# Patient Record
Sex: MALE | Race: WHITE | NOT HISPANIC OR LATINO | Employment: OTHER | URBAN - METROPOLITAN AREA
[De-identification: names, ages, dates, MRNs, and addresses within clinical notes are randomized per-mention and may not be internally consistent; named-entity substitution may affect disease eponyms.]

---

## 2018-01-22 ENCOUNTER — TRANSCRIBE ORDERS (OUTPATIENT)
Dept: ADMINISTRATIVE | Facility: HOSPITAL | Age: 63
End: 2018-01-22

## 2018-01-22 DIAGNOSIS — R10.84 ABDOMINAL PAIN, GENERALIZED: Primary | ICD-10-CM

## 2018-01-23 ENCOUNTER — HOSPITAL ENCOUNTER (OUTPATIENT)
Dept: CT IMAGING | Facility: HOSPITAL | Age: 63
Discharge: HOME | End: 2018-01-23
Attending: FAMILY MEDICINE | Admitting: FAMILY MEDICINE

## 2018-01-23 DIAGNOSIS — R10.84 ABDOMINAL PAIN, GENERALIZED: ICD-10-CM

## 2018-01-23 PROCEDURE — 74176 CT ABD & PELVIS W/O CONTRAST: CPT

## 2018-01-25 ENCOUNTER — APPOINTMENT (OUTPATIENT)
Dept: CT IMAGING | Facility: HOSPITAL | Age: 63
End: 2018-01-25
Attending: FAMILY MEDICINE

## 2018-03-26 ENCOUNTER — HOSPITAL ENCOUNTER (OUTPATIENT)
Dept: GENERAL RADIOLOGY | Facility: HOSPITAL | Age: 63
Discharge: HOME | End: 2018-03-26
Attending: FAMILY MEDICINE | Admitting: FAMILY MEDICINE

## 2018-03-26 ENCOUNTER — TRANSCRIBE ORDERS (OUTPATIENT)
Dept: ADMINISTRATIVE | Facility: HOSPITAL | Age: 63
End: 2018-03-26

## 2018-03-26 DIAGNOSIS — R06.00 DYSPNEA, UNSPECIFIED TYPE: Primary | ICD-10-CM

## 2018-03-26 PROCEDURE — 71046 X-RAY EXAM CHEST 2 VIEWS: CPT

## 2018-10-29 ENCOUNTER — LAB (OUTPATIENT)
Dept: LAB | Facility: HOSPITAL | Age: 63
End: 2018-10-29

## 2018-10-29 ENCOUNTER — TRANSCRIBE ORDERS (OUTPATIENT)
Dept: LAB | Facility: HOSPITAL | Age: 63
End: 2018-10-29

## 2018-10-29 DIAGNOSIS — N18.9 CHRONIC KIDNEY DISEASE, UNSPECIFIED CKD STAGE: ICD-10-CM

## 2018-10-29 DIAGNOSIS — N18.9 CHRONIC KIDNEY DISEASE, UNSPECIFIED CKD STAGE: Primary | ICD-10-CM

## 2018-10-29 LAB
ALBUMIN SERPL-MCNC: 3.87 G/DL (ref 3.2–4.8)
ANION GAP SERPL CALCULATED.3IONS-SCNC: 7 MMOL/L (ref 3–11)
BACTERIA UR QL AUTO: ABNORMAL /HPF
BASOPHILS # BLD AUTO: 0.03 10*3/MM3 (ref 0–0.2)
BASOPHILS NFR BLD AUTO: 0.4 % (ref 0–1)
BILIRUB UR QL STRIP: NEGATIVE
BUN BLD-MCNC: 26 MG/DL (ref 9–23)
BUN/CREAT SERPL: 13.8 (ref 7–25)
CALCIUM SPEC-SCNC: 9.2 MG/DL (ref 8.7–10.4)
CHLORIDE SERPL-SCNC: 105 MMOL/L (ref 99–109)
CLARITY UR: CLEAR
CO2 SERPL-SCNC: 27 MMOL/L (ref 20–31)
COLOR UR: YELLOW
CREAT BLD-MCNC: 1.88 MG/DL (ref 0.6–1.3)
CREAT UR-MCNC: 127.2 MG/DL
DEPRECATED RDW RBC AUTO: 45.3 FL (ref 37–54)
EOSINOPHIL # BLD AUTO: 0.07 10*3/MM3 (ref 0–0.3)
EOSINOPHIL NFR BLD AUTO: 0.9 % (ref 0–3)
ERYTHROCYTE [DISTWIDTH] IN BLOOD BY AUTOMATED COUNT: 14 % (ref 11.3–14.5)
GFR SERPL CREATININE-BSD FRML MDRD: 36 ML/MIN/1.73
GLUCOSE BLD-MCNC: 188 MG/DL (ref 70–100)
GLUCOSE UR STRIP-MCNC: ABNORMAL MG/DL
HCT VFR BLD AUTO: 41.6 % (ref 38.9–50.9)
HGB BLD-MCNC: 13.6 G/DL (ref 13.1–17.5)
HGB UR QL STRIP.AUTO: ABNORMAL
HYALINE CASTS UR QL AUTO: ABNORMAL /LPF
IMM GRANULOCYTES # BLD: 0.02 10*3/MM3 (ref 0–0.03)
IMM GRANULOCYTES NFR BLD: 0.3 % (ref 0–0.6)
KETONES UR QL STRIP: NEGATIVE
LEUKOCYTE ESTERASE UR QL STRIP.AUTO: ABNORMAL
LYMPHOCYTES # BLD AUTO: 1.26 10*3/MM3 (ref 0.6–4.8)
LYMPHOCYTES NFR BLD AUTO: 15.9 % (ref 24–44)
MCH RBC QN AUTO: 29.5 PG (ref 27–31)
MCHC RBC AUTO-ENTMCNC: 32.7 G/DL (ref 32–36)
MCV RBC AUTO: 90.2 FL (ref 80–99)
MONOCYTES # BLD AUTO: 0.54 10*3/MM3 (ref 0–1)
MONOCYTES NFR BLD AUTO: 6.8 % (ref 0–12)
NEUTROPHILS # BLD AUTO: 6.01 10*3/MM3 (ref 1.5–8.3)
NEUTROPHILS NFR BLD AUTO: 76 % (ref 41–71)
NITRITE UR QL STRIP: NEGATIVE
PH UR STRIP.AUTO: <=5 [PH] (ref 5–8)
PHOSPHATE SERPL-MCNC: 2.8 MG/DL (ref 2.4–5.1)
PLAT MORPH BLD: NORMAL
PLATELET # BLD AUTO: 128 10*3/MM3 (ref 150–450)
PMV BLD AUTO: 10 FL (ref 6–12)
POTASSIUM BLD-SCNC: 4.4 MMOL/L (ref 3.5–5.5)
PROT UR QL STRIP: NEGATIVE
PROT UR-MCNC: 12 MG/DL (ref 1–14)
RBC # BLD AUTO: 4.61 10*6/MM3 (ref 4.2–5.76)
RBC # UR: ABNORMAL /HPF
RBC MORPH BLD: NORMAL
REF LAB TEST METHOD: ABNORMAL
SODIUM BLD-SCNC: 139 MMOL/L (ref 132–146)
SP GR UR STRIP: 1.02 (ref 1–1.03)
SQUAMOUS #/AREA URNS HPF: ABNORMAL /HPF
UROBILINOGEN UR QL STRIP: ABNORMAL
WBC MORPH BLD: NORMAL
WBC NRBC COR # BLD: 7.91 10*3/MM3 (ref 3.5–10.8)
WBC UR QL AUTO: ABNORMAL /HPF

## 2018-10-29 PROCEDURE — 81001 URINALYSIS AUTO W/SCOPE: CPT

## 2018-10-29 PROCEDURE — 84156 ASSAY OF PROTEIN URINE: CPT

## 2018-10-29 PROCEDURE — 80069 RENAL FUNCTION PANEL: CPT

## 2018-10-29 PROCEDURE — 85007 BL SMEAR W/DIFF WBC COUNT: CPT

## 2018-10-29 PROCEDURE — 36415 COLL VENOUS BLD VENIPUNCTURE: CPT

## 2018-10-29 PROCEDURE — 85025 COMPLETE CBC W/AUTO DIFF WBC: CPT

## 2018-10-29 PROCEDURE — 82570 ASSAY OF URINE CREATININE: CPT

## 2019-01-25 ENCOUNTER — TRANSCRIBE ORDERS (OUTPATIENT)
Dept: LAB | Facility: HOSPITAL | Age: 64
End: 2019-01-25

## 2019-01-25 ENCOUNTER — LAB (OUTPATIENT)
Dept: LAB | Facility: HOSPITAL | Age: 64
End: 2019-01-25

## 2019-01-25 DIAGNOSIS — N18.30 CHRONIC KIDNEY DISEASE, STAGE III (MODERATE) (HCC): ICD-10-CM

## 2019-01-25 DIAGNOSIS — N18.30 CHRONIC KIDNEY DISEASE, STAGE III (MODERATE) (HCC): Primary | ICD-10-CM

## 2019-01-25 LAB
25(OH)D3 SERPL-MCNC: 18.3 NG/ML
ALBUMIN SERPL-MCNC: 4.04 G/DL (ref 3.2–4.8)
ANION GAP SERPL CALCULATED.3IONS-SCNC: 6 MMOL/L (ref 3–11)
BUN BLD-MCNC: 29 MG/DL (ref 9–23)
BUN/CREAT SERPL: 15.7 (ref 7–25)
CALCIUM SPEC-SCNC: 9 MG/DL (ref 8.7–10.4)
CHLORIDE SERPL-SCNC: 105 MMOL/L (ref 99–109)
CO2 SERPL-SCNC: 27 MMOL/L (ref 20–31)
CREAT BLD-MCNC: 1.85 MG/DL (ref 0.6–1.3)
CREAT UR-MCNC: 140.5 MG/DL
EOSINOPHIL SPEC QL MICRO: 1 % EOS/100 CELLS (ref 0–0)
GFR SERPL CREATININE-BSD FRML MDRD: 37 ML/MIN/1.73
GLUCOSE BLD-MCNC: 292 MG/DL (ref 70–100)
PHOSPHATE SERPL-MCNC: 2.9 MG/DL (ref 2.4–5.1)
POTASSIUM BLD-SCNC: 4.2 MMOL/L (ref 3.5–5.5)
PTH-INTACT SERPL-MCNC: 38 PG/ML (ref 11–80)
SODIUM BLD-SCNC: 138 MMOL/L (ref 132–146)

## 2019-01-25 PROCEDURE — 80069 RENAL FUNCTION PANEL: CPT | Performed by: INTERNAL MEDICINE

## 2019-01-25 PROCEDURE — 87205 SMEAR GRAM STAIN: CPT

## 2019-01-25 PROCEDURE — 82043 UR ALBUMIN QUANTITATIVE: CPT

## 2019-01-25 PROCEDURE — 82570 ASSAY OF URINE CREATININE: CPT | Performed by: INTERNAL MEDICINE

## 2019-01-25 PROCEDURE — 83970 ASSAY OF PARATHORMONE: CPT

## 2019-01-25 PROCEDURE — 36415 COLL VENOUS BLD VENIPUNCTURE: CPT | Performed by: INTERNAL MEDICINE

## 2019-01-25 PROCEDURE — 82306 VITAMIN D 25 HYDROXY: CPT | Performed by: INTERNAL MEDICINE

## 2019-02-08 LAB — MICROALBUMIN UR-MCNC: 15.5 UG/ML

## 2019-02-26 ENCOUNTER — TRANSCRIBE ORDERS (OUTPATIENT)
Dept: ADMINISTRATIVE | Facility: HOSPITAL | Age: 64
End: 2019-02-26

## 2019-02-26 ENCOUNTER — HOSPITAL ENCOUNTER (OUTPATIENT)
Dept: CARDIOLOGY | Facility: HOSPITAL | Age: 64
Discharge: HOME | End: 2019-02-26
Admitting: INTERNAL MEDICINE

## 2019-02-26 ENCOUNTER — HOSPITAL ENCOUNTER (OUTPATIENT)
Dept: CARDIOLOGY | Facility: HOSPITAL | Age: 64
Discharge: HOME | End: 2019-02-26

## 2019-02-26 VITALS
SYSTOLIC BLOOD PRESSURE: 144 MMHG | DIASTOLIC BLOOD PRESSURE: 82 MMHG | WEIGHT: 205 LBS | HEIGHT: 70 IN | BODY MASS INDEX: 29.35 KG/M2

## 2019-02-26 VITALS — BODY MASS INDEX: 28.7 KG/M2 | WEIGHT: 205 LBS | HEIGHT: 71 IN

## 2019-02-26 DIAGNOSIS — R90.89 ABNORMAL BRAIN MRI: Primary | ICD-10-CM

## 2019-02-26 DIAGNOSIS — G45.8 OTHER SPECIFIED TRANSIENT CEREBRAL ISCHEMIAS: Primary | ICD-10-CM

## 2019-02-26 DIAGNOSIS — G45.8 OTHER SPECIFIED TRANSIENT CEREBRAL ISCHEMIAS: ICD-10-CM

## 2019-02-26 LAB
BH CV ECHO MEAS - AO MAX PG (FULL): 3 MMHG
BH CV ECHO MEAS - AO MAX PG: 6.7 MMHG
BH CV ECHO MEAS - AO ROOT AREA (BSA CORRECTED): 1.9
BH CV ECHO MEAS - AO ROOT AREA: 12.1 CM^2
BH CV ECHO MEAS - AO ROOT DIAM: 3.9 CM
BH CV ECHO MEAS - AO V2 MAX: 129.5 CM/SEC
BH CV ECHO MEAS - AVA(V,A): 3.2 CM^2
BH CV ECHO MEAS - AVA(V,D): 3.2 CM^2
BH CV ECHO MEAS - BSA(HAYCOCK): 2.2 M^2
BH CV ECHO MEAS - BSA: 2.1 M^2
BH CV ECHO MEAS - BZI_BMI: 29.4 KILOGRAMS/M^2
BH CV ECHO MEAS - BZI_METRIC_HEIGHT: 177.8 CM
BH CV ECHO MEAS - BZI_METRIC_WEIGHT: 93 KG
BH CV ECHO MEAS - EDV(CUBED): 213.9 ML
BH CV ECHO MEAS - EDV(TEICH): 178.7 ML
BH CV ECHO MEAS - EF(CUBED): 55.7 %
BH CV ECHO MEAS - EF(TEICH): 46.6 %
BH CV ECHO MEAS - ESV(CUBED): 94.8 ML
BH CV ECHO MEAS - ESV(TEICH): 95.3 ML
BH CV ECHO MEAS - FS: 23.8 %
BH CV ECHO MEAS - IVS/LVPW: 0.89
BH CV ECHO MEAS - IVSD: 1.3 CM
BH CV ECHO MEAS - LA DIMENSION: 4.7 CM
BH CV ECHO MEAS - LA/AO: 1.2
BH CV ECHO MEAS - LAD MAJOR: 6.6 CM
BH CV ECHO MEAS - LAT PEAK E' VEL: 6 CM/SEC
BH CV ECHO MEAS - LATERAL E/E' RATIO: 7.9
BH CV ECHO MEAS - LV MASS(C)D: 387.4 GRAMS
BH CV ECHO MEAS - LV MASS(C)DI: 183.6 GRAMS/M^2
BH CV ECHO MEAS - LV MAX PG: 3.7 MMHG
BH CV ECHO MEAS - LV MEAN PG: 2 MMHG
BH CV ECHO MEAS - LV V1 MAX: 96.4 CM/SEC
BH CV ECHO MEAS - LV V1 MEAN: 68 CM/SEC
BH CV ECHO MEAS - LV V1 VTI: 23.2 CM
BH CV ECHO MEAS - LVIDD: 6 CM
BH CV ECHO MEAS - LVIDS: 4.6 CM
BH CV ECHO MEAS - LVOT AREA (M): 4.2 CM^2
BH CV ECHO MEAS - LVOT AREA: 4.2 CM^2
BH CV ECHO MEAS - LVOT DIAM: 2.3 CM
BH CV ECHO MEAS - LVPWD: 1.5 CM
BH CV ECHO MEAS - MED PEAK E' VEL: 4.6 CM/SEC
BH CV ECHO MEAS - MEDIAL E/E' RATIO: 10.2
BH CV ECHO MEAS - MV A MAX VEL: 77.9 CM/SEC
BH CV ECHO MEAS - MV DEC TIME: 0.28 SEC
BH CV ECHO MEAS - MV E MAX VEL: 48.6 CM/SEC
BH CV ECHO MEAS - MV E/A: 0.62
BH CV ECHO MEAS - PA MAX PG: 2.7 MMHG
BH CV ECHO MEAS - PA V2 MAX: 82.1 CM/SEC
BH CV ECHO MEAS - PI END-D VEL: 107.5 CM/SEC
BH CV ECHO MEAS - RAP SYSTOLE: 3 MMHG
BH CV ECHO MEAS - RVSP: 37 MMHG
BH CV ECHO MEAS - SI(CUBED): 56.5 ML/M^2
BH CV ECHO MEAS - SI(LVOT): 46.6 ML/M^2
BH CV ECHO MEAS - SI(TEICH): 39.5 ML/M^2
BH CV ECHO MEAS - SV(CUBED): 119.1 ML
BH CV ECHO MEAS - SV(LVOT): 98.4 ML
BH CV ECHO MEAS - SV(TEICH): 83.3 ML
BH CV ECHO MEAS - TAPSE (>1.6): 1.5 CM2
BH CV ECHO MEAS - TR MAX PG: 34 MMHG
BH CV ECHO MEAS - TR MAX VEL: 289.4 CM/SEC
BH CV ECHO MEASUREMENTS AVERAGE E/E' RATIO: 9.17
BH CV VAS BP LEFT ARM: NORMAL MMHG
BH CV XLRA - RV BASE: 3 CM
BH CV XLRA - RV LENGTH: 7.8 CM
BH CV XLRA - RV MID: 2.4 CM
BH CV XLRA - TDI S': 12 CM/SEC
LEFT ATRIUM VOLUME INDEX: 34.1 ML/M^2
LEFT ATRIUM VOLUME: 72 ML
LV EF 2D ECHO EST: 55 %
MAXIMAL PREDICTED HEART RATE: 155 BPM
STRESS TARGET HR: 132 BPM

## 2019-02-26 PROCEDURE — 93306 TTE W/DOPPLER COMPLETE: CPT | Performed by: INTERNAL MEDICINE

## 2019-02-26 PROCEDURE — 93306 TTE W/DOPPLER COMPLETE: CPT

## 2019-02-26 PROCEDURE — 93880 EXTRACRANIAL BILAT STUDY: CPT | Performed by: INTERNAL MEDICINE

## 2019-02-26 PROCEDURE — 93880 EXTRACRANIAL BILAT STUDY: CPT

## 2019-02-26 PROCEDURE — 25010000002 SULFUR HEXAFLUORIDE MICROSPH 60.7-25 MG RECONSTITUTED SUSPENSION: Performed by: INTERNAL MEDICINE

## 2019-02-26 RX ADMIN — SULFUR HEXAFLUORIDE 5 ML: KIT at 16:15

## 2019-02-27 ENCOUNTER — APPOINTMENT (OUTPATIENT)
Dept: CARDIOLOGY | Facility: HOSPITAL | Age: 64
End: 2019-02-27

## 2019-02-27 LAB
BH CV ECHO MEAS - BSA(HAYCOCK): 2.2 M^2
BH CV ECHO MEAS - BSA: 2.1 M^2
BH CV ECHO MEAS - BZI_BMI: 29.4 KILOGRAMS/M^2
BH CV ECHO MEAS - BZI_METRIC_HEIGHT: 177.8 CM
BH CV ECHO MEAS - BZI_METRIC_WEIGHT: 93 KG
BH CV XLRA MEAS LEFT CCA RATIO VEL: 64.9 CM/SEC
BH CV XLRA MEAS LEFT DIST CCA EDV: 20.3 CM/SEC
BH CV XLRA MEAS LEFT DIST CCA PSV: 65.6 CM/SEC
BH CV XLRA MEAS LEFT DIST ICA EDV: 25 CM/SEC
BH CV XLRA MEAS LEFT DIST ICA PSV: 63.3 CM/SEC
BH CV XLRA MEAS LEFT ICA RATIO VEL: 83.5 CM/SEC
BH CV XLRA MEAS LEFT ICA/CCA RATIO: 1.3
BH CV XLRA MEAS LEFT MID CCA EDV: 23.7 CM/SEC
BH CV XLRA MEAS LEFT MID CCA PSV: 85.9 CM/SEC
BH CV XLRA MEAS LEFT MID ICA EDV: 30 CM/SEC
BH CV XLRA MEAS LEFT MID ICA PSV: 84 CM/SEC
BH CV XLRA MEAS LEFT PROX CCA EDV: 22.3 CM/SEC
BH CV XLRA MEAS LEFT PROX CCA PSV: 88.4 CM/SEC
BH CV XLRA MEAS LEFT PROX ECA PSV: 82.5 CM/SEC
BH CV XLRA MEAS LEFT PROX ICA EDV: 14.1 CM/SEC
BH CV XLRA MEAS LEFT PROX ICA PSV: 52.6 CM/SEC
BH CV XLRA MEAS LEFT PROX SCLA PSV: 84.3 CM/SEC
BH CV XLRA MEAS LEFT VERTEBRAL A EDV: 11.8 CM/SEC
BH CV XLRA MEAS LEFT VERTEBRAL A PSV: 43.7 CM/SEC
BH CV XLRA MEAS RIGHT CCA RATIO VEL: 51.3 CM/SEC
BH CV XLRA MEAS RIGHT DIST CCA EDV: 14.3 CM/SEC
BH CV XLRA MEAS RIGHT DIST CCA PSV: 51.8 CM/SEC
BH CV XLRA MEAS RIGHT DIST ICA EDV: 27.9 CM/SEC
BH CV XLRA MEAS RIGHT DIST ICA PSV: 71.2 CM/SEC
BH CV XLRA MEAS RIGHT ICA RATIO VEL: 55 CM/SEC
BH CV XLRA MEAS RIGHT ICA/CCA RATIO: 1.1
BH CV XLRA MEAS RIGHT MID CCA EDV: 18.2 CM/SEC
BH CV XLRA MEAS RIGHT MID CCA PSV: 61.8 CM/SEC
BH CV XLRA MEAS RIGHT MID ICA EDV: 22.2 CM/SEC
BH CV XLRA MEAS RIGHT MID ICA PSV: 55.4 CM/SEC
BH CV XLRA MEAS RIGHT PROX CCA EDV: 16.5 CM/SEC
BH CV XLRA MEAS RIGHT PROX CCA PSV: 74.4 CM/SEC
BH CV XLRA MEAS RIGHT PROX ECA PSV: 79.8 CM/SEC
BH CV XLRA MEAS RIGHT PROX ICA EDV: 13.3 CM/SEC
BH CV XLRA MEAS RIGHT PROX ICA PSV: 32.8 CM/SEC
BH CV XLRA MEAS RIGHT PROX SCLA PSV: 82.5 CM/SEC
BH CV XLRA MEAS RIGHT VERTEBRAL A EDV: 14.9 CM/SEC
BH CV XLRA MEAS RIGHT VERTEBRAL A PSV: 44.1 CM/SEC

## 2019-08-09 ENCOUNTER — CONSULT (OUTPATIENT)
Dept: SLEEP MEDICINE | Facility: HOSPITAL | Age: 64
End: 2019-08-09

## 2019-08-09 VITALS
OXYGEN SATURATION: 93 % | DIASTOLIC BLOOD PRESSURE: 81 MMHG | BODY MASS INDEX: 29.12 KG/M2 | SYSTOLIC BLOOD PRESSURE: 152 MMHG | WEIGHT: 208 LBS | HEART RATE: 86 BPM | HEIGHT: 71 IN

## 2019-08-09 DIAGNOSIS — R06.83 SNORING: Primary | ICD-10-CM

## 2019-08-09 DIAGNOSIS — G47.33 OBSTRUCTIVE SLEEP APNEA, ADULT: ICD-10-CM

## 2019-08-09 DIAGNOSIS — E66.3 OVERWEIGHT: ICD-10-CM

## 2019-08-09 PROBLEM — I10 HYPERTENSION: Status: ACTIVE | Noted: 2019-08-09

## 2019-08-09 PROBLEM — E11.9 DIABETES MELLITUS (HCC): Status: ACTIVE | Noted: 2019-08-09

## 2019-08-09 PROCEDURE — 99203 OFFICE O/P NEW LOW 30 MIN: CPT | Performed by: INTERNAL MEDICINE

## 2019-08-09 RX ORDER — PREDNISONE 10 MG/1
10 TABLET ORAL DAILY
COMMUNITY

## 2019-08-09 RX ORDER — DICYCLOMINE HCL 20 MG
20 TABLET ORAL
COMMUNITY
End: 2021-01-01 | Stop reason: HOSPADM

## 2019-08-09 RX ORDER — RANITIDINE 150 MG/1
TABLET ORAL
COMMUNITY
End: 2021-01-01 | Stop reason: HOSPADM

## 2019-08-09 RX ORDER — ATORVASTATIN CALCIUM 40 MG/1
40 TABLET, FILM COATED ORAL DAILY
Refills: 1 | COMMUNITY
Start: 2019-06-06

## 2019-08-10 NOTE — PROGRESS NOTES
Subjective   Jeremías Soto is a 64 y.o. male is being seen for consultation today at the request of DANK Manriquez MD for the evaluation of snoring and possible sleep disordered breathing.  He is also seen by Dr. Ana Palm    History of Present Illness  Patient apparently recently had a retinal evaluation with Dr. Palm was found to have changes consistent with obstructive sleep apnea.  He is referred for evaluation.  His wife says that he has had slight snoring noted for 10 years.  She has not noted him to have apneas.  He denies awakening gasping for breath.  He says he is not rested in the morning.  He denies morning headaches.  He will fall asleep if sitting quietly during the day.  He denies any problems getting sleepy while driving.    He says he has only slight snoring.  It is worse on his back his wife states.  He denies waking with a dry mouth, gasping, coughing, or with a sore throat.  He denies having trouble breathing through his nose.  He did think that he broke his nose previously but did not have surgery.  He has occasional reflux and is on medications.  He denies hypnagogic hallucination or sleep paralysis.  He has occasional kicking of his legs at night but denies having chronic pain.  He denies any recent weight change.    He goes to bed about 10 PM.  He will fall asleep immediately.  He awakens 4-5 times during the night.  He thinks he gets 8 hours of sleep and feels fairly rested sometimes although not at others.  He has a history of diabetes known for 10 years.  He has had hypertension known for 10 years.  He has history of coronary artery disease with his first MI in 2000.  He has had cardiac surgery as well as stents.  No Known Allergies       Current Outpatient Medications:   •  apixaban (ELIQUIS) 5 MG tablet tablet, Take 5 mg by mouth 2 (Two) Times a Day., Disp: , Rfl:   •  atorvastatin (LIPITOR) 40 MG tablet, Take 40 mg by mouth Daily., Disp: , Rfl: 1  •  dicyclomine  (BENTYL) 20 MG tablet, dicyclomine, Disp: , Rfl:   •  metFORMIN (GLUCOPHAGE) 500 MG tablet, Take 500 mg by mouth 2 (Two) Times a Day., Disp: , Rfl: 1  •  metoprolol tartrate (LOPRESSOR) 25 MG tablet, Take 25 mg by mouth 2 (Two) Times a Day., Disp: , Rfl: 2  •  predniSONE (DELTASONE) 10 MG tablet, prednisone, Disp: , Rfl:   •  raNITIdine (ZANTAC) 150 MG tablet, Zantac Maximum Strength 150 mg tablet  Daily, Disp: , Rfl:     Social History    Tobacco Use      Smoking status: Never Smoker      Smokeless tobacco: Never Used       Social History     Substance and Sexual Activity   Alcohol Use No   • Frequency: Never       Caffeine: He has 3-4 caffeinated soft drinks per day    Past Medical History:   Diagnosis Date   • Diabetes mellitus (CMS/HCC)    • Heart attack (CMS/HCC)    • Hypertension    • Sarcoid        Past Surgical History:   Procedure Laterality Date   • CATARACT EXTRACTION, BILATERAL     • CORONARY ARTERY BYPASS GRAFT     • SKIN CANCER EXCISION         Family History   Problem Relation Age of Onset   • COPD Mother    • Diabetes Father    • Heart disease Father    • Hypertension Father    • Diabetes Sister    • Obesity Sister    • Cancer Brother        The following portions of the patient's history were reviewed and updated as appropriate: allergies, current medications, past family history, past medical history, past social history, past surgical history and problem list.    Review of Systems   Constitutional: Positive for fatigue.   HENT: Positive for ear discharge, ear pain, hearing loss and tinnitus.         He has dentures.   Eyes: Positive for visual disturbance.   Respiratory: Positive for shortness of breath.    Cardiovascular: Positive for chest pain and leg swelling.   Gastrointestinal: Positive for diarrhea.   Endocrine: Positive for cold intolerance, heat intolerance, polydipsia and polyuria.   Genitourinary: Positive for frequency.   Musculoskeletal: Negative.    Skin: Negative.   "  Allergic/Immunologic: Negative.    Neurological: Positive for dizziness and light-headedness.        Has a history of stroke   Hematological: Negative.    Psychiatric/Behavioral: Positive for dysphoric mood.   [place] score is 14/24    Objective     /81   Pulse 86   Ht 180.3 cm (71\")   Wt 94.3 kg (208 lb)   SpO2 93%   BMI 29.01 kg/m²      Physical Exam   Constitutional: He is oriented to person, place, and time. He appears well-developed and well-nourished.   He is overweight.   HENT:   Head: Normocephalic and atraumatic.   He has Mallampati class IV anatomy.   Eyes: EOM are normal. Pupils are equal, round, and reactive to light.   Neck: Normal range of motion. Neck supple.   Cardiovascular: Normal rate and normal heart sounds.   He has occasional ectopic beat   Pulmonary/Chest: Effort normal and breath sounds normal.   Abdominal: Soft. Bowel sounds are normal.   Musculoskeletal: Normal range of motion. He exhibits edema.   He has 2+ pedal edema   Neurological: He is alert and oriented to person, place, and time.   Skin: Skin is warm and dry.   Psychiatric: He has a normal mood and affect. His behavior is normal.         Assessment/Plan   Jeremías was seen today for sleeping problem.    Diagnoses and all orders for this visit:    Snoring  -     Polysomnography 4 or More Parameters; Future    Obstructive sleep apnea, adult  -     Polysomnography 4 or More Parameters; Future    Overweight    Patient has been found to have retinal changes suggesting obstructive sleep apnea.  He apparently has some slight snoring and nonrestorative sleep.  We will plan to proceed to polysomnogram.  I discussed possible therapies including CPAP, weight control, oral appliance, and surgery.  We have also discussed the long-term consequences of untreated obstructive sleep apnea.  He is encouraged to lose weight.  He is encouraged to avoid alcohol and sedatives close to bedtime.  He is encouraged to practice lateral position " sleep.         Lambert Burciaga MD [facility]  Sleep Medicine  Pulmonary and Critical Care Medicine

## 2019-10-09 ENCOUNTER — HOSPITAL ENCOUNTER (OUTPATIENT)
Dept: SLEEP MEDICINE | Facility: HOSPITAL | Age: 64
Discharge: HOME | End: 2019-10-09
Admitting: INTERNAL MEDICINE

## 2019-10-09 VITALS
OXYGEN SATURATION: 95 % | DIASTOLIC BLOOD PRESSURE: 88 MMHG | SYSTOLIC BLOOD PRESSURE: 159 MMHG | BODY MASS INDEX: 29.44 KG/M2 | HEART RATE: 69 BPM | HEIGHT: 71 IN | WEIGHT: 210.32 LBS

## 2019-10-09 DIAGNOSIS — G47.33 OBSTRUCTIVE SLEEP APNEA, ADULT: ICD-10-CM

## 2019-10-09 DIAGNOSIS — R06.83 SNORING: ICD-10-CM

## 2019-10-09 PROCEDURE — 95810 POLYSOM 6/> YRS 4/> PARAM: CPT | Performed by: INTERNAL MEDICINE

## 2019-10-09 PROCEDURE — 95810 POLYSOM 6/> YRS 4/> PARAM: CPT

## 2019-10-10 DIAGNOSIS — G47.33 MODERATE OBSTRUCTIVE SLEEP APNEA: Primary | ICD-10-CM

## 2019-10-10 DIAGNOSIS — R06.83 SNORING: ICD-10-CM

## 2019-10-17 ENCOUNTER — OFFICE VISIT (OUTPATIENT)
Dept: SLEEP MEDICINE | Facility: HOSPITAL | Age: 64
End: 2019-10-17

## 2019-10-17 VITALS
RESPIRATION RATE: 16 BRPM | WEIGHT: 211 LBS | BODY MASS INDEX: 30.21 KG/M2 | TEMPERATURE: 98.8 F | HEART RATE: 67 BPM | OXYGEN SATURATION: 95 % | HEIGHT: 70 IN | SYSTOLIC BLOOD PRESSURE: 142 MMHG | DIASTOLIC BLOOD PRESSURE: 82 MMHG

## 2019-10-17 DIAGNOSIS — G47.34 NOCTURNAL HYPOXEMIA: ICD-10-CM

## 2019-10-17 DIAGNOSIS — G47.33 MODERATE OBSTRUCTIVE SLEEP APNEA: ICD-10-CM

## 2019-10-17 DIAGNOSIS — G47.61 PLMD (PERIODIC LIMB MOVEMENT DISORDER): Primary | ICD-10-CM

## 2019-10-17 PROCEDURE — 99213 OFFICE O/P EST LOW 20 MIN: CPT | Performed by: NURSE PRACTITIONER

## 2019-10-17 RX ORDER — ROPINIROLE 0.25 MG/1
0.25 TABLET, FILM COATED ORAL NIGHTLY
Qty: 30 TABLET | Refills: 3 | Status: SHIPPED | OUTPATIENT
Start: 2019-10-17

## 2019-10-17 NOTE — PROGRESS NOTES
"  Chief Complaint:   Chief Complaint   Patient presents with   • Sleeping Problem     Results follow up        HPI:    Jeremías Soto is a 64 y.o. male here for follow-up of sleep study results.  Patient was seen here 8/9/2019 and consult with Dr. Lambert Burciaga .  Patient was referred here after retinal changes were seen on eye exam.  Patient states he has nonrestorative sleep and snoring.  Patient is sleeping 8 hours nightly and feels \"normal\" upon awakening.  Patient has an [place] score of 14/24.  Patient had a sleep study 10/9/2019 did show moderate obstructive sleep apnea, PLMD, nocturnal hypoxemia.  We have discussed the consequences of untreated sleep apnea as well as different therapies available to him.  Patient is willing to initiate CPAP therapy as well as Requip.        Current medications are:   Current Outpatient Medications:   •  apixaban (ELIQUIS) 5 MG tablet tablet, Take 5 mg by mouth 2 (Two) Times a Day., Disp: , Rfl:   •  atorvastatin (LIPITOR) 40 MG tablet, Take 40 mg by mouth Daily., Disp: , Rfl: 1  •  dicyclomine (BENTYL) 20 MG tablet, dicyclomine, Disp: , Rfl:   •  metFORMIN (GLUCOPHAGE) 500 MG tablet, Take 500 mg by mouth 2 (Two) Times a Day., Disp: , Rfl: 1  •  metoprolol tartrate (LOPRESSOR) 25 MG tablet, Take 25 mg by mouth 2 (Two) Times a Day., Disp: , Rfl: 2  •  predniSONE (DELTASONE) 10 MG tablet, prednisone, Disp: , Rfl:   •  raNITIdine (ZANTAC) 150 MG tablet, Zantac Maximum Strength 150 mg tablet  Daily, Disp: , Rfl:   •  rOPINIRole (REQUIP) 0.25 MG tablet, Take 1 tablet by mouth Every Night. Take 1 hour before bedtime., Disp: 30 tablet, Rfl: 3.      The patient's relevant past medical, surgical, family and social history were reviewed and updated in Epic as appropriate.       Review of Systems   Constitutional: Positive for fatigue.   HENT: Positive for hearing loss and tinnitus.    Eyes: Positive for visual disturbance.   Respiratory: Positive for apnea and shortness of breath.  "   Cardiovascular: Positive for chest pain and leg swelling.   Gastrointestinal: Positive for diarrhea.   Endocrine: Positive for cold intolerance, heat intolerance, polydipsia and polyuria.   Genitourinary: Positive for frequency.   Neurological: Positive for dizziness and light-headedness.   Psychiatric/Behavioral: Positive for dysphoric mood and sleep disturbance.   All other systems reviewed and are negative.        Objective:    Physical Exam   Constitutional: He is oriented to person, place, and time. He appears well-developed and well-nourished.   HENT:   Head: Normocephalic and atraumatic.   Mouth/Throat: Oropharynx is clear and moist.   Mallampati 4 anatomy   Eyes: Conjunctivae are normal.   Neck: Neck supple. No thyromegaly present.   Cardiovascular: Normal rate and regular rhythm.   Pulmonary/Chest: Effort normal and breath sounds normal.   Lymphadenopathy:     He has no cervical adenopathy.   Neurological: He is alert and oriented to person, place, and time.   Skin: Skin is warm and dry.   Psychiatric: He has a normal mood and affect. His behavior is normal. Judgment and thought content normal.   Nursing note and vitals reviewed.        ASSESSMENT/PLAN    Jeremías was seen today for sleeping problem.    Diagnoses and all orders for this visit:    PLMD (periodic limb movement disorder)  -     rOPINIRole (REQUIP) 0.25 MG tablet; Take 1 tablet by mouth Every Night. Take 1 hour before bedtime.    Moderate obstructive sleep apnea    Nocturnal hypoxemia            1. Counseled patient regarding multimodal approach with healthy nutrition, healthy sleep, regular physical activity, social activities, counseling, and medications. Encouraged to practice lateral sleep position. Avoid alcohol and sedatives close to bedtime.  2. Requip 0.25 mg 1 p.o. nightly #30 with 2 refills.  3. Order to initiate CPAP therapy faxed to DME of patient's choosing.  I will see patient back in 31 to 90 days to reassess.  4. At next  appointment if patient is compliant I will order overnight oximetry to reassess nocturnal hypoxemia.    I have reviewed the results of my evaluation and impression and discussed my recommendations in detail with the patient.      Signed by  Geneva Solis, ASHER    October 17, 2019      CC: DANK Manriquez MD          No ref. provider found

## 2019-10-17 NOTE — PATIENT INSTRUCTIONS
Hypoxemia  Hypoxemia occurs when the blood does not contain enough oxygen. The body cannot work well when it does not have enough oxygen because every part of the body needs oxygen. Oxygen enters the lungs when we breathe in, then it travels to all parts of the body through the blood. Hypoxemia can develop suddenly or slowly.  What are the causes?  Common causes of this condition include:  · Long-term (chronic) lung diseases, such as chronic obstructive pulmonary disease (COPD) or interstitial lung disease.  · Disorders that affect breathing at night, such as sleep apnea.  · Fluid buildup in the lungs (pulmonary edema).  · Lung infection (pneumonia).  · Lung or throat cancer.  · Abnormal blood flow that bypasses the lungs (having a shunt).  · Certain diseases that affect nerves or muscles.  · A collapsed lung (pneumothorax).  · A blood clot in the lungs (pulmonary embolus).  · Certain types of heart disease.  · Slow or shallow breathing (hypoventilation).  · Certain medicines.  · High altitudes.  · Toxic chemicals, smoke, and gases.  What are the signs or symptoms?  In some cases, there may be no symptoms of this condition. If you do have symptoms, they may include:  · Shortness of breath (dyspnea).  · Bluish color of the skin, lips, or nail beds.  · Breathing that is fast, noisy, or shallow.  · A fast heartbeat.  · Feeling tired or sleepy.  · Feeling confused or worried.  If hypoxemia develops quickly, you will likely have dyspnea. If hypoxemia develops slowly over months or years, you may not notice any symptoms.  How is this diagnosed?  This condition is diagnosed by:  · A physical exam.  · Blood tests.  · A test that measures the percentage of oxygen in your blood (pulse oximetry). This is done with a sensor that is placed on your finger, toe, or earlobe.  How is this treated?    Treatment for this condition depends on the underlying cause of your hypoxemia. You will likely be treated with oxygen therapy to  restore your blood oxygen level. Depending on the cause of your hypoxemia, you may need oxygen therapy for a short time (weeks or months), or you may need it for the rest of your life.  Your health care provider may also recommend other therapies to treat the underlying cause of your hypoxemia.  Follow these instructions at home:    · Take over-the-counter and prescription medicines only as told by your health care provider.  · If you are on oxygen therapy, follow oxygen safety precautions as directed by your health care provider. These may include:  ? Always having a backup supply of oxygen.  ? Not allowing anyone to smoke or have a fire around oxygen.  ? Handling oxygen tanks carefully and as instructed.  · Do not use any products that contain nicotine or tobacco, such as cigarettes and e-cigarettes. If you need help quitting, ask your health care provider. Stay away from people who smoke.  · Keep all follow-up visits as told by your health care provider. This is important.  Contact a health care provider if:  · You have any concerns about your oxygen therapy.  · You have trouble breathing, even during or after treatment.  · You become short of breath when you exercise.  · You are tired when you wake up.  · You have a headache when you wake up.  Get help right away if:  · Your shortness of breath gets worse, especially with normal or minimal activity.  · You have a bluish color of the skin, lips, or nail beds.  · You become confused or you cannot think properly.  · You cough up dark mucus or blood.  · You have chest pain.  · You have a fever.  Summary  · Hypoxemia occurs when the blood does not contain enough oxygen.  · Hypoxemia may or may not cause symptoms. Often, the main symptom is shortness of breath (dyspnea).  · Depending on the cause of your hypoxemia, you may need oxygen therapy for a short time (weeks or months), or you may need it for the rest of your life.  · If you are on oxygen therapy, follow  oxygen safety precautions as directed by your health care provider.  This information is not intended to replace advice given to you by your health care provider. Make sure you discuss any questions you have with your health care provider.  Document Released: 07/02/2012 Document Revised: 11/21/2017 Document Reviewed: 11/21/2017  [employer] Interactive Patient Education © 2019 [employer]  Restless Legs Syndrome  Restless legs syndrome is a condition that causes uncomfortable feelings or sensations in the legs, especially while sitting or lying down. The sensations usually cause an overwhelming urge to move the legs. The arms can also sometimes be affected.  The condition can range from mild to severe. The symptoms often interfere with a person's ability to sleep.  What are the causes?  The cause of this condition is not known.  What increases the risk?  The following factors may make you more likely to develop this condition:  · Being older than 50.  · Pregnancy.  · Being a woman. In general, the condition is more common in women than in men.  · A family history of the condition.  · Having iron deficiency.  · Overuse of caffeine, nicotine, or alcohol.  · Certain medical conditions, such as kidney disease, Parkinson's disease, or nerve damage.  · Certain medicines, such as those for high blood pressure, nausea, colds, allergies, depression, and some heart conditions.  What are the signs or symptoms?  The main symptom of this condition is uncomfortable sensations in the legs, such as:  · Pulling.  · Tingling.  · Prickling.  · Throbbing.  · Crawling.  · Burning.  Usually, the sensations:  · Affect both sides of the body.  · Are worse when you sit or lie down.  · Are worse at night. These may wake you up or make it difficult to fall asleep.  · Make you have a strong urge to move your legs.  · Are temporarily relieved by moving your legs.  The arms can also be affected, but this is rare. People who have this condition  often have tiredness during the day because of their lack of sleep at night.  How is this diagnosed?  This condition may be diagnosed based on:  · Your symptoms.  · Blood tests.  In some cases, you may be monitored in a sleep lab by a specialist (a sleep study). This can detect any disruptions in your sleep.  How is this treated?  This condition is treated by managing the symptoms. This may include:  · Lifestyle changes, such as exercising, using relaxation techniques, and avoiding caffeine, alcohol, or tobacco.  · Medicines. Anti-seizure medicines may be tried first.  Follow these instructions at home:    General instructions  · Take over-the-counter and prescription medicines only as told by your health care provider.  · Use methods to help relieve the uncomfortable sensations, such as:  ? Massaging your legs.  ? Walking or stretching.  ? Taking a cold or hot bath.  · Keep all follow-up visits as told by your health care provider. This is important.  Lifestyle  · Practice good sleep habits. For example, go to bed and get up at the same time every day. Most adults should get 7-9 hours of sleep each night.  · Exercise regularly. Try to get at least 30 minutes of exercise most days of the week.  · Practice ways of relaxing, such as yoga or meditation.  · Avoid caffeine and alcohol.  · Do not use any products that contain nicotine or tobacco, such as cigarettes and e-cigarettes. If you need help quitting, ask your health care provider.  Contact a health care provider if:  · Your symptoms get worse or they do not improve with treatment.  Summary  · Restless legs syndrome is a condition that causes uncomfortable feelings or sensations in the legs, especially while sitting or lying down.  · The symptoms often interfere with a person's ability to sleep.  · This condition is treated by managing the symptoms. You may need to make lifestyle changes or take medicines.  This information is not intended to replace advice given  to you by your health care provider. Make sure you discuss any questions you have with your health care provider.  Document Released: 12/08/2003 Document Revised: 01/07/2019 Document Reviewed: 01/07/2019  [employer] Interactive Patient Education © 2019 [employer]  Sleep Apnea  Sleep apnea is a condition that affects breathing. People with sleep apnea have moments during sleep when their breathing pauses briefly or gets shallow. Sleep apnea can cause these symptoms:  · Trouble staying asleep.  · Sleepiness or tiredness during the day.  · Irritability.  · Loud snoring.  · Morning headaches.  · Trouble concentrating.  · Forgetting things.  · Less interest in sex.  · Being sleepy for no reason.  · Mood swings.  · Personality changes.  · Depression.  · Waking up a lot during the night to pee (urinate).  · Dry mouth.  · Sore throat.  Follow these instructions at home:    · Make any changes in your routine that your doctor recommends.  · Eat a healthy, well-balanced diet.  · Take over-the-counter and prescription medicines only as told by your doctor.  · Avoid using alcohol, calming medicines (sedatives), and narcotic medicines.  · Take steps to lose weight if you are overweight.  · If you were given a machine (device) to use while you sleep, use it only as told by your doctor.  · Do not use any tobacco products, such as cigarettes, chewing tobacco, and e-cigarettes. If you need help quitting, ask your doctor.  · Keep all follow-up visits as told by your doctor. This is important.  Contact a doctor if:  · The machine that you were given to use during sleep is uncomfortable or does not seem to be working.  · Your symptoms do not get better.  · Your symptoms get worse.  Get help right away if:  · Your chest hurts.  · You have trouble breathing in enough air (shortness of breath).  · You have an uncomfortable feeling in your back, arms, or stomach.  · You have trouble talking.  · One side of your body feels weak.  · A part  of your face is hanging down (drooping).  These symptoms may be an emergency. Do not wait to see if the symptoms will go away. Get medical help right away. Call your local emergency services (911 in the [place] Do not drive yourself to the hospital.  This information is not intended to replace advice given to you by your health care provider. Make sure you discuss any questions you have with your health care provider.  Document Released: 09/26/2009 Document Revised: 07/16/2018 Document Reviewed: 09/26/2016  [employer] Interactive Patient Education © 2019 [employer]

## 2019-11-15 ENCOUNTER — TRANSCRIBE ORDERS (OUTPATIENT)
Dept: CT IMAGING | Facility: HOSPITAL | Age: 64
End: 2019-11-15

## 2019-11-15 DIAGNOSIS — N20.0 STONE, KIDNEY: Primary | ICD-10-CM

## 2020-07-23 ENCOUNTER — TRANSCRIBE ORDERS (OUTPATIENT)
Dept: ADMINISTRATIVE | Facility: HOSPITAL | Age: 65
End: 2020-07-23

## 2020-07-23 DIAGNOSIS — I95.1 ORTHOSTATIC HYPOTENSION: Primary | ICD-10-CM

## 2020-08-19 ENCOUNTER — APPOINTMENT (OUTPATIENT)
Dept: PREADMISSION TESTING | Facility: HOSPITAL | Age: 65
End: 2020-08-19

## 2020-08-19 ENCOUNTER — APPOINTMENT (OUTPATIENT)
Dept: CARDIOLOGY | Facility: HOSPITAL | Age: 65
End: 2020-08-19

## 2020-08-19 LAB
REF LAB TEST METHOD: NORMAL
SARS-COV-2 RNA RESP QL NAA+PROBE: NOT DETECTED

## 2020-08-19 PROCEDURE — U0002 COVID-19 LAB TEST NON-CDC: HCPCS

## 2020-08-19 PROCEDURE — U0004 COV-19 TEST NON-CDC HGH THRU: HCPCS

## 2020-08-19 PROCEDURE — C9803 HOPD COVID-19 SPEC COLLECT: HCPCS

## 2020-08-20 RX ORDER — GLIMEPIRIDE 2 MG/1
2 TABLET ORAL
COMMUNITY

## 2020-08-20 RX ORDER — GLIPIZIDE 5 MG/1
5 TABLET ORAL
COMMUNITY
End: 2020-08-20

## 2020-08-20 RX ORDER — TAMSULOSIN HYDROCHLORIDE 0.4 MG/1
0.4 CAPSULE ORAL DAILY
COMMUNITY

## 2020-08-21 ENCOUNTER — ANESTHESIA (OUTPATIENT)
Dept: PERIOP | Facility: HOSPITAL | Age: 65
End: 2020-08-21

## 2020-08-21 ENCOUNTER — HOSPITAL ENCOUNTER (OUTPATIENT)
Facility: HOSPITAL | Age: 65
Setting detail: HOSPITAL OUTPATIENT SURGERY
Discharge: HOME | End: 2020-08-21
Attending: UROLOGY | Admitting: UROLOGY

## 2020-08-21 ENCOUNTER — ANESTHESIA EVENT (OUTPATIENT)
Dept: PERIOP | Facility: HOSPITAL | Age: 65
End: 2020-08-21

## 2020-08-21 VITALS
DIASTOLIC BLOOD PRESSURE: 85 MMHG | OXYGEN SATURATION: 96 % | HEART RATE: 55 BPM | WEIGHT: 220 LBS | RESPIRATION RATE: 20 BRPM | TEMPERATURE: 97.4 F | SYSTOLIC BLOOD PRESSURE: 169 MMHG | BODY MASS INDEX: 30.8 KG/M2 | HEIGHT: 71 IN

## 2020-08-21 LAB
ANION GAP SERPL CALCULATED.3IONS-SCNC: 10 MMOL/L (ref 5–15)
BUN SERPL-MCNC: 23 MG/DL (ref 8–23)
BUN/CREAT SERPL: 10.9 (ref 7–25)
CALCIUM SPEC-SCNC: 9.2 MG/DL (ref 8.6–10.5)
CHLORIDE SERPL-SCNC: 106 MMOL/L (ref 98–107)
CO2 SERPL-SCNC: 26 MMOL/L (ref 22–29)
CREAT SERPL-MCNC: 2.11 MG/DL (ref 0.76–1.27)
DEPRECATED RDW RBC AUTO: 46.6 FL (ref 37–54)
ERYTHROCYTE [DISTWIDTH] IN BLOOD BY AUTOMATED COUNT: 14.6 % (ref 12.3–15.4)
GFR SERPL CREATININE-BSD FRML MDRD: 31 ML/MIN/1.73
GLUCOSE BLDC GLUCOMTR-MCNC: 102 MG/DL (ref 70–130)
GLUCOSE BLDC GLUCOMTR-MCNC: 95 MG/DL (ref 70–130)
GLUCOSE SERPL-MCNC: 113 MG/DL (ref 65–99)
HCT VFR BLD AUTO: 42.2 % (ref 37.5–51)
HGB BLD-MCNC: 13.6 G/DL (ref 13–17.7)
MCH RBC QN AUTO: 28 PG (ref 26.6–33)
MCHC RBC AUTO-ENTMCNC: 32.2 G/DL (ref 31.5–35.7)
MCV RBC AUTO: 86.8 FL (ref 79–97)
PLATELET # BLD AUTO: 152 10*3/MM3 (ref 140–450)
PMV BLD AUTO: 9.8 FL (ref 6–12)
POTASSIUM SERPL-SCNC: 4 MMOL/L (ref 3.5–5.2)
RBC # BLD AUTO: 4.86 10*6/MM3 (ref 4.14–5.8)
SODIUM SERPL-SCNC: 142 MMOL/L (ref 136–145)
WBC # BLD AUTO: 10.37 10*3/MM3 (ref 3.4–10.8)

## 2020-08-21 PROCEDURE — 25010000002 FENTANYL CITRATE (PF) 100 MCG/2ML SOLUTION: Performed by: ANESTHESIOLOGY

## 2020-08-21 PROCEDURE — 93005 ELECTROCARDIOGRAM TRACING: CPT | Performed by: ANESTHESIOLOGY

## 2020-08-21 PROCEDURE — 82962 GLUCOSE BLOOD TEST: CPT

## 2020-08-21 PROCEDURE — C1769 GUIDE WIRE: HCPCS | Performed by: UROLOGY

## 2020-08-21 PROCEDURE — 93010 ELECTROCARDIOGRAM REPORT: CPT | Performed by: INTERNAL MEDICINE

## 2020-08-21 PROCEDURE — 25010000002 PROPOFOL 10 MG/ML EMULSION: Performed by: ANESTHESIOLOGY

## 2020-08-21 PROCEDURE — C2617 STENT, NON-COR, TEM W/O DEL: HCPCS | Performed by: UROLOGY

## 2020-08-21 PROCEDURE — 80048 BASIC METABOLIC PNL TOTAL CA: CPT | Performed by: UROLOGY

## 2020-08-21 PROCEDURE — 25010000002 CEFOXITIN PER 1 G: Performed by: UROLOGY

## 2020-08-21 PROCEDURE — 85027 COMPLETE CBC AUTOMATED: CPT | Performed by: UROLOGY

## 2020-08-21 DEVICE — URETERAL STENT
Type: IMPLANTABLE DEVICE | Site: URETER | Status: FUNCTIONAL
Brand: PERCUFLEX™ PLUS

## 2020-08-21 RX ORDER — FAMOTIDINE 20 MG/1
20 TABLET, FILM COATED ORAL ONCE
Status: COMPLETED | OUTPATIENT
Start: 2020-08-21 | End: 2020-08-21

## 2020-08-21 RX ORDER — LIDOCAINE HYDROCHLORIDE 10 MG/ML
0.5 INJECTION, SOLUTION EPIDURAL; INFILTRATION; INTRACAUDAL; PERINEURAL ONCE AS NEEDED
Status: COMPLETED | OUTPATIENT
Start: 2020-08-21 | End: 2020-08-21

## 2020-08-21 RX ORDER — PROMETHAZINE HYDROCHLORIDE 25 MG/1
25 TABLET ORAL ONCE AS NEEDED
Status: DISCONTINUED | OUTPATIENT
Start: 2020-08-21 | End: 2020-08-21 | Stop reason: HOSPADM

## 2020-08-21 RX ORDER — PROMETHAZINE HYDROCHLORIDE 25 MG/ML
6.25 INJECTION, SOLUTION INTRAMUSCULAR; INTRAVENOUS ONCE AS NEEDED
Status: DISCONTINUED | OUTPATIENT
Start: 2020-08-21 | End: 2020-08-21 | Stop reason: HOSPADM

## 2020-08-21 RX ORDER — LIDOCAINE HYDROCHLORIDE 20 MG/ML
INJECTION, SOLUTION INFILTRATION; PERINEURAL AS NEEDED
Status: DISCONTINUED | OUTPATIENT
Start: 2020-08-21 | End: 2020-08-21 | Stop reason: SURG

## 2020-08-21 RX ORDER — SODIUM CHLORIDE 9 MG/ML
9 INJECTION, SOLUTION INTRAVENOUS CONTINUOUS
Status: DISCONTINUED | OUTPATIENT
Start: 2020-08-21 | End: 2020-08-21 | Stop reason: HOSPADM

## 2020-08-21 RX ORDER — PROPOFOL 10 MG/ML
VIAL (ML) INTRAVENOUS AS NEEDED
Status: DISCONTINUED | OUTPATIENT
Start: 2020-08-21 | End: 2020-08-21 | Stop reason: SURG

## 2020-08-21 RX ORDER — PROMETHAZINE HYDROCHLORIDE 25 MG/1
25 SUPPOSITORY RECTAL ONCE AS NEEDED
Status: DISCONTINUED | OUTPATIENT
Start: 2020-08-21 | End: 2020-08-21 | Stop reason: HOSPADM

## 2020-08-21 RX ORDER — FENTANYL CITRATE 50 UG/ML
50 INJECTION, SOLUTION INTRAMUSCULAR; INTRAVENOUS
Status: DISCONTINUED | OUTPATIENT
Start: 2020-08-21 | End: 2020-08-21 | Stop reason: HOSPADM

## 2020-08-21 RX ORDER — MAGNESIUM HYDROXIDE 1200 MG/15ML
LIQUID ORAL AS NEEDED
Status: DISCONTINUED | OUTPATIENT
Start: 2020-08-21 | End: 2020-08-21 | Stop reason: HOSPADM

## 2020-08-21 RX ORDER — CEFAZOLIN SODIUM 2 G/100ML
2 INJECTION, SOLUTION INTRAVENOUS ONCE
Status: DISCONTINUED | OUTPATIENT
Start: 2020-08-21 | End: 2020-08-21

## 2020-08-21 RX ORDER — SODIUM CHLORIDE 0.9 % (FLUSH) 0.9 %
10 SYRINGE (ML) INJECTION EVERY 12 HOURS SCHEDULED
Status: DISCONTINUED | OUTPATIENT
Start: 2020-08-21 | End: 2020-08-21 | Stop reason: HOSPADM

## 2020-08-21 RX ORDER — FENTANYL CITRATE 50 UG/ML
INJECTION, SOLUTION INTRAMUSCULAR; INTRAVENOUS AS NEEDED
Status: DISCONTINUED | OUTPATIENT
Start: 2020-08-21 | End: 2020-08-21 | Stop reason: SURG

## 2020-08-21 RX ORDER — SODIUM CHLORIDE 0.9 % (FLUSH) 0.9 %
10 SYRINGE (ML) INJECTION AS NEEDED
Status: DISCONTINUED | OUTPATIENT
Start: 2020-08-21 | End: 2020-08-21 | Stop reason: HOSPADM

## 2020-08-21 RX ORDER — ONDANSETRON 2 MG/ML
4 INJECTION INTRAMUSCULAR; INTRAVENOUS ONCE AS NEEDED
Status: DISCONTINUED | OUTPATIENT
Start: 2020-08-21 | End: 2020-08-21 | Stop reason: HOSPADM

## 2020-08-21 RX ORDER — FAMOTIDINE 10 MG/ML
20 INJECTION, SOLUTION INTRAVENOUS ONCE
Status: CANCELLED | OUTPATIENT
Start: 2020-08-21 | End: 2020-08-21

## 2020-08-21 RX ORDER — SODIUM CHLORIDE, SODIUM LACTATE, POTASSIUM CHLORIDE, CALCIUM CHLORIDE 600; 310; 30; 20 MG/100ML; MG/100ML; MG/100ML; MG/100ML
9 INJECTION, SOLUTION INTRAVENOUS CONTINUOUS
Status: DISCONTINUED | OUTPATIENT
Start: 2020-08-21 | End: 2020-08-21 | Stop reason: HOSPADM

## 2020-08-21 RX ADMIN — CEFOXITIN SODIUM 2 G: 1 POWDER, FOR SOLUTION INTRAVENOUS at 13:36

## 2020-08-21 RX ADMIN — PROPOFOL 50 MG: 10 INJECTION, EMULSION INTRAVENOUS at 14:13

## 2020-08-21 RX ADMIN — FENTANYL CITRATE 25 MCG: 50 INJECTION, SOLUTION INTRAMUSCULAR; INTRAVENOUS at 13:34

## 2020-08-21 RX ADMIN — FENTANYL CITRATE 75 MCG: 50 INJECTION, SOLUTION INTRAMUSCULAR; INTRAVENOUS at 14:15

## 2020-08-21 RX ADMIN — LIDOCAINE HYDROCHLORIDE 75 MG: 20 INJECTION, SOLUTION INFILTRATION; PERINEURAL at 13:27

## 2020-08-21 RX ADMIN — LIDOCAINE HYDROCHLORIDE: 10 INJECTION, SOLUTION EPIDURAL; INFILTRATION; INTRACAUDAL; PERINEURAL at 11:58

## 2020-08-21 RX ADMIN — PROPOFOL 150 MG: 10 INJECTION, EMULSION INTRAVENOUS at 13:27

## 2020-08-21 RX ADMIN — FAMOTIDINE 20 MG: 20 TABLET ORAL at 11:58

## 2020-08-21 NOTE — OP NOTE
EXTRACORPOREAL SHOCKWAVE LITHOTRIPSY WITH STENT INSERTION/REMOVAL  Procedure Note    Jeremías Soto  8/21/2020    Pre-op Diagnosis:   Bilateral nephrolithiasis    Post-op Diagnosis:     Bilateral nephrolithiasis    Procedure/CPT® Codes:      Procedure(s):  BILATERAL EXTRACORPOREAL SHOCKWAVE LITHOTRIPSY WITH CYSTOSCOPY STENT PLACEMENT, LEFT    Surgeon(s):  Ethan Barnes Jr., MD    Anesthesia: General    Staff:   Circulator: Yisel Strong RN; Erin Pepe RN  Scrub Person: Nelda Lay  Vendor Representative: Jai Brown    Estimated Blood Loss: minimal  Urine Voided: * No values recorded between 8/21/2020  1:17 PM and 8/21/2020  2:38 PM *    Specimens:                None      Drains: * No LDAs found *    Findings: Bilateral nephrolithiasis    Complications: None    History: This is a pleasant 65-year-old gentleman who has bilateral nephrolithiasis and wishes to proceed with surgical therapy.  He will consent for the procedure above.    Operative Report: After consent is obtained patient's brought to the operating suite was placed in supine position.  Anesthesia was induced.  He is a pleasant dorsolithotomy position    Points.  Discussed with patient urethra and bladder atraumatically.  He has a well resected prostatic fossa.  The left ureteral orifice is visualized and large based into the left renal pelvis under fluoroscopic guidance.  Double-J ureteral stent 4.8 x 24 was placed with good curl renal pelvis good curl the bladder.  String secured the patient's penis.  Bladder is emptied.  Patient then placed in supine position.  Fluoroscopic imaging was used to identify the large left lower pole stone and 3000 shocks are delivered at 18 to 24 kV.  Good fragmentation of calculus on fluoroscopic imaging    There are 2 radiopaque stones on the right side and both of these were treated with shockwave lithotripsy with good fragmentation for a total of 3000 shocks to the kidney.  At this  point the patient was awoken for anesthesia and taken recovery in stable condition.    Ethan Barnes Jr., MD     Date: 8/21/2020  Time: 14:38

## 2020-08-21 NOTE — H&P
Pre-Op H&P  Jeremías Soto  [id number]  1955    Chief complaint: Bilateral renal stones    HPI:    7/22/20 office visit:  65 yo male returns for a 3 mo f/u visit previously seen due to Kidney Stones. He was treated with Cysto, Bilateral RPG, Bilateral Ureteroscopy w Laser Litho and Bilateral Stent Placement in November, 2019.  He denies recent flank pain, no recent passage of stones, no recent gross hematuria. No new voiding complaints/concerns. See  ROS.  KUB is reviewed today showing bilateral nonobstructing nephrolithiasis larger on the left.    8/21/20:  Here today to undergo BILATERAL EXTRACORPOREAL SHOCKWAVE LITHOTRIPSY WITH STENT PLACEMENT LEFT    Review of Systems:  General ROS: negative for chills, fever or skin lesions;  No changes since last office visit.  Neg for recent sick exposure  Cardiovascular ROS: no chest pain or dyspnea on exertion.  Follows with DRESSBO[employer] cards:   History of CAD, Inferior MI, RCA PCI 2001,CABG 2002, post CABG PCI, repeat catheterization 6/2014 patency of all grafts significant native CAD .  History of LV thrombus:  echo evidence LV wall segment hypokinesis apical lateral, mid inferolateral apical inferior and apex hypokinetic large left ventricular thrombus fixed located at apex 7/2019   Respiratory ROS: no cough, shortness of breath, or wheezing.  History of sarcoidosis  Neuro:  History of CVA    Allergies: No Known Allergies    Home Meds:    No current facility-administered medications on file prior to encounter.      Current Outpatient Medications on File Prior to Encounter   Medication Sig Dispense Refill   • atorvastatin (LIPITOR) 40 MG tablet Take 40 mg by mouth Daily.  1   • esomeprazole (nexIUM) 20 MG capsule Take 20 mg by mouth Every Morning Before Breakfast.     • glimepiride (AMARYL) 2 MG tablet Take 2 mg by mouth Every Morning Before Breakfast.     • metoprolol tartrate (LOPRESSOR) 25 MG tablet Take 12.5 mg by mouth 2 (Two) Times a Day.  2   • predniSONE  "(DELTASONE) 10 MG tablet Take 10 mg by mouth Daily.     • tamsulosin (FLOMAX) 0.4 MG capsule 24 hr capsule Take 0.4 mg by mouth Daily.     • apixaban (ELIQUIS) 5 MG tablet tablet Take 5 mg by mouth 2 (Two) Times a Day.     • dicyclomine (BENTYL) 20 MG tablet Take 20 mg by mouth.     • metFORMIN (GLUCOPHAGE) 500 MG tablet Take 500 mg by mouth 2 (Two) Times a Day.  1   • raNITIdine (ZANTAC) 150 MG tablet Zantac Maximum Strength 150 mg tablet   Daily     • rOPINIRole (REQUIP) 0.25 MG tablet Take 1 tablet by mouth Every Night. Take 1 hour before bedtime. 30 tablet 3   LD Eliquis 8/18/20    PMH:   Past Medical History:   Diagnosis Date   • Cancer (CMS/HCC)     skin   • Coronary artery disease    • Diabetes mellitus (CMS/HCC)    • Elevated cholesterol    • Heart attack (CMS/HCC)    • Hypertension    • Sarcoid    • Sleep apnea     no cpap   • SOB (shortness of breath)    • Stroke (CMS/HCC)     Pt. states that he has had 2 mini strokes     PSH:    Past Surgical History:   Procedure Laterality Date   • CARDIAC CATHETERIZATION      cardiac stent x2 after s/p CABG   • CARDIAC SURGERY      2002   • CATARACT EXTRACTION, BILATERAL     • COLONOSCOPY     • CORONARY ARTERY BYPASS GRAFT     • ENDOSCOPY     • SKIN CANCER EXCISION       Social History:   Tobacco:   Social History     Tobacco Use   Smoking Status Never Smoker   Smokeless Tobacco Never Used      Alcohol:     Social History     Substance and Sexual Activity   Alcohol Use No   • Frequency: Never       Vitals:           /94 (BP Location: Right arm, Patient Position: Lying)   Pulse 75   Temp 97.9 °F (36.6 °C) (Temporal)   Resp 18   Ht 180.3 cm (71\")   Wt 99.8 kg (220 lb)   SpO2 97%   BMI 30.68 kg/m²     Physical Exam:  General Appearance:    Alert, cooperative, no distress, appears stated age   Head:    Normocephalic, without obvious abnormality, atraumatic   Lungs:     Clear to auscultation bilaterally, respirations unlabored    Heart:   Regular rate and " rhythm, S1 and S2 normal, no murmur, rub    or gallop    Abdomen:    Soft, nontender.  +bowel sounds   Breast Exam:    deferred   Genitalia:    deferred   Extremities:   Extremities normal, atraumatic, no cyanosis or edema   Skin:   Skin color, texture, turgor normal, no rashes or lesions   Neurologic:   Grossly intact   Results Review  LABS:  Lab Results   Component Value Date    WBC 10.37 08/21/2020    HGB 13.6 08/21/2020    HCT 42.2 08/21/2020    MCV 86.8 08/21/2020     08/21/2020    NEUTROABS 6.01 10/29/2018    GLUCOSE 292 (H) 01/25/2019    BUN 29 (H) 01/25/2019    CREATININE 1.85 (H) 01/25/2019    EGFRIFNONA 37 (L) 01/25/2019     01/25/2019    K 4.2 01/25/2019     01/25/2019    CO2 27.0 01/25/2019    PHOS 2.9 01/25/2019    CALCIUM 9.0 01/25/2019    ALBUMIN 4.04 01/25/2019       RADIOLOGY:  Imaging Results (Last 72 Hours)     ** No results found for the last 72 hours. **          I reviewed the patient's new clinical results.    Cancer Staging (if applicable)  Cancer Patient: __ yes _x_no __unknown; If yes, clinical stage T:__ N:__M:__, stage group or __N/A    Impression/Plan:      1. Kidney stone        Discussion Note   at this point because of the large stone on the left we will plan for shockwave lithotripsy cleared by cardiology. Plan for stent on this side. We'll also plan for shockwave lithotripsy on the right for his small stone fragments seen on KUB     We have discussed various treatment options and have decided to proceed with ESWL with stent placement. We did discuss all risks and potential complications at length - patient desires to proceed.     Marjorie Gilmore, APRN   8/21/2020   12:23

## 2020-08-21 NOTE — ANESTHESIA PREPROCEDURE EVALUATION
Anesthesia Evaluation     Patient summary reviewed and Nursing notes reviewed   NPO Solid Status: > 8 hours  NPO Liquid Status: > 8 hours           Airway   Mallampati: I  TM distance: >3 FB  No difficulty expected  Dental    (+) upper dentures and lower dentures    Pulmonary     breath sounds clear to auscultation  (+) shortness of breath, sleep apnea,   Cardiovascular     ECG reviewed  Rhythm: regular  Rate: normal    (+) hypertension, CAD, CABG >6 Months,       Neuro/Psych  (+) CVA,     GI/Hepatic/Renal/Endo    (+)   diabetes mellitus,   (-) GERD    Musculoskeletal     Abdominal    Substance History      OB/GYN          Other        ROS/Med Hx Other: Can climb 2 FOS  Denies orthopnea                  Anesthesia Plan    ASA 3     general     intravenous induction     Anesthetic plan, all risks, benefits, and alternatives have been provided, discussed and informed consent has been obtained with: patient.    Plan discussed with CRNA.

## 2020-08-21 NOTE — ANESTHESIA PROCEDURE NOTES
Airway  Urgency: elective    Date/Time: 8/21/2020 1:29 PM  Airway not difficult    General Information and Staff    Patient location during procedure: OR    Indications and Patient Condition  Indications for airway management: airway protection    Preoxygenated: yes  Mask difficulty assessment: 1 - vent by mask    Final Airway Details  Final airway type: supraglottic airway      Successful airway: I-gel  Size 5    Number of attempts at approach: 1    Additional Comments  LMA placed without difficulty, ventilation with assist, equal breath sounds and symmetric chest rise and fall

## 2020-08-21 NOTE — ANESTHESIA POSTPROCEDURE EVALUATION
Patient: Jeremías Soto    Procedure Summary     Date:  08/21/20 Room / Location:   [facility]    Anesthesia Start:  1326 Anesthesia Stop:      Procedure:  BILATERAL EXTRACORPOREAL SHOCKWAVE LITHOTRIPSY WITH STENT PLACEMENT LEFT (Bilateral ) Diagnosis:      Surgeon:  Ethan Barnes Jr., MD Provider:  Erich Landeros MD    Anesthesia Type:  general ASA Status:  3          Anesthesia Type: general    Vitals  Vitals Value Taken Time   /86 8/21/2020  2:49 PM   Temp     Pulse 56 8/21/2020  2:50 PM   Resp     SpO2 95 % 8/21/2020  2:50 PM   Vitals shown include unvalidated device data.        Post Anesthesia Care and Evaluation    Patient location during evaluation: PACU  Patient participation: complete - patient cannot participate  Post-procedure mental status: asleep.  Pain management: adequate  Airway patency: patent  Anesthetic complications: No anesthetic complications  PONV Status: none  Cardiovascular status: acceptable  Respiratory status: acceptable  Hydration status: acceptable

## 2020-08-26 ENCOUNTER — APPOINTMENT (OUTPATIENT)
Dept: PREADMISSION TESTING | Facility: HOSPITAL | Age: 65
End: 2020-08-26

## 2020-08-26 ENCOUNTER — HOSPITAL ENCOUNTER (OUTPATIENT)
Dept: CARDIOLOGY | Facility: HOSPITAL | Age: 65
Discharge: HOME | End: 2020-08-26
Admitting: FAMILY MEDICINE

## 2020-08-26 VITALS — BODY MASS INDEX: 30.8 KG/M2 | WEIGHT: 220 LBS | HEIGHT: 71 IN

## 2020-08-26 DIAGNOSIS — I95.1 ORTHOSTATIC HYPOTENSION: ICD-10-CM

## 2020-08-26 LAB
BH CV ECHO MEAS - AO MAX PG: 5 MMHG
BH CV ECHO MEAS - AO MEAN PG: 3 MMHG
BH CV ECHO MEAS - AO ROOT AREA (BSA CORRECTED): 1.8
BH CV ECHO MEAS - AO ROOT AREA: 11.9 CM^2
BH CV ECHO MEAS - AO ROOT DIAM: 3.9 CM
BH CV ECHO MEAS - AO V2 MAX: 109 CM/SEC
BH CV ECHO MEAS - AO V2 MEAN: 81.7 CM/SEC
BH CV ECHO MEAS - AO V2 VTI: 22.9 CM
BH CV ECHO MEAS - ASC AORTA: 3.5 CM
BH CV ECHO MEAS - BSA(HAYCOCK): 2.3 M^2
BH CV ECHO MEAS - BSA: 2.2 M^2
BH CV ECHO MEAS - BZI_BMI: 30.7 KILOGRAMS/M^2
BH CV ECHO MEAS - BZI_METRIC_HEIGHT: 180.3 CM
BH CV ECHO MEAS - BZI_METRIC_WEIGHT: 99.8 KG
BH CV ECHO MEAS - EDV(CUBED): 304.8 ML
BH CV ECHO MEAS - EDV(MOD-SP2): 89 ML
BH CV ECHO MEAS - EDV(MOD-SP4): 124 ML
BH CV ECHO MEAS - EDV(TEICH): 233.7 ML
BH CV ECHO MEAS - EF(CUBED): 59.7 %
BH CV ECHO MEAS - EF(MOD-BP): 50 %
BH CV ECHO MEAS - EF(MOD-SP2): 51.7 %
BH CV ECHO MEAS - EF(MOD-SP4): 46 %
BH CV ECHO MEAS - EF(TEICH): 50.1 %
BH CV ECHO MEAS - ESV(CUBED): 122.8 ML
BH CV ECHO MEAS - ESV(MOD-SP2): 43 ML
BH CV ECHO MEAS - ESV(MOD-SP4): 67 ML
BH CV ECHO MEAS - ESV(TEICH): 116.6 ML
BH CV ECHO MEAS - FS: 26.2 %
BH CV ECHO MEAS - IVS/LVPW: 1
BH CV ECHO MEAS - IVSD: 1.3 CM
BH CV ECHO MEAS - LA DIMENSION: 5.1 CM
BH CV ECHO MEAS - LA/AO: 1.3
BH CV ECHO MEAS - LAD MAJOR: 6.5 CM
BH CV ECHO MEAS - LAT PEAK E' VEL: 4 CM/SEC
BH CV ECHO MEAS - LATERAL E/E' RATIO: 10
BH CV ECHO MEAS - LV DIASTOLIC VOL/BSA (35-75): 56.5 ML/M^2
BH CV ECHO MEAS - LV MASS(C)D: 431.4 GRAMS
BH CV ECHO MEAS - LV MASS(C)DI: 196.5 GRAMS/M^2
BH CV ECHO MEAS - LV SYSTOLIC VOL/BSA (12-30): 30.5 ML/M^2
BH CV ECHO MEAS - LVIDD: 6.7 CM
BH CV ECHO MEAS - LVIDS: 5 CM
BH CV ECHO MEAS - LVLD AP2: 7.5 CM
BH CV ECHO MEAS - LVLD AP4: 7.8 CM
BH CV ECHO MEAS - LVLS AP2: 6.9 CM
BH CV ECHO MEAS - LVLS AP4: 7.2 CM
BH CV ECHO MEAS - LVPWD: 1.3 CM
BH CV ECHO MEAS - MED PEAK E' VEL: 3.6 CM/SEC
BH CV ECHO MEAS - MEDIAL E/E' RATIO: 10.9
BH CV ECHO MEAS - MV A MAX VEL: 71.1 CM/SEC
BH CV ECHO MEAS - MV DEC TIME: 0.2 SEC
BH CV ECHO MEAS - MV E MAX VEL: 39.5 CM/SEC
BH CV ECHO MEAS - MV E/A: 0.56
BH CV ECHO MEAS - PA ACC SLOPE: 467 CM/SEC^2
BH CV ECHO MEAS - PA ACC TIME: 0.12 SEC
BH CV ECHO MEAS - PA PR(ACCEL): 23.2 MMHG
BH CV ECHO MEAS - RVDD: 2.5 CM
BH CV ECHO MEAS - SI(AO): 124.6 ML/M^2
BH CV ECHO MEAS - SI(CUBED): 82.9 ML/M^2
BH CV ECHO MEAS - SI(MOD-SP2): 20.9 ML/M^2
BH CV ECHO MEAS - SI(MOD-SP4): 26 ML/M^2
BH CV ECHO MEAS - SI(TEICH): 53.3 ML/M^2
BH CV ECHO MEAS - SV(AO): 273.6 ML
BH CV ECHO MEAS - SV(CUBED): 182.1 ML
BH CV ECHO MEAS - SV(MOD-SP2): 46 ML
BH CV ECHO MEAS - SV(MOD-SP4): 57 ML
BH CV ECHO MEAS - SV(TEICH): 117.1 ML
BH CV ECHO MEAS - TAPSE (>1.6): 1.6 CM2
BH CV ECHO MEASUREMENTS AVERAGE E/E' RATIO: 10.39
BH CV VAS BP RIGHT ARM: NORMAL MMHG
BH CV XLRA - RV BASE: 2.8 CM
BH CV XLRA - RV LENGTH: 5.9 CM
BH CV XLRA - RV MID: 2.3 CM
BH CV XLRA - TDI S': 7.9 CM/SEC
LEFT ATRIUM VOLUME INDEX: 41.9 ML/M^2
LEFT ATRIUM VOLUME: 92 ML
LV EF 2D ECHO EST: 50 %
MAXIMAL PREDICTED HEART RATE: 153 BPM
REF LAB TEST METHOD: NORMAL
SARS-COV-2 RNA RESP QL NAA+PROBE: NOT DETECTED
STRESS TARGET HR: 130 BPM

## 2020-08-26 PROCEDURE — C9803 HOPD COVID-19 SPEC COLLECT: HCPCS

## 2020-08-26 PROCEDURE — 25010000002 SULFUR HEXAFLUORIDE MICROSPH 60.7-25 MG RECONSTITUTED SUSPENSION: Performed by: FAMILY MEDICINE

## 2020-08-26 PROCEDURE — U0002 COVID-19 LAB TEST NON-CDC: HCPCS

## 2020-08-26 PROCEDURE — 93306 TTE W/DOPPLER COMPLETE: CPT | Performed by: INTERNAL MEDICINE

## 2020-08-26 PROCEDURE — U0004 COV-19 TEST NON-CDC HGH THRU: HCPCS

## 2020-08-26 PROCEDURE — 93306 TTE W/DOPPLER COMPLETE: CPT

## 2020-08-26 RX ADMIN — SULFUR HEXAFLUORIDE 3 ML: KIT at 08:45

## 2020-08-28 ENCOUNTER — HOSPITAL ENCOUNTER (OUTPATIENT)
Dept: CARDIOLOGY | Facility: HOSPITAL | Age: 65
Discharge: HOME | End: 2020-08-28
Admitting: FAMILY MEDICINE

## 2020-08-28 VITALS
SYSTOLIC BLOOD PRESSURE: 130 MMHG | WEIGHT: 220 LBS | DIASTOLIC BLOOD PRESSURE: 86 MMHG | BODY MASS INDEX: 30.8 KG/M2 | HEIGHT: 71 IN | HEART RATE: 76 BPM

## 2020-08-28 DIAGNOSIS — I95.1 ORTHOSTATIC HYPOTENSION: ICD-10-CM

## 2020-08-28 PROCEDURE — 93660 TILT TABLE EVALUATION: CPT

## 2020-08-28 PROCEDURE — 93660 TILT TABLE EVALUATION: CPT | Performed by: INTERNAL MEDICINE

## 2021-01-01 ENCOUNTER — HOME CARE VISIT (OUTPATIENT)
Dept: HOME HEALTH SERVICES | Facility: HOME HEALTHCARE | Age: 66
End: 2021-01-01

## 2021-01-01 ENCOUNTER — APPOINTMENT (OUTPATIENT)
Dept: CT IMAGING | Facility: HOSPITAL | Age: 66
End: 2021-01-01

## 2021-01-01 ENCOUNTER — APPOINTMENT (OUTPATIENT)
Dept: CARDIOLOGY | Facility: HOSPITAL | Age: 66
End: 2021-01-01

## 2021-01-01 ENCOUNTER — APPOINTMENT (OUTPATIENT)
Dept: GENERAL RADIOLOGY | Facility: HOSPITAL | Age: 66
End: 2021-01-01

## 2021-01-01 ENCOUNTER — APPOINTMENT (OUTPATIENT)
Dept: PREADMISSION TESTING | Facility: HOSPITAL | Age: 66
End: 2021-01-01

## 2021-01-01 ENCOUNTER — READMISSION MANAGEMENT (OUTPATIENT)
Dept: CALL CENTER | Facility: HOSPITAL | Age: 66
End: 2021-01-01

## 2021-01-01 ENCOUNTER — ANESTHESIA EVENT (OUTPATIENT)
Dept: PERIOP | Facility: HOSPITAL | Age: 66
End: 2021-01-01

## 2021-01-01 ENCOUNTER — TRANSCRIBE ORDERS (OUTPATIENT)
Dept: ADMINISTRATIVE | Facility: HOSPITAL | Age: 66
End: 2021-01-01

## 2021-01-01 ENCOUNTER — TRANSCRIBE ORDERS (OUTPATIENT)
Dept: LAB | Facility: HOSPITAL | Age: 66
End: 2021-01-01

## 2021-01-01 ENCOUNTER — APPOINTMENT (OUTPATIENT)
Dept: ULTRASOUND IMAGING | Facility: HOSPITAL | Age: 66
End: 2021-01-01

## 2021-01-01 ENCOUNTER — HOSPITAL ENCOUNTER (INPATIENT)
Facility: HOSPITAL | Age: 66
LOS: 6 days | Discharge: HOME | End: 2021-09-29
Attending: EMERGENCY MEDICINE | Admitting: INTERNAL MEDICINE

## 2021-01-01 ENCOUNTER — HOSPITAL ENCOUNTER (OUTPATIENT)
Dept: INTERVENTIONAL RADIOLOGY/VASCULAR | Facility: HOSPITAL | Age: 66
Discharge: HOME | End: 2021-11-02
Admitting: INTERNAL MEDICINE

## 2021-01-01 ENCOUNTER — ANESTHESIA (OUTPATIENT)
Dept: GASTROENTEROLOGY | Facility: HOSPITAL | Age: 66
End: 2021-01-01

## 2021-01-01 ENCOUNTER — ANESTHESIA (OUTPATIENT)
Dept: PERIOP | Facility: HOSPITAL | Age: 66
End: 2021-01-01

## 2021-01-01 ENCOUNTER — ANESTHESIA EVENT CONVERTED (OUTPATIENT)
Dept: ANESTHESIOLOGY | Facility: HOSPITAL | Age: 66
End: 2021-01-01

## 2021-01-01 ENCOUNTER — HOSPITAL ENCOUNTER (INPATIENT)
Facility: HOSPITAL | Age: 66
LOS: 14 days | Discharge: HOME HEALTH | End: 2021-10-19
Attending: STUDENT IN AN ORGANIZED HEALTH CARE EDUCATION/TRAINING PROGRAM | Admitting: INTERNAL MEDICINE

## 2021-01-01 ENCOUNTER — HOSPITAL ENCOUNTER (EMERGENCY)
Facility: HOSPITAL | Age: 66
End: 2021-11-03
Attending: EMERGENCY MEDICINE | Admitting: EMERGENCY MEDICINE

## 2021-01-01 ENCOUNTER — APPOINTMENT (OUTPATIENT)
Dept: PET IMAGING | Facility: HOSPITAL | Age: 66
End: 2021-01-01

## 2021-01-01 ENCOUNTER — LAB (OUTPATIENT)
Dept: LAB | Facility: HOSPITAL | Age: 66
End: 2021-01-01

## 2021-01-01 ENCOUNTER — APPOINTMENT (OUTPATIENT)
Dept: INTERVENTIONAL RADIOLOGY/VASCULAR | Facility: HOSPITAL | Age: 66
End: 2021-01-01

## 2021-01-01 ENCOUNTER — HOSPITAL ENCOUNTER (OUTPATIENT)
Facility: HOSPITAL | Age: 66
Setting detail: HOSPITAL OUTPATIENT SURGERY
End: 2021-01-01
Attending: INTERNAL MEDICINE | Admitting: INTERNAL MEDICINE

## 2021-01-01 ENCOUNTER — ANESTHESIA EVENT (OUTPATIENT)
Dept: GASTROENTEROLOGY | Facility: HOSPITAL | Age: 66
End: 2021-01-01

## 2021-01-01 ENCOUNTER — PREP FOR SURGERY (OUTPATIENT)
Dept: OTHER | Facility: HOSPITAL | Age: 66
End: 2021-01-01

## 2021-01-01 ENCOUNTER — APPOINTMENT (OUTPATIENT)
Dept: NUCLEAR MEDICINE | Facility: HOSPITAL | Age: 66
End: 2021-01-01

## 2021-01-01 ENCOUNTER — HOSPITAL ENCOUNTER (OUTPATIENT)
Dept: CT IMAGING | Facility: HOSPITAL | Age: 66
Discharge: HOME | End: 2021-10-22
Admitting: INTERNAL MEDICINE

## 2021-01-01 ENCOUNTER — APPOINTMENT (OUTPATIENT)
Dept: MRI IMAGING | Facility: HOSPITAL | Age: 66
End: 2021-01-01

## 2021-01-01 ENCOUNTER — HOME HEALTH ADMISSION (OUTPATIENT)
Dept: HOME HEALTH SERVICES | Facility: HOME HEALTHCARE | Age: 66
End: 2021-01-01

## 2021-01-01 VITALS
DIASTOLIC BLOOD PRESSURE: 69 MMHG | OXYGEN SATURATION: 98 % | TEMPERATURE: 97.7 F | RESPIRATION RATE: 16 BRPM | HEART RATE: 64 BPM | SYSTOLIC BLOOD PRESSURE: 110 MMHG

## 2021-01-01 VITALS
WEIGHT: 214 LBS | OXYGEN SATURATION: 94 % | SYSTOLIC BLOOD PRESSURE: 158 MMHG | BODY MASS INDEX: 31.7 KG/M2 | HEIGHT: 69 IN | TEMPERATURE: 96.8 F | RESPIRATION RATE: 18 BRPM | DIASTOLIC BLOOD PRESSURE: 83 MMHG | HEART RATE: 69 BPM

## 2021-01-01 VITALS
BODY MASS INDEX: 27.44 KG/M2 | TEMPERATURE: 97.5 F | OXYGEN SATURATION: 98 % | SYSTOLIC BLOOD PRESSURE: 110 MMHG | HEART RATE: 90 BPM | WEIGHT: 196 LBS | HEIGHT: 71 IN | DIASTOLIC BLOOD PRESSURE: 64 MMHG | RESPIRATION RATE: 16 BRPM

## 2021-01-01 VITALS
HEART RATE: 63 BPM | OXYGEN SATURATION: 98 % | SYSTOLIC BLOOD PRESSURE: 108 MMHG | TEMPERATURE: 98 F | DIASTOLIC BLOOD PRESSURE: 68 MMHG | RESPIRATION RATE: 16 BRPM

## 2021-01-01 VITALS
SYSTOLIC BLOOD PRESSURE: 96 MMHG | TEMPERATURE: 97.4 F | DIASTOLIC BLOOD PRESSURE: 62 MMHG | RESPIRATION RATE: 16 BRPM | HEART RATE: 68 BPM | OXYGEN SATURATION: 96 %

## 2021-01-01 VITALS
RESPIRATION RATE: 18 BRPM | OXYGEN SATURATION: 95 % | DIASTOLIC BLOOD PRESSURE: 77 MMHG | WEIGHT: 200.1 LBS | TEMPERATURE: 98.2 F | HEIGHT: 67 IN | SYSTOLIC BLOOD PRESSURE: 122 MMHG | BODY MASS INDEX: 31.4 KG/M2 | HEART RATE: 80 BPM

## 2021-01-01 VITALS
RESPIRATION RATE: 16 BRPM | HEART RATE: 72 BPM | TEMPERATURE: 98.1 F | DIASTOLIC BLOOD PRESSURE: 62 MMHG | OXYGEN SATURATION: 99 % | SYSTOLIC BLOOD PRESSURE: 100 MMHG

## 2021-01-01 VITALS — WEIGHT: 196 LBS | HEIGHT: 71 IN | BODY MASS INDEX: 27.44 KG/M2

## 2021-01-01 VITALS
HEART RATE: 68 BPM | SYSTOLIC BLOOD PRESSURE: 110 MMHG | RESPIRATION RATE: 18 BRPM | DIASTOLIC BLOOD PRESSURE: 68 MMHG | OXYGEN SATURATION: 98 % | TEMPERATURE: 98.6 F

## 2021-01-01 DIAGNOSIS — I50.9 ACUTE CONGESTIVE HEART FAILURE, UNSPECIFIED HEART FAILURE TYPE (HCC): ICD-10-CM

## 2021-01-01 DIAGNOSIS — T81.40XS POSTOPERATIVE INFECTION, UNSPECIFIED TYPE, SEQUELA: ICD-10-CM

## 2021-01-01 DIAGNOSIS — D53.9 MACROCYTIC ANEMIA: ICD-10-CM

## 2021-01-01 DIAGNOSIS — D72.829 LEUKOCYTOSIS, UNSPECIFIED TYPE: ICD-10-CM

## 2021-01-01 DIAGNOSIS — K85.90 ACUTE PANCREATITIS WITHOUT INFECTION OR NECROSIS, UNSPECIFIED PANCREATITIS TYPE: ICD-10-CM

## 2021-01-01 DIAGNOSIS — I46.9 ASYSTOLE (HCC): ICD-10-CM

## 2021-01-01 DIAGNOSIS — A41.9 SEPSIS WITH ACUTE ORGAN DYSFUNCTION, DUE TO UNSPECIFIED ORGANISM, UNSPECIFIED TYPE, UNSPECIFIED WHETHER SEPTIC SHOCK PRESENT (HCC): Primary | ICD-10-CM

## 2021-01-01 DIAGNOSIS — L03.311 ABDOMINAL WALL CELLULITIS: ICD-10-CM

## 2021-01-01 DIAGNOSIS — A41.9 SEPSIS WITH ACUTE RENAL FAILURE AND SEPTIC SHOCK, DUE TO UNSPECIFIED ORGANISM, UNSPECIFIED ACUTE RENAL FAILURE TYPE (HCC): ICD-10-CM

## 2021-01-01 DIAGNOSIS — N17.9 AKI (ACUTE KIDNEY INJURY) (HCC): ICD-10-CM

## 2021-01-01 DIAGNOSIS — R91.8 MULTIPLE LUNG NODULES: ICD-10-CM

## 2021-01-01 DIAGNOSIS — K83.09 CHOLANGITIS: ICD-10-CM

## 2021-01-01 DIAGNOSIS — T81.43XA POSTOPERATIVE INTRA-ABDOMINAL ABSCESS: ICD-10-CM

## 2021-01-01 DIAGNOSIS — R91.1 LUNG NODULE: Primary | ICD-10-CM

## 2021-01-01 DIAGNOSIS — R65.20 SEPSIS WITH ACUTE ORGAN DYSFUNCTION, DUE TO UNSPECIFIED ORGANISM, UNSPECIFIED TYPE, UNSPECIFIED WHETHER SEPTIC SHOCK PRESENT (HCC): Primary | ICD-10-CM

## 2021-01-01 DIAGNOSIS — R10.2 COMBINED ABDOMINAL AND PELVIC PAIN: Primary | ICD-10-CM

## 2021-01-01 DIAGNOSIS — N39.0 URINARY TRACT INFECTION WITHOUT HEMATURIA, SITE UNSPECIFIED: Primary | ICD-10-CM

## 2021-01-01 DIAGNOSIS — I46.9 PEA (PULSELESS ELECTRICAL ACTIVITY) (HCC): ICD-10-CM

## 2021-01-01 DIAGNOSIS — N39.0 URINARY TRACT INFECTION WITHOUT HEMATURIA, SITE UNSPECIFIED: ICD-10-CM

## 2021-01-01 DIAGNOSIS — L03.311 CELLULITIS OF ABDOMINAL WALL: Primary | ICD-10-CM

## 2021-01-01 DIAGNOSIS — R93.2 ABNORMAL CT SCAN, LIVER: Primary | ICD-10-CM

## 2021-01-01 DIAGNOSIS — R93.89 ABNORMAL CT OF THE CHEST: Primary | ICD-10-CM

## 2021-01-01 DIAGNOSIS — L03.90 CELLULITIS: ICD-10-CM

## 2021-01-01 DIAGNOSIS — R65.21 SEPSIS WITH ACUTE RENAL FAILURE AND SEPTIC SHOCK, DUE TO UNSPECIFIED ORGANISM, UNSPECIFIED ACUTE RENAL FAILURE TYPE (HCC): ICD-10-CM

## 2021-01-01 DIAGNOSIS — L02.211 ABSCESS OF ABDOMINAL WALL: ICD-10-CM

## 2021-01-01 DIAGNOSIS — K83.9 BILE LEAK: ICD-10-CM

## 2021-01-01 DIAGNOSIS — N17.9 SEPSIS WITH ACUTE RENAL FAILURE AND SEPTIC SHOCK, DUE TO UNSPECIFIED ORGANISM, UNSPECIFIED ACUTE RENAL FAILURE TYPE (HCC): ICD-10-CM

## 2021-01-01 DIAGNOSIS — I46.9 CARDIAC ARREST (HCC): Primary | ICD-10-CM

## 2021-01-01 DIAGNOSIS — R10.9 COMBINED ABDOMINAL AND PELVIC PAIN: Primary | ICD-10-CM

## 2021-01-01 DIAGNOSIS — R93.2 ABNORMAL CT SCAN, LIVER: ICD-10-CM

## 2021-01-01 DIAGNOSIS — K91.870 POSTOPERATIVE HEMATOMA INVOLVING DIGESTIVE SYSTEM FOLLOWING DIGESTIVE SYSTEM PROCEDURE: ICD-10-CM

## 2021-01-01 DIAGNOSIS — N30.00 ACUTE CYSTITIS WITHOUT HEMATURIA: ICD-10-CM

## 2021-01-01 DIAGNOSIS — R74.8 ABNORMAL SERUM LIPASE LEVEL: ICD-10-CM

## 2021-01-01 DIAGNOSIS — B37.89 GASTROINTESTINAL CANDIDIASIS: ICD-10-CM

## 2021-01-01 DIAGNOSIS — D86.9 SARCOIDOSIS: ICD-10-CM

## 2021-01-01 DIAGNOSIS — T81.41XA INFECTION OF SUPERFICIAL INCISIONAL SURGICAL SITE AFTER PROCEDURE, INITIAL ENCOUNTER: ICD-10-CM

## 2021-01-01 DIAGNOSIS — L03.311 CELLULITIS OF ABDOMINAL WALL: ICD-10-CM

## 2021-01-01 DIAGNOSIS — Z86.79 HISTORY OF CORONARY ARTERY DISEASE: ICD-10-CM

## 2021-01-01 DIAGNOSIS — K80.50 CHOLEDOCHOLITHIASIS: ICD-10-CM

## 2021-01-01 DIAGNOSIS — U07.1 COVID-19 VIRUS INFECTION: ICD-10-CM

## 2021-01-01 DIAGNOSIS — Z86.79 HISTORY OF CHF (CONGESTIVE HEART FAILURE): ICD-10-CM

## 2021-01-01 LAB
ABO GROUP BLD: NORMAL
ALBUMIN 24H MFR UR ELPH: 21.3 %
ALBUMIN SERPL-MCNC: 2.2 G/DL (ref 3.5–5.2)
ALBUMIN SERPL-MCNC: 2.3 G/DL (ref 3.5–5.2)
ALBUMIN SERPL-MCNC: 2.4 G/DL (ref 3.5–5.2)
ALBUMIN SERPL-MCNC: 2.5 G/DL (ref 3.5–5.2)
ALBUMIN SERPL-MCNC: 2.6 G/DL (ref 3.5–5.2)
ALBUMIN SERPL-MCNC: 2.7 G/DL (ref 3.5–5.2)
ALBUMIN SERPL-MCNC: 2.8 G/DL (ref 3.5–5.2)
ALBUMIN SERPL-MCNC: 3.1 G/DL (ref 3.5–5.2)
ALBUMIN SERPL-MCNC: 3.1 G/DL (ref 3.5–5.2)
ALBUMIN SERPL-MCNC: 3.2 G/DL (ref 3.5–5.2)
ALBUMIN SERPL-MCNC: 3.3 G/DL (ref 3.5–5.2)
ALBUMIN SERPL-MCNC: 3.3 G/DL (ref 3.5–5.2)
ALBUMIN SERPL-MCNC: 3.9 G/DL (ref 3.5–5.2)
ALBUMIN SERPL-MCNC: 4.4 G/DL (ref 3.5–5.2)
ALBUMIN/GLOB SERPL: 0.6 G/DL
ALBUMIN/GLOB SERPL: 0.7 G/DL
ALBUMIN/GLOB SERPL: 0.8 G/DL
ALBUMIN/GLOB SERPL: 0.9 G/DL
ALBUMIN/GLOB SERPL: 1 G/DL
ALBUMIN/GLOB SERPL: 1.1 G/DL
ALBUMIN/GLOB SERPL: 1.2 G/DL
ALBUMIN/GLOB SERPL: 1.3 G/DL
ALBUMIN/GLOB SERPL: 1.4 G/DL
ALBUMIN/GLOB SERPL: 1.8 G/DL
ALP SERPL-CCNC: 149 U/L (ref 39–117)
ALP SERPL-CCNC: 150 U/L (ref 39–117)
ALP SERPL-CCNC: 151 U/L (ref 39–117)
ALP SERPL-CCNC: 154 U/L (ref 39–117)
ALP SERPL-CCNC: 163 U/L (ref 39–117)
ALP SERPL-CCNC: 165 U/L (ref 39–117)
ALP SERPL-CCNC: 166 U/L (ref 39–117)
ALP SERPL-CCNC: 174 U/L (ref 39–117)
ALP SERPL-CCNC: 176 U/L (ref 39–117)
ALP SERPL-CCNC: 178 U/L (ref 39–117)
ALP SERPL-CCNC: 178 U/L (ref 39–117)
ALP SERPL-CCNC: 182 U/L (ref 39–117)
ALP SERPL-CCNC: 183 U/L (ref 39–117)
ALP SERPL-CCNC: 184 U/L (ref 39–117)
ALP SERPL-CCNC: 193 U/L (ref 39–117)
ALP SERPL-CCNC: 206 U/L (ref 39–117)
ALP SERPL-CCNC: 222 U/L (ref 39–117)
ALP SERPL-CCNC: 226 U/L (ref 39–117)
ALP SERPL-CCNC: 228 U/L (ref 39–117)
ALP SERPL-CCNC: 250 U/L (ref 39–117)
ALP SERPL-CCNC: 304 U/L (ref 39–117)
ALP SERPL-CCNC: 397 U/L (ref 39–117)
ALPHA1 GLOB 24H MFR UR ELPH: 3.9 %
ALPHA2 GLOB 24H MFR UR ELPH: 26.7 %
ALT SERPL W P-5'-P-CCNC: 117 U/L (ref 1–41)
ALT SERPL W P-5'-P-CCNC: 140 U/L (ref 1–41)
ALT SERPL W P-5'-P-CCNC: 144 U/L (ref 1–41)
ALT SERPL W P-5'-P-CCNC: 201 U/L (ref 1–41)
ALT SERPL W P-5'-P-CCNC: 238 U/L (ref 1–41)
ALT SERPL W P-5'-P-CCNC: 255 U/L (ref 1–41)
ALT SERPL W P-5'-P-CCNC: 27 U/L (ref 1–41)
ALT SERPL W P-5'-P-CCNC: 35 U/L (ref 1–41)
ALT SERPL W P-5'-P-CCNC: 35 U/L (ref 1–41)
ALT SERPL W P-5'-P-CCNC: 37 U/L (ref 1–41)
ALT SERPL W P-5'-P-CCNC: 38 U/L (ref 1–41)
ALT SERPL W P-5'-P-CCNC: 39 U/L (ref 1–41)
ALT SERPL W P-5'-P-CCNC: 40 U/L (ref 1–41)
ALT SERPL W P-5'-P-CCNC: 45 U/L (ref 1–41)
ALT SERPL W P-5'-P-CCNC: 45 U/L (ref 1–41)
ALT SERPL W P-5'-P-CCNC: 53 U/L (ref 1–41)
ALT SERPL W P-5'-P-CCNC: 55 U/L (ref 1–41)
ALT SERPL W P-5'-P-CCNC: 56 U/L (ref 1–41)
ALT SERPL W P-5'-P-CCNC: 73 U/L (ref 1–41)
ALT SERPL W P-5'-P-CCNC: 77 U/L (ref 1–41)
ALT SERPL W P-5'-P-CCNC: 94 U/L (ref 1–41)
ALT SERPL W P-5'-P-CCNC: 95 U/L (ref 1–41)
AMYLASE SERPL-CCNC: 37 U/L (ref 28–100)
ANION GAP SERPL CALCULATED.3IONS-SCNC: 10 MMOL/L (ref 5–15)
ANION GAP SERPL CALCULATED.3IONS-SCNC: 10 MMOL/L (ref 5–15)
ANION GAP SERPL CALCULATED.3IONS-SCNC: 11 MMOL/L (ref 5–15)
ANION GAP SERPL CALCULATED.3IONS-SCNC: 12 MMOL/L (ref 5–15)
ANION GAP SERPL CALCULATED.3IONS-SCNC: 13 MMOL/L (ref 5–15)
ANION GAP SERPL CALCULATED.3IONS-SCNC: 14 MMOL/L (ref 5–15)
ANION GAP SERPL CALCULATED.3IONS-SCNC: 15 MMOL/L (ref 5–15)
ANION GAP SERPL CALCULATED.3IONS-SCNC: 16 MMOL/L (ref 5–15)
ANION GAP SERPL CALCULATED.3IONS-SCNC: 17 MMOL/L (ref 5–15)
ANION GAP SERPL CALCULATED.3IONS-SCNC: 19 MMOL/L (ref 5–15)
ANION GAP SERPL CALCULATED.3IONS-SCNC: 20 MMOL/L (ref 5–15)
APTT PPP: 33.3 SECONDS (ref 22–39)
APTT PPP: 40.1 SECONDS (ref 22–39)
APTT PPP: 40.2 SECONDS (ref 22–39)
APTT PPP: 46.6 SECONDS (ref 22–39)
APTT PPP: 52 SECONDS (ref 22–39)
APTT PPP: 61.2 SECONDS (ref 22–39)
APTT PPP: 61.5 SECONDS (ref 22–39)
ASCENDING AORTA: 3.2 CM
AST SERPL-CCNC: 103 U/L (ref 1–40)
AST SERPL-CCNC: 137 U/L (ref 1–40)
AST SERPL-CCNC: 179 U/L (ref 1–40)
AST SERPL-CCNC: 27 U/L (ref 1–40)
AST SERPL-CCNC: 272 U/L (ref 1–40)
AST SERPL-CCNC: 31 U/L (ref 1–40)
AST SERPL-CCNC: 31 U/L (ref 1–40)
AST SERPL-CCNC: 37 U/L (ref 1–40)
AST SERPL-CCNC: 37 U/L (ref 1–40)
AST SERPL-CCNC: 40 U/L (ref 1–40)
AST SERPL-CCNC: 41 U/L (ref 1–40)
AST SERPL-CCNC: 43 U/L (ref 1–40)
AST SERPL-CCNC: 43 U/L (ref 1–40)
AST SERPL-CCNC: 45 U/L (ref 1–40)
AST SERPL-CCNC: 48 U/L (ref 1–40)
AST SERPL-CCNC: 54 U/L (ref 1–40)
AST SERPL-CCNC: 59 U/L (ref 1–40)
AST SERPL-CCNC: 80 U/L (ref 1–40)
AST SERPL-CCNC: 83 U/L (ref 1–40)
AST SERPL-CCNC: 90 U/L (ref 1–40)
AST SERPL-CCNC: 90 U/L (ref 1–40)
AST SERPL-CCNC: 99 U/L (ref 1–40)
B PARAPERT DNA SPEC QL NAA+PROBE: NOT DETECTED
B PERT DNA SPEC QL NAA+PROBE: NOT DETECTED
B-GLOBULIN 24H MFR UR ELPH: 35.8 %
BACTERIA BLD CULT: ABNORMAL
BACTERIA BLD CULT: ABNORMAL
BACTERIA FLD CULT: ABNORMAL
BACTERIA FLD CULT: ABNORMAL
BACTERIA SPEC AEROBE CULT: ABNORMAL
BACTERIA SPEC AEROBE CULT: NORMAL
BACTERIA UR QL AUTO: ABNORMAL /HPF
BACTERIA UR QL AUTO: ABNORMAL /HPF
BASOPHILS # BLD AUTO: 0.01 10*3/MM3 (ref 0–0.2)
BASOPHILS # BLD AUTO: 0.02 10*3/MM3 (ref 0–0.2)
BASOPHILS # BLD AUTO: 0.03 10*3/MM3 (ref 0–0.2)
BASOPHILS # BLD AUTO: 0.04 10*3/MM3 (ref 0–0.2)
BASOPHILS # BLD AUTO: 0.04 10*3/MM3 (ref 0–0.2)
BASOPHILS # BLD AUTO: 0.05 10*3/MM3 (ref 0–0.2)
BASOPHILS # BLD AUTO: 0.06 10*3/MM3 (ref 0–0.2)
BASOPHILS # BLD AUTO: 0.09 10*3/MM3 (ref 0–0.2)
BASOPHILS # BLD MANUAL: 0 10*3/MM3 (ref 0–0.2)
BASOPHILS NFR BLD AUTO: 0 % (ref 0–1.5)
BASOPHILS NFR BLD AUTO: 0.1 % (ref 0–1.5)
BASOPHILS NFR BLD AUTO: 0.1 % (ref 0–1.5)
BASOPHILS NFR BLD AUTO: 0.2 % (ref 0–1.5)
BASOPHILS NFR BLD AUTO: 0.3 % (ref 0–1.5)
BASOPHILS NFR BLD AUTO: 0.4 % (ref 0–1.5)
BASOPHILS NFR BLD AUTO: 0.5 % (ref 0–1.5)
BASOPHILS NFR BLD AUTO: 0.6 % (ref 0–1.5)
BH BB BLOOD EXPIRATION DATE: NORMAL
BH BB BLOOD TYPE BARCODE: 6200
BH BB DISPENSE STATUS: NORMAL
BH BB PRODUCT CODE: NORMAL
BH BB UNIT NUMBER: NORMAL
BH CV ECHO MEAS - AO MAX PG (FULL): 0.88 MMHG
BH CV ECHO MEAS - AO MAX PG (FULL): 2.3 MMHG
BH CV ECHO MEAS - AO MAX PG: 2.7 MMHG
BH CV ECHO MEAS - AO MAX PG: 6 MMHG
BH CV ECHO MEAS - AO MEAN PG (FULL): 1.4 MMHG
BH CV ECHO MEAS - AO MEAN PG: 3.2 MMHG
BH CV ECHO MEAS - AO ROOT AREA (BSA CORRECTED): 2
BH CV ECHO MEAS - AO ROOT AREA (BSA CORRECTED): 2
BH CV ECHO MEAS - AO ROOT AREA: 13.5 CM^2
BH CV ECHO MEAS - AO ROOT AREA: 14.5 CM^2
BH CV ECHO MEAS - AO ROOT DIAM: 4.1 CM
BH CV ECHO MEAS - AO ROOT DIAM: 4.3 CM
BH CV ECHO MEAS - AO V2 MAX: 118.7 CM/SEC
BH CV ECHO MEAS - AO V2 MAX: 81.4 CM/SEC
BH CV ECHO MEAS - AO V2 MEAN: 81 CM/SEC
BH CV ECHO MEAS - AO V2 VTI: 21.8 CM
BH CV ECHO MEAS - ASC AORTA: 3.2 CM
BH CV ECHO MEAS - AVA(I,A): 3.4 CM^2
BH CV ECHO MEAS - AVA(I,D): 3.4 CM^2
BH CV ECHO MEAS - AVA(V,A): 3.4 CM^2
BH CV ECHO MEAS - AVA(V,A): 3.6 CM^2
BH CV ECHO MEAS - AVA(V,D): 3.4 CM^2
BH CV ECHO MEAS - AVA(V,D): 3.6 CM^2
BH CV ECHO MEAS - BSA(HAYCOCK): 2.2 M^2
BH CV ECHO MEAS - BSA(HAYCOCK): 2.3 M^2
BH CV ECHO MEAS - BSA: 2.1 M^2
BH CV ECHO MEAS - BSA: 2.1 M^2
BH CV ECHO MEAS - BZI_BMI: 31.6 KILOGRAMS/M^2
BH CV ECHO MEAS - BZI_BMI: 35.9 KILOGRAMS/M^2
BH CV ECHO MEAS - BZI_METRIC_HEIGHT: 170.2 CM
BH CV ECHO MEAS - BZI_METRIC_HEIGHT: 175.3 CM
BH CV ECHO MEAS - BZI_METRIC_WEIGHT: 103.9 KG
BH CV ECHO MEAS - BZI_METRIC_WEIGHT: 97.1 KG
BH CV ECHO MEAS - EDV(CUBED): 144.1 ML
BH CV ECHO MEAS - EDV(CUBED): 215.2 ML
BH CV ECHO MEAS - EDV(MOD-SP2): 173 ML
BH CV ECHO MEAS - EDV(MOD-SP4): 168 ML
BH CV ECHO MEAS - EDV(TEICH): 132 ML
BH CV ECHO MEAS - EDV(TEICH): 179.5 ML
BH CV ECHO MEAS - EF(CUBED): 44.8 %
BH CV ECHO MEAS - EF(CUBED): 47.8 %
BH CV ECHO MEAS - EF(MOD-BP): 27.9 %
BH CV ECHO MEAS - EF(MOD-SP2): 38.7 %
BH CV ECHO MEAS - EF(MOD-SP4): 18.5 %
BH CV ECHO MEAS - EF(TEICH): 36.7 %
BH CV ECHO MEAS - EF(TEICH): 39.8 %
BH CV ECHO MEAS - ESV(CUBED): 118.7 ML
BH CV ECHO MEAS - ESV(CUBED): 75.2 ML
BH CV ECHO MEAS - ESV(MOD-SP2): 106 ML
BH CV ECHO MEAS - ESV(MOD-SP4): 137 ML
BH CV ECHO MEAS - ESV(TEICH): 113.6 ML
BH CV ECHO MEAS - ESV(TEICH): 79.5 ML
BH CV ECHO MEAS - FS: 18 %
BH CV ECHO MEAS - FS: 19.5 %
BH CV ECHO MEAS - IVS/LVPW: 1.1
BH CV ECHO MEAS - IVS/LVPW: 1.1
BH CV ECHO MEAS - IVSD: 1.5 CM
BH CV ECHO MEAS - IVSD: 1.5 CM
BH CV ECHO MEAS - LA DIMENSION: 4.4 CM
BH CV ECHO MEAS - LA DIMENSION: 4.9 CM
BH CV ECHO MEAS - LA/AO: 1.2
BH CV ECHO MEAS - LAD MAJOR: 5.9 CM
BH CV ECHO MEAS - LAD MAJOR: 6.4 CM
BH CV ECHO MEAS - LAT PEAK E' VEL: 5.7 CM/SEC
BH CV ECHO MEAS - LAT PEAK E' VEL: 6.8 CM/SEC
BH CV ECHO MEAS - LATERAL E/E' RATIO: 6.4
BH CV ECHO MEAS - LATERAL E/E' RATIO: 7.2
BH CV ECHO MEAS - LV DIASTOLIC VOL/BSA (35-75): 78.4 ML/M^2
BH CV ECHO MEAS - LV IVRT: 0.11 SEC
BH CV ECHO MEAS - LV IVRT: 0.19 SEC
BH CV ECHO MEAS - LV MASS(C)D: 387.3 GRAMS
BH CV ECHO MEAS - LV MASS(C)D: 441 GRAMS
BH CV ECHO MEAS - LV MASS(C)DI: 182.2 GRAMS/M^2
BH CV ECHO MEAS - LV MASS(C)DI: 205.9 GRAMS/M^2
BH CV ECHO MEAS - LV MAX PG: 1.8 MMHG
BH CV ECHO MEAS - LV MAX PG: 3.7 MMHG
BH CV ECHO MEAS - LV MEAN PG: 1.1 MMHG
BH CV ECHO MEAS - LV MEAN PG: 1.8 MMHG
BH CV ECHO MEAS - LV SYSTOLIC VOL/BSA (12-30): 64 ML/M^2
BH CV ECHO MEAS - LV V1 MAX: 66.5 CM/SEC
BH CV ECHO MEAS - LV V1 MAX: 96 CM/SEC
BH CV ECHO MEAS - LV V1 MEAN: 47.6 CM/SEC
BH CV ECHO MEAS - LV V1 MEAN: 60.4 CM/SEC
BH CV ECHO MEAS - LV V1 VTI: 13.4 CM
BH CV ECHO MEAS - LV V1 VTI: 17.7 CM
BH CV ECHO MEAS - LVIDD: 5.2 CM
BH CV ECHO MEAS - LVIDD: 6 CM
BH CV ECHO MEAS - LVIDS: 4.2 CM
BH CV ECHO MEAS - LVIDS: 4.9 CM
BH CV ECHO MEAS - LVLD AP2: 9.2 CM
BH CV ECHO MEAS - LVLD AP4: 9 CM
BH CV ECHO MEAS - LVLS AP2: 8.2 CM
BH CV ECHO MEAS - LVLS AP4: 8.9 CM
BH CV ECHO MEAS - LVOT AREA (M): 4.2 CM^2
BH CV ECHO MEAS - LVOT AREA (M): 4.5 CM^2
BH CV ECHO MEAS - LVOT AREA: 4.2 CM^2
BH CV ECHO MEAS - LVOT AREA: 4.4 CM^2
BH CV ECHO MEAS - LVOT DIAM: 2.3 CM
BH CV ECHO MEAS - LVOT DIAM: 2.4 CM
BH CV ECHO MEAS - LVPWD: 1.2 CM
BH CV ECHO MEAS - LVPWD: 1.3 CM
BH CV ECHO MEAS - MED PEAK E' VEL: 3.9 CM/SEC
BH CV ECHO MEAS - MEDIAL E/E' RATIO: 9.3
BH CV ECHO MEAS - MV A MAX VEL: 66.6 CM/SEC
BH CV ECHO MEAS - MV A MAX VEL: 76 CM/SEC
BH CV ECHO MEAS - MV DEC SLOPE: 347.9 CM/SEC^2
BH CV ECHO MEAS - MV DEC TIME: 0.16 SEC
BH CV ECHO MEAS - MV DEC TIME: 0.28 SEC
BH CV ECHO MEAS - MV E MAX VEL: 36.5 CM/SEC
BH CV ECHO MEAS - MV E MAX VEL: 50.3 CM/SEC
BH CV ECHO MEAS - MV E/A: 0.48
BH CV ECHO MEAS - MV E/A: 0.76
BH CV ECHO MEAS - MV MAX PG: 4.6 MMHG
BH CV ECHO MEAS - MV MEAN PG: 1.4 MMHG
BH CV ECHO MEAS - MV P1/2T MAX VEL: 46.2 CM/SEC
BH CV ECHO MEAS - MV P1/2T: 38.8 MSEC
BH CV ECHO MEAS - MV V2 MAX: 107.5 CM/SEC
BH CV ECHO MEAS - MV V2 MEAN: 51.1 CM/SEC
BH CV ECHO MEAS - MV V2 VTI: 18.4 CM
BH CV ECHO MEAS - MVA P1/2T LCG: 4.8 CM^2
BH CV ECHO MEAS - MVA(P1/2T): 5.7 CM^2
BH CV ECHO MEAS - MVA(VTI): 4 CM^2
BH CV ECHO MEAS - PA ACC TIME: 0.14 SEC
BH CV ECHO MEAS - PA MAX PG: 2.1 MMHG
BH CV ECHO MEAS - PA MAX PG: 2.9 MMHG
BH CV ECHO MEAS - PA PR(ACCEL): 17.2 MMHG
BH CV ECHO MEAS - PA V2 MAX: 71.7 CM/SEC
BH CV ECHO MEAS - PA V2 MAX: 85.1 CM/SEC
BH CV ECHO MEAS - PI END-D VEL: 122.7 CM/SEC
BH CV ECHO MEAS - SI(AO): 147.8 ML/M^2
BH CV ECHO MEAS - SI(CUBED): 32.2 ML/M^2
BH CV ECHO MEAS - SI(CUBED): 45.4 ML/M^2
BH CV ECHO MEAS - SI(LVOT): 27.6 ML/M^2
BH CV ECHO MEAS - SI(LVOT): 34.7 ML/M^2
BH CV ECHO MEAS - SI(MOD-SP2): 31.3 ML/M^2
BH CV ECHO MEAS - SI(MOD-SP4): 14.5 ML/M^2
BH CV ECHO MEAS - SI(TEICH): 24.5 ML/M^2
BH CV ECHO MEAS - SI(TEICH): 31 ML/M^2
BH CV ECHO MEAS - SV(AO): 316.5 ML
BH CV ECHO MEAS - SV(CUBED): 68.9 ML
BH CV ECHO MEAS - SV(CUBED): 96.5 ML
BH CV ECHO MEAS - SV(LVOT): 58.6 ML
BH CV ECHO MEAS - SV(LVOT): 74.3 ML
BH CV ECHO MEAS - SV(MOD-SP2): 67 ML
BH CV ECHO MEAS - SV(MOD-SP4): 31 ML
BH CV ECHO MEAS - SV(TEICH): 52.5 ML
BH CV ECHO MEAS - SV(TEICH): 65.9 ML
BH CV ECHO MEAS - TAPSE (>1.6): 1.1 CM
BH CV ECHO MEAS - TAPSE (>1.6): 1.2 CM
BH CV ECHO MEASUREMENTS AVERAGE E/E' RATIO: 7.6
BH CV VAS BP LEFT ARM: NORMAL MMHG
BH CV XLRA - RV BASE: 3.8 CM
BH CV XLRA - RV BASE: 4.1 CM
BH CV XLRA - RV LENGTH: 7.4 CM
BH CV XLRA - RV LENGTH: 8.9 CM
BH CV XLRA - RV MID: 3 CM
BH CV XLRA - RV MID: 3.4 CM
BH CV XLRA - TDI S': 13.9 CM/SEC
BILIRUB SERPL-MCNC: 0.7 MG/DL (ref 0–1.2)
BILIRUB SERPL-MCNC: 0.9 MG/DL (ref 0–1.2)
BILIRUB SERPL-MCNC: 1 MG/DL (ref 0–1.2)
BILIRUB SERPL-MCNC: 1.1 MG/DL (ref 0–1.2)
BILIRUB SERPL-MCNC: 1.1 MG/DL (ref 0–1.2)
BILIRUB SERPL-MCNC: 1.2 MG/DL (ref 0–1.2)
BILIRUB SERPL-MCNC: 1.4 MG/DL (ref 0–1.2)
BILIRUB SERPL-MCNC: 1.5 MG/DL (ref 0–1.2)
BILIRUB SERPL-MCNC: 2 MG/DL (ref 0–1.2)
BILIRUB SERPL-MCNC: 2.1 MG/DL (ref 0–1.2)
BILIRUB SERPL-MCNC: 4.4 MG/DL (ref 0–1.2)
BILIRUB SERPL-MCNC: 4.6 MG/DL (ref 0–1.2)
BILIRUB UR QL STRIP: NEGATIVE
BILIRUB UR QL STRIP: NEGATIVE
BLD GP AB SCN SERPL QL: NEGATIVE
BUN SERPL-MCNC: 25 MG/DL (ref 8–23)
BUN SERPL-MCNC: 26 MG/DL (ref 8–23)
BUN SERPL-MCNC: 27 MG/DL (ref 8–23)
BUN SERPL-MCNC: 27 MG/DL (ref 8–23)
BUN SERPL-MCNC: 28 MG/DL (ref 8–23)
BUN SERPL-MCNC: 28 MG/DL (ref 8–23)
BUN SERPL-MCNC: 29 MG/DL (ref 8–23)
BUN SERPL-MCNC: 30 MG/DL (ref 8–23)
BUN SERPL-MCNC: 31 MG/DL (ref 8–23)
BUN SERPL-MCNC: 32 MG/DL (ref 8–23)
BUN SERPL-MCNC: 34 MG/DL (ref 8–23)
BUN SERPL-MCNC: 35 MG/DL (ref 8–23)
BUN SERPL-MCNC: 35 MG/DL (ref 8–23)
BUN SERPL-MCNC: 36 MG/DL (ref 8–23)
BUN SERPL-MCNC: 37 MG/DL (ref 8–23)
BUN SERPL-MCNC: 37 MG/DL (ref 8–23)
BUN SERPL-MCNC: 39 MG/DL (ref 8–23)
BUN SERPL-MCNC: 49 MG/DL (ref 8–23)
BUN SERPL-MCNC: 50 MG/DL (ref 8–23)
BUN SERPL-MCNC: 58 MG/DL (ref 8–23)
BUN SERPL-MCNC: 61 MG/DL (ref 8–23)
BUN SERPL-MCNC: 67 MG/DL (ref 8–23)
BUN/CREAT SERPL: 10.4 (ref 7–25)
BUN/CREAT SERPL: 10.5 (ref 7–25)
BUN/CREAT SERPL: 12.7 (ref 7–25)
BUN/CREAT SERPL: 13.4 (ref 7–25)
BUN/CREAT SERPL: 13.5 (ref 7–25)
BUN/CREAT SERPL: 13.6 (ref 7–25)
BUN/CREAT SERPL: 13.8 (ref 7–25)
BUN/CREAT SERPL: 13.9 (ref 7–25)
BUN/CREAT SERPL: 14 (ref 7–25)
BUN/CREAT SERPL: 14 (ref 7–25)
BUN/CREAT SERPL: 14.2 (ref 7–25)
BUN/CREAT SERPL: 14.4 (ref 7–25)
BUN/CREAT SERPL: 14.5 (ref 7–25)
BUN/CREAT SERPL: 14.8 (ref 7–25)
BUN/CREAT SERPL: 15.4 (ref 7–25)
BUN/CREAT SERPL: 16.6 (ref 7–25)
BUN/CREAT SERPL: 16.9 (ref 7–25)
BUN/CREAT SERPL: 18.4 (ref 7–25)
BUN/CREAT SERPL: 19.7 (ref 7–25)
BUN/CREAT SERPL: 22.1 (ref 7–25)
BUN/CREAT SERPL: 23.1 (ref 7–25)
BUN/CREAT SERPL: 23.8 (ref 7–25)
BUN/CREAT SERPL: 26.9 (ref 7–25)
BUN/CREAT SERPL: 27.8 (ref 7–25)
BUN/CREAT SERPL: 9.5 (ref 7–25)
BURR CELLS BLD QL SMEAR: ABNORMAL
C PNEUM DNA NPH QL NAA+NON-PROBE: NOT DETECTED
CA-I SERPL ISE-MCNC: 1.16 MMOL/L (ref 1.12–1.32)
CA-I SERPL ISE-MCNC: 1.24 MMOL/L (ref 1.12–1.32)
CA-I SERPL ISE-MCNC: 1.27 MMOL/L (ref 1.12–1.32)
CA-I SERPL ISE-MCNC: 1.28 MMOL/L (ref 1.12–1.32)
CA-I SERPL ISE-MCNC: 1.3 MMOL/L (ref 1.12–1.32)
CALCIUM SPEC-SCNC: 7.6 MG/DL (ref 8.6–10.5)
CALCIUM SPEC-SCNC: 7.6 MG/DL (ref 8.6–10.5)
CALCIUM SPEC-SCNC: 7.9 MG/DL (ref 8.6–10.5)
CALCIUM SPEC-SCNC: 8 MG/DL (ref 8.6–10.5)
CALCIUM SPEC-SCNC: 8.1 MG/DL (ref 8.6–10.5)
CALCIUM SPEC-SCNC: 8.2 MG/DL (ref 8.6–10.5)
CALCIUM SPEC-SCNC: 8.2 MG/DL (ref 8.6–10.5)
CALCIUM SPEC-SCNC: 8.5 MG/DL (ref 8.6–10.5)
CALCIUM SPEC-SCNC: 8.6 MG/DL (ref 8.6–10.5)
CALCIUM SPEC-SCNC: 8.8 MG/DL (ref 8.6–10.5)
CALCIUM SPEC-SCNC: 8.8 MG/DL (ref 8.6–10.5)
CALCIUM SPEC-SCNC: 8.9 MG/DL (ref 8.6–10.5)
CALCIUM SPEC-SCNC: 8.9 MG/DL (ref 8.6–10.5)
CALCIUM SPEC-SCNC: 9.2 MG/DL (ref 8.6–10.5)
CALCIUM SPEC-SCNC: 9.3 MG/DL (ref 8.6–10.5)
CHLORIDE SERPL-SCNC: 100 MMOL/L (ref 98–107)
CHLORIDE SERPL-SCNC: 101 MMOL/L (ref 98–107)
CHLORIDE SERPL-SCNC: 101 MMOL/L (ref 98–107)
CHLORIDE SERPL-SCNC: 102 MMOL/L (ref 98–107)
CHLORIDE SERPL-SCNC: 104 MMOL/L (ref 98–107)
CHLORIDE SERPL-SCNC: 104 MMOL/L (ref 98–107)
CHLORIDE SERPL-SCNC: 105 MMOL/L (ref 98–107)
CHLORIDE SERPL-SCNC: 106 MMOL/L (ref 98–107)
CHLORIDE SERPL-SCNC: 107 MMOL/L (ref 98–107)
CHLORIDE SERPL-SCNC: 108 MMOL/L (ref 98–107)
CHLORIDE SERPL-SCNC: 113 MMOL/L (ref 98–107)
CHLORIDE SERPL-SCNC: 114 MMOL/L (ref 98–107)
CHLORIDE SERPL-SCNC: 99 MMOL/L (ref 98–107)
CHLORIDE SERPL-SCNC: 99 MMOL/L (ref 98–107)
CK SERPL-CCNC: 182 U/L (ref 20–200)
CK SERPL-CCNC: 41 U/L (ref 20–200)
CK SERPL-CCNC: 673 U/L (ref 20–200)
CLARITY UR: ABNORMAL
CLARITY UR: ABNORMAL
CO2 SERPL-SCNC: 17 MMOL/L (ref 22–29)
CO2 SERPL-SCNC: 18 MMOL/L (ref 22–29)
CO2 SERPL-SCNC: 19 MMOL/L (ref 22–29)
CO2 SERPL-SCNC: 20 MMOL/L (ref 22–29)
CO2 SERPL-SCNC: 21 MMOL/L (ref 22–29)
CO2 SERPL-SCNC: 21 MMOL/L (ref 22–29)
CO2 SERPL-SCNC: 22 MMOL/L (ref 22–29)
CO2 SERPL-SCNC: 23 MMOL/L (ref 22–29)
CO2 SERPL-SCNC: 23 MMOL/L (ref 22–29)
CO2 SERPL-SCNC: 24 MMOL/L (ref 22–29)
COLOR UR: ABNORMAL
COLOR UR: YELLOW
CREAT SERPL-MCNC: 1.52 MG/DL (ref 0.76–1.27)
CREAT SERPL-MCNC: 1.54 MG/DL (ref 0.76–1.27)
CREAT SERPL-MCNC: 1.64 MG/DL (ref 0.76–1.27)
CREAT SERPL-MCNC: 1.69 MG/DL (ref 0.76–1.27)
CREAT SERPL-MCNC: 1.72 MG/DL (ref 0.76–1.27)
CREAT SERPL-MCNC: 1.73 MG/DL (ref 0.76–1.27)
CREAT SERPL-MCNC: 1.8 MG/DL (ref 0.76–1.27)
CREAT SERPL-MCNC: 1.82 MG/DL (ref 0.76–1.27)
CREAT SERPL-MCNC: 1.88 MG/DL (ref 0.76–1.27)
CREAT SERPL-MCNC: 1.94 MG/DL (ref 0.76–1.27)
CREAT SERPL-MCNC: 2.04 MG/DL (ref 0.76–1.27)
CREAT SERPL-MCNC: 2.15 MG/DL (ref 0.76–1.27)
CREAT SERPL-MCNC: 2.16 MG/DL (ref 0.76–1.27)
CREAT SERPL-MCNC: 2.28 MG/DL (ref 0.76–1.27)
CREAT SERPL-MCNC: 2.37 MG/DL (ref 0.76–1.27)
CREAT SERPL-MCNC: 2.37 MG/DL (ref 0.76–1.27)
CREAT SERPL-MCNC: 2.43 MG/DL (ref 0.76–1.27)
CREAT SERPL-MCNC: 2.45 MG/DL (ref 0.76–1.27)
CREAT SERPL-MCNC: 2.58 MG/DL (ref 0.76–1.27)
CREAT SERPL-MCNC: 2.76 MG/DL (ref 0.76–1.27)
CREAT SERPL-MCNC: 2.77 MG/DL (ref 0.76–1.27)
CREAT SERPL-MCNC: 2.84 MG/DL (ref 0.76–1.27)
CREAT SERPL-MCNC: 2.9 MG/DL (ref 0.76–1.27)
CREAT SERPL-MCNC: 2.91 MG/DL (ref 0.76–1.27)
CREAT SERPL-MCNC: 3.09 MG/DL (ref 0.76–1.27)
CREAT SERPL-MCNC: 3.31 MG/DL (ref 0.76–1.27)
CREAT SERPL-MCNC: 3.51 MG/DL (ref 0.76–1.27)
CREAT UR-MCNC: 110 MG/DL
CREAT UR-MCNC: 186.2 MG/DL
CROSSMATCH INTERPRETATION: NORMAL
CRP SERPL-MCNC: 1.22 MG/DL (ref 0–0.5)
CRP SERPL-MCNC: 46.11 MG/DL (ref 0–0.5)
CYTO UR: NORMAL
CYTOLOGIST CVX/VAG CYTO: NORMAL
D-LACTATE SERPL-SCNC: 2.9 MMOL/L (ref 0.5–2)
D-LACTATE SERPL-SCNC: 3.1 MMOL/L (ref 0.5–2)
D-LACTATE SERPL-SCNC: 3.1 MMOL/L (ref 0.5–2)
D-LACTATE SERPL-SCNC: 3.9 MMOL/L (ref 0.5–2)
D-LACTATE SERPL-SCNC: 4.1 MMOL/L (ref 0.5–2)
D-LACTATE SERPL-SCNC: 7.1 MMOL/L (ref 0.5–2)
DEPRECATED RDW RBC AUTO: 42.7 FL (ref 37–54)
DEPRECATED RDW RBC AUTO: 45.1 FL (ref 37–54)
DEPRECATED RDW RBC AUTO: 45.3 FL (ref 37–54)
DEPRECATED RDW RBC AUTO: 45.5 FL (ref 37–54)
DEPRECATED RDW RBC AUTO: 45.8 FL (ref 37–54)
DEPRECATED RDW RBC AUTO: 46.5 FL (ref 37–54)
DEPRECATED RDW RBC AUTO: 46.6 FL (ref 37–54)
DEPRECATED RDW RBC AUTO: 46.8 FL (ref 37–54)
DEPRECATED RDW RBC AUTO: 47.1 FL (ref 37–54)
DEPRECATED RDW RBC AUTO: 47.3 FL (ref 37–54)
DEPRECATED RDW RBC AUTO: 47.7 FL (ref 37–54)
DEPRECATED RDW RBC AUTO: 47.7 FL (ref 37–54)
DEPRECATED RDW RBC AUTO: 48.4 FL (ref 37–54)
DEPRECATED RDW RBC AUTO: 48.5 FL (ref 37–54)
DEPRECATED RDW RBC AUTO: 48.5 FL (ref 37–54)
DEPRECATED RDW RBC AUTO: 48.7 FL (ref 37–54)
DEPRECATED RDW RBC AUTO: 48.8 FL (ref 37–54)
DEPRECATED RDW RBC AUTO: 49.5 FL (ref 37–54)
DEPRECATED RDW RBC AUTO: 49.7 FL (ref 37–54)
DEPRECATED RDW RBC AUTO: 50 FL (ref 37–54)
DEPRECATED RDW RBC AUTO: 51.1 FL (ref 37–54)
DEPRECATED RDW RBC AUTO: 51.6 FL (ref 37–54)
DEPRECATED RDW RBC AUTO: 53.8 FL (ref 37–54)
ELASTASE PANC STL-MCNT: >500 UG ELAST./G
EOSINOPHIL # BLD AUTO: 0 10*3/MM3 (ref 0–0.4)
EOSINOPHIL # BLD AUTO: 0 10*3/MM3 (ref 0–0.4)
EOSINOPHIL # BLD AUTO: 0.02 10*3/MM3 (ref 0–0.4)
EOSINOPHIL # BLD AUTO: 0.03 10*3/MM3 (ref 0–0.4)
EOSINOPHIL # BLD AUTO: 0.03 10*3/MM3 (ref 0–0.4)
EOSINOPHIL # BLD AUTO: 0.04 10*3/MM3 (ref 0–0.4)
EOSINOPHIL # BLD AUTO: 0.04 10*3/MM3 (ref 0–0.4)
EOSINOPHIL # BLD AUTO: 0.05 10*3/MM3 (ref 0–0.4)
EOSINOPHIL # BLD AUTO: 0.07 10*3/MM3 (ref 0–0.4)
EOSINOPHIL # BLD AUTO: 0.14 10*3/MM3 (ref 0–0.4)
EOSINOPHIL # BLD AUTO: 0.14 10*3/MM3 (ref 0–0.4)
EOSINOPHIL # BLD AUTO: 0.27 10*3/MM3 (ref 0–0.4)
EOSINOPHIL # BLD MANUAL: 0 10*3/MM3 (ref 0–0.4)
EOSINOPHIL NFR BLD AUTO: 0 % (ref 0.3–6.2)
EOSINOPHIL NFR BLD AUTO: 0 % (ref 0.3–6.2)
EOSINOPHIL NFR BLD AUTO: 0.1 % (ref 0.3–6.2)
EOSINOPHIL NFR BLD AUTO: 0.1 % (ref 0.3–6.2)
EOSINOPHIL NFR BLD AUTO: 0.2 % (ref 0.3–6.2)
EOSINOPHIL NFR BLD AUTO: 0.3 % (ref 0.3–6.2)
EOSINOPHIL NFR BLD AUTO: 0.3 % (ref 0.3–6.2)
EOSINOPHIL NFR BLD AUTO: 0.4 % (ref 0.3–6.2)
EOSINOPHIL NFR BLD AUTO: 0.6 % (ref 0.3–6.2)
EOSINOPHIL NFR BLD AUTO: 1 % (ref 0.3–6.2)
EOSINOPHIL NFR BLD AUTO: 1.5 % (ref 0.3–6.2)
EOSINOPHIL NFR BLD AUTO: 1.7 % (ref 0.3–6.2)
EOSINOPHIL NFR BLD MANUAL: 0 % (ref 0.3–6.2)
EOSINOPHIL SPEC QL MICRO: 0 % EOS/100 CELLS (ref 0–0)
ERYTHROCYTE [DISTWIDTH] IN BLOOD BY AUTOMATED COUNT: 13.1 % (ref 12.3–15.4)
ERYTHROCYTE [DISTWIDTH] IN BLOOD BY AUTOMATED COUNT: 13.2 % (ref 12.3–15.4)
ERYTHROCYTE [DISTWIDTH] IN BLOOD BY AUTOMATED COUNT: 13.3 % (ref 12.3–15.4)
ERYTHROCYTE [DISTWIDTH] IN BLOOD BY AUTOMATED COUNT: 13.4 % (ref 12.3–15.4)
ERYTHROCYTE [DISTWIDTH] IN BLOOD BY AUTOMATED COUNT: 13.6 % (ref 12.3–15.4)
ERYTHROCYTE [DISTWIDTH] IN BLOOD BY AUTOMATED COUNT: 13.8 % (ref 12.3–15.4)
ERYTHROCYTE [DISTWIDTH] IN BLOOD BY AUTOMATED COUNT: 13.9 % (ref 12.3–15.4)
ERYTHROCYTE [DISTWIDTH] IN BLOOD BY AUTOMATED COUNT: 13.9 % (ref 12.3–15.4)
ERYTHROCYTE [DISTWIDTH] IN BLOOD BY AUTOMATED COUNT: 14 % (ref 12.3–15.4)
ERYTHROCYTE [DISTWIDTH] IN BLOOD BY AUTOMATED COUNT: 15.1 % (ref 12.3–15.4)
ERYTHROCYTE [DISTWIDTH] IN BLOOD BY AUTOMATED COUNT: 15.2 % (ref 12.3–15.4)
ERYTHROCYTE [DISTWIDTH] IN BLOOD BY AUTOMATED COUNT: 15.2 % (ref 12.3–15.4)
ERYTHROCYTE [DISTWIDTH] IN BLOOD BY AUTOMATED COUNT: 15.3 % (ref 12.3–15.4)
ERYTHROCYTE [DISTWIDTH] IN BLOOD BY AUTOMATED COUNT: 15.4 % (ref 12.3–15.4)
ERYTHROCYTE [DISTWIDTH] IN BLOOD BY AUTOMATED COUNT: 15.5 % (ref 12.3–15.4)
ERYTHROCYTE [DISTWIDTH] IN BLOOD BY AUTOMATED COUNT: 15.5 % (ref 12.3–15.4)
ERYTHROCYTE [DISTWIDTH] IN BLOOD BY AUTOMATED COUNT: 15.7 % (ref 12.3–15.4)
ERYTHROCYTE [DISTWIDTH] IN BLOOD BY AUTOMATED COUNT: 15.9 % (ref 12.3–15.4)
ERYTHROCYTE [DISTWIDTH] IN BLOOD BY AUTOMATED COUNT: 16.3 % (ref 12.3–15.4)
ERYTHROCYTE [SEDIMENTATION RATE] IN BLOOD: 13 MM/HR (ref 0–20)
FERRITIN SERPL-MCNC: 163.1 NG/ML (ref 30–400)
FERRITIN SERPL-MCNC: 718.4 NG/ML (ref 30–400)
FLUAV SUBTYP SPEC NAA+PROBE: NOT DETECTED
FLUBV RNA ISLT QL NAA+PROBE: NOT DETECTED
GAMMA GLOB 24H MFR UR ELPH: 12.4 %
GFR SERPL CREATININE-BSD FRML MDRD: 17 ML/MIN/1.73
GFR SERPL CREATININE-BSD FRML MDRD: 19 ML/MIN/1.73
GFR SERPL CREATININE-BSD FRML MDRD: 20 ML/MIN/1.73
GFR SERPL CREATININE-BSD FRML MDRD: 22 ML/MIN/1.73
GFR SERPL CREATININE-BSD FRML MDRD: 23 ML/MIN/1.73
GFR SERPL CREATININE-BSD FRML MDRD: 23 ML/MIN/1.73
GFR SERPL CREATININE-BSD FRML MDRD: 25 ML/MIN/1.73
GFR SERPL CREATININE-BSD FRML MDRD: 26 ML/MIN/1.73
GFR SERPL CREATININE-BSD FRML MDRD: 27 ML/MIN/1.73
GFR SERPL CREATININE-BSD FRML MDRD: 29 ML/MIN/1.73
GFR SERPL CREATININE-BSD FRML MDRD: 31 ML/MIN/1.73
GFR SERPL CREATININE-BSD FRML MDRD: 31 ML/MIN/1.73
GFR SERPL CREATININE-BSD FRML MDRD: 33 ML/MIN/1.73
GFR SERPL CREATININE-BSD FRML MDRD: 35 ML/MIN/1.73
GFR SERPL CREATININE-BSD FRML MDRD: 36 ML/MIN/1.73
GFR SERPL CREATININE-BSD FRML MDRD: 37 ML/MIN/1.73
GFR SERPL CREATININE-BSD FRML MDRD: 38 ML/MIN/1.73
GFR SERPL CREATININE-BSD FRML MDRD: 39 ML/MIN/1.73
GFR SERPL CREATININE-BSD FRML MDRD: 40 ML/MIN/1.73
GFR SERPL CREATININE-BSD FRML MDRD: 41 ML/MIN/1.73
GFR SERPL CREATININE-BSD FRML MDRD: 42 ML/MIN/1.73
GFR SERPL CREATININE-BSD FRML MDRD: 45 ML/MIN/1.73
GFR SERPL CREATININE-BSD FRML MDRD: 46 ML/MIN/1.73
GLOBULIN UR ELPH-MCNC: 2.1 GM/DL
GLOBULIN UR ELPH-MCNC: 2.5 GM/DL
GLOBULIN UR ELPH-MCNC: 2.5 GM/DL
GLOBULIN UR ELPH-MCNC: 2.6 GM/DL
GLOBULIN UR ELPH-MCNC: 2.6 GM/DL
GLOBULIN UR ELPH-MCNC: 2.8 GM/DL
GLOBULIN UR ELPH-MCNC: 3.1 GM/DL
GLOBULIN UR ELPH-MCNC: 3.1 GM/DL
GLOBULIN UR ELPH-MCNC: 3.2 GM/DL
GLOBULIN UR ELPH-MCNC: 3.4 GM/DL
GLOBULIN UR ELPH-MCNC: 3.5 GM/DL
GLUCOSE BLDC GLUCOMTR-MCNC: 101 MG/DL (ref 70–130)
GLUCOSE BLDC GLUCOMTR-MCNC: 104 MG/DL (ref 70–130)
GLUCOSE BLDC GLUCOMTR-MCNC: 104 MG/DL (ref 70–130)
GLUCOSE BLDC GLUCOMTR-MCNC: 105 MG/DL (ref 70–130)
GLUCOSE BLDC GLUCOMTR-MCNC: 105 MG/DL (ref 70–130)
GLUCOSE BLDC GLUCOMTR-MCNC: 106 MG/DL (ref 70–130)
GLUCOSE BLDC GLUCOMTR-MCNC: 110 MG/DL (ref 70–130)
GLUCOSE BLDC GLUCOMTR-MCNC: 112 MG/DL (ref 70–130)
GLUCOSE BLDC GLUCOMTR-MCNC: 113 MG/DL (ref 70–130)
GLUCOSE BLDC GLUCOMTR-MCNC: 115 MG/DL (ref 70–130)
GLUCOSE BLDC GLUCOMTR-MCNC: 116 MG/DL (ref 70–130)
GLUCOSE BLDC GLUCOMTR-MCNC: 118 MG/DL (ref 70–130)
GLUCOSE BLDC GLUCOMTR-MCNC: 118 MG/DL (ref 70–130)
GLUCOSE BLDC GLUCOMTR-MCNC: 120 MG/DL (ref 70–130)
GLUCOSE BLDC GLUCOMTR-MCNC: 123 MG/DL (ref 70–130)
GLUCOSE BLDC GLUCOMTR-MCNC: 125 MG/DL (ref 70–130)
GLUCOSE BLDC GLUCOMTR-MCNC: 125 MG/DL (ref 70–130)
GLUCOSE BLDC GLUCOMTR-MCNC: 126 MG/DL (ref 70–130)
GLUCOSE BLDC GLUCOMTR-MCNC: 126 MG/DL (ref 70–130)
GLUCOSE BLDC GLUCOMTR-MCNC: 128 MG/DL (ref 70–130)
GLUCOSE BLDC GLUCOMTR-MCNC: 129 MG/DL (ref 70–130)
GLUCOSE BLDC GLUCOMTR-MCNC: 129 MG/DL (ref 70–130)
GLUCOSE BLDC GLUCOMTR-MCNC: 130 MG/DL (ref 70–130)
GLUCOSE BLDC GLUCOMTR-MCNC: 131 MG/DL (ref 70–130)
GLUCOSE BLDC GLUCOMTR-MCNC: 133 MG/DL (ref 70–130)
GLUCOSE BLDC GLUCOMTR-MCNC: 134 MG/DL (ref 70–130)
GLUCOSE BLDC GLUCOMTR-MCNC: 139 MG/DL (ref 70–130)
GLUCOSE BLDC GLUCOMTR-MCNC: 141 MG/DL (ref 70–130)
GLUCOSE BLDC GLUCOMTR-MCNC: 143 MG/DL (ref 70–130)
GLUCOSE BLDC GLUCOMTR-MCNC: 145 MG/DL (ref 70–130)
GLUCOSE BLDC GLUCOMTR-MCNC: 145 MG/DL (ref 70–130)
GLUCOSE BLDC GLUCOMTR-MCNC: 147 MG/DL (ref 70–130)
GLUCOSE BLDC GLUCOMTR-MCNC: 147 MG/DL (ref 70–130)
GLUCOSE BLDC GLUCOMTR-MCNC: 148 MG/DL (ref 70–130)
GLUCOSE BLDC GLUCOMTR-MCNC: 150 MG/DL (ref 70–130)
GLUCOSE BLDC GLUCOMTR-MCNC: 151 MG/DL (ref 70–130)
GLUCOSE BLDC GLUCOMTR-MCNC: 151 MG/DL (ref 70–130)
GLUCOSE BLDC GLUCOMTR-MCNC: 153 MG/DL (ref 70–130)
GLUCOSE BLDC GLUCOMTR-MCNC: 153 MG/DL (ref 70–130)
GLUCOSE BLDC GLUCOMTR-MCNC: 154 MG/DL (ref 70–130)
GLUCOSE BLDC GLUCOMTR-MCNC: 155 MG/DL (ref 70–130)
GLUCOSE BLDC GLUCOMTR-MCNC: 157 MG/DL (ref 70–130)
GLUCOSE BLDC GLUCOMTR-MCNC: 159 MG/DL (ref 70–130)
GLUCOSE BLDC GLUCOMTR-MCNC: 159 MG/DL (ref 70–130)
GLUCOSE BLDC GLUCOMTR-MCNC: 160 MG/DL (ref 70–130)
GLUCOSE BLDC GLUCOMTR-MCNC: 165 MG/DL (ref 70–130)
GLUCOSE BLDC GLUCOMTR-MCNC: 167 MG/DL (ref 70–130)
GLUCOSE BLDC GLUCOMTR-MCNC: 167 MG/DL (ref 70–130)
GLUCOSE BLDC GLUCOMTR-MCNC: 168 MG/DL (ref 70–130)
GLUCOSE BLDC GLUCOMTR-MCNC: 171 MG/DL (ref 70–130)
GLUCOSE BLDC GLUCOMTR-MCNC: 179 MG/DL (ref 70–130)
GLUCOSE BLDC GLUCOMTR-MCNC: 182 MG/DL (ref 70–130)
GLUCOSE BLDC GLUCOMTR-MCNC: 183 MG/DL (ref 70–130)
GLUCOSE BLDC GLUCOMTR-MCNC: 186 MG/DL (ref 70–130)
GLUCOSE BLDC GLUCOMTR-MCNC: 187 MG/DL (ref 70–130)
GLUCOSE BLDC GLUCOMTR-MCNC: 189 MG/DL (ref 70–130)
GLUCOSE BLDC GLUCOMTR-MCNC: 191 MG/DL (ref 70–130)
GLUCOSE BLDC GLUCOMTR-MCNC: 191 MG/DL (ref 70–130)
GLUCOSE BLDC GLUCOMTR-MCNC: 193 MG/DL (ref 70–130)
GLUCOSE BLDC GLUCOMTR-MCNC: 194 MG/DL (ref 70–130)
GLUCOSE BLDC GLUCOMTR-MCNC: 198 MG/DL (ref 70–130)
GLUCOSE BLDC GLUCOMTR-MCNC: 205 MG/DL (ref 70–130)
GLUCOSE BLDC GLUCOMTR-MCNC: 207 MG/DL (ref 70–130)
GLUCOSE BLDC GLUCOMTR-MCNC: 214 MG/DL (ref 70–130)
GLUCOSE BLDC GLUCOMTR-MCNC: 217 MG/DL (ref 70–130)
GLUCOSE BLDC GLUCOMTR-MCNC: 220 MG/DL (ref 70–130)
GLUCOSE BLDC GLUCOMTR-MCNC: 222 MG/DL (ref 70–130)
GLUCOSE BLDC GLUCOMTR-MCNC: 235 MG/DL (ref 70–130)
GLUCOSE BLDC GLUCOMTR-MCNC: 240 MG/DL (ref 70–130)
GLUCOSE BLDC GLUCOMTR-MCNC: 248 MG/DL (ref 70–130)
GLUCOSE BLDC GLUCOMTR-MCNC: 250 MG/DL (ref 70–130)
GLUCOSE BLDC GLUCOMTR-MCNC: 299 MG/DL (ref 70–130)
GLUCOSE BLDC GLUCOMTR-MCNC: 66 MG/DL (ref 70–130)
GLUCOSE BLDC GLUCOMTR-MCNC: 68 MG/DL (ref 70–130)
GLUCOSE BLDC GLUCOMTR-MCNC: 69 MG/DL (ref 70–130)
GLUCOSE BLDC GLUCOMTR-MCNC: 69 MG/DL (ref 70–130)
GLUCOSE BLDC GLUCOMTR-MCNC: 72 MG/DL (ref 70–130)
GLUCOSE BLDC GLUCOMTR-MCNC: 75 MG/DL (ref 70–130)
GLUCOSE BLDC GLUCOMTR-MCNC: 78 MG/DL (ref 70–130)
GLUCOSE BLDC GLUCOMTR-MCNC: 78 MG/DL (ref 70–130)
GLUCOSE BLDC GLUCOMTR-MCNC: 80 MG/DL (ref 70–130)
GLUCOSE BLDC GLUCOMTR-MCNC: 80 MG/DL (ref 70–130)
GLUCOSE BLDC GLUCOMTR-MCNC: 81 MG/DL (ref 70–130)
GLUCOSE BLDC GLUCOMTR-MCNC: 86 MG/DL (ref 70–130)
GLUCOSE BLDC GLUCOMTR-MCNC: 86 MG/DL (ref 70–130)
GLUCOSE BLDC GLUCOMTR-MCNC: 87 MG/DL (ref 70–130)
GLUCOSE BLDC GLUCOMTR-MCNC: 91 MG/DL (ref 70–130)
GLUCOSE BLDC GLUCOMTR-MCNC: 95 MG/DL (ref 70–130)
GLUCOSE BLDC GLUCOMTR-MCNC: 97 MG/DL (ref 70–130)
GLUCOSE BLDC GLUCOMTR-MCNC: 99 MG/DL (ref 70–130)
GLUCOSE SERPL-MCNC: 109 MG/DL (ref 65–99)
GLUCOSE SERPL-MCNC: 110 MG/DL (ref 65–99)
GLUCOSE SERPL-MCNC: 115 MG/DL (ref 65–99)
GLUCOSE SERPL-MCNC: 117 MG/DL (ref 65–99)
GLUCOSE SERPL-MCNC: 120 MG/DL (ref 65–99)
GLUCOSE SERPL-MCNC: 123 MG/DL (ref 65–99)
GLUCOSE SERPL-MCNC: 124 MG/DL (ref 65–99)
GLUCOSE SERPL-MCNC: 129 MG/DL (ref 65–99)
GLUCOSE SERPL-MCNC: 130 MG/DL (ref 65–99)
GLUCOSE SERPL-MCNC: 134 MG/DL (ref 65–99)
GLUCOSE SERPL-MCNC: 139 MG/DL (ref 65–99)
GLUCOSE SERPL-MCNC: 140 MG/DL (ref 65–99)
GLUCOSE SERPL-MCNC: 144 MG/DL (ref 65–99)
GLUCOSE SERPL-MCNC: 164 MG/DL (ref 65–99)
GLUCOSE SERPL-MCNC: 172 MG/DL (ref 65–99)
GLUCOSE SERPL-MCNC: 181 MG/DL (ref 65–99)
GLUCOSE SERPL-MCNC: 211 MG/DL (ref 65–99)
GLUCOSE SERPL-MCNC: 214 MG/DL (ref 65–99)
GLUCOSE SERPL-MCNC: 217 MG/DL (ref 65–99)
GLUCOSE SERPL-MCNC: 267 MG/DL (ref 65–99)
GLUCOSE SERPL-MCNC: 61 MG/DL (ref 65–99)
GLUCOSE SERPL-MCNC: 72 MG/DL (ref 65–99)
GLUCOSE SERPL-MCNC: 82 MG/DL (ref 65–99)
GLUCOSE SERPL-MCNC: 92 MG/DL (ref 65–99)
GLUCOSE SERPL-MCNC: 99 MG/DL (ref 65–99)
GLUCOSE UR STRIP-MCNC: ABNORMAL MG/DL
GLUCOSE UR STRIP-MCNC: NEGATIVE MG/DL
GRAM STN SPEC: ABNORMAL
HADV DNA SPEC NAA+PROBE: NOT DETECTED
HBA1C MFR BLD: 7.3 % (ref 4.8–5.6)
HCOV 229E RNA SPEC QL NAA+PROBE: NOT DETECTED
HCOV HKU1 RNA SPEC QL NAA+PROBE: NOT DETECTED
HCOV NL63 RNA SPEC QL NAA+PROBE: NOT DETECTED
HCOV OC43 RNA SPEC QL NAA+PROBE: NOT DETECTED
HCT VFR BLD AUTO: 25.6 % (ref 37.5–51)
HCT VFR BLD AUTO: 27 % (ref 37.5–51)
HCT VFR BLD AUTO: 27.5 % (ref 37.5–51)
HCT VFR BLD AUTO: 28.3 % (ref 37.5–51)
HCT VFR BLD AUTO: 29.9 % (ref 37.5–51)
HCT VFR BLD AUTO: 30 % (ref 37.5–51)
HCT VFR BLD AUTO: 30.4 % (ref 37.5–51)
HCT VFR BLD AUTO: 30.9 % (ref 37.5–51)
HCT VFR BLD AUTO: 31.1 % (ref 37.5–51)
HCT VFR BLD AUTO: 31.5 % (ref 37.5–51)
HCT VFR BLD AUTO: 31.8 % (ref 37.5–51)
HCT VFR BLD AUTO: 31.8 % (ref 37.5–51)
HCT VFR BLD AUTO: 32.2 % (ref 37.5–51)
HCT VFR BLD AUTO: 33.1 % (ref 37.5–51)
HCT VFR BLD AUTO: 33.5 % (ref 37.5–51)
HCT VFR BLD AUTO: 34.8 % (ref 37.5–51)
HCT VFR BLD AUTO: 35.9 % (ref 37.5–51)
HCT VFR BLD AUTO: 36 % (ref 37.5–51)
HCT VFR BLD AUTO: 36.5 % (ref 37.5–51)
HCT VFR BLD AUTO: 37.1 % (ref 37.5–51)
HCT VFR BLD AUTO: 37.9 % (ref 37.5–51)
HCT VFR BLD AUTO: 38.1 % (ref 37.5–51)
HCT VFR BLD AUTO: 38.3 % (ref 37.5–51)
HCT VFR BLD AUTO: 49.5 % (ref 37.5–51)
HGB BLD-MCNC: 10.1 G/DL (ref 13–17.7)
HGB BLD-MCNC: 10.3 G/DL (ref 13–17.7)
HGB BLD-MCNC: 10.4 G/DL (ref 13–17.7)
HGB BLD-MCNC: 10.6 G/DL (ref 13–17.7)
HGB BLD-MCNC: 11 G/DL (ref 13–17.7)
HGB BLD-MCNC: 11 G/DL (ref 13–17.7)
HGB BLD-MCNC: 11.1 G/DL (ref 13–17.7)
HGB BLD-MCNC: 11.3 G/DL (ref 13–17.7)
HGB BLD-MCNC: 11.6 G/DL (ref 13–17.7)
HGB BLD-MCNC: 11.9 G/DL (ref 13–17.7)
HGB BLD-MCNC: 11.9 G/DL (ref 13–17.7)
HGB BLD-MCNC: 12 G/DL (ref 13–17.7)
HGB BLD-MCNC: 12 G/DL (ref 13–17.7)
HGB BLD-MCNC: 16.5 G/DL (ref 13–17.7)
HGB BLD-MCNC: 8.1 G/DL (ref 13–17.7)
HGB BLD-MCNC: 9 G/DL (ref 13–17.7)
HGB BLD-MCNC: 9.1 G/DL (ref 13–17.7)
HGB BLD-MCNC: 9.3 G/DL (ref 13–17.7)
HGB BLD-MCNC: 9.3 G/DL (ref 13–17.7)
HGB BLD-MCNC: 9.5 G/DL (ref 13–17.7)
HGB BLD-MCNC: 9.9 G/DL (ref 13–17.7)
HGB BLD-MCNC: 9.9 G/DL (ref 13–17.7)
HGB UR QL STRIP.AUTO: ABNORMAL
HGB UR QL STRIP.AUTO: ABNORMAL
HMPV RNA NPH QL NAA+NON-PROBE: NOT DETECTED
HOLD SPECIMEN: NORMAL
HPIV1 RNA SPEC QL NAA+PROBE: NOT DETECTED
HPIV2 RNA SPEC QL NAA+PROBE: NOT DETECTED
HPIV3 RNA NPH QL NAA+PROBE: NOT DETECTED
HPIV4 P GENE NPH QL NAA+PROBE: NOT DETECTED
HYALINE CASTS UR QL AUTO: ABNORMAL /LPF
HYALINE CASTS UR QL AUTO: ABNORMAL /LPF
IMM GRANULOCYTES # BLD AUTO: 0.05 10*3/MM3 (ref 0–0.05)
IMM GRANULOCYTES # BLD AUTO: 0.06 10*3/MM3 (ref 0–0.05)
IMM GRANULOCYTES # BLD AUTO: 0.1 10*3/MM3 (ref 0–0.05)
IMM GRANULOCYTES # BLD AUTO: 0.12 10*3/MM3 (ref 0–0.05)
IMM GRANULOCYTES # BLD AUTO: 0.13 10*3/MM3 (ref 0–0.05)
IMM GRANULOCYTES # BLD AUTO: 0.14 10*3/MM3 (ref 0–0.05)
IMM GRANULOCYTES # BLD AUTO: 0.22 10*3/MM3 (ref 0–0.05)
IMM GRANULOCYTES # BLD AUTO: 0.31 10*3/MM3 (ref 0–0.05)
IMM GRANULOCYTES # BLD AUTO: 0.38 10*3/MM3 (ref 0–0.05)
IMM GRANULOCYTES # BLD AUTO: 0.39 10*3/MM3 (ref 0–0.05)
IMM GRANULOCYTES # BLD AUTO: 0.5 10*3/MM3 (ref 0–0.05)
IMM GRANULOCYTES # BLD AUTO: 0.5 10*3/MM3 (ref 0–0.05)
IMM GRANULOCYTES NFR BLD AUTO: 0.5 % (ref 0–0.5)
IMM GRANULOCYTES NFR BLD AUTO: 0.5 % (ref 0–0.5)
IMM GRANULOCYTES NFR BLD AUTO: 0.7 % (ref 0–0.5)
IMM GRANULOCYTES NFR BLD AUTO: 0.9 % (ref 0–0.5)
IMM GRANULOCYTES NFR BLD AUTO: 1 % (ref 0–0.5)
IMM GRANULOCYTES NFR BLD AUTO: 1.1 % (ref 0–0.5)
IMM GRANULOCYTES NFR BLD AUTO: 1.5 % (ref 0–0.5)
IMM GRANULOCYTES NFR BLD AUTO: 1.6 % (ref 0–0.5)
IMM GRANULOCYTES NFR BLD AUTO: 1.8 % (ref 0–0.5)
IMM GRANULOCYTES NFR BLD AUTO: 2.1 % (ref 0–0.5)
IMM GRANULOCYTES NFR BLD AUTO: 2.4 % (ref 0–0.5)
IMM GRANULOCYTES NFR BLD AUTO: 3.5 % (ref 0–0.5)
INR PPP: 1.08 (ref 0.85–1.16)
INR PPP: 1.23 (ref 0.85–1.16)
INR PPP: 1.23 (ref 0.85–1.16)
INR PPP: 1.43 (ref 0.85–1.16)
INR PPP: 2.2 (ref 0.85–1.16)
INR PPP: 2.22 (ref 0.85–1.16)
INR PPP: 2.39 (ref 0.85–1.16)
INR PPP: 2.72 (ref 0.85–1.16)
INR PPP: 3.59 (ref 0.85–1.16)
IRON 24H UR-MRATE: 10 MCG/DL (ref 59–158)
IRON 24H UR-MRATE: 16 MCG/DL (ref 59–158)
IRON SATN MFR SERPL: 6 % (ref 20–50)
IRON SATN MFR SERPL: 7 % (ref 20–50)
ISOLATED FROM: ABNORMAL
IVRT: 187 MSEC
KETONES UR QL STRIP: ABNORMAL
KETONES UR QL STRIP: NEGATIVE
LAB AP CASE REPORT: NORMAL
LAB AP CLINICAL INFORMATION: NORMAL
LABORATORY COMMENT REPORT: NORMAL
LEFT ATRIUM VOLUME INDEX: 23.8 ML/M^2
LEFT ATRIUM VOLUME INDEX: 37.6 ML/M^2
LEFT ATRIUM VOLUME: 50.9 ML
LEFT ATRIUM VOLUME: 80 ML
LEUKOCYTE ESTERASE UR QL STRIP.AUTO: ABNORMAL
LEUKOCYTE ESTERASE UR QL STRIP.AUTO: ABNORMAL
LIPASE SERPL-CCNC: 34 U/L (ref 13–60)
LIPASE SERPL-CCNC: 603 U/L (ref 13–60)
LIPASE SERPL-CCNC: 8 U/L (ref 13–60)
LV EF 2D ECHO EST: 30 %
LYMPHOCYTES # BLD AUTO: 0.36 10*3/MM3 (ref 0.7–3.1)
LYMPHOCYTES # BLD AUTO: 0.5 10*3/MM3 (ref 0.7–3.1)
LYMPHOCYTES # BLD AUTO: 0.51 10*3/MM3 (ref 0.7–3.1)
LYMPHOCYTES # BLD AUTO: 0.54 10*3/MM3 (ref 0.7–3.1)
LYMPHOCYTES # BLD AUTO: 0.55 10*3/MM3 (ref 0.7–3.1)
LYMPHOCYTES # BLD AUTO: 0.61 10*3/MM3 (ref 0.7–3.1)
LYMPHOCYTES # BLD AUTO: 0.68 10*3/MM3 (ref 0.7–3.1)
LYMPHOCYTES # BLD AUTO: 0.74 10*3/MM3 (ref 0.7–3.1)
LYMPHOCYTES # BLD AUTO: 0.79 10*3/MM3 (ref 0.7–3.1)
LYMPHOCYTES # BLD AUTO: 0.91 10*3/MM3 (ref 0.7–3.1)
LYMPHOCYTES # BLD AUTO: 1.24 10*3/MM3 (ref 0.7–3.1)
LYMPHOCYTES # BLD AUTO: 1.37 10*3/MM3 (ref 0.7–3.1)
LYMPHOCYTES # BLD MANUAL: 1.31 10*3/MM3 (ref 0.7–3.1)
LYMPHOCYTES NFR BLD AUTO: 2 % (ref 19.6–45.3)
LYMPHOCYTES NFR BLD AUTO: 2.1 % (ref 19.6–45.3)
LYMPHOCYTES NFR BLD AUTO: 2.7 % (ref 19.6–45.3)
LYMPHOCYTES NFR BLD AUTO: 3.5 % (ref 19.6–45.3)
LYMPHOCYTES NFR BLD AUTO: 3.7 % (ref 19.6–45.3)
LYMPHOCYTES NFR BLD AUTO: 5 % (ref 19.6–45.3)
LYMPHOCYTES NFR BLD AUTO: 5.4 % (ref 19.6–45.3)
LYMPHOCYTES NFR BLD AUTO: 5.4 % (ref 19.6–45.3)
LYMPHOCYTES NFR BLD AUTO: 6.4 % (ref 19.6–45.3)
LYMPHOCYTES NFR BLD AUTO: 7.8 % (ref 19.6–45.3)
LYMPHOCYTES NFR BLD AUTO: 8.2 % (ref 19.6–45.3)
LYMPHOCYTES NFR BLD AUTO: 9.9 % (ref 19.6–45.3)
LYMPHOCYTES NFR BLD MANUAL: 14 % (ref 19.6–45.3)
LYMPHOCYTES NFR BLD MANUAL: 4 % (ref 5–12)
M PNEUMO IGG SER IA-ACNC: NOT DETECTED
M PROTEIN 24H MFR UR ELPH: NORMAL %
M PROTEIN 24H UR ELPH-MRATE: NORMAL MG/24 HR
MAGNESIUM SERPL-MCNC: 1.4 MG/DL (ref 1.6–2.4)
MAGNESIUM SERPL-MCNC: 1.6 MG/DL (ref 1.6–2.4)
MAGNESIUM SERPL-MCNC: 1.8 MG/DL (ref 1.6–2.4)
MAGNESIUM SERPL-MCNC: 1.9 MG/DL (ref 1.6–2.4)
MAGNESIUM SERPL-MCNC: 1.9 MG/DL (ref 1.6–2.4)
MAGNESIUM SERPL-MCNC: 2 MG/DL (ref 1.6–2.4)
MAGNESIUM SERPL-MCNC: 2.1 MG/DL (ref 1.6–2.4)
MAGNESIUM SERPL-MCNC: 2.2 MG/DL (ref 1.6–2.4)
MAGNESIUM SERPL-MCNC: 2.4 MG/DL (ref 1.6–2.4)
MAGNESIUM SERPL-MCNC: 2.5 MG/DL (ref 1.6–2.4)
MAXIMAL PREDICTED HEART RATE: 152 BPM
MCH RBC QN AUTO: 28 PG (ref 26.6–33)
MCH RBC QN AUTO: 28.3 PG (ref 26.6–33)
MCH RBC QN AUTO: 28.3 PG (ref 26.6–33)
MCH RBC QN AUTO: 28.4 PG (ref 26.6–33)
MCH RBC QN AUTO: 28.6 PG (ref 26.6–33)
MCH RBC QN AUTO: 28.7 PG (ref 26.6–33)
MCH RBC QN AUTO: 28.8 PG (ref 26.6–33)
MCH RBC QN AUTO: 29 PG (ref 26.6–33)
MCH RBC QN AUTO: 29.1 PG (ref 26.6–33)
MCH RBC QN AUTO: 29.4 PG (ref 26.6–33)
MCH RBC QN AUTO: 29.5 PG (ref 26.6–33)
MCH RBC QN AUTO: 29.6 PG (ref 26.6–33)
MCH RBC QN AUTO: 29.7 PG (ref 26.6–33)
MCH RBC QN AUTO: 29.8 PG (ref 26.6–33)
MCH RBC QN AUTO: 29.9 PG (ref 26.6–33)
MCH RBC QN AUTO: 30 PG (ref 26.6–33)
MCH RBC QN AUTO: 30.2 PG (ref 26.6–33)
MCH RBC QN AUTO: 30.3 PG (ref 26.6–33)
MCHC RBC AUTO-ENTMCNC: 29.5 G/DL (ref 31.5–35.7)
MCHC RBC AUTO-ENTMCNC: 31 G/DL (ref 31.5–35.7)
MCHC RBC AUTO-ENTMCNC: 31 G/DL (ref 31.5–35.7)
MCHC RBC AUTO-ENTMCNC: 31.3 G/DL (ref 31.5–35.7)
MCHC RBC AUTO-ENTMCNC: 31.4 G/DL (ref 31.5–35.7)
MCHC RBC AUTO-ENTMCNC: 31.4 G/DL (ref 31.5–35.7)
MCHC RBC AUTO-ENTMCNC: 31.5 G/DL (ref 31.5–35.7)
MCHC RBC AUTO-ENTMCNC: 31.6 G/DL (ref 31.5–35.7)
MCHC RBC AUTO-ENTMCNC: 31.6 G/DL (ref 31.5–35.7)
MCHC RBC AUTO-ENTMCNC: 31.8 G/DL (ref 31.5–35.7)
MCHC RBC AUTO-ENTMCNC: 32.1 G/DL (ref 31.5–35.7)
MCHC RBC AUTO-ENTMCNC: 32.1 G/DL (ref 31.5–35.7)
MCHC RBC AUTO-ENTMCNC: 32.2 G/DL (ref 31.5–35.7)
MCHC RBC AUTO-ENTMCNC: 32.4 G/DL (ref 31.5–35.7)
MCHC RBC AUTO-ENTMCNC: 32.6 G/DL (ref 31.5–35.7)
MCHC RBC AUTO-ENTMCNC: 32.9 G/DL (ref 31.5–35.7)
MCHC RBC AUTO-ENTMCNC: 33.1 G/DL (ref 31.5–35.7)
MCHC RBC AUTO-ENTMCNC: 33.1 G/DL (ref 31.5–35.7)
MCHC RBC AUTO-ENTMCNC: 33.2 G/DL (ref 31.5–35.7)
MCHC RBC AUTO-ENTMCNC: 33.3 G/DL (ref 31.5–35.7)
MCHC RBC AUTO-ENTMCNC: 33.7 G/DL (ref 31.5–35.7)
MCV RBC AUTO: 100.6 FL (ref 79–97)
MCV RBC AUTO: 85.7 FL (ref 79–97)
MCV RBC AUTO: 86.2 FL (ref 79–97)
MCV RBC AUTO: 86.3 FL (ref 79–97)
MCV RBC AUTO: 87.1 FL (ref 79–97)
MCV RBC AUTO: 87.5 FL (ref 79–97)
MCV RBC AUTO: 87.9 FL (ref 79–97)
MCV RBC AUTO: 88.4 FL (ref 79–97)
MCV RBC AUTO: 89.1 FL (ref 79–97)
MCV RBC AUTO: 89.3 FL (ref 79–97)
MCV RBC AUTO: 90.8 FL (ref 79–97)
MCV RBC AUTO: 91.5 FL (ref 79–97)
MCV RBC AUTO: 91.8 FL (ref 79–97)
MCV RBC AUTO: 92.4 FL (ref 79–97)
MCV RBC AUTO: 92.4 FL (ref 79–97)
MCV RBC AUTO: 92.7 FL (ref 79–97)
MCV RBC AUTO: 93.5 FL (ref 79–97)
MCV RBC AUTO: 93.8 FL (ref 79–97)
MCV RBC AUTO: 94.1 FL (ref 79–97)
MCV RBC AUTO: 94.5 FL (ref 79–97)
MCV RBC AUTO: 94.5 FL (ref 79–97)
MCV RBC AUTO: 94.9 FL (ref 79–97)
MCV RBC AUTO: 96.1 FL (ref 79–97)
METAMYELOCYTES NFR BLD MANUAL: 5 % (ref 0–0)
MONOCYTES # BLD AUTO: 0.29 10*3/MM3 (ref 0.1–0.9)
MONOCYTES # BLD AUTO: 0.3 10*3/MM3 (ref 0.1–0.9)
MONOCYTES # BLD AUTO: 0.38 10*3/MM3 (ref 0.1–0.9)
MONOCYTES # BLD AUTO: 0.45 10*3/MM3 (ref 0.1–0.9)
MONOCYTES # BLD AUTO: 0.48 10*3/MM3 (ref 0.1–0.9)
MONOCYTES # BLD AUTO: 0.71 10*3/MM3 (ref 0.1–0.9)
MONOCYTES # BLD AUTO: 0.72 10*3/MM3 (ref 0.1–0.9)
MONOCYTES # BLD AUTO: 0.74 10*3/MM3 (ref 0.1–0.9)
MONOCYTES # BLD AUTO: 0.75 10*3/MM3 (ref 0.1–0.9)
MONOCYTES # BLD AUTO: 0.86 10*3/MM3 (ref 0.1–0.9)
MONOCYTES # BLD AUTO: 1.08 10*3/MM3 (ref 0.1–0.9)
MONOCYTES # BLD AUTO: 1.28 10*3/MM3 (ref 0.1–0.9)
MONOCYTES # BLD AUTO: 1.35 10*3/MM3 (ref 0.1–0.9)
MONOCYTES NFR BLD AUTO: 2.2 % (ref 5–12)
MONOCYTES NFR BLD AUTO: 3 % (ref 5–12)
MONOCYTES NFR BLD AUTO: 3.1 % (ref 5–12)
MONOCYTES NFR BLD AUTO: 3.4 % (ref 5–12)
MONOCYTES NFR BLD AUTO: 3.8 % (ref 5–12)
MONOCYTES NFR BLD AUTO: 4.4 % (ref 5–12)
MONOCYTES NFR BLD AUTO: 4.7 % (ref 5–12)
MONOCYTES NFR BLD AUTO: 5 % (ref 5–12)
MONOCYTES NFR BLD AUTO: 6.3 % (ref 5–12)
MONOCYTES NFR BLD AUTO: 6.6 % (ref 5–12)
MONOCYTES NFR BLD AUTO: 7.8 % (ref 5–12)
MONOCYTES NFR BLD AUTO: 8.8 % (ref 5–12)
NEUTROPHILS # BLD AUTO: 7.23 10*3/MM3 (ref 1.7–7)
NEUTROPHILS NFR BLD AUTO: 11.19 10*3/MM3 (ref 1.7–7)
NEUTROPHILS NFR BLD AUTO: 11.89 10*3/MM3 (ref 1.7–7)
NEUTROPHILS NFR BLD AUTO: 11.94 10*3/MM3 (ref 1.7–7)
NEUTROPHILS NFR BLD AUTO: 11.99 10*3/MM3 (ref 1.7–7)
NEUTROPHILS NFR BLD AUTO: 12.33 10*3/MM3 (ref 1.7–7)
NEUTROPHILS NFR BLD AUTO: 12.45 10*3/MM3 (ref 1.7–7)
NEUTROPHILS NFR BLD AUTO: 13.25 10*3/MM3 (ref 1.7–7)
NEUTROPHILS NFR BLD AUTO: 14.91 10*3/MM3 (ref 1.7–7)
NEUTROPHILS NFR BLD AUTO: 22.22 10*3/MM3 (ref 1.7–7)
NEUTROPHILS NFR BLD AUTO: 22.51 10*3/MM3 (ref 1.7–7)
NEUTROPHILS NFR BLD AUTO: 8.26 10*3/MM3 (ref 1.7–7)
NEUTROPHILS NFR BLD AUTO: 82.8 % (ref 42.7–76)
NEUTROPHILS NFR BLD AUTO: 83.9 % (ref 42.7–76)
NEUTROPHILS NFR BLD AUTO: 85.4 % (ref 42.7–76)
NEUTROPHILS NFR BLD AUTO: 86.3 % (ref 42.7–76)
NEUTROPHILS NFR BLD AUTO: 86.5 % (ref 42.7–76)
NEUTROPHILS NFR BLD AUTO: 86.5 % (ref 42.7–76)
NEUTROPHILS NFR BLD AUTO: 86.8 % (ref 42.7–76)
NEUTROPHILS NFR BLD AUTO: 87.1 % (ref 42.7–76)
NEUTROPHILS NFR BLD AUTO: 89.3 % (ref 42.7–76)
NEUTROPHILS NFR BLD AUTO: 9.51 10*3/MM3 (ref 1.7–7)
NEUTROPHILS NFR BLD AUTO: 91.8 % (ref 42.7–76)
NEUTROPHILS NFR BLD AUTO: 92.5 % (ref 42.7–76)
NEUTROPHILS NFR BLD AUTO: 92.7 % (ref 42.7–76)
NEUTROPHILS NFR BLD MANUAL: 75 % (ref 42.7–76)
NEUTS BAND NFR BLD MANUAL: 2 % (ref 0–5)
NITRITE UR QL STRIP: NEGATIVE
NITRITE UR QL STRIP: NEGATIVE
NRBC BLD AUTO-RTO: 0 /100 WBC (ref 0–0.2)
NRBC BLD AUTO-RTO: 0.1 /100 WBC (ref 0–0.2)
NRBC BLD AUTO-RTO: 0.1 /100 WBC (ref 0–0.2)
NT-PROBNP SERPL-MCNC: 8353 PG/ML (ref 0–900)
PATH INTERP BLD-IMP: NORMAL
PATH REPORT.FINAL DX SPEC: NORMAL
PATH REPORT.GROSS SPEC: NORMAL
PH UR STRIP.AUTO: 6 [PH] (ref 5–8)
PH UR STRIP.AUTO: 6 [PH] (ref 5–8)
PHOSPHATE SERPL-MCNC: 2.1 MG/DL (ref 2.5–4.5)
PHOSPHATE SERPL-MCNC: 2.4 MG/DL (ref 2.5–4.5)
PHOSPHATE SERPL-MCNC: 2.7 MG/DL (ref 2.5–4.5)
PHOSPHATE SERPL-MCNC: 3.1 MG/DL (ref 2.5–4.5)
PHOSPHATE SERPL-MCNC: 3.3 MG/DL (ref 2.5–4.5)
PHOSPHATE SERPL-MCNC: 3.5 MG/DL (ref 2.5–4.5)
PHOSPHATE SERPL-MCNC: 3.6 MG/DL (ref 2.5–4.5)
PHOSPHATE SERPL-MCNC: 3.6 MG/DL (ref 2.5–4.5)
PHOSPHATE SERPL-MCNC: 4.9 MG/DL (ref 2.5–4.5)
PHOSPHATE SERPL-MCNC: 5.5 MG/DL (ref 2.5–4.5)
PLAT MORPH BLD: NORMAL
PLATELET # BLD AUTO: 102 10*3/MM3 (ref 140–450)
PLATELET # BLD AUTO: 106 10*3/MM3 (ref 140–450)
PLATELET # BLD AUTO: 109 10*3/MM3 (ref 140–450)
PLATELET # BLD AUTO: 111 10*3/MM3 (ref 140–450)
PLATELET # BLD AUTO: 130 10*3/MM3 (ref 140–450)
PLATELET # BLD AUTO: 134 10*3/MM3 (ref 140–450)
PLATELET # BLD AUTO: 142 10*3/MM3 (ref 140–450)
PLATELET # BLD AUTO: 158 10*3/MM3 (ref 140–450)
PLATELET # BLD AUTO: 161 10*3/MM3 (ref 140–450)
PLATELET # BLD AUTO: 168 10*3/MM3 (ref 140–450)
PLATELET # BLD AUTO: 170 10*3/MM3 (ref 140–450)
PLATELET # BLD AUTO: 175 10*3/MM3 (ref 140–450)
PLATELET # BLD AUTO: 196 10*3/MM3 (ref 140–450)
PLATELET # BLD AUTO: 203 10*3/MM3 (ref 140–450)
PLATELET # BLD AUTO: 207 10*3/MM3 (ref 140–450)
PLATELET # BLD AUTO: 208 10*3/MM3 (ref 140–450)
PLATELET # BLD AUTO: 226 10*3/MM3 (ref 140–450)
PLATELET # BLD AUTO: 237 10*3/MM3 (ref 140–450)
PLATELET # BLD AUTO: 240 10*3/MM3 (ref 140–450)
PLATELET # BLD AUTO: 269 10*3/MM3 (ref 140–450)
PLATELET # BLD AUTO: 271 10*3/MM3 (ref 140–450)
PLATELET # BLD AUTO: 311 10*3/MM3 (ref 140–450)
PLATELET # BLD AUTO: 98 10*3/MM3 (ref 140–450)
PMV BLD AUTO: 10 FL (ref 6–12)
PMV BLD AUTO: 10.3 FL (ref 6–12)
PMV BLD AUTO: 10.4 FL (ref 6–12)
PMV BLD AUTO: 10.7 FL (ref 6–12)
PMV BLD AUTO: 10.7 FL (ref 6–12)
PMV BLD AUTO: 10.8 FL (ref 6–12)
PMV BLD AUTO: 10.9 FL (ref 6–12)
PMV BLD AUTO: 8.9 FL (ref 6–12)
PMV BLD AUTO: 9 FL (ref 6–12)
PMV BLD AUTO: 9.1 FL (ref 6–12)
PMV BLD AUTO: 9.2 FL (ref 6–12)
PMV BLD AUTO: 9.5 FL (ref 6–12)
PMV BLD AUTO: 9.6 FL (ref 6–12)
PMV BLD AUTO: 9.6 FL (ref 6–12)
PMV BLD AUTO: 9.7 FL (ref 6–12)
PMV BLD AUTO: 9.7 FL (ref 6–12)
PMV BLD AUTO: 9.9 FL (ref 6–12)
PMV BLD AUTO: 9.9 FL (ref 6–12)
POTASSIUM SERPL-SCNC: 3.4 MMOL/L (ref 3.5–5.2)
POTASSIUM SERPL-SCNC: 3.6 MMOL/L (ref 3.5–5.2)
POTASSIUM SERPL-SCNC: 3.7 MMOL/L (ref 3.5–5.2)
POTASSIUM SERPL-SCNC: 3.8 MMOL/L (ref 3.5–5.2)
POTASSIUM SERPL-SCNC: 3.9 MMOL/L (ref 3.5–5.2)
POTASSIUM SERPL-SCNC: 4 MMOL/L (ref 3.5–5.2)
POTASSIUM SERPL-SCNC: 4.1 MMOL/L (ref 3.5–5.2)
POTASSIUM SERPL-SCNC: 4.2 MMOL/L (ref 3.5–5.2)
POTASSIUM SERPL-SCNC: 4.3 MMOL/L (ref 3.5–5.2)
POTASSIUM SERPL-SCNC: 4.4 MMOL/L (ref 3.5–5.2)
POTASSIUM SERPL-SCNC: 4.5 MMOL/L (ref 3.5–5.2)
POTASSIUM SERPL-SCNC: 4.7 MMOL/L (ref 3.5–5.2)
POTASSIUM SERPL-SCNC: 4.8 MMOL/L (ref 3.5–5.2)
POTASSIUM SERPL-SCNC: 5.3 MMOL/L (ref 3.5–5.2)
POTASSIUM SERPL-SCNC: 5.3 MMOL/L (ref 3.5–5.2)
PROCALCITONIN SERPL-MCNC: 34.19 NG/ML (ref 0–0.25)
PROCALCITONIN SERPL-MCNC: 8.7 NG/ML (ref 0–0.25)
PROT 24H UR-MRATE: NORMAL MG/24 HR (ref 30–150)
PROT SERPL-MCNC: 4.8 G/DL (ref 6–8.5)
PROT SERPL-MCNC: 5 G/DL (ref 6–8.5)
PROT SERPL-MCNC: 5.3 G/DL (ref 6–8.5)
PROT SERPL-MCNC: 5.4 G/DL (ref 6–8.5)
PROT SERPL-MCNC: 5.4 G/DL (ref 6–8.5)
PROT SERPL-MCNC: 5.6 G/DL (ref 6–8.5)
PROT SERPL-MCNC: 5.7 G/DL (ref 6–8.5)
PROT SERPL-MCNC: 5.8 G/DL (ref 6–8.5)
PROT SERPL-MCNC: 5.9 G/DL (ref 6–8.5)
PROT SERPL-MCNC: 5.9 G/DL (ref 6–8.5)
PROT SERPL-MCNC: 6.1 G/DL (ref 6–8.5)
PROT SERPL-MCNC: 6.1 G/DL (ref 6–8.5)
PROT SERPL-MCNC: 6.4 G/DL (ref 6–8.5)
PROT SERPL-MCNC: 6.7 G/DL (ref 6–8.5)
PROT SERPL-MCNC: 6.7 G/DL (ref 6–8.5)
PROT SERPL-MCNC: 6.8 G/DL (ref 6–8.5)
PROT SERPL-MCNC: 6.9 G/DL (ref 6–8.5)
PROT UR QL STRIP: ABNORMAL
PROT UR QL STRIP: ABNORMAL
PROT UR-MCNC: 146.1 MG/DL
PROT UR-MCNC: 57.7 MG/DL
PROT UR-MCNC: 59 MG/DL
PROTHROMBIN TIME: 13.7 SECONDS (ref 11.4–14.4)
PROTHROMBIN TIME: 15.1 SECONDS (ref 11.4–14.4)
PROTHROMBIN TIME: 15.1 SECONDS (ref 11.4–14.4)
PROTHROMBIN TIME: 17 SECONDS (ref 11.4–14.4)
PROTHROMBIN TIME: 23.5 SECONDS (ref 11.4–14.4)
PROTHROMBIN TIME: 23.7 SECONDS (ref 11.4–14.4)
PROTHROMBIN TIME: 25.1 SECONDS (ref 11.4–14.4)
PROTHROMBIN TIME: 27.7 SECONDS (ref 11.4–14.4)
PROTHROMBIN TIME: 34.2 SECONDS (ref 11.4–14.4)
QT INTERVAL: 338 MS
QT INTERVAL: 362 MS
QT INTERVAL: 388 MS
QT INTERVAL: 398 MS
QT INTERVAL: 414 MS
QT INTERVAL: 432 MS
QT INTERVAL: 458 MS
QT INTERVAL: 484 MS
QTC INTERVAL: 425 MS
QTC INTERVAL: 447 MS
QTC INTERVAL: 478 MS
QTC INTERVAL: 487 MS
QTC INTERVAL: 509 MS
QTC INTERVAL: 510 MS
QTC INTERVAL: 534 MS
QTC INTERVAL: 551 MS
RBC # BLD AUTO: 2.79 10*6/MM3 (ref 4.14–5.8)
RBC # BLD AUTO: 3.16 10*6/MM3 (ref 4.14–5.8)
RBC # BLD AUTO: 3.21 10*6/MM3 (ref 4.14–5.8)
RBC # BLD AUTO: 3.25 10*6/MM3 (ref 4.14–5.8)
RBC # BLD AUTO: 3.35 10*6/MM3 (ref 4.14–5.8)
RBC # BLD AUTO: 3.41 10*6/MM3 (ref 4.14–5.8)
RBC # BLD AUTO: 3.44 10*6/MM3 (ref 4.14–5.8)
RBC # BLD AUTO: 3.44 10*6/MM3 (ref 4.14–5.8)
RBC # BLD AUTO: 3.52 10*6/MM3 (ref 4.14–5.8)
RBC # BLD AUTO: 3.53 10*6/MM3 (ref 4.14–5.8)
RBC # BLD AUTO: 3.54 10*6/MM3 (ref 4.14–5.8)
RBC # BLD AUTO: 3.57 10*6/MM3 (ref 4.14–5.8)
RBC # BLD AUTO: 3.57 10*6/MM3 (ref 4.14–5.8)
RBC # BLD AUTO: 3.7 10*6/MM3 (ref 4.14–5.8)
RBC # BLD AUTO: 3.8 10*6/MM3 (ref 4.14–5.8)
RBC # BLD AUTO: 3.84 10*6/MM3 (ref 4.14–5.8)
RBC # BLD AUTO: 3.84 10*6/MM3 (ref 4.14–5.8)
RBC # BLD AUTO: 3.88 10*6/MM3 (ref 4.14–5.8)
RBC # BLD AUTO: 3.97 10*6/MM3 (ref 4.14–5.8)
RBC # BLD AUTO: 4.01 10*6/MM3 (ref 4.14–5.8)
RBC # BLD AUTO: 4.03 10*6/MM3 (ref 4.14–5.8)
RBC # BLD AUTO: 4.13 10*6/MM3 (ref 4.14–5.8)
RBC # BLD AUTO: 5.45 10*6/MM3 (ref 4.14–5.8)
RBC # UR: ABNORMAL /HPF
RBC # UR: ABNORMAL /HPF
RBC MORPH BLD: NORMAL
RBC MORPH BLD: NORMAL
REF LAB TEST METHOD: ABNORMAL
REF LAB TEST METHOD: ABNORMAL
RH BLD: POSITIVE
RHINOVIRUS RNA SPEC NAA+PROBE: NOT DETECTED
RSV RNA NPH QL NAA+NON-PROBE: NOT DETECTED
SARS-COV-2 RDRP RESP QL NAA+PROBE: NORMAL
SARS-COV-2 RNA NPH QL NAA+NON-PROBE: NOT DETECTED
SARS-COV-2 RNA PNL SPEC NAA+PROBE: NOT DETECTED
SARS-COV-2 RNA RESP QL NAA+PROBE: DETECTED
SODIUM SERPL-SCNC: 135 MMOL/L (ref 136–145)
SODIUM SERPL-SCNC: 136 MMOL/L (ref 136–145)
SODIUM SERPL-SCNC: 137 MMOL/L (ref 136–145)
SODIUM SERPL-SCNC: 138 MMOL/L (ref 136–145)
SODIUM SERPL-SCNC: 139 MMOL/L (ref 136–145)
SODIUM SERPL-SCNC: 140 MMOL/L (ref 136–145)
SODIUM SERPL-SCNC: 141 MMOL/L (ref 136–145)
SODIUM SERPL-SCNC: 141 MMOL/L (ref 136–145)
SODIUM SERPL-SCNC: 142 MMOL/L (ref 136–145)
SODIUM SERPL-SCNC: 145 MMOL/L (ref 136–145)
SODIUM SERPL-SCNC: 150 MMOL/L (ref 136–145)
SODIUM UR-SCNC: 26 MMOL/L
SP GR UR STRIP: 1.01 (ref 1–1.03)
SP GR UR STRIP: 1.02 (ref 1–1.03)
SQUAMOUS #/AREA URNS HPF: ABNORMAL /HPF
SQUAMOUS #/AREA URNS HPF: ABNORMAL /HPF
STRESS TARGET HR: 129 BPM
STRONGYLOIDES IGG SER QL IA: NEGATIVE
T&S EXPIRATION DATE: NORMAL
TIBC SERPL-MCNC: 177 MCG/DL (ref 298–536)
TIBC SERPL-MCNC: 246 MCG/DL (ref 298–536)
TRANSFERRIN SERPL-MCNC: 119 MG/DL (ref 200–360)
TRANSFERRIN SERPL-MCNC: 165 MG/DL (ref 200–360)
TROPONIN T SERPL-MCNC: 0.03 NG/ML (ref 0–0.03)
TROPONIN T SERPL-MCNC: 0.05 NG/ML (ref 0–0.03)
TROPONIN T SERPL-MCNC: 0.07 NG/ML (ref 0–0.03)
TROPONIN T SERPL-MCNC: 0.09 NG/ML (ref 0–0.03)
TSH SERPL DL<=0.05 MIU/L-ACNC: 1.53 UIU/ML (ref 0.27–4.2)
UNIT  ABO: NORMAL
UNIT  RH: NORMAL
UROBILINOGEN UR QL STRIP: ABNORMAL
UROBILINOGEN UR QL STRIP: ABNORMAL
VANCOMYCIN SERPL-MCNC: 11.7 MCG/ML (ref 5–40)
VANCOMYCIN SERPL-MCNC: 17.4 MCG/ML (ref 5–40)
VANCOMYCIN TROUGH SERPL-MCNC: 16.1 MCG/ML (ref 5–20)
WBC # BLD AUTO: 10.81 10*3/MM3 (ref 3.4–10.8)
WBC # BLD AUTO: 10.93 10*3/MM3 (ref 3.4–10.8)
WBC # BLD AUTO: 11.04 10*3/MM3 (ref 3.4–10.8)
WBC # BLD AUTO: 12.54 10*3/MM3 (ref 3.4–10.8)
WBC # BLD AUTO: 12.59 10*3/MM3 (ref 3.4–10.8)
WBC # BLD AUTO: 12.88 10*3/MM3 (ref 3.4–10.8)
WBC # BLD AUTO: 13.29 10*3/MM3 (ref 3.4–10.8)
WBC # BLD AUTO: 13.29 10*3/MM3 (ref 3.4–10.8)
WBC # BLD AUTO: 13.69 10*3/MM3 (ref 3.4–10.8)
WBC # BLD AUTO: 13.8 10*3/MM3 (ref 3.4–10.8)
WBC # BLD AUTO: 14.28 10*3/MM3 (ref 3.4–10.8)
WBC # BLD AUTO: 14.44 10*3/MM3 (ref 3.4–10.8)
WBC # BLD AUTO: 14.57 10*3/MM3 (ref 3.4–10.8)
WBC # BLD AUTO: 15.18 10*3/MM3 (ref 3.4–10.8)
WBC # BLD AUTO: 15.54 10*3/MM3 (ref 3.4–10.8)
WBC # BLD AUTO: 15.98 10*3/MM3 (ref 3.4–10.8)
WBC # BLD AUTO: 17.25 10*3/MM3 (ref 3.4–10.8)
WBC # BLD AUTO: 21.05 10*3/MM3 (ref 3.4–10.8)
WBC # BLD AUTO: 24.02 10*3/MM3 (ref 3.4–10.8)
WBC # BLD AUTO: 24.54 10*3/MM3 (ref 3.4–10.8)
WBC # BLD AUTO: 32.6 10*3/MM3 (ref 3.4–10.8)
WBC # BLD AUTO: 9.39 10*3/MM3 (ref 3.4–10.8)
WBC # BLD AUTO: 9.58 10*3/MM3 (ref 3.4–10.8)
WBC MORPH BLD: NORMAL
WBC UR QL AUTO: ABNORMAL /HPF
WBC UR QL AUTO: ABNORMAL /HPF
WHOLE BLOOD HOLD SPECIMEN: NORMAL
YEAST URNS QL MICRO: ABNORMAL /HPF

## 2021-01-01 PROCEDURE — 85730 THROMBOPLASTIN TIME PARTIAL: CPT | Performed by: INTERNAL MEDICINE

## 2021-01-01 PROCEDURE — 85025 COMPLETE CBC W/AUTO DIFF WBC: CPT | Performed by: INTERNAL MEDICINE

## 2021-01-01 PROCEDURE — 87150 DNA/RNA AMPLIFIED PROBE: CPT | Performed by: EMERGENCY MEDICINE

## 2021-01-01 PROCEDURE — 82962 GLUCOSE BLOOD TEST: CPT

## 2021-01-01 PROCEDURE — 74330 X-RAY BILE/PANC ENDOSCOPY: CPT

## 2021-01-01 PROCEDURE — 99232 SBSQ HOSP IP/OBS MODERATE 35: CPT | Performed by: INTERNAL MEDICINE

## 2021-01-01 PROCEDURE — C1894 INTRO/SHEATH, NON-LASER: HCPCS

## 2021-01-01 PROCEDURE — U0005 INFEC AGEN DETEC AMPLI PROBE: HCPCS | Performed by: INTERNAL MEDICINE

## 2021-01-01 PROCEDURE — 80053 COMPREHEN METABOLIC PANEL: CPT | Performed by: INTERNAL MEDICINE

## 2021-01-01 PROCEDURE — 93010 ELECTROCARDIOGRAM REPORT: CPT | Performed by: INTERNAL MEDICINE

## 2021-01-01 PROCEDURE — 25010000002 VANCOMYCIN 10 G RECONSTITUTED SOLUTION: Performed by: STUDENT IN AN ORGANIZED HEALTH CARE EDUCATION/TRAINING PROGRAM

## 2021-01-01 PROCEDURE — 25010000002 MICAFUNGIN SODIUM 100 MG RECONSTITUTED SOLUTION: Performed by: INTERNAL MEDICINE

## 2021-01-01 PROCEDURE — 97161 PT EVAL LOW COMPLEX 20 MIN: CPT

## 2021-01-01 PROCEDURE — 63710000001 INSULIN LISPRO (HUMAN) PER 5 UNITS: Performed by: INTERNAL MEDICINE

## 2021-01-01 PROCEDURE — 99223 1ST HOSP IP/OBS HIGH 75: CPT | Performed by: FAMILY MEDICINE

## 2021-01-01 PROCEDURE — 86900 BLOOD TYPING SEROLOGIC ABO: CPT

## 2021-01-01 PROCEDURE — 99232 SBSQ HOSP IP/OBS MODERATE 35: CPT | Performed by: PHYSICIAN ASSISTANT

## 2021-01-01 PROCEDURE — 93005 ELECTROCARDIOGRAM TRACING: CPT | Performed by: NURSE PRACTITIONER

## 2021-01-01 PROCEDURE — 0 TECHNETIUM TC 99M MEBROFENIN KIT: Performed by: INTERNAL MEDICINE

## 2021-01-01 PROCEDURE — 0 IOPAMIDOL PER 1 ML: Performed by: EMERGENCY MEDICINE

## 2021-01-01 PROCEDURE — 25010000002 HEPARIN (PORCINE) PER 1000 UNITS

## 2021-01-01 PROCEDURE — 99232 SBSQ HOSP IP/OBS MODERATE 35: CPT | Performed by: NURSE PRACTITIONER

## 2021-01-01 PROCEDURE — 63710000001 INSULIN LISPRO (HUMAN) PER 5 UNITS: Performed by: PHYSICIAN ASSISTANT

## 2021-01-01 PROCEDURE — 85610 PROTHROMBIN TIME: CPT | Performed by: SURGERY

## 2021-01-01 PROCEDURE — 25010000002 HEPARIN (PORCINE) PER 1000 UNITS: Performed by: INTERNAL MEDICINE

## 2021-01-01 PROCEDURE — P9047 ALBUMIN (HUMAN), 25%, 50ML: HCPCS | Performed by: INTERNAL MEDICINE

## 2021-01-01 PROCEDURE — 84466 ASSAY OF TRANSFERRIN: CPT | Performed by: INTERNAL MEDICINE

## 2021-01-01 PROCEDURE — 83605 ASSAY OF LACTIC ACID: CPT | Performed by: INTERNAL MEDICINE

## 2021-01-01 PROCEDURE — 97116 GAIT TRAINING THERAPY: CPT

## 2021-01-01 PROCEDURE — 93005 ELECTROCARDIOGRAM TRACING: CPT | Performed by: PHYSICIAN ASSISTANT

## 2021-01-01 PROCEDURE — 25010000002 FENTANYL CITRATE (PF) 50 MCG/ML SOLUTION: Performed by: RADIOLOGY

## 2021-01-01 PROCEDURE — 25010000002 MEROPENEM: Performed by: SURGERY

## 2021-01-01 PROCEDURE — 99152 MOD SED SAME PHYS/QHP 5/>YRS: CPT

## 2021-01-01 PROCEDURE — 87040 BLOOD CULTURE FOR BACTERIA: CPT | Performed by: NURSE PRACTITIONER

## 2021-01-01 PROCEDURE — 25010000002 NEOSTIGMINE 10 MG/10ML SOLUTION: Performed by: NURSE ANESTHETIST, CERTIFIED REGISTERED

## 2021-01-01 PROCEDURE — 63710000001 PREDNISONE PER 5 MG: Performed by: INTERNAL MEDICINE

## 2021-01-01 PROCEDURE — 84145 PROCALCITONIN (PCT): CPT | Performed by: NURSE PRACTITIONER

## 2021-01-01 PROCEDURE — 83605 ASSAY OF LACTIC ACID: CPT | Performed by: STUDENT IN AN ORGANIZED HEALTH CARE EDUCATION/TRAINING PROGRAM

## 2021-01-01 PROCEDURE — 63710000001 INSULIN LISPRO (HUMAN) PER 5 UNITS: Performed by: SURGERY

## 2021-01-01 PROCEDURE — 87076 CULTURE ANAEROBE IDENT EACH: CPT | Performed by: EMERGENCY MEDICINE

## 2021-01-01 PROCEDURE — 25010000002 PIPERACILLIN SOD-TAZOBACTAM PER 1 G: Performed by: INTERNAL MEDICINE

## 2021-01-01 PROCEDURE — 93306 TTE W/DOPPLER COMPLETE: CPT | Performed by: INTERNAL MEDICINE

## 2021-01-01 PROCEDURE — 25010000002 PIPERACILLIN SOD-TAZOBACTAM PER 1 G: Performed by: SURGERY

## 2021-01-01 PROCEDURE — 93005 ELECTROCARDIOGRAM TRACING: CPT | Performed by: INTERNAL MEDICINE

## 2021-01-01 PROCEDURE — 25010000002 MEROPENEM PER 100 MG

## 2021-01-01 PROCEDURE — 86850 RBC ANTIBODY SCREEN: CPT | Performed by: INTERNAL MEDICINE

## 2021-01-01 PROCEDURE — 85730 THROMBOPLASTIN TIME PARTIAL: CPT | Performed by: SURGERY

## 2021-01-01 PROCEDURE — 97530 THERAPEUTIC ACTIVITIES: CPT

## 2021-01-01 PROCEDURE — 80053 COMPREHEN METABOLIC PANEL: CPT | Performed by: SURGERY

## 2021-01-01 PROCEDURE — 85652 RBC SED RATE AUTOMATED: CPT

## 2021-01-01 PROCEDURE — 86900 BLOOD TYPING SEROLOGIC ABO: CPT | Performed by: INTERNAL MEDICINE

## 2021-01-01 PROCEDURE — 51702 INSERT TEMP BLADDER CATH: CPT

## 2021-01-01 PROCEDURE — 05HM33Z INSERTION OF INFUSION DEVICE INTO RIGHT INTERNAL JUGULAR VEIN, PERCUTANEOUS APPROACH: ICD-10-PCS | Performed by: RADIOLOGY

## 2021-01-01 PROCEDURE — 74176 CT ABD & PELVIS W/O CONTRAST: CPT

## 2021-01-01 PROCEDURE — 84100 ASSAY OF PHOSPHORUS: CPT | Performed by: INTERNAL MEDICINE

## 2021-01-01 PROCEDURE — 0JH60WZ INSERTION OF TOTALLY IMPLANTABLE VASCULAR ACCESS DEVICE INTO CHEST SUBCUTANEOUS TISSUE AND FASCIA, OPEN APPROACH: ICD-10-PCS | Performed by: RADIOLOGY

## 2021-01-01 PROCEDURE — 85025 COMPLETE CBC W/AUTO DIFF WBC: CPT | Performed by: SURGERY

## 2021-01-01 PROCEDURE — C1769 GUIDE WIRE: HCPCS | Performed by: INTERNAL MEDICINE

## 2021-01-01 PROCEDURE — 25010000002 NA FERRIC GLUC CPLX PER 12.5 MG: Performed by: INTERNAL MEDICINE

## 2021-01-01 PROCEDURE — 83735 ASSAY OF MAGNESIUM: CPT | Performed by: INTERNAL MEDICINE

## 2021-01-01 PROCEDURE — 83690 ASSAY OF LIPASE: CPT | Performed by: STUDENT IN AN ORGANIZED HEALTH CARE EDUCATION/TRAINING PROGRAM

## 2021-01-01 PROCEDURE — 85027 COMPLETE CBC AUTOMATED: CPT | Performed by: INTERNAL MEDICINE

## 2021-01-01 PROCEDURE — 25010000002 MICAFUNGIN SODIUM 100 MG RECONSTITUTED SOLUTION: Performed by: SURGERY

## 2021-01-01 PROCEDURE — 82570 ASSAY OF URINE CREATININE: CPT | Performed by: INTERNAL MEDICINE

## 2021-01-01 PROCEDURE — 85027 COMPLETE CBC AUTOMATED: CPT | Performed by: SURGERY

## 2021-01-01 PROCEDURE — C1751 CATH, INF, PER/CENT/MIDLINE: HCPCS

## 2021-01-01 PROCEDURE — 87186 SC STD MICRODIL/AGAR DIL: CPT | Performed by: SURGERY

## 2021-01-01 PROCEDURE — 88304 TISSUE EXAM BY PATHOLOGIST: CPT | Performed by: SURGERY

## 2021-01-01 PROCEDURE — G0151 HHCP-SERV OF PT,EA 15 MIN: HCPCS

## 2021-01-01 PROCEDURE — G0299 HHS/HOSPICE OF RN EA 15 MIN: HCPCS

## 2021-01-01 PROCEDURE — 85060 BLOOD SMEAR INTERPRETATION: CPT | Performed by: INTERNAL MEDICINE

## 2021-01-01 PROCEDURE — 87205 SMEAR GRAM STAIN: CPT | Performed by: INTERNAL MEDICINE

## 2021-01-01 PROCEDURE — 99233 SBSQ HOSP IP/OBS HIGH 50: CPT | Performed by: INTERNAL MEDICINE

## 2021-01-01 PROCEDURE — 84145 PROCALCITONIN (PCT): CPT | Performed by: INTERNAL MEDICINE

## 2021-01-01 PROCEDURE — 25010000002 DAPTOMYCIN PER 1 MG: Performed by: SURGERY

## 2021-01-01 PROCEDURE — 87185 SC STD ENZYME DETCJ PER NZM: CPT | Performed by: EMERGENCY MEDICINE

## 2021-01-01 PROCEDURE — 76775 US EXAM ABDO BACK WALL LIM: CPT

## 2021-01-01 PROCEDURE — 82330 ASSAY OF CALCIUM: CPT | Performed by: INTERNAL MEDICINE

## 2021-01-01 PROCEDURE — U0005 INFEC AGEN DETEC AMPLI PROBE: HCPCS | Performed by: EMERGENCY MEDICINE

## 2021-01-01 PROCEDURE — 99239 HOSP IP/OBS DSCHRG MGMT >30: CPT | Performed by: PHYSICIAN ASSISTANT

## 2021-01-01 PROCEDURE — 25010000002 EPINEPHRINE 1 MG/10ML SOLUTION PREFILLED SYRINGE: Performed by: EMERGENCY MEDICINE

## 2021-01-01 PROCEDURE — P9016 RBC LEUKOCYTES REDUCED: HCPCS

## 2021-01-01 PROCEDURE — 86901 BLOOD TYPING SEROLOGIC RH(D): CPT

## 2021-01-01 PROCEDURE — 0 DIATRIZOATE MEGLUMINE & SODIUM PER 1 ML: Performed by: SURGERY

## 2021-01-01 PROCEDURE — 84156 ASSAY OF PROTEIN URINE: CPT | Performed by: INTERNAL MEDICINE

## 2021-01-01 PROCEDURE — 25010000002 ONDANSETRON PER 1 MG

## 2021-01-01 PROCEDURE — 25010000002 MORPHINE PER 10 MG: Performed by: INTERNAL MEDICINE

## 2021-01-01 PROCEDURE — G0157 HHC PT ASSISTANT EA 15: HCPCS

## 2021-01-01 PROCEDURE — 63710000001 PREDNISONE PER 5 MG: Performed by: SURGERY

## 2021-01-01 PROCEDURE — 25010000002 MAGNESIUM SULFATE 2 GM/50ML SOLUTION: Performed by: INTERNAL MEDICINE

## 2021-01-01 PROCEDURE — 85730 THROMBOPLASTIN TIME PARTIAL: CPT | Performed by: STUDENT IN AN ORGANIZED HEALTH CARE EDUCATION/TRAINING PROGRAM

## 2021-01-01 PROCEDURE — 86850 RBC ANTIBODY SCREEN: CPT | Performed by: SURGERY

## 2021-01-01 PROCEDURE — U0003 INFECTIOUS AGENT DETECTION BY NUCLEIC ACID (DNA OR RNA); SEVERE ACUTE RESPIRATORY SYNDROME CORONAVIRUS 2 (SARS-COV-2) (CORONAVIRUS DISEASE [COVID-19]), AMPLIFIED PROBE TECHNIQUE, MAKING USE OF HIGH THROUGHPUT TECHNOLOGIES AS DESCRIBED BY CMS-2020-01-R: HCPCS | Performed by: EMERGENCY MEDICINE

## 2021-01-01 PROCEDURE — 97110 THERAPEUTIC EXERCISES: CPT | Performed by: PHYSICAL THERAPIST

## 2021-01-01 PROCEDURE — 86901 BLOOD TYPING SEROLOGIC RH(D): CPT | Performed by: NURSE PRACTITIONER

## 2021-01-01 PROCEDURE — 85025 COMPLETE CBC W/AUTO DIFF WBC: CPT | Performed by: STUDENT IN AN ORGANIZED HEALTH CARE EDUCATION/TRAINING PROGRAM

## 2021-01-01 PROCEDURE — 25010000002 PROPOFOL 10 MG/ML EMULSION: Performed by: NURSE ANESTHETIST, CERTIFIED REGISTERED

## 2021-01-01 PROCEDURE — 87040 BLOOD CULTURE FOR BACTERIA: CPT | Performed by: EMERGENCY MEDICINE

## 2021-01-01 PROCEDURE — 87205 SMEAR GRAM STAIN: CPT | Performed by: SURGERY

## 2021-01-01 PROCEDURE — 92950 HEART/LUNG RESUSCITATION CPR: CPT

## 2021-01-01 PROCEDURE — 76937 US GUIDE VASCULAR ACCESS: CPT

## 2021-01-01 PROCEDURE — 83735 ASSAY OF MAGNESIUM: CPT | Performed by: NURSE PRACTITIONER

## 2021-01-01 PROCEDURE — 85610 PROTHROMBIN TIME: CPT | Performed by: STUDENT IN AN ORGANIZED HEALTH CARE EDUCATION/TRAINING PROGRAM

## 2021-01-01 PROCEDURE — 36415 COLL VENOUS BLD VENIPUNCTURE: CPT

## 2021-01-01 PROCEDURE — 25010000002 DEXAMETHASONE PER 1 MG: Performed by: NURSE ANESTHETIST, CERTIFIED REGISTERED

## 2021-01-01 PROCEDURE — 87077 CULTURE AEROBIC IDENTIFY: CPT | Performed by: EMERGENCY MEDICINE

## 2021-01-01 PROCEDURE — 99231 SBSQ HOSP IP/OBS SF/LOW 25: CPT | Performed by: PHYSICIAN ASSISTANT

## 2021-01-01 PROCEDURE — 85610 PROTHROMBIN TIME: CPT | Performed by: RADIOLOGY

## 2021-01-01 PROCEDURE — 78226 HEPATOBILIARY SYSTEM IMAGING: CPT

## 2021-01-01 PROCEDURE — C1874 STENT, COATED/COV W/DEL SYS: HCPCS | Performed by: INTERNAL MEDICINE

## 2021-01-01 PROCEDURE — 87086 URINE CULTURE/COLONY COUNT: CPT | Performed by: STUDENT IN AN ORGANIZED HEALTH CARE EDUCATION/TRAINING PROGRAM

## 2021-01-01 PROCEDURE — 25010000002 PHENYLEPHRINE 10 MG/ML SOLUTION 1 ML VIAL: Performed by: NURSE ANESTHETIST, CERTIFIED REGISTERED

## 2021-01-01 PROCEDURE — 81001 URINALYSIS AUTO W/SCOPE: CPT | Performed by: EMERGENCY MEDICINE

## 2021-01-01 PROCEDURE — 83605 ASSAY OF LACTIC ACID: CPT | Performed by: EMERGENCY MEDICINE

## 2021-01-01 PROCEDURE — 93005 ELECTROCARDIOGRAM TRACING: CPT | Performed by: EMERGENCY MEDICINE

## 2021-01-01 PROCEDURE — 81001 URINALYSIS AUTO W/SCOPE: CPT | Performed by: STUDENT IN AN ORGANIZED HEALTH CARE EDUCATION/TRAINING PROGRAM

## 2021-01-01 PROCEDURE — 0202U NFCT DS 22 TRGT SARS-COV-2: CPT | Performed by: INTERNAL MEDICINE

## 2021-01-01 PROCEDURE — 25010000002 MEROPENEM PER 100 MG: Performed by: INTERNAL MEDICINE

## 2021-01-01 PROCEDURE — 85007 BL SMEAR W/DIFF WBC COUNT: CPT | Performed by: INTERNAL MEDICINE

## 2021-01-01 PROCEDURE — P9041 ALBUMIN (HUMAN),5%, 50ML: HCPCS | Performed by: NURSE ANESTHETIST, CERTIFIED REGISTERED

## 2021-01-01 PROCEDURE — 71045 X-RAY EXAM CHEST 1 VIEW: CPT

## 2021-01-01 PROCEDURE — 93306 TTE W/DOPPLER COMPLETE: CPT

## 2021-01-01 PROCEDURE — 25010000002 METHYLPREDNISOLONE PER 125 MG: Performed by: NURSE ANESTHETIST, CERTIFIED REGISTERED

## 2021-01-01 PROCEDURE — 0F798DZ DILATION OF COMMON BILE DUCT WITH INTRALUMINAL DEVICE, VIA NATURAL OR ARTIFICIAL OPENING ENDOSCOPIC: ICD-10-PCS | Performed by: INTERNAL MEDICINE

## 2021-01-01 PROCEDURE — 43274 ERCP DUCT STENT PLACEMENT: CPT | Performed by: INTERNAL MEDICINE

## 2021-01-01 PROCEDURE — 93005 ELECTROCARDIOGRAM TRACING: CPT | Performed by: STUDENT IN AN ORGANIZED HEALTH CARE EDUCATION/TRAINING PROGRAM

## 2021-01-01 PROCEDURE — P9017 PLASMA 1 DONOR FRZ W/IN 8 HR: HCPCS

## 2021-01-01 PROCEDURE — 84132 ASSAY OF SERUM POTASSIUM: CPT | Performed by: INTERNAL MEDICINE

## 2021-01-01 PROCEDURE — 43262 ENDO CHOLANGIOPANCREATOGRAPH: CPT | Performed by: INTERNAL MEDICINE

## 2021-01-01 PROCEDURE — 25010000002 MEROPENEM PER 100 MG: Performed by: SURGERY

## 2021-01-01 PROCEDURE — 25010000002 SUCCINYLCHOLINE PER 20 MG: Performed by: NURSE ANESTHETIST, CERTIFIED REGISTERED

## 2021-01-01 PROCEDURE — 82728 ASSAY OF FERRITIN: CPT | Performed by: INTERNAL MEDICINE

## 2021-01-01 PROCEDURE — 25010000002 PROTHROMBIN COMPLEX CONC HUMAN 1000 UNITS KIT: Performed by: SURGERY

## 2021-01-01 PROCEDURE — 84484 ASSAY OF TROPONIN QUANT: CPT | Performed by: INTERNAL MEDICINE

## 2021-01-01 PROCEDURE — 85014 HEMATOCRIT: CPT | Performed by: INTERNAL MEDICINE

## 2021-01-01 PROCEDURE — 97162 PT EVAL MOD COMPLEX 30 MIN: CPT | Performed by: PHYSICAL THERAPIST

## 2021-01-01 PROCEDURE — 80048 BASIC METABOLIC PNL TOTAL CA: CPT | Performed by: NURSE PRACTITIONER

## 2021-01-01 PROCEDURE — 0FC98ZZ EXTIRPATION OF MATTER FROM COMMON BILE DUCT, VIA NATURAL OR ARTIFICIAL OPENING ENDOSCOPIC: ICD-10-PCS | Performed by: INTERNAL MEDICINE

## 2021-01-01 PROCEDURE — 87070 CULTURE OTHR SPECIMN AEROBIC: CPT | Performed by: SURGERY

## 2021-01-01 PROCEDURE — C1889 IMPLANT/INSERT DEVICE, NOC: HCPCS | Performed by: SURGERY

## 2021-01-01 PROCEDURE — 25010000002 VANCOMYCIN

## 2021-01-01 PROCEDURE — 25010000002 MIDAZOLAM PER 1 MG: Performed by: RADIOLOGY

## 2021-01-01 PROCEDURE — 25010000002 ONDANSETRON PER 1 MG: Performed by: SURGERY

## 2021-01-01 PROCEDURE — 83735 ASSAY OF MAGNESIUM: CPT | Performed by: STUDENT IN AN ORGANIZED HEALTH CARE EDUCATION/TRAINING PROGRAM

## 2021-01-01 PROCEDURE — 25010000002 HEPARIN (PORCINE) PER 1000 UNITS: Performed by: SURGERY

## 2021-01-01 PROCEDURE — 85610 PROTHROMBIN TIME: CPT | Performed by: INTERNAL MEDICINE

## 2021-01-01 PROCEDURE — A9537 TC99M MEBROFENIN: HCPCS | Performed by: INTERNAL MEDICINE

## 2021-01-01 PROCEDURE — 82565 ASSAY OF CREATININE: CPT | Performed by: INTERNAL MEDICINE

## 2021-01-01 PROCEDURE — 86901 BLOOD TYPING SEROLOGIC RH(D): CPT | Performed by: SURGERY

## 2021-01-01 PROCEDURE — 99223 1ST HOSP IP/OBS HIGH 75: CPT | Performed by: NURSE PRACTITIONER

## 2021-01-01 PROCEDURE — 85025 COMPLETE CBC W/AUTO DIFF WBC: CPT | Performed by: PHYSICIAN ASSISTANT

## 2021-01-01 PROCEDURE — 97110 THERAPEUTIC EXERCISES: CPT

## 2021-01-01 PROCEDURE — 36558 INSERT TUNNELED CV CATH: CPT | Performed by: INTERNAL MEDICINE

## 2021-01-01 PROCEDURE — 94799 UNLISTED PULMONARY SVC/PX: CPT

## 2021-01-01 PROCEDURE — 87040 BLOOD CULTURE FOR BACTERIA: CPT | Performed by: STUDENT IN AN ORGANIZED HEALTH CARE EDUCATION/TRAINING PROGRAM

## 2021-01-01 PROCEDURE — 25010000003 LIDOCAINE 1 % SOLUTION: Performed by: NURSE ANESTHETIST, CERTIFIED REGISTERED

## 2021-01-01 PROCEDURE — 25010000002 ONDANSETRON PER 1 MG: Performed by: NURSE ANESTHETIST, CERTIFIED REGISTERED

## 2021-01-01 PROCEDURE — 82656 EL-1 FECAL QUAL/SEMIQ: CPT | Performed by: INTERNAL MEDICINE

## 2021-01-01 PROCEDURE — 25010000003 PHYTONADIONE 10 MG/ML SOLUTION 1 ML AMPULE: Performed by: SURGERY

## 2021-01-01 PROCEDURE — 25010000002 ALBUMIN HUMAN 5% PER 50 ML: Performed by: NURSE ANESTHETIST, CERTIFIED REGISTERED

## 2021-01-01 PROCEDURE — 25010000002 DEXAMETHASONE PER 1 MG: Performed by: INTERNAL MEDICINE

## 2021-01-01 PROCEDURE — 86850 RBC ANTIBODY SCREEN: CPT | Performed by: NURSE PRACTITIONER

## 2021-01-01 PROCEDURE — 86140 C-REACTIVE PROTEIN: CPT | Performed by: STUDENT IN AN ORGANIZED HEALTH CARE EDUCATION/TRAINING PROGRAM

## 2021-01-01 PROCEDURE — 25010000002 SULFUR HEXAFLUORIDE MICROSPH 60.7-25 MG RECONSTITUTED SUSPENSION: Performed by: INTERNAL MEDICINE

## 2021-01-01 PROCEDURE — 85610 PROTHROMBIN TIME: CPT | Performed by: NURSE PRACTITIONER

## 2021-01-01 PROCEDURE — 80053 COMPREHEN METABOLIC PANEL: CPT

## 2021-01-01 PROCEDURE — 97535 SELF CARE MNGMENT TRAINING: CPT

## 2021-01-01 PROCEDURE — 80069 RENAL FUNCTION PANEL: CPT | Performed by: INTERNAL MEDICINE

## 2021-01-01 PROCEDURE — 36430 TRANSFUSION BLD/BLD COMPNT: CPT

## 2021-01-01 PROCEDURE — 63710000001 PREDNISONE PER 5 MG: Performed by: NURSE PRACTITIONER

## 2021-01-01 PROCEDURE — 76705 ECHO EXAM OF ABDOMEN: CPT

## 2021-01-01 PROCEDURE — 84166 PROTEIN E-PHORESIS/URINE/CSF: CPT | Performed by: INTERNAL MEDICINE

## 2021-01-01 PROCEDURE — 83036 HEMOGLOBIN GLYCOSYLATED A1C: CPT | Performed by: NURSE PRACTITIONER

## 2021-01-01 PROCEDURE — 82550 ASSAY OF CK (CPK): CPT | Performed by: INTERNAL MEDICINE

## 2021-01-01 PROCEDURE — 86927 PLASMA FRESH FROZEN: CPT

## 2021-01-01 PROCEDURE — 99284 EMERGENCY DEPT VISIT MOD MDM: CPT

## 2021-01-01 PROCEDURE — 86682 HELMINTH ANTIBODY: CPT | Performed by: PHYSICIAN ASSISTANT

## 2021-01-01 PROCEDURE — 82150 ASSAY OF AMYLASE: CPT | Performed by: SURGERY

## 2021-01-01 PROCEDURE — 85025 COMPLETE CBC W/AUTO DIFF WBC: CPT | Performed by: EMERGENCY MEDICINE

## 2021-01-01 PROCEDURE — 25010000002 PIPERACILLIN SOD-TAZOBACTAM PER 1 G

## 2021-01-01 PROCEDURE — 25010000002 DAPTOMYCIN PER 1 MG: Performed by: INTERNAL MEDICINE

## 2021-01-01 PROCEDURE — 99239 HOSP IP/OBS DSCHRG MGMT >30: CPT | Performed by: INTERNAL MEDICINE

## 2021-01-01 PROCEDURE — 25010000002 HYDRALAZINE PER 20 MG: Performed by: NURSE PRACTITIONER

## 2021-01-01 PROCEDURE — 31500 INSERT EMERGENCY AIRWAY: CPT

## 2021-01-01 PROCEDURE — 99222 1ST HOSP IP/OBS MODERATE 55: CPT | Performed by: INTERNAL MEDICINE

## 2021-01-01 PROCEDURE — 87040 BLOOD CULTURE FOR BACTERIA: CPT | Performed by: INTERNAL MEDICINE

## 2021-01-01 PROCEDURE — 43264 ERCP REMOVE DUCT CALCULI: CPT | Performed by: INTERNAL MEDICINE

## 2021-01-01 PROCEDURE — 0 LIDOCAINE 1 % SOLUTION: Performed by: RADIOLOGY

## 2021-01-01 PROCEDURE — U0004 COV-19 TEST NON-CDC HGH THRU: HCPCS | Performed by: INTERNAL MEDICINE

## 2021-01-01 PROCEDURE — 84484 ASSAY OF TROPONIN QUANT: CPT | Performed by: EMERGENCY MEDICINE

## 2021-01-01 PROCEDURE — 74300 X-RAY BILE DUCTS/PANCREAS: CPT

## 2021-01-01 PROCEDURE — 83690 ASSAY OF LIPASE: CPT | Performed by: EMERGENCY MEDICINE

## 2021-01-01 PROCEDURE — 80202 ASSAY OF VANCOMYCIN: CPT

## 2021-01-01 PROCEDURE — 80053 COMPREHEN METABOLIC PANEL: CPT | Performed by: EMERGENCY MEDICINE

## 2021-01-01 PROCEDURE — 25010000002 FENTANYL CITRATE (PF) 50 MCG/ML SOLUTION: Performed by: NURSE ANESTHETIST, CERTIFIED REGISTERED

## 2021-01-01 PROCEDURE — 74181 MRI ABDOMEN W/O CONTRAST: CPT

## 2021-01-01 PROCEDURE — 86923 COMPATIBILITY TEST ELECTRIC: CPT

## 2021-01-01 PROCEDURE — 80048 BASIC METABOLIC PNL TOTAL CA: CPT | Performed by: INTERNAL MEDICINE

## 2021-01-01 PROCEDURE — 85018 HEMOGLOBIN: CPT | Performed by: INTERNAL MEDICINE

## 2021-01-01 PROCEDURE — 74018 RADEX ABDOMEN 1 VIEW: CPT

## 2021-01-01 PROCEDURE — 74328 X-RAY BILE DUCT ENDOSCOPY: CPT

## 2021-01-01 PROCEDURE — 82550 ASSAY OF CK (CPK): CPT

## 2021-01-01 PROCEDURE — 25010000002 ALBUMIN HUMAN 25% PER 50 ML: Performed by: INTERNAL MEDICINE

## 2021-01-01 PROCEDURE — BF131ZZ FLUOROSCOPY OF GALLBLADDER AND BILE DUCTS USING LOW OSMOLAR CONTRAST: ICD-10-PCS | Performed by: SURGERY

## 2021-01-01 PROCEDURE — 25010000002 VANCOMYCIN 10 G RECONSTITUTED SOLUTION: Performed by: EMERGENCY MEDICINE

## 2021-01-01 PROCEDURE — 86900 BLOOD TYPING SEROLOGIC ABO: CPT | Performed by: NURSE PRACTITIONER

## 2021-01-01 PROCEDURE — 99291 CRITICAL CARE FIRST HOUR: CPT | Performed by: INTERNAL MEDICINE

## 2021-01-01 PROCEDURE — 86900 BLOOD TYPING SEROLOGIC ABO: CPT | Performed by: SURGERY

## 2021-01-01 PROCEDURE — 86140 C-REACTIVE PROTEIN: CPT

## 2021-01-01 PROCEDURE — 97116 GAIT TRAINING THERAPY: CPT | Performed by: PHYSICAL THERAPIST

## 2021-01-01 PROCEDURE — 80053 COMPREHEN METABOLIC PANEL: CPT | Performed by: PHYSICIAN ASSISTANT

## 2021-01-01 PROCEDURE — 25010000002 MEROPENEM

## 2021-01-01 PROCEDURE — 25010000002 MORPHINE PER 10 MG: Performed by: SURGERY

## 2021-01-01 PROCEDURE — 84100 ASSAY OF PHOSPHORUS: CPT | Performed by: STUDENT IN AN ORGANIZED HEALTH CARE EDUCATION/TRAINING PROGRAM

## 2021-01-01 PROCEDURE — 25010000002 HYDROMORPHONE PER 4 MG: Performed by: EMERGENCY MEDICINE

## 2021-01-01 PROCEDURE — 83880 ASSAY OF NATRIURETIC PEPTIDE: CPT | Performed by: STUDENT IN AN ORGANIZED HEALTH CARE EDUCATION/TRAINING PROGRAM

## 2021-01-01 PROCEDURE — 25010000002 IOPAMIDOL 61 % SOLUTION: Performed by: SURGERY

## 2021-01-01 PROCEDURE — 87635 SARS-COV-2 COVID-19 AMP PRB: CPT | Performed by: FAMILY MEDICINE

## 2021-01-01 PROCEDURE — 84484 ASSAY OF TROPONIN QUANT: CPT | Performed by: STUDENT IN AN ORGANIZED HEALTH CARE EDUCATION/TRAINING PROGRAM

## 2021-01-01 PROCEDURE — 86901 BLOOD TYPING SEROLOGIC RH(D): CPT | Performed by: INTERNAL MEDICINE

## 2021-01-01 PROCEDURE — 25010000002 ONDANSETRON PER 1 MG: Performed by: INTERNAL MEDICINE

## 2021-01-01 PROCEDURE — 85730 THROMBOPLASTIN TIME PARTIAL: CPT | Performed by: NURSE PRACTITIONER

## 2021-01-01 PROCEDURE — 85025 COMPLETE CBC W/AUTO DIFF WBC: CPT | Performed by: NURSE PRACTITIONER

## 2021-01-01 PROCEDURE — 0W9F40Z DRAINAGE OF ABDOMINAL WALL WITH DRAINAGE DEVICE, PERCUTANEOUS ENDOSCOPIC APPROACH: ICD-10-PCS | Performed by: SURGERY

## 2021-01-01 PROCEDURE — 0FT44ZZ RESECTION OF GALLBLADDER, PERCUTANEOUS ENDOSCOPIC APPROACH: ICD-10-PCS | Performed by: SURGERY

## 2021-01-01 PROCEDURE — 80053 COMPREHEN METABOLIC PANEL: CPT | Performed by: STUDENT IN AN ORGANIZED HEALTH CARE EDUCATION/TRAINING PROGRAM

## 2021-01-01 PROCEDURE — 87186 SC STD MICRODIL/AGAR DIL: CPT | Performed by: EMERGENCY MEDICINE

## 2021-01-01 PROCEDURE — 25010000002 MORPHINE PER 10 MG: Performed by: NURSE PRACTITIONER

## 2021-01-01 PROCEDURE — 97165 OT EVAL LOW COMPLEX 30 MIN: CPT

## 2021-01-01 PROCEDURE — 84484 ASSAY OF TROPONIN QUANT: CPT | Performed by: NURSE PRACTITIONER

## 2021-01-01 PROCEDURE — 84443 ASSAY THYROID STIM HORMONE: CPT | Performed by: INTERNAL MEDICINE

## 2021-01-01 PROCEDURE — 83540 ASSAY OF IRON: CPT | Performed by: INTERNAL MEDICINE

## 2021-01-01 PROCEDURE — 25010000002 ONDANSETRON PER 1 MG: Performed by: EMERGENCY MEDICINE

## 2021-01-01 PROCEDURE — 84300 ASSAY OF URINE SODIUM: CPT | Performed by: INTERNAL MEDICINE

## 2021-01-01 PROCEDURE — 87086 URINE CULTURE/COLONY COUNT: CPT | Performed by: EMERGENCY MEDICINE

## 2021-01-01 PROCEDURE — 83690 ASSAY OF LIPASE: CPT | Performed by: SURGERY

## 2021-01-01 PROCEDURE — 25010000002 CEFEPIME PER 500 MG: Performed by: STUDENT IN AN ORGANIZED HEALTH CARE EDUCATION/TRAINING PROGRAM

## 2021-01-01 PROCEDURE — 77001 FLUOROGUIDE FOR VEIN DEVICE: CPT

## 2021-01-01 PROCEDURE — 25010000002 CEFEPIME PER 500 MG

## 2021-01-01 PROCEDURE — 97530 THERAPEUTIC ACTIVITIES: CPT | Performed by: PHYSICAL THERAPIST

## 2021-01-01 PROCEDURE — 80053 COMPREHEN METABOLIC PANEL: CPT | Performed by: NURSE PRACTITIONER

## 2021-01-01 PROCEDURE — 0WCJ4ZZ EXTIRPATION OF MATTER FROM PELVIC CAVITY, PERCUTANEOUS ENDOSCOPIC APPROACH: ICD-10-PCS | Performed by: SURGERY

## 2021-01-01 PROCEDURE — 74174 CTA ABD&PLVS W/CONTRAST: CPT

## 2021-01-01 PROCEDURE — 85025 COMPLETE CBC W/AUTO DIFF WBC: CPT

## 2021-01-01 DEVICE — LIGACLIP 10-M/L, 10MM ENDOSCOPIC ROTATING MULTIPLE CLIP APPLIERS
Type: IMPLANTABLE DEVICE | Site: ABDOMEN | Status: FUNCTIONAL
Brand: LIGACLIP

## 2021-01-01 DEVICE — STENT SYSTEM RMV
Type: IMPLANTABLE DEVICE | Site: BILE DUCT | Status: FUNCTIONAL
Brand: WALLFLEX BILIARY

## 2021-01-01 DEVICE — ABSORBABLE HEMOSTAT (OXIDIZED REGENERATED CELLULOSE, U.S.P.)
Type: IMPLANTABLE DEVICE | Site: ABDOMEN | Status: FUNCTIONAL
Brand: SURGICEL

## 2021-01-01 RX ORDER — NEOSTIGMINE METHYLSULFATE 1 MG/ML
INJECTION, SOLUTION INTRAVENOUS AS NEEDED
Status: DISCONTINUED | OUTPATIENT
Start: 2021-01-01 | End: 2021-01-01 | Stop reason: SURG

## 2021-01-01 RX ORDER — OXYCODONE HYDROCHLORIDE AND ACETAMINOPHEN 5; 325 MG/1; MG/1
1 TABLET ORAL EVERY 4 HOURS PRN
Status: DISCONTINUED | OUTPATIENT
Start: 2021-01-01 | End: 2021-01-01

## 2021-01-01 RX ORDER — SODIUM CHLORIDE 9 MG/ML
INJECTION, SOLUTION INTRAVENOUS AS NEEDED
Status: DISCONTINUED | OUTPATIENT
Start: 2021-01-01 | End: 2021-01-01 | Stop reason: HOSPADM

## 2021-01-01 RX ORDER — SODIUM CHLORIDE, SODIUM LACTATE, POTASSIUM CHLORIDE, CALCIUM CHLORIDE 600; 310; 30; 20 MG/100ML; MG/100ML; MG/100ML; MG/100ML
30 INJECTION, SOLUTION INTRAVENOUS CONTINUOUS PRN
Status: CANCELLED | OUTPATIENT
Start: 2021-01-01

## 2021-01-01 RX ORDER — SODIUM CHLORIDE 0.9 % (FLUSH) 0.9 %
10 SYRINGE (ML) INJECTION EVERY 12 HOURS SCHEDULED
Status: DISCONTINUED | OUTPATIENT
Start: 2021-01-01 | End: 2021-01-01 | Stop reason: HOSPADM

## 2021-01-01 RX ORDER — PROPOFOL 10 MG/ML
VIAL (ML) INTRAVENOUS AS NEEDED
Status: DISCONTINUED | OUTPATIENT
Start: 2021-01-01 | End: 2021-01-01 | Stop reason: SURG

## 2021-01-01 RX ORDER — ONDANSETRON 2 MG/ML
INJECTION INTRAMUSCULAR; INTRAVENOUS AS NEEDED
Status: DISCONTINUED | OUTPATIENT
Start: 2021-01-01 | End: 2021-01-01 | Stop reason: SURG

## 2021-01-01 RX ORDER — SODIUM CHLORIDE, SODIUM LACTATE, POTASSIUM CHLORIDE, CALCIUM CHLORIDE 600; 310; 30; 20 MG/100ML; MG/100ML; MG/100ML; MG/100ML
9 INJECTION, SOLUTION INTRAVENOUS CONTINUOUS PRN
Status: CANCELLED | OUTPATIENT
Start: 2021-01-01

## 2021-01-01 RX ORDER — CALCIUM CHLORIDE 100 MG/ML
INJECTION INTRAVENOUS; INTRAVENTRICULAR
Status: COMPLETED | OUTPATIENT
Start: 2021-01-01 | End: 2021-01-01

## 2021-01-01 RX ORDER — KIT FOR THE PREPARATION OF TECHNETIUM TC 99M MEBROFENIN 45 MG/10ML
1 INJECTION, POWDER, LYOPHILIZED, FOR SOLUTION INTRAVENOUS
Status: COMPLETED | OUTPATIENT
Start: 2021-01-01 | End: 2021-01-01

## 2021-01-01 RX ORDER — ONDANSETRON 2 MG/ML
4 INJECTION INTRAMUSCULAR; INTRAVENOUS EVERY 6 HOURS PRN
Status: DISCONTINUED | OUTPATIENT
Start: 2021-01-01 | End: 2021-01-01 | Stop reason: HOSPADM

## 2021-01-01 RX ORDER — METHYLPREDNISOLONE SODIUM SUCCINATE 125 MG/2ML
INJECTION, POWDER, LYOPHILIZED, FOR SOLUTION INTRAMUSCULAR; INTRAVENOUS AS NEEDED
Status: DISCONTINUED | OUTPATIENT
Start: 2021-01-01 | End: 2021-01-01 | Stop reason: SURG

## 2021-01-01 RX ORDER — PANTOPRAZOLE SODIUM 40 MG/1
40 TABLET, DELAYED RELEASE ORAL EVERY MORNING
Status: DISCONTINUED | OUTPATIENT
Start: 2021-01-01 | End: 2021-01-01

## 2021-01-01 RX ORDER — MEPERIDINE HYDROCHLORIDE 25 MG/ML
12.5 INJECTION INTRAMUSCULAR; INTRAVENOUS; SUBCUTANEOUS
Status: DISCONTINUED | OUTPATIENT
Start: 2021-01-01 | End: 2021-01-01 | Stop reason: HOSPADM

## 2021-01-01 RX ORDER — SODIUM CHLORIDE 0.9 % (FLUSH) 0.9 %
3-10 SYRINGE (ML) INJECTION AS NEEDED
Status: DISCONTINUED | OUTPATIENT
Start: 2021-01-01 | End: 2021-01-01 | Stop reason: HOSPADM

## 2021-01-01 RX ORDER — AMIODARONE HYDROCHLORIDE 200 MG/1
200 TABLET ORAL 3 TIMES DAILY
Status: DISCONTINUED | OUTPATIENT
Start: 2021-01-01 | End: 2021-01-01

## 2021-01-01 RX ORDER — IPRATROPIUM BROMIDE AND ALBUTEROL SULFATE 2.5; .5 MG/3ML; MG/3ML
3 SOLUTION RESPIRATORY (INHALATION) ONCE AS NEEDED
Status: DISCONTINUED | OUTPATIENT
Start: 2021-01-01 | End: 2021-01-01 | Stop reason: HOSPADM

## 2021-01-01 RX ORDER — MORPHINE SULFATE 2 MG/ML
2 INJECTION, SOLUTION INTRAMUSCULAR; INTRAVENOUS ONCE
Status: COMPLETED | OUTPATIENT
Start: 2021-01-01 | End: 2021-01-01

## 2021-01-01 RX ORDER — MORPHINE SULFATE 2 MG/ML
1 INJECTION, SOLUTION INTRAMUSCULAR; INTRAVENOUS ONCE
Status: COMPLETED | OUTPATIENT
Start: 2021-01-01 | End: 2021-01-01

## 2021-01-01 RX ORDER — ALBUMIN, HUMAN INJ 5% 5 %
SOLUTION INTRAVENOUS CONTINUOUS PRN
Status: DISCONTINUED | OUTPATIENT
Start: 2021-01-01 | End: 2021-01-01 | Stop reason: SURG

## 2021-01-01 RX ORDER — SODIUM CHLORIDE 0.9 % (FLUSH) 0.9 %
3-10 SYRINGE (ML) INJECTION AS NEEDED
Status: CANCELLED | OUTPATIENT
Start: 2021-01-01

## 2021-01-01 RX ORDER — HYDRALAZINE HYDROCHLORIDE 20 MG/ML
5 INJECTION INTRAMUSCULAR; INTRAVENOUS
Status: DISCONTINUED | OUTPATIENT
Start: 2021-01-01 | End: 2021-01-01 | Stop reason: HOSPADM

## 2021-01-01 RX ORDER — TERAZOSIN 1 MG/1
1 CAPSULE ORAL NIGHTLY
Qty: 30 CAPSULE | Refills: 5 | Status: SHIPPED | OUTPATIENT
Start: 2021-01-01

## 2021-01-01 RX ORDER — ALBUMIN (HUMAN) 12.5 G/50ML
12.5 SOLUTION INTRAVENOUS 2 TIMES DAILY
Status: COMPLETED | OUTPATIENT
Start: 2021-01-01 | End: 2021-01-01

## 2021-01-01 RX ORDER — DOCOSANOL 100 MG/G
1 CREAM TOPICAL
Status: DISCONTINUED | OUTPATIENT
Start: 2021-01-01 | End: 2021-01-01 | Stop reason: HOSPADM

## 2021-01-01 RX ORDER — ONDANSETRON 2 MG/ML
INJECTION INTRAMUSCULAR; INTRAVENOUS
Status: DISPENSED
Start: 2021-01-01 | End: 2021-01-01

## 2021-01-01 RX ORDER — ONDANSETRON 2 MG/ML
4 INJECTION INTRAMUSCULAR; INTRAVENOUS ONCE AS NEEDED
Status: DISCONTINUED | OUTPATIENT
Start: 2021-01-01 | End: 2021-01-01 | Stop reason: HOSPADM

## 2021-01-01 RX ORDER — POTASSIUM CHLORIDE 750 MG/1
40 CAPSULE, EXTENDED RELEASE ORAL ONCE
Status: COMPLETED | OUTPATIENT
Start: 2021-01-01 | End: 2021-01-01

## 2021-01-01 RX ORDER — PROMETHAZINE HYDROCHLORIDE 25 MG/1
25 TABLET ORAL ONCE AS NEEDED
Status: DISCONTINUED | OUTPATIENT
Start: 2021-01-01 | End: 2021-01-01 | Stop reason: HOSPADM

## 2021-01-01 RX ORDER — DROPERIDOL 2.5 MG/ML
0.62 INJECTION, SOLUTION INTRAMUSCULAR; INTRAVENOUS ONCE AS NEEDED
Status: DISCONTINUED | OUTPATIENT
Start: 2021-01-01 | End: 2021-01-01 | Stop reason: HOSPADM

## 2021-01-01 RX ORDER — PANTOPRAZOLE SODIUM 40 MG/1
40 TABLET, DELAYED RELEASE ORAL
Status: DISCONTINUED | OUTPATIENT
Start: 2021-01-01 | End: 2021-01-01 | Stop reason: HOSPADM

## 2021-01-01 RX ORDER — SODIUM CHLORIDE 9 MG/ML
50 INJECTION, SOLUTION INTRAVENOUS CONTINUOUS
Status: DISCONTINUED | OUTPATIENT
Start: 2021-01-01 | End: 2021-01-01

## 2021-01-01 RX ORDER — OXYCODONE HYDROCHLORIDE AND ACETAMINOPHEN 5; 325 MG/1; MG/1
2 TABLET ORAL EVERY 4 HOURS PRN
Status: DISCONTINUED | OUTPATIENT
Start: 2021-01-01 | End: 2021-01-01 | Stop reason: SDUPTHER

## 2021-01-01 RX ORDER — FENTANYL CITRATE 50 UG/ML
INJECTION, SOLUTION INTRAMUSCULAR; INTRAVENOUS AS NEEDED
Status: DISCONTINUED | OUTPATIENT
Start: 2021-01-01 | End: 2021-01-01 | Stop reason: SURG

## 2021-01-01 RX ORDER — BUMETANIDE 0.25 MG/ML
0.5 INJECTION INTRAMUSCULAR; INTRAVENOUS 3 TIMES DAILY
Status: COMPLETED | OUTPATIENT
Start: 2021-01-01 | End: 2021-01-01

## 2021-01-01 RX ORDER — SODIUM CHLORIDE, SODIUM LACTATE, POTASSIUM CHLORIDE, CALCIUM CHLORIDE 600; 310; 30; 20 MG/100ML; MG/100ML; MG/100ML; MG/100ML
100 INJECTION, SOLUTION INTRAVENOUS CONTINUOUS
Status: ACTIVE | OUTPATIENT
Start: 2021-01-01 | End: 2021-01-01

## 2021-01-01 RX ORDER — ETOMIDATE 2 MG/ML
INJECTION INTRAVENOUS AS NEEDED
Status: DISCONTINUED | OUTPATIENT
Start: 2021-01-01 | End: 2021-01-01 | Stop reason: SURG

## 2021-01-01 RX ORDER — L.ACID,PARA/B.BIFIDUM/S.THERM 8B CELL
1 CAPSULE ORAL DAILY
Status: DISCONTINUED | OUTPATIENT
Start: 2021-01-01 | End: 2021-01-01 | Stop reason: HOSPADM

## 2021-01-01 RX ORDER — TAMSULOSIN HYDROCHLORIDE 0.4 MG/1
0.4 CAPSULE ORAL DAILY
Status: DISCONTINUED | OUTPATIENT
Start: 2021-01-01 | End: 2021-01-01 | Stop reason: HOSPADM

## 2021-01-01 RX ORDER — BUPIVACAINE HCL/0.9 % NACL/PF 0.125 %
PLASTIC BAG, INJECTION (ML) EPIDURAL AS NEEDED
Status: DISCONTINUED | OUTPATIENT
Start: 2021-01-01 | End: 2021-01-01 | Stop reason: SURG

## 2021-01-01 RX ORDER — LISINOPRIL 5 MG/1
5 TABLET ORAL
Qty: 30 TABLET | Refills: 5 | Status: SHIPPED | OUTPATIENT
Start: 2021-01-01

## 2021-01-01 RX ORDER — TERAZOSIN 1 MG/1
1 CAPSULE ORAL NIGHTLY
Status: DISCONTINUED | OUTPATIENT
Start: 2021-01-01 | End: 2021-01-01 | Stop reason: HOSPADM

## 2021-01-01 RX ORDER — HEPARIN SODIUM 1000 [USP'U]/ML
INJECTION, SOLUTION INTRAVENOUS; SUBCUTANEOUS
Status: COMPLETED
Start: 2021-01-01 | End: 2021-01-01

## 2021-01-01 RX ORDER — DROPERIDOL 2.5 MG/ML
0.62 INJECTION, SOLUTION INTRAMUSCULAR; INTRAVENOUS AS NEEDED
Status: DISCONTINUED | OUTPATIENT
Start: 2021-01-01 | End: 2021-01-01 | Stop reason: HOSPADM

## 2021-01-01 RX ORDER — NICOTINE POLACRILEX 4 MG
15 LOZENGE BUCCAL
Status: DISCONTINUED | OUTPATIENT
Start: 2021-01-01 | End: 2021-01-01 | Stop reason: HOSPADM

## 2021-01-01 RX ORDER — EPINEPHRINE 0.1 MG/ML
SYRINGE (ML) INJECTION
Status: COMPLETED | OUTPATIENT
Start: 2021-01-01 | End: 2021-01-01

## 2021-01-01 RX ORDER — ONDANSETRON 2 MG/ML
4 INJECTION INTRAMUSCULAR; INTRAVENOUS ONCE AS NEEDED
Status: COMPLETED | OUTPATIENT
Start: 2021-01-01 | End: 2021-01-01

## 2021-01-01 RX ORDER — ATORVASTATIN CALCIUM 40 MG/1
40 TABLET, FILM COATED ORAL NIGHTLY
Status: DISCONTINUED | OUTPATIENT
Start: 2021-01-01 | End: 2021-01-01 | Stop reason: HOSPADM

## 2021-01-01 RX ORDER — DORZOLAMIDE HYDROCHLORIDE AND TIMOLOL MALEATE 20; 5 MG/ML; MG/ML
SOLUTION/ DROPS OPHTHALMIC 2 TIMES DAILY
Status: DISCONTINUED | OUTPATIENT
Start: 2021-01-01 | End: 2021-01-01 | Stop reason: HOSPADM

## 2021-01-01 RX ORDER — SODIUM CHLORIDE 9 MG/ML
9 INJECTION, SOLUTION INTRAVENOUS CONTINUOUS
Status: DISCONTINUED | OUTPATIENT
Start: 2021-01-01 | End: 2021-01-01

## 2021-01-01 RX ORDER — FENTANYL CITRATE 50 UG/ML
INJECTION, SOLUTION INTRAMUSCULAR; INTRAVENOUS
Status: COMPLETED | OUTPATIENT
Start: 2021-01-01 | End: 2021-01-01

## 2021-01-01 RX ORDER — L.ACID,PARA/B.BIFIDUM/S.THERM 8B CELL
1 CAPSULE ORAL DAILY
Qty: 20 CAPSULE | Refills: 0 | Status: ON HOLD | OUTPATIENT
Start: 2021-01-01 | End: 2021-01-01 | Stop reason: SDUPTHER

## 2021-01-01 RX ORDER — NALOXONE HCL 0.4 MG/ML
0.1 VIAL (ML) INJECTION
Status: DISCONTINUED | OUTPATIENT
Start: 2021-01-01 | End: 2021-01-01 | Stop reason: HOSPADM

## 2021-01-01 RX ORDER — HEPARIN SODIUM 5000 [USP'U]/ML
5000 INJECTION, SOLUTION INTRAVENOUS; SUBCUTANEOUS EVERY 8 HOURS SCHEDULED
Status: DISCONTINUED | OUTPATIENT
Start: 2021-01-01 | End: 2021-01-01 | Stop reason: HOSPADM

## 2021-01-01 RX ORDER — FAMOTIDINE 20 MG/1
20 TABLET, FILM COATED ORAL
Status: COMPLETED | OUTPATIENT
Start: 2021-01-01 | End: 2021-01-01

## 2021-01-01 RX ORDER — PANTOPRAZOLE SODIUM 40 MG/1
40 TABLET, DELAYED RELEASE ORAL DAILY
Qty: 30 TABLET | Refills: 5 | Status: SHIPPED | OUTPATIENT
Start: 2021-01-01

## 2021-01-01 RX ORDER — METOPROLOL TARTRATE 75 MG/1
75 TABLET, FILM COATED ORAL EVERY 12 HOURS SCHEDULED
Qty: 60 TABLET | Refills: 5 | Status: SHIPPED | OUTPATIENT
Start: 2021-01-01

## 2021-01-01 RX ORDER — LIDOCAINE HYDROCHLORIDE 10 MG/ML
10 INJECTION, SOLUTION EPIDURAL; INFILTRATION; INTRACAUDAL; PERINEURAL ONCE
Status: COMPLETED | OUTPATIENT
Start: 2021-01-01 | End: 2021-01-01

## 2021-01-01 RX ORDER — ROCURONIUM BROMIDE 10 MG/ML
INJECTION, SOLUTION INTRAVENOUS AS NEEDED
Status: DISCONTINUED | OUTPATIENT
Start: 2021-01-01 | End: 2021-01-01 | Stop reason: SURG

## 2021-01-01 RX ORDER — HYDROMORPHONE HYDROCHLORIDE 1 MG/ML
0.25 INJECTION, SOLUTION INTRAMUSCULAR; INTRAVENOUS; SUBCUTANEOUS
Status: DISCONTINUED | OUTPATIENT
Start: 2021-01-01 | End: 2021-01-01 | Stop reason: HOSPADM

## 2021-01-01 RX ORDER — ACETAMINOPHEN 500 MG
1000 TABLET ORAL ONCE
Status: COMPLETED | OUTPATIENT
Start: 2021-01-01 | End: 2021-01-01

## 2021-01-01 RX ORDER — DEXAMETHASONE SODIUM PHOSPHATE 4 MG/ML
INJECTION, SOLUTION INTRA-ARTICULAR; INTRALESIONAL; INTRAMUSCULAR; INTRAVENOUS; SOFT TISSUE AS NEEDED
Status: DISCONTINUED | OUTPATIENT
Start: 2021-01-01 | End: 2021-01-01 | Stop reason: SURG

## 2021-01-01 RX ORDER — LISINOPRIL 5 MG/1
5 TABLET ORAL
Status: DISCONTINUED | OUTPATIENT
Start: 2021-01-01 | End: 2021-01-01 | Stop reason: HOSPADM

## 2021-01-01 RX ORDER — SODIUM CHLORIDE 9 MG/ML
10 INJECTION INTRAVENOUS AS NEEDED
Status: DISCONTINUED | OUTPATIENT
Start: 2021-01-01 | End: 2021-01-01 | Stop reason: HOSPADM

## 2021-01-01 RX ORDER — BUPIVACAINE HYDROCHLORIDE AND EPINEPHRINE 2.5; 5 MG/ML; UG/ML
INJECTION, SOLUTION EPIDURAL; INFILTRATION; INTRACAUDAL; PERINEURAL AS NEEDED
Status: DISCONTINUED | OUTPATIENT
Start: 2021-01-01 | End: 2021-01-01 | Stop reason: HOSPADM

## 2021-01-01 RX ORDER — SODIUM CHLORIDE 0.9 % (FLUSH) 0.9 %
10 SYRINGE (ML) INJECTION AS NEEDED
Status: DISCONTINUED | OUTPATIENT
Start: 2021-01-01 | End: 2021-01-01 | Stop reason: HOSPADM

## 2021-01-01 RX ORDER — ONDANSETRON 4 MG/1
4 TABLET, FILM COATED ORAL EVERY 6 HOURS PRN
Status: DISCONTINUED | OUTPATIENT
Start: 2021-01-01 | End: 2021-01-01 | Stop reason: HOSPADM

## 2021-01-01 RX ORDER — PREDNISONE 10 MG/1
10 TABLET ORAL DAILY
Status: DISCONTINUED | OUTPATIENT
Start: 2021-01-01 | End: 2021-01-01 | Stop reason: HOSPADM

## 2021-01-01 RX ORDER — GLYCOPYRROLATE 0.2 MG/ML
INJECTION INTRAMUSCULAR; INTRAVENOUS AS NEEDED
Status: DISCONTINUED | OUTPATIENT
Start: 2021-01-01 | End: 2021-01-01 | Stop reason: SURG

## 2021-01-01 RX ORDER — LIDOCAINE HYDROCHLORIDE 10 MG/ML
0.5 INJECTION, SOLUTION EPIDURAL; INFILTRATION; INTRACAUDAL; PERINEURAL ONCE AS NEEDED
Status: CANCELLED | OUTPATIENT
Start: 2021-01-01

## 2021-01-01 RX ORDER — PROMETHAZINE HYDROCHLORIDE 25 MG/1
25 SUPPOSITORY RECTAL ONCE AS NEEDED
Status: DISCONTINUED | OUTPATIENT
Start: 2021-01-01 | End: 2021-01-01 | Stop reason: HOSPADM

## 2021-01-01 RX ORDER — DEXTROSE AND SODIUM CHLORIDE 5; .45 G/100ML; G/100ML
125 INJECTION, SOLUTION INTRAVENOUS CONTINUOUS
Status: DISCONTINUED | OUTPATIENT
Start: 2021-01-01 | End: 2021-01-01

## 2021-01-01 RX ORDER — DORZOLAMIDE HYDROCHLORIDE AND TIMOLOL MALEATE 20; 5 MG/ML; MG/ML
1 SOLUTION/ DROPS OPHTHALMIC 2 TIMES DAILY
COMMUNITY

## 2021-01-01 RX ORDER — LIDOCAINE HYDROCHLORIDE 10 MG/ML
5 INJECTION, SOLUTION INFILTRATION; PERINEURAL ONCE
Status: DISCONTINUED | OUTPATIENT
Start: 2021-01-01 | End: 2021-01-01 | Stop reason: HOSPADM

## 2021-01-01 RX ORDER — ALBUMIN (HUMAN) 12.5 G/50ML
12.5 SOLUTION INTRAVENOUS ONCE
Status: COMPLETED | OUTPATIENT
Start: 2021-01-01 | End: 2021-01-01

## 2021-01-01 RX ORDER — FENTANYL CITRATE 50 UG/ML
50 INJECTION, SOLUTION INTRAMUSCULAR; INTRAVENOUS
Status: DISCONTINUED | OUTPATIENT
Start: 2021-01-01 | End: 2021-01-01 | Stop reason: HOSPADM

## 2021-01-01 RX ORDER — ACETAMINOPHEN 650 MG/1
650 SUPPOSITORY RECTAL ONCE
Status: DISCONTINUED | OUTPATIENT
Start: 2021-01-01 | End: 2021-01-01

## 2021-01-01 RX ORDER — HYDROMORPHONE HYDROCHLORIDE 1 MG/ML
0.5 INJECTION, SOLUTION INTRAMUSCULAR; INTRAVENOUS; SUBCUTANEOUS
Status: DISCONTINUED | OUTPATIENT
Start: 2021-01-01 | End: 2021-01-01 | Stop reason: HOSPADM

## 2021-01-01 RX ORDER — LIDOCAINE HYDROCHLORIDE 10 MG/ML
INJECTION, SOLUTION EPIDURAL; INFILTRATION; INTRACAUDAL; PERINEURAL AS NEEDED
Status: DISCONTINUED | OUTPATIENT
Start: 2021-01-01 | End: 2021-01-01 | Stop reason: SURG

## 2021-01-01 RX ORDER — SODIUM CHLORIDE, SODIUM LACTATE, POTASSIUM CHLORIDE, CALCIUM CHLORIDE 600; 310; 30; 20 MG/100ML; MG/100ML; MG/100ML; MG/100ML
1000 INJECTION, SOLUTION INTRAVENOUS CONTINUOUS
Status: CANCELLED | OUTPATIENT
Start: 2021-01-01

## 2021-01-01 RX ORDER — METOLAZONE 2.5 MG/1
2.5 TABLET ORAL ONCE
Status: COMPLETED | OUTPATIENT
Start: 2021-01-01 | End: 2021-01-01

## 2021-01-01 RX ORDER — POTASSIUM CHLORIDE 750 MG/1
40 CAPSULE, EXTENDED RELEASE ORAL ONCE
Status: DISCONTINUED | OUTPATIENT
Start: 2021-01-01 | End: 2021-01-01 | Stop reason: SDUPTHER

## 2021-01-01 RX ORDER — SODIUM CHLORIDE 0.9 % (FLUSH) 0.9 %
3 SYRINGE (ML) INJECTION EVERY 12 HOURS SCHEDULED
Status: CANCELLED | OUTPATIENT
Start: 2021-01-01

## 2021-01-01 RX ORDER — L.ACID,PARA/B.BIFIDUM/S.THERM 8B CELL
1 CAPSULE ORAL DAILY
Qty: 60 CAPSULE | Refills: 5 | Status: SHIPPED | OUTPATIENT
Start: 2021-01-01

## 2021-01-01 RX ORDER — PANTOPRAZOLE SODIUM 40 MG/1
40 TABLET, DELAYED RELEASE ORAL DAILY
Status: DISCONTINUED | OUTPATIENT
Start: 2021-01-01 | End: 2021-01-01 | Stop reason: HOSPADM

## 2021-01-01 RX ORDER — MIDAZOLAM HYDROCHLORIDE 1 MG/ML
INJECTION INTRAMUSCULAR; INTRAVENOUS
Status: COMPLETED | OUTPATIENT
Start: 2021-01-01 | End: 2021-01-01

## 2021-01-01 RX ORDER — HYDROMORPHONE HYDROCHLORIDE 1 MG/ML
0.25 INJECTION, SOLUTION INTRAMUSCULAR; INTRAVENOUS; SUBCUTANEOUS ONCE
Status: COMPLETED | OUTPATIENT
Start: 2021-01-01 | End: 2021-01-01

## 2021-01-01 RX ORDER — LIDOCAINE HYDROCHLORIDE AND EPINEPHRINE 10; 10 MG/ML; UG/ML
10 INJECTION, SOLUTION INFILTRATION; PERINEURAL ONCE
Status: COMPLETED | OUTPATIENT
Start: 2021-01-01 | End: 2021-01-01

## 2021-01-01 RX ORDER — HYDRALAZINE HYDROCHLORIDE 20 MG/ML
10 INJECTION INTRAMUSCULAR; INTRAVENOUS ONCE
Status: COMPLETED | OUTPATIENT
Start: 2021-01-01 | End: 2021-01-01

## 2021-01-01 RX ORDER — MORPHINE SULFATE 4 MG/ML
4 INJECTION, SOLUTION INTRAMUSCULAR; INTRAVENOUS
Status: DISCONTINUED | OUTPATIENT
Start: 2021-01-01 | End: 2021-01-01

## 2021-01-01 RX ORDER — AMIODARONE HYDROCHLORIDE 200 MG/1
200 TABLET ORAL 2 TIMES DAILY WITH MEALS
Status: DISCONTINUED | OUTPATIENT
Start: 2021-01-01 | End: 2021-01-01 | Stop reason: HOSPADM

## 2021-01-01 RX ORDER — LIDOCAINE HYDROCHLORIDE 10 MG/ML
INJECTION, SOLUTION INFILTRATION; PERINEURAL AS NEEDED
Status: DISCONTINUED | OUTPATIENT
Start: 2021-01-01 | End: 2021-01-01 | Stop reason: SURG

## 2021-01-01 RX ORDER — ASPIRIN 81 MG/1
81 TABLET ORAL DAILY
Status: DISCONTINUED | OUTPATIENT
Start: 2021-01-01 | End: 2021-01-01 | Stop reason: HOSPADM

## 2021-01-01 RX ORDER — CLINDAMYCIN PHOSPHATE 900 MG/50ML
900 INJECTION INTRAVENOUS EVERY 8 HOURS
Status: DISCONTINUED | OUTPATIENT
Start: 2021-01-01 | End: 2021-01-01

## 2021-01-01 RX ORDER — MAGNESIUM SULFATE HEPTAHYDRATE 40 MG/ML
2 INJECTION, SOLUTION INTRAVENOUS ONCE
Status: COMPLETED | OUTPATIENT
Start: 2021-01-01 | End: 2021-01-01

## 2021-01-01 RX ORDER — METOPROLOL TARTRATE 50 MG/1
50 TABLET, FILM COATED ORAL EVERY 8 HOURS SCHEDULED
Status: DISCONTINUED | OUTPATIENT
Start: 2021-01-01 | End: 2021-01-01

## 2021-01-01 RX ORDER — MAGNESIUM SULFATE HEPTAHYDRATE 40 MG/ML
4 INJECTION, SOLUTION INTRAVENOUS AS NEEDED
Status: DISCONTINUED | OUTPATIENT
Start: 2021-01-01 | End: 2021-01-01 | Stop reason: HOSPADM

## 2021-01-01 RX ORDER — ROPINIROLE 0.5 MG/1
0.25 TABLET, FILM COATED ORAL NIGHTLY
Status: DISCONTINUED | OUTPATIENT
Start: 2021-01-01 | End: 2021-01-01 | Stop reason: HOSPADM

## 2021-01-01 RX ORDER — SUCCINYLCHOLINE CHLORIDE 20 MG/ML
INJECTION INTRAMUSCULAR; INTRAVENOUS AS NEEDED
Status: DISCONTINUED | OUTPATIENT
Start: 2021-01-01 | End: 2021-01-01 | Stop reason: SURG

## 2021-01-01 RX ORDER — BUMETANIDE 1 MG/1
1 TABLET ORAL DAILY
Qty: 30 TABLET | Refills: 5 | Status: SHIPPED | OUTPATIENT
Start: 2021-01-01

## 2021-01-01 RX ORDER — CALCIUM CHLORIDE 100 MG/ML
INJECTION INTRAVENOUS; INTRAVENTRICULAR AS NEEDED
Status: DISCONTINUED | OUTPATIENT
Start: 2021-01-01 | End: 2021-01-01 | Stop reason: SURG

## 2021-01-01 RX ORDER — NICOTINE POLACRILEX 4 MG
15 LOZENGE BUCCAL
Status: DISCONTINUED | OUTPATIENT
Start: 2021-01-01 | End: 2021-01-01

## 2021-01-01 RX ORDER — ACETAMINOPHEN 325 MG/1
650 TABLET ORAL EVERY 6 HOURS PRN
Status: DISCONTINUED | OUTPATIENT
Start: 2021-01-01 | End: 2021-01-01 | Stop reason: HOSPADM

## 2021-01-01 RX ORDER — SODIUM CHLORIDE 9 MG/ML
9 INJECTION, SOLUTION INTRAVENOUS CONTINUOUS PRN
Status: DISCONTINUED | OUTPATIENT
Start: 2021-01-01 | End: 2021-01-01 | Stop reason: HOSPADM

## 2021-01-01 RX ORDER — DEXAMETHASONE SODIUM PHOSPHATE 4 MG/ML
6 INJECTION, SOLUTION INTRA-ARTICULAR; INTRALESIONAL; INTRAMUSCULAR; INTRAVENOUS; SOFT TISSUE DAILY
Status: DISCONTINUED | OUTPATIENT
Start: 2021-01-01 | End: 2021-01-01

## 2021-01-01 RX ORDER — 0.9 % SODIUM CHLORIDE 0.9 %
10 VIAL (ML) INJECTION ONCE
Status: COMPLETED | OUTPATIENT
Start: 2021-01-01 | End: 2021-01-01

## 2021-01-01 RX ORDER — MIDAZOLAM HYDROCHLORIDE 1 MG/ML
0.5 INJECTION INTRAMUSCULAR; INTRAVENOUS
Status: DISCONTINUED | OUTPATIENT
Start: 2021-01-01 | End: 2021-01-01 | Stop reason: HOSPADM

## 2021-01-01 RX ORDER — LISINOPRIL 10 MG/1
10 TABLET ORAL DAILY
COMMUNITY
End: 2021-01-01 | Stop reason: HOSPADM

## 2021-01-01 RX ORDER — MAGNESIUM SULFATE HEPTAHYDRATE 40 MG/ML
2 INJECTION, SOLUTION INTRAVENOUS AS NEEDED
Status: DISCONTINUED | OUTPATIENT
Start: 2021-01-01 | End: 2021-01-01 | Stop reason: HOSPADM

## 2021-01-01 RX ORDER — NALOXONE HCL 0.4 MG/ML
0.4 VIAL (ML) INJECTION AS NEEDED
Status: DISCONTINUED | OUTPATIENT
Start: 2021-01-01 | End: 2021-01-01 | Stop reason: HOSPADM

## 2021-01-01 RX ORDER — NOREPINEPHRINE BIT/0.9 % NACL 8 MG/250ML
.02-.3 INFUSION BOTTLE (ML) INTRAVENOUS
Status: DISCONTINUED | OUTPATIENT
Start: 2021-01-01 | End: 2021-01-01

## 2021-01-01 RX ORDER — LABETALOL HYDROCHLORIDE 5 MG/ML
5 INJECTION, SOLUTION INTRAVENOUS
Status: DISCONTINUED | OUTPATIENT
Start: 2021-01-01 | End: 2021-01-01 | Stop reason: HOSPADM

## 2021-01-01 RX ORDER — FENTANYL CITRATE 50 UG/ML
25 INJECTION, SOLUTION INTRAMUSCULAR; INTRAVENOUS
Status: DISCONTINUED | OUTPATIENT
Start: 2021-01-01 | End: 2021-01-01 | Stop reason: HOSPADM

## 2021-01-01 RX ORDER — VANCOMYCIN HYDROCHLORIDE 1 G/200ML
1000 INJECTION, SOLUTION INTRAVENOUS EVERY 24 HOURS
Status: DISCONTINUED | OUTPATIENT
Start: 2021-01-01 | End: 2021-01-01

## 2021-01-01 RX ORDER — SODIUM CHLORIDE 9 MG/ML
10 INJECTION, SOLUTION INTRAVENOUS CONTINUOUS
Status: DISCONTINUED | OUTPATIENT
Start: 2021-01-01 | End: 2021-01-01 | Stop reason: HOSPADM

## 2021-01-01 RX ORDER — DEXTROSE MONOHYDRATE 50 MG/ML
75 INJECTION, SOLUTION INTRAVENOUS CONTINUOUS
Status: DISCONTINUED | OUTPATIENT
Start: 2021-01-01 | End: 2021-01-01

## 2021-01-01 RX ORDER — DOCUSATE SODIUM 100 MG/1
100 CAPSULE, LIQUID FILLED ORAL 2 TIMES DAILY PRN
Status: DISCONTINUED | OUTPATIENT
Start: 2021-01-01 | End: 2021-01-01 | Stop reason: HOSPADM

## 2021-01-01 RX ORDER — ATRACURIUM BESYLATE 10 MG/ML
INJECTION, SOLUTION INTRAVENOUS AS NEEDED
Status: DISCONTINUED | OUTPATIENT
Start: 2021-01-01 | End: 2021-01-01 | Stop reason: SURG

## 2021-01-01 RX ORDER — DEXTROSE MONOHYDRATE 25 G/50ML
25 INJECTION, SOLUTION INTRAVENOUS
Status: DISCONTINUED | OUTPATIENT
Start: 2021-01-01 | End: 2021-01-01 | Stop reason: HOSPADM

## 2021-01-01 RX ORDER — DOCUSATE SODIUM 100 MG/1
100 CAPSULE, LIQUID FILLED ORAL 2 TIMES DAILY
Status: DISCONTINUED | OUTPATIENT
Start: 2021-01-01 | End: 2021-01-01

## 2021-01-01 RX ORDER — ONDANSETRON 2 MG/ML
4 INJECTION INTRAMUSCULAR; INTRAVENOUS ONCE
Status: COMPLETED | OUTPATIENT
Start: 2021-01-01 | End: 2021-01-01

## 2021-01-01 RX ORDER — BUMETANIDE 1 MG/1
1 TABLET ORAL DAILY
Status: DISCONTINUED | OUTPATIENT
Start: 2021-01-01 | End: 2021-01-01 | Stop reason: HOSPADM

## 2021-01-01 RX ORDER — PANTOPRAZOLE SODIUM 40 MG/10ML
40 INJECTION, POWDER, LYOPHILIZED, FOR SOLUTION INTRAVENOUS
Status: DISCONTINUED | OUTPATIENT
Start: 2021-01-01 | End: 2021-01-01

## 2021-01-01 RX ORDER — DEXTROSE AND SODIUM CHLORIDE 5; .45 G/100ML; G/100ML
75 INJECTION, SOLUTION INTRAVENOUS CONTINUOUS
Status: DISCONTINUED | OUTPATIENT
Start: 2021-01-01 | End: 2021-01-01

## 2021-01-01 RX ORDER — VALACYCLOVIR HYDROCHLORIDE 500 MG/1
500 TABLET, FILM COATED ORAL EVERY 12 HOURS SCHEDULED
Status: COMPLETED | OUTPATIENT
Start: 2021-01-01 | End: 2021-01-01

## 2021-01-01 RX ORDER — AMIODARONE HYDROCHLORIDE 200 MG/1
200 TABLET ORAL 2 TIMES DAILY
Qty: 60 TABLET | Refills: 3 | Status: SHIPPED | OUTPATIENT
Start: 2021-01-01

## 2021-01-01 RX ORDER — MORPHINE SULFATE 2 MG/ML
2 INJECTION, SOLUTION INTRAMUSCULAR; INTRAVENOUS
Status: DISPENSED | OUTPATIENT
Start: 2021-01-01 | End: 2021-01-01

## 2021-01-01 RX ORDER — MAGNESIUM HYDROXIDE 1200 MG/15ML
LIQUID ORAL AS NEEDED
Status: DISCONTINUED | OUTPATIENT
Start: 2021-01-01 | End: 2021-01-01 | Stop reason: HOSPADM

## 2021-01-01 RX ORDER — SODIUM CHLORIDE 0.9 % (FLUSH) 0.9 %
3 SYRINGE (ML) INJECTION EVERY 12 HOURS SCHEDULED
Status: DISCONTINUED | OUTPATIENT
Start: 2021-01-01 | End: 2021-01-01 | Stop reason: HOSPADM

## 2021-01-01 RX ORDER — OXYCODONE HYDROCHLORIDE AND ACETAMINOPHEN 5; 325 MG/1; MG/1
1 TABLET ORAL EVERY 4 HOURS PRN
Status: DISCONTINUED | OUTPATIENT
Start: 2021-01-01 | End: 2021-01-01 | Stop reason: HOSPADM

## 2021-01-01 RX ORDER — BUMETANIDE 0.25 MG/ML
1 INJECTION INTRAMUSCULAR; INTRAVENOUS ONCE
Status: DISCONTINUED | OUTPATIENT
Start: 2021-01-01 | End: 2021-01-01

## 2021-01-01 RX ORDER — ASPIRIN 81 MG/1
81 TABLET ORAL DAILY
Qty: 90 TABLET | Refills: 3 | Status: SHIPPED | OUTPATIENT
Start: 2021-01-01

## 2021-01-01 RX ORDER — OXYCODONE AND ACETAMINOPHEN 7.5; 325 MG/1; MG/1
1 TABLET ORAL EVERY 4 HOURS PRN
Status: DISPENSED | OUTPATIENT
Start: 2021-01-01 | End: 2021-01-01

## 2021-01-01 RX ORDER — MAGNESIUM SULFATE 1 G/100ML
1 INJECTION INTRAVENOUS AS NEEDED
Status: DISCONTINUED | OUTPATIENT
Start: 2021-01-01 | End: 2021-01-01 | Stop reason: HOSPADM

## 2021-01-01 RX ORDER — OXYCODONE HYDROCHLORIDE AND ACETAMINOPHEN 5; 325 MG/1; MG/1
1 TABLET ORAL EVERY 4 HOURS PRN
Qty: 10 TABLET | Refills: 0 | Status: SHIPPED | OUTPATIENT
Start: 2021-01-01 | End: 2021-01-01 | Stop reason: HOSPADM

## 2021-01-01 RX ORDER — ATORVASTATIN CALCIUM 40 MG/1
40 TABLET, FILM COATED ORAL DAILY
Status: DISCONTINUED | OUTPATIENT
Start: 2021-01-01 | End: 2021-01-01

## 2021-01-01 RX ORDER — DEXTROSE MONOHYDRATE 25 G/50ML
25 INJECTION, SOLUTION INTRAVENOUS
Status: DISCONTINUED | OUTPATIENT
Start: 2021-01-01 | End: 2021-01-01

## 2021-01-01 RX ORDER — DEXTROSE MONOHYDRATE 25 G/50ML
25 INJECTION, SOLUTION INTRAVENOUS ONCE
Status: COMPLETED | OUTPATIENT
Start: 2021-01-01 | End: 2021-01-01

## 2021-01-01 RX ADMIN — TAZOBACTAM SODIUM AND PIPERACILLIN SODIUM 3.38 G: 375; 3 INJECTION, SOLUTION INTRAVENOUS at 00:32

## 2021-01-01 RX ADMIN — LIDOCAINE HYDROCHLORIDE 50 MG: 10 INJECTION, SOLUTION EPIDURAL; INFILTRATION; INTRACAUDAL; PERINEURAL at 14:59

## 2021-01-01 RX ADMIN — OXYCODONE AND ACETAMINOPHEN 1 TABLET: 5; 325 TABLET ORAL at 03:08

## 2021-01-01 RX ADMIN — DORZOLAMIDE HYDROCHLORIDE: 20 SOLUTION/ DROPS OPHTHALMIC at 20:40

## 2021-01-01 RX ADMIN — ROCURONIUM BROMIDE 45 MG: 10 INJECTION, SOLUTION INTRAVENOUS at 13:10

## 2021-01-01 RX ADMIN — VANCOMYCIN HYDROCHLORIDE 1250 MG: 10 INJECTION, POWDER, LYOPHILIZED, FOR SOLUTION INTRAVENOUS at 13:17

## 2021-01-01 RX ADMIN — DORZOLAMIDE HYDROCHLORIDE: 20 SOLUTION/ DROPS OPHTHALMIC at 08:54

## 2021-01-01 RX ADMIN — DORZOLAMIDE HYDROCHLORIDE: 20 SOLUTION/ DROPS OPHTHALMIC at 08:09

## 2021-01-01 RX ADMIN — ACETAMINOPHEN 650 MG: 325 TABLET, FILM COATED ORAL at 09:33

## 2021-01-01 RX ADMIN — DOCOSANOL 1 APPLICATION: 100 CREAM TOPICAL at 06:33

## 2021-01-01 RX ADMIN — Medication 200 MCG: at 08:47

## 2021-01-01 RX ADMIN — AMIODARONE HYDROCHLORIDE 200 MG: 200 TABLET ORAL at 17:30

## 2021-01-01 RX ADMIN — EPINEPHRINE 1 MG: 0.1 INJECTION INTRACARDIAC; INTRAVENOUS at 05:28

## 2021-01-01 RX ADMIN — METOPROLOL TARTRATE 25 MG: 25 TABLET, FILM COATED ORAL at 09:26

## 2021-01-01 RX ADMIN — CLINDAMYCIN PHOSPHATE 900 MG: 900 INJECTION, SOLUTION INTRAVENOUS at 08:42

## 2021-01-01 RX ADMIN — DORZOLAMIDE HYDROCHLORIDE: 20 SOLUTION/ DROPS OPHTHALMIC at 21:06

## 2021-01-01 RX ADMIN — TAZOBACTAM SODIUM AND PIPERACILLIN SODIUM 3.38 G: 375; 3 INJECTION, SOLUTION INTRAVENOUS at 00:30

## 2021-01-01 RX ADMIN — ACETAMINOPHEN 650 MG: 325 TABLET, FILM COATED ORAL at 18:39

## 2021-01-01 RX ADMIN — DEXAMETHASONE SODIUM PHOSPHATE 8 MG: 4 INJECTION, SOLUTION INTRA-ARTICULAR; INTRALESIONAL; INTRAMUSCULAR; INTRAVENOUS; SOFT TISSUE at 08:47

## 2021-01-01 RX ADMIN — DORZOLAMIDE HYDROCHLORIDE: 20 SOLUTION/ DROPS OPHTHALMIC at 09:43

## 2021-01-01 RX ADMIN — MORPHINE SULFATE 1 MG: 2 INJECTION, SOLUTION INTRAMUSCULAR; INTRAVENOUS at 06:17

## 2021-01-01 RX ADMIN — METOPROLOL TARTRATE 12.5 MG: 25 TABLET, FILM COATED ORAL at 20:58

## 2021-01-01 RX ADMIN — MEROPENEM 1 G: 1 INJECTION, POWDER, FOR SOLUTION INTRAVENOUS at 17:24

## 2021-01-01 RX ADMIN — METOPROLOL TARTRATE 25 MG: 25 TABLET, FILM COATED ORAL at 06:00

## 2021-01-01 RX ADMIN — DOCOSANOL 1 APPLICATION: 100 CREAM TOPICAL at 14:40

## 2021-01-01 RX ADMIN — TERAZOSIN HYDROCHLORIDE 1 MG: 1 CAPSULE ORAL at 21:05

## 2021-01-01 RX ADMIN — DORZOLAMIDE HYDROCHLORIDE: 20 SOLUTION/ DROPS OPHTHALMIC at 07:42

## 2021-01-01 RX ADMIN — CEFEPIME HYDROCHLORIDE 2 G: 2 INJECTION, POWDER, FOR SOLUTION INTRAVENOUS at 22:09

## 2021-01-01 RX ADMIN — TAZOBACTAM SODIUM AND PIPERACILLIN SODIUM 4.5 G: 500; 4 INJECTION, SOLUTION INTRAVENOUS at 02:21

## 2021-01-01 RX ADMIN — BUMETANIDE 1 MG: 1 TABLET ORAL at 08:47

## 2021-01-01 RX ADMIN — CLINDAMYCIN PHOSPHATE 900 MG: 900 INJECTION, SOLUTION INTRAVENOUS at 17:28

## 2021-01-01 RX ADMIN — SODIUM CHLORIDE 125 MG: 9 INJECTION, SOLUTION INTRAVENOUS at 18:19

## 2021-01-01 RX ADMIN — SODIUM CHLORIDE, PRESERVATIVE FREE 10 ML: 5 INJECTION INTRAVENOUS at 20:48

## 2021-01-01 RX ADMIN — INSULIN LISPRO 2 UNITS: 100 INJECTION, SOLUTION INTRAVENOUS; SUBCUTANEOUS at 17:27

## 2021-01-01 RX ADMIN — PROPOFOL 150 MG: 10 INJECTION, EMULSION INTRAVENOUS at 11:50

## 2021-01-01 RX ADMIN — ALBUMIN HUMAN 12.5 G: 0.25 SOLUTION INTRAVENOUS at 08:08

## 2021-01-01 RX ADMIN — ALBUMIN HUMAN: 0.05 INJECTION, SOLUTION INTRAVENOUS at 08:50

## 2021-01-01 RX ADMIN — DORZOLAMIDE HYDROCHLORIDE: 20 SOLUTION/ DROPS OPHTHALMIC at 20:52

## 2021-01-01 RX ADMIN — INSULIN LISPRO 2 UNITS: 100 INJECTION, SOLUTION INTRAVENOUS; SUBCUTANEOUS at 17:20

## 2021-01-01 RX ADMIN — SODIUM CHLORIDE, PRESERVATIVE FREE 10 ML: 5 INJECTION INTRAVENOUS at 08:15

## 2021-01-01 RX ADMIN — ROCURONIUM BROMIDE 5 MG: 10 INJECTION, SOLUTION INTRAVENOUS at 12:53

## 2021-01-01 RX ADMIN — FENTANYL CITRATE 100 MCG: 50 INJECTION, SOLUTION INTRAMUSCULAR; INTRAVENOUS at 11:50

## 2021-01-01 RX ADMIN — BUMETANIDE 1 MG: 1 TABLET ORAL at 10:07

## 2021-01-01 RX ADMIN — DOCOSANOL 1 APPLICATION: 100 CREAM TOPICAL at 21:09

## 2021-01-01 RX ADMIN — ATRACURIUM BESYLATE 40 MG: 10 INJECTION, SOLUTION INTRAVENOUS at 11:50

## 2021-01-01 RX ADMIN — DOCOSANOL 1 APPLICATION: 100 CREAM TOPICAL at 21:46

## 2021-01-01 RX ADMIN — CLINDAMYCIN PHOSPHATE 900 MG: 900 INJECTION, SOLUTION INTRAVENOUS at 21:32

## 2021-01-01 RX ADMIN — TAZOBACTAM SODIUM AND PIPERACILLIN SODIUM 3.38 G: 375; 3 INJECTION, SOLUTION INTRAVENOUS at 08:53

## 2021-01-01 RX ADMIN — DOCOSANOL 1 APPLICATION: 100 CREAM TOPICAL at 17:25

## 2021-01-01 RX ADMIN — OXYCODONE AND ACETAMINOPHEN 1 TABLET: 5; 325 TABLET ORAL at 00:24

## 2021-01-01 RX ADMIN — VANCOMYCIN HYDROCHLORIDE 2000 MG: 10 INJECTION, POWDER, LYOPHILIZED, FOR SOLUTION INTRAVENOUS at 19:35

## 2021-01-01 RX ADMIN — DOCOSANOL 1 APPLICATION: 100 CREAM TOPICAL at 15:35

## 2021-01-01 RX ADMIN — Medication 1 CAPSULE: at 08:08

## 2021-01-01 RX ADMIN — ETOMIDATE 20 MG: 2 INJECTION, SOLUTION INTRAVENOUS at 12:53

## 2021-01-01 RX ADMIN — Medication 1 CAPSULE: at 08:00

## 2021-01-01 RX ADMIN — TAZOBACTAM SODIUM AND PIPERACILLIN SODIUM 3.38 G: 375; 3 INJECTION, SOLUTION INTRAVENOUS at 08:44

## 2021-01-01 RX ADMIN — INSULIN LISPRO 3 UNITS: 100 INJECTION, SOLUTION INTRAVENOUS; SUBCUTANEOUS at 17:54

## 2021-01-01 RX ADMIN — ASPIRIN 81 MG: 81 TABLET, COATED ORAL at 09:07

## 2021-01-01 RX ADMIN — ROPINIROLE HYDROCHLORIDE 0.25 MG: 0.5 TABLET, FILM COATED ORAL at 20:51

## 2021-01-01 RX ADMIN — PREDNISONE 10 MG: 10 TABLET ORAL at 08:48

## 2021-01-01 RX ADMIN — ACETAMINOPHEN 650 MG: 325 TABLET, FILM COATED ORAL at 23:46

## 2021-01-01 RX ADMIN — SODIUM CHLORIDE, PRESERVATIVE FREE 10 ML: 5 INJECTION INTRAVENOUS at 20:45

## 2021-01-01 RX ADMIN — ALBUMIN HUMAN 12.5 G: 0.25 SOLUTION INTRAVENOUS at 14:35

## 2021-01-01 RX ADMIN — Medication 1 CAPSULE: at 08:47

## 2021-01-01 RX ADMIN — LIDOCAINE HYDROCHLORIDE 10 ML: 10 INJECTION, SOLUTION EPIDURAL; INFILTRATION; INTRACAUDAL; PERINEURAL at 11:28

## 2021-01-01 RX ADMIN — METOPROLOL TARTRATE 50 MG: 50 TABLET, FILM COATED ORAL at 05:08

## 2021-01-01 RX ADMIN — SODIUM CHLORIDE 125 MG: 9 INJECTION, SOLUTION INTRAVENOUS at 20:48

## 2021-01-01 RX ADMIN — METOPROLOL TARTRATE 12.5 MG: 25 TABLET, FILM COATED ORAL at 20:31

## 2021-01-01 RX ADMIN — HYDRALAZINE HYDROCHLORIDE 10 MG: 20 INJECTION INTRAMUSCULAR; INTRAVENOUS at 04:31

## 2021-01-01 RX ADMIN — MEROPENEM 1 G: 1 INJECTION, POWDER, FOR SOLUTION INTRAVENOUS at 04:49

## 2021-01-01 RX ADMIN — METOPROLOL TARTRATE 25 MG: 25 TABLET, FILM COATED ORAL at 08:15

## 2021-01-01 RX ADMIN — PREDNISONE 10 MG: 10 TABLET ORAL at 08:39

## 2021-01-01 RX ADMIN — TAMSULOSIN HYDROCHLORIDE 0.4 MG: 0.4 CAPSULE ORAL at 07:59

## 2021-01-01 RX ADMIN — FENTANYL CITRATE 50 MCG: 50 INJECTION, SOLUTION INTRAMUSCULAR; INTRAVENOUS at 15:02

## 2021-01-01 RX ADMIN — SODIUM CHLORIDE, POTASSIUM CHLORIDE, SODIUM LACTATE AND CALCIUM CHLORIDE 1000 ML: 600; 310; 30; 20 INJECTION, SOLUTION INTRAVENOUS at 21:11

## 2021-01-01 RX ADMIN — MEROPENEM 1 G: 1 INJECTION, POWDER, FOR SOLUTION INTRAVENOUS at 03:54

## 2021-01-01 RX ADMIN — OXYCODONE HYDROCHLORIDE AND ACETAMINOPHEN 1 TABLET: 7.5; 325 TABLET ORAL at 20:53

## 2021-01-01 RX ADMIN — SODIUM CHLORIDE 9 ML/HR: 9 INJECTION, SOLUTION INTRAVENOUS at 07:13

## 2021-01-01 RX ADMIN — METOPROLOL TARTRATE 25 MG: 25 TABLET, FILM COATED ORAL at 05:25

## 2021-01-01 RX ADMIN — INSULIN LISPRO 2 UNITS: 100 INJECTION, SOLUTION INTRAVENOUS; SUBCUTANEOUS at 17:07

## 2021-01-01 RX ADMIN — ROPINIROLE HYDROCHLORIDE 0.25 MG: 0.5 TABLET, FILM COATED ORAL at 20:40

## 2021-01-01 RX ADMIN — MAGNESIUM SULFATE HEPTAHYDRATE 2 G: 2 INJECTION, SOLUTION INTRAVENOUS at 16:19

## 2021-01-01 RX ADMIN — DOCOSANOL 1 APPLICATION: 100 CREAM TOPICAL at 22:37

## 2021-01-01 RX ADMIN — DEXTROSE AND SODIUM CHLORIDE 75 ML/HR: 5; 450 INJECTION, SOLUTION INTRAVENOUS at 05:26

## 2021-01-01 RX ADMIN — SODIUM CHLORIDE: 9 INJECTION, SOLUTION INTRAVENOUS at 14:40

## 2021-01-01 RX ADMIN — LIDOCAINE HYDROCHLORIDE 50 MG: 10 INJECTION, SOLUTION INFILTRATION; PERINEURAL at 11:50

## 2021-01-01 RX ADMIN — LIDOCAINE HYDROCHLORIDE 50 MG: 10 INJECTION, SOLUTION EPIDURAL; INFILTRATION; INTRACAUDAL; PERINEURAL at 12:53

## 2021-01-01 RX ADMIN — Medication 1 CAPSULE: at 08:48

## 2021-01-01 RX ADMIN — METOPROLOL TARTRATE 12.5 MG: 25 TABLET, FILM COATED ORAL at 08:40

## 2021-01-01 RX ADMIN — DEXAMETHASONE SODIUM PHOSPHATE 8 MG: 4 INJECTION, SOLUTION INTRA-ARTICULAR; INTRALESIONAL; INTRAMUSCULAR; INTRAVENOUS; SOFT TISSUE at 12:16

## 2021-01-01 RX ADMIN — METOPROLOL TARTRATE 25 MG: 25 TABLET, FILM COATED ORAL at 10:12

## 2021-01-01 RX ADMIN — Medication 1 CAPSULE: at 08:54

## 2021-01-01 RX ADMIN — AMIODARONE HYDROCHLORIDE 200 MG: 200 TABLET ORAL at 09:12

## 2021-01-01 RX ADMIN — DOCUSATE SODIUM 100 MG: 100 CAPSULE, LIQUID FILLED ORAL at 08:39

## 2021-01-01 RX ADMIN — HEPARIN SODIUM 5000 UNITS: 5000 INJECTION, SOLUTION INTRAVENOUS; SUBCUTANEOUS at 05:07

## 2021-01-01 RX ADMIN — TAZOBACTAM SODIUM AND PIPERACILLIN SODIUM 3.38 G: 375; 3 INJECTION, SOLUTION INTRAVENOUS at 17:30

## 2021-01-01 RX ADMIN — PANTOPRAZOLE SODIUM 40 MG: 40 TABLET, DELAYED RELEASE ORAL at 08:54

## 2021-01-01 RX ADMIN — TAZOBACTAM SODIUM AND PIPERACILLIN SODIUM 4.5 G: 500; 4 INJECTION, SOLUTION INTRAVENOUS at 08:53

## 2021-01-01 RX ADMIN — DORZOLAMIDE HYDROCHLORIDE: 20 SOLUTION/ DROPS OPHTHALMIC at 08:40

## 2021-01-01 RX ADMIN — ETOMIDATE 30 MG: 2 INJECTION, SOLUTION INTRAVENOUS at 08:47

## 2021-01-01 RX ADMIN — DORZOLAMIDE HYDROCHLORIDE: 20 SOLUTION/ DROPS OPHTHALMIC at 22:13

## 2021-01-01 RX ADMIN — TAMSULOSIN HYDROCHLORIDE 0.4 MG: 0.4 CAPSULE ORAL at 08:47

## 2021-01-01 RX ADMIN — TAZOBACTAM SODIUM AND PIPERACILLIN SODIUM 3.38 G: 375; 3 INJECTION, SOLUTION INTRAVENOUS at 10:11

## 2021-01-01 RX ADMIN — SODIUM CHLORIDE 125 MG: 9 INJECTION, SOLUTION INTRAVENOUS at 17:30

## 2021-01-01 RX ADMIN — DAPTOMYCIN 500 MG: 500 INJECTION, POWDER, LYOPHILIZED, FOR SOLUTION INTRAVENOUS at 21:26

## 2021-01-01 RX ADMIN — TAZOBACTAM SODIUM AND PIPERACILLIN SODIUM 4.5 G: 500; 4 INJECTION, SOLUTION INTRAVENOUS at 08:15

## 2021-01-01 RX ADMIN — ROPINIROLE HYDROCHLORIDE 0.25 MG: 0.5 TABLET, FILM COATED ORAL at 20:58

## 2021-01-01 RX ADMIN — ALBUMIN HUMAN 12.5 G: 0.25 SOLUTION INTRAVENOUS at 20:50

## 2021-01-01 RX ADMIN — TAZOBACTAM SODIUM AND PIPERACILLIN SODIUM 4.5 G: 500; 4 INJECTION, SOLUTION INTRAVENOUS at 18:12

## 2021-01-01 RX ADMIN — METOPROLOL TARTRATE 12.5 MG: 25 TABLET, FILM COATED ORAL at 07:33

## 2021-01-01 RX ADMIN — DOCOSANOL 1 APPLICATION: 100 CREAM TOPICAL at 17:18

## 2021-01-01 RX ADMIN — TAZOBACTAM SODIUM AND PIPERACILLIN SODIUM 3.38 G: 375; 3 INJECTION, SOLUTION INTRAVENOUS at 08:08

## 2021-01-01 RX ADMIN — CLINDAMYCIN PHOSPHATE 900 MG: 900 INJECTION, SOLUTION INTRAVENOUS at 18:21

## 2021-01-01 RX ADMIN — Medication 100 MCG: at 13:20

## 2021-01-01 RX ADMIN — Medication 200 MCG: at 08:51

## 2021-01-01 RX ADMIN — METOPROLOL TARTRATE 12.5 MG: 25 TABLET, FILM COATED ORAL at 20:45

## 2021-01-01 RX ADMIN — DORZOLAMIDE HYDROCHLORIDE: 20 SOLUTION/ DROPS OPHTHALMIC at 09:08

## 2021-01-01 RX ADMIN — DOCOSANOL 1 APPLICATION: 100 CREAM TOPICAL at 15:21

## 2021-01-01 RX ADMIN — AMIODARONE HYDROCHLORIDE 200 MG: 200 TABLET ORAL at 17:47

## 2021-01-01 RX ADMIN — CALCIUM CHLORIDE 1 G: 100 INJECTION INTRAVENOUS; INTRAVENTRICULAR at 05:31

## 2021-01-01 RX ADMIN — TAZOBACTAM SODIUM AND PIPERACILLIN SODIUM 4.5 G: 500; 4 INJECTION, SOLUTION INTRAVENOUS at 01:49

## 2021-01-01 RX ADMIN — GLYCOPYRROLATE 0.4 MG: 0.4 INJECTION INTRAMUSCULAR; INTRAVENOUS at 12:38

## 2021-01-01 RX ADMIN — TAZOBACTAM SODIUM AND PIPERACILLIN SODIUM 3.38 G: 375; 3 INJECTION, SOLUTION INTRAVENOUS at 23:44

## 2021-01-01 RX ADMIN — ROPINIROLE HYDROCHLORIDE 0.25 MG: 0.5 TABLET, FILM COATED ORAL at 20:14

## 2021-01-01 RX ADMIN — MICAFUNGIN SODIUM 100 MG: 100 INJECTION, POWDER, LYOPHILIZED, FOR SOLUTION INTRAVENOUS at 17:28

## 2021-01-01 RX ADMIN — METOPROLOL TARTRATE 50 MG: 50 TABLET, FILM COATED ORAL at 05:33

## 2021-01-01 RX ADMIN — TAMSULOSIN HYDROCHLORIDE 0.4 MG: 0.4 CAPSULE ORAL at 08:54

## 2021-01-01 RX ADMIN — ACETAMINOPHEN 650 MG: 325 TABLET, FILM COATED ORAL at 08:31

## 2021-01-01 RX ADMIN — ALBUMIN HUMAN 12.5 G: 0.25 SOLUTION INTRAVENOUS at 21:06

## 2021-01-01 RX ADMIN — AMIODARONE HYDROCHLORIDE 200 MG: 200 TABLET ORAL at 10:07

## 2021-01-01 RX ADMIN — INSULIN LISPRO 3 UNITS: 100 INJECTION, SOLUTION INTRAVENOUS; SUBCUTANEOUS at 17:47

## 2021-01-01 RX ADMIN — DEXAMETHASONE SODIUM PHOSPHATE 6 MG: 4 INJECTION, SOLUTION INTRAMUSCULAR; INTRAVENOUS at 08:17

## 2021-01-01 RX ADMIN — DORZOLAMIDE HYDROCHLORIDE: 20 SOLUTION/ DROPS OPHTHALMIC at 21:26

## 2021-01-01 RX ADMIN — METOPROLOL TARTRATE 12.5 MG: 25 TABLET, FILM COATED ORAL at 20:15

## 2021-01-01 RX ADMIN — METOPROLOL TARTRATE 25 MG: 25 TABLET, FILM COATED ORAL at 17:58

## 2021-01-01 RX ADMIN — CLINDAMYCIN PHOSPHATE 900 MG: 900 INJECTION, SOLUTION INTRAVENOUS at 04:21

## 2021-01-01 RX ADMIN — INSULIN LISPRO 2 UNITS: 100 INJECTION, SOLUTION INTRAVENOUS; SUBCUTANEOUS at 17:30

## 2021-01-01 RX ADMIN — HEPARIN SODIUM 5000 UNITS: 5000 INJECTION, SOLUTION INTRAVENOUS; SUBCUTANEOUS at 05:54

## 2021-01-01 RX ADMIN — ROPINIROLE HYDROCHLORIDE 0.25 MG: 0.5 TABLET, FILM COATED ORAL at 19:58

## 2021-01-01 RX ADMIN — SODIUM CHLORIDE, PRESERVATIVE FREE 10 ML: 5 INJECTION INTRAVENOUS at 10:21

## 2021-01-01 RX ADMIN — ATORVASTATIN CALCIUM 40 MG: 40 TABLET, FILM COATED ORAL at 21:06

## 2021-01-01 RX ADMIN — MICAFUNGIN SODIUM 100 MG: 100 INJECTION, POWDER, LYOPHILIZED, FOR SOLUTION INTRAVENOUS at 15:46

## 2021-01-01 RX ADMIN — INSULIN LISPRO 4 UNITS: 100 INJECTION, SOLUTION INTRAVENOUS; SUBCUTANEOUS at 17:19

## 2021-01-01 RX ADMIN — METOPROLOL TARTRATE 12.5 MG: 25 TABLET, FILM COATED ORAL at 08:48

## 2021-01-01 RX ADMIN — DOCOSANOL 1 APPLICATION: 100 CREAM TOPICAL at 17:01

## 2021-01-01 RX ADMIN — LISINOPRIL 5 MG: 5 TABLET ORAL at 08:47

## 2021-01-01 RX ADMIN — CLINDAMYCIN PHOSPHATE 900 MG: 900 INJECTION, SOLUTION INTRAVENOUS at 07:14

## 2021-01-01 RX ADMIN — METOPROLOL TARTRATE 25 MG: 25 TABLET, FILM COATED ORAL at 12:18

## 2021-01-01 RX ADMIN — AMIODARONE HYDROCHLORIDE 200 MG: 200 TABLET ORAL at 09:42

## 2021-01-01 RX ADMIN — DOCOSANOL 1 APPLICATION: 100 CREAM TOPICAL at 08:48

## 2021-01-01 RX ADMIN — SODIUM CHLORIDE 75 ML/HR: 9 INJECTION, SOLUTION INTRAVENOUS at 16:41

## 2021-01-01 RX ADMIN — DOCOSANOL 1 APPLICATION: 100 CREAM TOPICAL at 22:13

## 2021-01-01 RX ADMIN — ROPINIROLE HYDROCHLORIDE 0.25 MG: 0.5 TABLET, FILM COATED ORAL at 20:55

## 2021-01-01 RX ADMIN — AMIODARONE HYDROCHLORIDE 200 MG: 200 TABLET ORAL at 09:56

## 2021-01-01 RX ADMIN — DOCOSANOL 1 APPLICATION: 100 CREAM TOPICAL at 13:09

## 2021-01-01 RX ADMIN — SODIUM CHLORIDE, POTASSIUM CHLORIDE, SODIUM LACTATE AND CALCIUM CHLORIDE 100 ML/HR: 600; 310; 30; 20 INJECTION, SOLUTION INTRAVENOUS at 01:49

## 2021-01-01 RX ADMIN — MORPHINE SULFATE 2 MG: 2 INJECTION, SOLUTION INTRAMUSCULAR; INTRAVENOUS at 03:09

## 2021-01-01 RX ADMIN — ONDANSETRON 4 MG: 2 INJECTION INTRAMUSCULAR; INTRAVENOUS at 13:49

## 2021-01-01 RX ADMIN — HEPARIN SODIUM 5000 UNITS: 5000 INJECTION, SOLUTION INTRAVENOUS; SUBCUTANEOUS at 19:57

## 2021-01-01 RX ADMIN — TAZOBACTAM SODIUM AND PIPERACILLIN SODIUM 4.5 G: 500; 4 INJECTION, SOLUTION INTRAVENOUS at 08:24

## 2021-01-01 RX ADMIN — ATORVASTATIN CALCIUM 40 MG: 40 TABLET, FILM COATED ORAL at 08:48

## 2021-01-01 RX ADMIN — SODIUM CHLORIDE, PRESERVATIVE FREE 10 ML: 5 INJECTION INTRAVENOUS at 23:28

## 2021-01-01 RX ADMIN — MICAFUNGIN SODIUM 100 MG: 100 INJECTION, POWDER, LYOPHILIZED, FOR SOLUTION INTRAVENOUS at 22:34

## 2021-01-01 RX ADMIN — HEPARIN SODIUM 5000 UNITS: 5000 INJECTION, SOLUTION INTRAVENOUS; SUBCUTANEOUS at 20:28

## 2021-01-01 RX ADMIN — Medication 100 MCG: at 12:56

## 2021-01-01 RX ADMIN — PANTOPRAZOLE SODIUM 40 MG: 40 TABLET, DELAYED RELEASE ORAL at 08:09

## 2021-01-01 RX ADMIN — LISINOPRIL 5 MG: 5 TABLET ORAL at 13:27

## 2021-01-01 RX ADMIN — AMIODARONE HYDROCHLORIDE 200 MG: 200 TABLET ORAL at 20:32

## 2021-01-01 RX ADMIN — MICAFUNGIN SODIUM 100 MG: 100 INJECTION, POWDER, LYOPHILIZED, FOR SOLUTION INTRAVENOUS at 20:41

## 2021-01-01 RX ADMIN — METOPROLOL TARTRATE 25 MG: 25 TABLET, FILM COATED ORAL at 08:54

## 2021-01-01 RX ADMIN — PREDNISONE 10 MG: 10 TABLET ORAL at 09:08

## 2021-01-01 RX ADMIN — TAZOBACTAM SODIUM AND PIPERACILLIN SODIUM 3.38 G: 375; 3 INJECTION, SOLUTION INTRAVENOUS at 17:01

## 2021-01-01 RX ADMIN — SODIUM CHLORIDE, PRESERVATIVE FREE 10 ML: 5 INJECTION INTRAVENOUS at 00:14

## 2021-01-01 RX ADMIN — ATORVASTATIN CALCIUM 40 MG: 40 TABLET, FILM COATED ORAL at 20:40

## 2021-01-01 RX ADMIN — SODIUM CHLORIDE 125 MG: 9 INJECTION, SOLUTION INTRAVENOUS at 16:25

## 2021-01-01 RX ADMIN — PANTOPRAZOLE SODIUM 40 MG: 40 TABLET, DELAYED RELEASE ORAL at 08:08

## 2021-01-01 RX ADMIN — METOPROLOL TARTRATE 50 MG: 50 TABLET, FILM COATED ORAL at 20:52

## 2021-01-01 RX ADMIN — ATORVASTATIN CALCIUM 40 MG: 40 TABLET, FILM COATED ORAL at 22:11

## 2021-01-01 RX ADMIN — SODIUM CHLORIDE, POTASSIUM CHLORIDE, SODIUM LACTATE AND CALCIUM CHLORIDE 100 ML/HR: 600; 310; 30; 20 INJECTION, SOLUTION INTRAVENOUS at 08:54

## 2021-01-01 RX ADMIN — MAGNESIUM SULFATE HEPTAHYDRATE 2 G: 2 INJECTION, SOLUTION INTRAVENOUS at 12:19

## 2021-01-01 RX ADMIN — MEROPENEM 1 G: 1 INJECTION, POWDER, FOR SOLUTION INTRAVENOUS at 15:19

## 2021-01-01 RX ADMIN — DAPTOMYCIN 500 MG: 500 INJECTION, POWDER, LYOPHILIZED, FOR SOLUTION INTRAVENOUS at 20:44

## 2021-01-01 RX ADMIN — TAZOBACTAM SODIUM AND PIPERACILLIN SODIUM 3.38 G: 375; 3 INJECTION, SOLUTION INTRAVENOUS at 00:59

## 2021-01-01 RX ADMIN — MORPHINE SULFATE 2 MG: 2 INJECTION, SOLUTION INTRAMUSCULAR; INTRAVENOUS at 18:46

## 2021-01-01 RX ADMIN — TAZOBACTAM SODIUM AND PIPERACILLIN SODIUM 3.38 G: 375; 3 INJECTION, SOLUTION INTRAVENOUS at 23:28

## 2021-01-01 RX ADMIN — PREDNISONE 10 MG: 10 TABLET ORAL at 08:31

## 2021-01-01 RX ADMIN — TAZOBACTAM SODIUM AND PIPERACILLIN SODIUM 3.38 G: 375; 3 INJECTION, SOLUTION INTRAVENOUS at 08:48

## 2021-01-01 RX ADMIN — DEXTROSE MONOHYDRATE 25 G: 500 INJECTION PARENTERAL at 01:14

## 2021-01-01 RX ADMIN — DOCOSANOL 1 APPLICATION: 100 CREAM TOPICAL at 06:09

## 2021-01-01 RX ADMIN — DOCOSANOL 1 APPLICATION: 100 CREAM TOPICAL at 21:07

## 2021-01-01 RX ADMIN — ASPIRIN 81 MG: 81 TABLET, COATED ORAL at 10:07

## 2021-01-01 RX ADMIN — POTASSIUM CHLORIDE 40 MEQ: 750 CAPSULE, EXTENDED RELEASE ORAL at 13:29

## 2021-01-01 RX ADMIN — TAZOBACTAM SODIUM AND PIPERACILLIN SODIUM 3.38 G: 375; 3 INJECTION, SOLUTION INTRAVENOUS at 23:29

## 2021-01-01 RX ADMIN — Medication 1 CAPSULE: at 10:07

## 2021-01-01 RX ADMIN — DOCOSANOL 1 APPLICATION: 100 CREAM TOPICAL at 13:10

## 2021-01-01 RX ADMIN — DOCOSANOL 1 APPLICATION: 100 CREAM TOPICAL at 06:07

## 2021-01-01 RX ADMIN — OXYCODONE AND ACETAMINOPHEN 1 TABLET: 5; 325 TABLET ORAL at 14:51

## 2021-01-01 RX ADMIN — SODIUM CHLORIDE, PRESERVATIVE FREE 10 ML: 5 INJECTION INTRAVENOUS at 20:11

## 2021-01-01 RX ADMIN — DEXTROSE AND SODIUM CHLORIDE 125 ML/HR: 5; 450 INJECTION, SOLUTION INTRAVENOUS at 10:06

## 2021-01-01 RX ADMIN — PROPOFOL 20 MG: 10 INJECTION, EMULSION INTRAVENOUS at 08:47

## 2021-01-01 RX ADMIN — MICAFUNGIN SODIUM 100 MG: 100 INJECTION, POWDER, LYOPHILIZED, FOR SOLUTION INTRAVENOUS at 21:11

## 2021-01-01 RX ADMIN — HEPARIN SODIUM 5000 UNITS: 5000 INJECTION, SOLUTION INTRAVENOUS; SUBCUTANEOUS at 13:17

## 2021-01-01 RX ADMIN — ROPINIROLE HYDROCHLORIDE 0.25 MG: 0.5 TABLET, FILM COATED ORAL at 01:49

## 2021-01-01 RX ADMIN — TERAZOSIN HYDROCHLORIDE 1 MG: 1 CAPSULE ORAL at 20:40

## 2021-01-01 RX ADMIN — INSULIN LISPRO 2 UNITS: 100 INJECTION, SOLUTION INTRAVENOUS; SUBCUTANEOUS at 17:58

## 2021-01-01 RX ADMIN — MIDAZOLAM HYDROCHLORIDE 0.5 MG: 1 INJECTION, SOLUTION INTRAMUSCULAR; INTRAVENOUS at 11:04

## 2021-01-01 RX ADMIN — OXYCODONE HYDROCHLORIDE AND ACETAMINOPHEN 1 TABLET: 5; 325 TABLET ORAL at 09:49

## 2021-01-01 RX ADMIN — FAMOTIDINE 20 MG: 20 TABLET ORAL at 06:57

## 2021-01-01 RX ADMIN — ROPINIROLE HYDROCHLORIDE 0.25 MG: 0.5 TABLET, FILM COATED ORAL at 21:05

## 2021-01-01 RX ADMIN — METOPROLOL TARTRATE 25 MG: 25 TABLET, FILM COATED ORAL at 22:36

## 2021-01-01 RX ADMIN — PANTOPRAZOLE SODIUM 40 MG: 40 TABLET, DELAYED RELEASE ORAL at 09:41

## 2021-01-01 RX ADMIN — Medication 300 MCG: at 11:53

## 2021-01-01 RX ADMIN — TAZOBACTAM SODIUM AND PIPERACILLIN SODIUM 3.38 G: 375; 3 INJECTION, SOLUTION INTRAVENOUS at 14:29

## 2021-01-01 RX ADMIN — PANTOPRAZOLE SODIUM 40 MG: 40 INJECTION, POWDER, FOR SOLUTION INTRAVENOUS at 05:54

## 2021-01-01 RX ADMIN — AMIODARONE HYDROCHLORIDE 200 MG: 200 TABLET ORAL at 17:10

## 2021-01-01 RX ADMIN — DEXTROSE MONOHYDRATE 75 ML/HR: 50 INJECTION, SOLUTION INTRAVENOUS at 13:50

## 2021-01-01 RX ADMIN — CLINDAMYCIN PHOSPHATE 900 MG: 900 INJECTION, SOLUTION INTRAVENOUS at 11:30

## 2021-01-01 RX ADMIN — MEROPENEM 1 G: 1 INJECTION, POWDER, FOR SOLUTION INTRAVENOUS at 05:36

## 2021-01-01 RX ADMIN — AMIODARONE HYDROCHLORIDE 200 MG: 200 TABLET ORAL at 20:08

## 2021-01-01 RX ADMIN — DOCOSANOL 1 APPLICATION: 100 CREAM TOPICAL at 12:00

## 2021-01-01 RX ADMIN — Medication 15 ML: at 08:52

## 2021-01-01 RX ADMIN — OXYCODONE HYDROCHLORIDE AND ACETAMINOPHEN 1 TABLET: 5; 325 TABLET ORAL at 13:08

## 2021-01-01 RX ADMIN — PANTOPRAZOLE SODIUM 40 MG: 40 TABLET, DELAYED RELEASE ORAL at 09:07

## 2021-01-01 RX ADMIN — ROPINIROLE HYDROCHLORIDE 0.25 MG: 0.5 TABLET, FILM COATED ORAL at 22:35

## 2021-01-01 RX ADMIN — DOCOSANOL 1 APPLICATION: 100 CREAM TOPICAL at 12:56

## 2021-01-01 RX ADMIN — METOPROLOL TARTRATE 50 MG: 50 TABLET, FILM COATED ORAL at 06:26

## 2021-01-01 RX ADMIN — VANCOMYCIN HYDROCHLORIDE 1750 MG: 10 INJECTION, POWDER, LYOPHILIZED, FOR SOLUTION INTRAVENOUS at 23:32

## 2021-01-01 RX ADMIN — AMIODARONE HYDROCHLORIDE 200 MG: 200 TABLET ORAL at 08:48

## 2021-01-01 RX ADMIN — INSULIN LISPRO 3 UNITS: 100 INJECTION, SOLUTION INTRAVENOUS; SUBCUTANEOUS at 17:55

## 2021-01-01 RX ADMIN — SODIUM CHLORIDE, PRESERVATIVE FREE 10 ML: 5 INJECTION INTRAVENOUS at 20:35

## 2021-01-01 RX ADMIN — METOPROLOL TARTRATE 25 MG: 25 TABLET, FILM COATED ORAL at 07:58

## 2021-01-01 RX ADMIN — DORZOLAMIDE HYDROCHLORIDE: 20 SOLUTION/ DROPS OPHTHALMIC at 20:44

## 2021-01-01 RX ADMIN — SODIUM CHLORIDE 75 ML/HR: 9 INJECTION, SOLUTION INTRAVENOUS at 04:12

## 2021-01-01 RX ADMIN — TAZOBACTAM SODIUM AND PIPERACILLIN SODIUM 4.5 G: 500; 4 INJECTION, SOLUTION INTRAVENOUS at 16:31

## 2021-01-01 RX ADMIN — METOPROLOL TARTRATE 12.5 MG: 25 TABLET, FILM COATED ORAL at 09:08

## 2021-01-01 RX ADMIN — DEXTROSE MONOHYDRATE 25 ML: 25 INJECTION, SOLUTION INTRAVENOUS at 05:30

## 2021-01-01 RX ADMIN — SODIUM CHLORIDE, POTASSIUM CHLORIDE, SODIUM LACTATE AND CALCIUM CHLORIDE 1000 ML: 600; 310; 30; 20 INJECTION, SOLUTION INTRAVENOUS at 19:35

## 2021-01-01 RX ADMIN — DORZOLAMIDE HYDROCHLORIDE: 20 SOLUTION/ DROPS OPHTHALMIC at 20:10

## 2021-01-01 RX ADMIN — SODIUM CHLORIDE, PRESERVATIVE FREE 10 ML: 5 INJECTION INTRAVENOUS at 22:14

## 2021-01-01 RX ADMIN — INSULIN LISPRO 2 UNITS: 100 INJECTION, SOLUTION INTRAVENOUS; SUBCUTANEOUS at 20:57

## 2021-01-01 RX ADMIN — DORZOLAMIDE HYDROCHLORIDE: 20 SOLUTION/ DROPS OPHTHALMIC at 08:49

## 2021-01-01 RX ADMIN — SODIUM ZIRCONIUM CYCLOSILICATE 10 G: 10 POWDER, FOR SUSPENSION ORAL at 17:43

## 2021-01-01 RX ADMIN — SODIUM CHLORIDE, PRESERVATIVE FREE 10 ML: 5 INJECTION INTRAVENOUS at 21:05

## 2021-01-01 RX ADMIN — TAZOBACTAM SODIUM AND PIPERACILLIN SODIUM 3.38 G: 375; 3 INJECTION, SOLUTION INTRAVENOUS at 23:46

## 2021-01-01 RX ADMIN — Medication 1 CAPSULE: at 09:08

## 2021-01-01 RX ADMIN — DOCOSANOL 1 APPLICATION: 100 CREAM TOPICAL at 21:05

## 2021-01-01 RX ADMIN — PREDNISONE 10 MG: 10 TABLET ORAL at 08:16

## 2021-01-01 RX ADMIN — TAMSULOSIN HYDROCHLORIDE 0.4 MG: 0.4 CAPSULE ORAL at 08:48

## 2021-01-01 RX ADMIN — PREDNISONE 10 MG: 10 TABLET ORAL at 10:07

## 2021-01-01 RX ADMIN — TAZOBACTAM SODIUM AND PIPERACILLIN SODIUM 3.38 G: 375; 3 INJECTION, SOLUTION INTRAVENOUS at 09:08

## 2021-01-01 RX ADMIN — PANTOPRAZOLE SODIUM 40 MG: 40 INJECTION, POWDER, FOR SOLUTION INTRAVENOUS at 05:30

## 2021-01-01 RX ADMIN — PHYTONADIONE 10 MG: 10 INJECTION, EMULSION INTRAMUSCULAR; INTRAVENOUS; SUBCUTANEOUS at 17:39

## 2021-01-01 RX ADMIN — DORZOLAMIDE HYDROCHLORIDE: 20 SOLUTION/ DROPS OPHTHALMIC at 10:08

## 2021-01-01 RX ADMIN — PREDNISONE 10 MG: 10 TABLET ORAL at 08:47

## 2021-01-01 RX ADMIN — BUMETANIDE 0.5 MG: 0.25 INJECTION, SOLUTION INTRAMUSCULAR; INTRAVENOUS at 20:51

## 2021-01-01 RX ADMIN — TAZOBACTAM SODIUM AND PIPERACILLIN SODIUM 3.38 G: 375; 3 INJECTION, SOLUTION INTRAVENOUS at 17:53

## 2021-01-01 RX ADMIN — INSULIN LISPRO 3 UNITS: 100 INJECTION, SOLUTION INTRAVENOUS; SUBCUTANEOUS at 21:05

## 2021-01-01 RX ADMIN — VALACYCLOVIR HYDROCHLORIDE 500 MG: 500 TABLET, FILM COATED ORAL at 08:54

## 2021-01-01 RX ADMIN — AMIODARONE HYDROCHLORIDE 200 MG: 200 TABLET ORAL at 17:57

## 2021-01-01 RX ADMIN — DOCOSANOL 1 APPLICATION: 100 CREAM TOPICAL at 17:20

## 2021-01-01 RX ADMIN — HEPARIN SODIUM 5000 UNITS: 5000 INJECTION, SOLUTION INTRAVENOUS; SUBCUTANEOUS at 16:19

## 2021-01-01 RX ADMIN — TAZOBACTAM SODIUM AND PIPERACILLIN SODIUM 4.5 G: 500; 4 INJECTION, SOLUTION INTRAVENOUS at 02:23

## 2021-01-01 RX ADMIN — SODIUM CHLORIDE, POTASSIUM CHLORIDE, SODIUM LACTATE AND CALCIUM CHLORIDE 500 ML: 600; 310; 30; 20 INJECTION, SOLUTION INTRAVENOUS at 22:11

## 2021-01-01 RX ADMIN — TAZOBACTAM SODIUM AND PIPERACILLIN SODIUM 4.5 G: 500; 4 INJECTION, SOLUTION INTRAVENOUS at 09:26

## 2021-01-01 RX ADMIN — PREDNISONE 10 MG: 10 TABLET ORAL at 08:14

## 2021-01-01 RX ADMIN — ONDANSETRON 4 MG: 2 INJECTION INTRAMUSCULAR; INTRAVENOUS at 08:39

## 2021-01-01 RX ADMIN — Medication 1 CAPSULE: at 09:07

## 2021-01-01 RX ADMIN — DOCUSATE SODIUM 100 MG: 100 CAPSULE, LIQUID FILLED ORAL at 20:58

## 2021-01-01 RX ADMIN — DORZOLAMIDE HYDROCHLORIDE: 20 SOLUTION/ DROPS OPHTHALMIC at 20:34

## 2021-01-01 RX ADMIN — TAZOBACTAM SODIUM AND PIPERACILLIN SODIUM 4.5 G: 500; 4 INJECTION, SOLUTION INTRAVENOUS at 10:08

## 2021-01-01 RX ADMIN — DOCOSANOL 1 APPLICATION: 100 CREAM TOPICAL at 17:30

## 2021-01-01 RX ADMIN — INSULIN LISPRO 3 UNITS: 100 INJECTION, SOLUTION INTRAVENOUS; SUBCUTANEOUS at 17:53

## 2021-01-01 RX ADMIN — SUGAMMADEX 200 MG: 100 INJECTION, SOLUTION INTRAVENOUS at 09:38

## 2021-01-01 RX ADMIN — Medication 200 MCG: at 12:07

## 2021-01-01 RX ADMIN — METOPROLOL TARTRATE 50 MG: 50 TABLET, FILM COATED ORAL at 14:02

## 2021-01-01 RX ADMIN — NEOSTIGMINE 3 MG: 1 INJECTION INTRAVENOUS at 12:38

## 2021-01-01 RX ADMIN — TAZOBACTAM SODIUM AND PIPERACILLIN SODIUM 4.5 G: 500; 4 INJECTION, SOLUTION INTRAVENOUS at 02:11

## 2021-01-01 RX ADMIN — FENTANYL CITRATE 50 MCG: 50 INJECTION, SOLUTION INTRAMUSCULAR; INTRAVENOUS at 13:16

## 2021-01-01 RX ADMIN — AMIODARONE HYDROCHLORIDE 200 MG: 200 TABLET ORAL at 08:47

## 2021-01-01 RX ADMIN — MICAFUNGIN SODIUM 100 MG: 100 INJECTION, POWDER, LYOPHILIZED, FOR SOLUTION INTRAVENOUS at 20:11

## 2021-01-01 RX ADMIN — ONDANSETRON 4 MG: 2 INJECTION INTRAMUSCULAR; INTRAVENOUS at 18:58

## 2021-01-01 RX ADMIN — DOCOSANOL 1 APPLICATION: 100 CREAM TOPICAL at 12:10

## 2021-01-01 RX ADMIN — METOPROLOL TARTRATE 25 MG: 25 TABLET, FILM COATED ORAL at 00:41

## 2021-01-01 RX ADMIN — AMIODARONE HYDROCHLORIDE 200 MG: 200 TABLET ORAL at 17:19

## 2021-01-01 RX ADMIN — ATORVASTATIN CALCIUM 40 MG: 40 TABLET, FILM COATED ORAL at 21:05

## 2021-01-01 RX ADMIN — BUMETANIDE 0.5 MG: 0.25 INJECTION, SOLUTION INTRAMUSCULAR; INTRAVENOUS at 17:08

## 2021-01-01 RX ADMIN — INSULIN LISPRO 2 UNITS: 100 INJECTION, SOLUTION INTRAVENOUS; SUBCUTANEOUS at 12:00

## 2021-01-01 RX ADMIN — PANTOPRAZOLE SODIUM 40 MG: 40 TABLET, DELAYED RELEASE ORAL at 10:07

## 2021-01-01 RX ADMIN — EPINEPHRINE 1 MG: 0.1 INJECTION INTRACARDIAC; INTRAVENOUS at 05:34

## 2021-01-01 RX ADMIN — TAZOBACTAM SODIUM AND PIPERACILLIN SODIUM 4.5 G: 500; 4 INJECTION, SOLUTION INTRAVENOUS at 19:53

## 2021-01-01 RX ADMIN — INSULIN LISPRO 3 UNITS: 100 INJECTION, SOLUTION INTRAVENOUS; SUBCUTANEOUS at 20:33

## 2021-01-01 RX ADMIN — HEPARIN SODIUM 5000 UNITS: 5000 INJECTION, SOLUTION INTRAVENOUS; SUBCUTANEOUS at 05:31

## 2021-01-01 RX ADMIN — OXYCODONE AND ACETAMINOPHEN 1 TABLET: 5; 325 TABLET ORAL at 12:43

## 2021-01-01 RX ADMIN — SODIUM CHLORIDE, PRESERVATIVE FREE 10 ML: 5 INJECTION INTRAVENOUS at 08:09

## 2021-01-01 RX ADMIN — DORZOLAMIDE HYDROCHLORIDE: 20 SOLUTION/ DROPS OPHTHALMIC at 10:28

## 2021-01-01 RX ADMIN — SULFUR HEXAFLUORIDE 3 ML: KIT at 11:40

## 2021-01-01 RX ADMIN — ROPINIROLE HYDROCHLORIDE 0.25 MG: 0.5 TABLET, FILM COATED ORAL at 20:31

## 2021-01-01 RX ADMIN — SODIUM CHLORIDE 10 ML/HR: 9 INJECTION, SOLUTION INTRAVENOUS at 14:12

## 2021-01-01 RX ADMIN — PANTOPRAZOLE SODIUM 40 MG: 40 TABLET, DELAYED RELEASE ORAL at 08:14

## 2021-01-01 RX ADMIN — PREDNISONE 10 MG: 10 TABLET ORAL at 16:19

## 2021-01-01 RX ADMIN — TAMSULOSIN HYDROCHLORIDE 0.4 MG: 0.4 CAPSULE ORAL at 08:08

## 2021-01-01 RX ADMIN — PANTOPRAZOLE SODIUM 40 MG: 40 TABLET, DELAYED RELEASE ORAL at 08:48

## 2021-01-01 RX ADMIN — DOCOSANOL 1 APPLICATION: 100 CREAM TOPICAL at 15:47

## 2021-01-01 RX ADMIN — LISINOPRIL 5 MG: 5 TABLET ORAL at 09:08

## 2021-01-01 RX ADMIN — MICAFUNGIN SODIUM 100 MG: 100 INJECTION, POWDER, LYOPHILIZED, FOR SOLUTION INTRAVENOUS at 17:01

## 2021-01-01 RX ADMIN — DAPTOMYCIN 500 MG: 500 INJECTION, POWDER, LYOPHILIZED, FOR SOLUTION INTRAVENOUS at 21:11

## 2021-01-01 RX ADMIN — DORZOLAMIDE HYDROCHLORIDE: 20 SOLUTION/ DROPS OPHTHALMIC at 21:12

## 2021-01-01 RX ADMIN — DOCOSANOL 1 APPLICATION: 100 CREAM TOPICAL at 20:53

## 2021-01-01 RX ADMIN — METRONIDAZOLE 500 MG: 500 INJECTION, SOLUTION INTRAVENOUS at 12:21

## 2021-01-01 RX ADMIN — HEPARIN SODIUM 5000 UNITS: 5000 INJECTION, SOLUTION INTRAVENOUS; SUBCUTANEOUS at 20:31

## 2021-01-01 RX ADMIN — HEPARIN SODIUM 5000 UNITS: 5000 INJECTION, SOLUTION INTRAVENOUS; SUBCUTANEOUS at 15:50

## 2021-01-01 RX ADMIN — NEOSTIGMINE METHYLSULFATE 3 MG: 0.5 INJECTION INTRAVENOUS at 15:02

## 2021-01-01 RX ADMIN — DEXTROSE MONOHYDRATE 25 G: 25 INJECTION, SOLUTION INTRAVENOUS at 07:42

## 2021-01-01 RX ADMIN — SODIUM CHLORIDE 125 MG: 9 INJECTION, SOLUTION INTRAVENOUS at 17:07

## 2021-01-01 RX ADMIN — DOCOSANOL 1 APPLICATION: 100 CREAM TOPICAL at 12:30

## 2021-01-01 RX ADMIN — METOPROLOL TARTRATE 50 MG: 50 TABLET, FILM COATED ORAL at 17:08

## 2021-01-01 RX ADMIN — INSULIN LISPRO 2 UNITS: 100 INJECTION, SOLUTION INTRAVENOUS; SUBCUTANEOUS at 20:39

## 2021-01-01 RX ADMIN — PANTOPRAZOLE SODIUM 40 MG: 40 INJECTION, POWDER, FOR SOLUTION INTRAVENOUS at 06:07

## 2021-01-01 RX ADMIN — TAMSULOSIN HYDROCHLORIDE 0.4 MG: 0.4 CAPSULE ORAL at 09:08

## 2021-01-01 RX ADMIN — PREDNISONE 10 MG: 10 TABLET ORAL at 09:07

## 2021-01-01 RX ADMIN — DOCOSANOL 1 APPLICATION: 100 CREAM TOPICAL at 16:26

## 2021-01-01 RX ADMIN — SODIUM CHLORIDE 125 MG: 9 INJECTION, SOLUTION INTRAVENOUS at 17:11

## 2021-01-01 RX ADMIN — VALACYCLOVIR HYDROCHLORIDE 500 MG: 500 TABLET, FILM COATED ORAL at 20:45

## 2021-01-01 RX ADMIN — ROPINIROLE HYDROCHLORIDE 0.25 MG: 0.5 TABLET, FILM COATED ORAL at 20:25

## 2021-01-01 RX ADMIN — DOCOSANOL 1 APPLICATION: 100 CREAM TOPICAL at 21:26

## 2021-01-01 RX ADMIN — MORPHINE SULFATE 2 MG: 2 INJECTION, SOLUTION INTRAMUSCULAR; INTRAVENOUS at 17:57

## 2021-01-01 RX ADMIN — INSULIN LISPRO 3 UNITS: 100 INJECTION, SOLUTION INTRAVENOUS; SUBCUTANEOUS at 21:06

## 2021-01-01 RX ADMIN — ROPINIROLE HYDROCHLORIDE 0.25 MG: 0.5 TABLET, FILM COATED ORAL at 21:11

## 2021-01-01 RX ADMIN — TAZOBACTAM SODIUM AND PIPERACILLIN SODIUM 3.38 G: 375; 3 INJECTION, SOLUTION INTRAVENOUS at 17:08

## 2021-01-01 RX ADMIN — INSULIN LISPRO 3 UNITS: 100 INJECTION, SOLUTION INTRAVENOUS; SUBCUTANEOUS at 18:19

## 2021-01-01 RX ADMIN — PROPOFOL 50 MG: 10 INJECTION, EMULSION INTRAVENOUS at 12:53

## 2021-01-01 RX ADMIN — ROPINIROLE HYDROCHLORIDE 0.25 MG: 0.5 TABLET, FILM COATED ORAL at 20:52

## 2021-01-01 RX ADMIN — DORZOLAMIDE HYDROCHLORIDE: 20 SOLUTION/ DROPS OPHTHALMIC at 22:36

## 2021-01-01 RX ADMIN — MEBROFENIN 1 DOSE: 45 INJECTION, POWDER, LYOPHILIZED, FOR SOLUTION INTRAVENOUS at 12:45

## 2021-01-01 RX ADMIN — DORZOLAMIDE HYDROCHLORIDE: 20 SOLUTION/ DROPS OPHTHALMIC at 08:48

## 2021-01-01 RX ADMIN — INSULIN LISPRO 3 UNITS: 100 INJECTION, SOLUTION INTRAVENOUS; SUBCUTANEOUS at 17:18

## 2021-01-01 RX ADMIN — SODIUM BICARBONATE 50 MEQ: 84 INJECTION, SOLUTION INTRAVENOUS at 05:30

## 2021-01-01 RX ADMIN — HEPARIN SODIUM 5000 UNITS: 5000 INJECTION, SOLUTION INTRAVENOUS; SUBCUTANEOUS at 20:25

## 2021-01-01 RX ADMIN — AMIODARONE HYDROCHLORIDE 200 MG: 200 TABLET ORAL at 07:59

## 2021-01-01 RX ADMIN — CALCIUM CHLORIDE 0.5 G: 100 INJECTION INTRAVENOUS; INTRAVENTRICULAR at 08:55

## 2021-01-01 RX ADMIN — TAZOBACTAM SODIUM AND PIPERACILLIN SODIUM 3.38 G: 375; 3 INJECTION, SOLUTION INTRAVENOUS at 00:45

## 2021-01-01 RX ADMIN — Medication 1 CAPSULE: at 08:39

## 2021-01-01 RX ADMIN — MORPHINE SULFATE 2 MG: 2 INJECTION, SOLUTION INTRAMUSCULAR; INTRAVENOUS at 04:11

## 2021-01-01 RX ADMIN — MEROPENEM 1 G: 1 INJECTION, POWDER, FOR SOLUTION INTRAVENOUS at 16:50

## 2021-01-01 RX ADMIN — ACETAMINOPHEN 1000 MG: 500 TABLET, FILM COATED ORAL at 19:35

## 2021-01-01 RX ADMIN — MICAFUNGIN SODIUM 100 MG: 100 INJECTION, POWDER, LYOPHILIZED, FOR SOLUTION INTRAVENOUS at 18:22

## 2021-01-01 RX ADMIN — ATORVASTATIN CALCIUM 40 MG: 40 TABLET, FILM COATED ORAL at 21:10

## 2021-01-01 RX ADMIN — PREDNISONE 10 MG: 10 TABLET ORAL at 10:12

## 2021-01-01 RX ADMIN — Medication 100 MCG: at 14:16

## 2021-01-01 RX ADMIN — TERAZOSIN HYDROCHLORIDE 1 MG: 1 CAPSULE ORAL at 21:10

## 2021-01-01 RX ADMIN — MORPHINE SULFATE 1 MG: 2 INJECTION, SOLUTION INTRAMUSCULAR; INTRAVENOUS at 01:58

## 2021-01-01 RX ADMIN — DEXTROSE AND SODIUM CHLORIDE 75 ML/HR: 5; 450 INJECTION, SOLUTION INTRAVENOUS at 17:57

## 2021-01-01 RX ADMIN — PREDNISONE 10 MG: 10 TABLET ORAL at 08:54

## 2021-01-01 RX ADMIN — SODIUM CHLORIDE 9 ML/HR: 9 INJECTION, SOLUTION INTRAVENOUS at 10:21

## 2021-01-01 RX ADMIN — MICAFUNGIN SODIUM 100 MG: 100 INJECTION, POWDER, LYOPHILIZED, FOR SOLUTION INTRAVENOUS at 22:38

## 2021-01-01 RX ADMIN — IOPAMIDOL 100 ML: 755 INJECTION, SOLUTION INTRAVENOUS at 22:59

## 2021-01-01 RX ADMIN — BUMETANIDE 0.5 MG: 0.25 INJECTION, SOLUTION INTRAMUSCULAR; INTRAVENOUS at 08:08

## 2021-01-01 RX ADMIN — METOPROLOL TARTRATE 25 MG: 25 TABLET, FILM COATED ORAL at 08:14

## 2021-01-01 RX ADMIN — AMIODARONE HYDROCHLORIDE 200 MG: 200 TABLET ORAL at 22:11

## 2021-01-01 RX ADMIN — MORPHINE SULFATE 2 MG: 2 INJECTION, SOLUTION INTRAMUSCULAR; INTRAVENOUS at 05:40

## 2021-01-01 RX ADMIN — TAZOBACTAM SODIUM AND PIPERACILLIN SODIUM 3.38 G: 375; 3 INJECTION, SOLUTION INTRAVENOUS at 18:19

## 2021-01-01 RX ADMIN — HEPARIN SODIUM 5000 UNITS: 5000 INJECTION, SOLUTION INTRAVENOUS; SUBCUTANEOUS at 23:29

## 2021-01-01 RX ADMIN — DORZOLAMIDE HYDROCHLORIDE: 20 SOLUTION/ DROPS OPHTHALMIC at 09:07

## 2021-01-01 RX ADMIN — DAPTOMYCIN 500 MG: 500 INJECTION, POWDER, LYOPHILIZED, FOR SOLUTION INTRAVENOUS at 20:34

## 2021-01-01 RX ADMIN — METOLAZONE 2.5 MG: 2.5 TABLET ORAL at 13:00

## 2021-01-01 RX ADMIN — SODIUM CHLORIDE, POTASSIUM CHLORIDE, SODIUM LACTATE AND CALCIUM CHLORIDE 100 ML/HR: 600; 310; 30; 20 INJECTION, SOLUTION INTRAVENOUS at 16:23

## 2021-01-01 RX ADMIN — SODIUM CHLORIDE 9 ML/HR: 9 INJECTION, SOLUTION INTRAVENOUS at 11:57

## 2021-01-01 RX ADMIN — SODIUM CHLORIDE 125 MG: 9 INJECTION, SOLUTION INTRAVENOUS at 17:47

## 2021-01-01 RX ADMIN — DOCOSANOL 1 APPLICATION: 100 CREAM TOPICAL at 12:20

## 2021-01-01 RX ADMIN — ATRACURIUM BESYLATE 20 MG: 10 INJECTION, SOLUTION INTRAVENOUS at 14:59

## 2021-01-01 RX ADMIN — MICAFUNGIN SODIUM 100 MG: 100 INJECTION, POWDER, LYOPHILIZED, FOR SOLUTION INTRAVENOUS at 17:25

## 2021-01-01 RX ADMIN — METOPROLOL TARTRATE 12.5 MG: 25 TABLET, FILM COATED ORAL at 20:09

## 2021-01-01 RX ADMIN — ROPINIROLE HYDROCHLORIDE 0.25 MG: 0.5 TABLET, FILM COATED ORAL at 21:06

## 2021-01-01 RX ADMIN — METRONIDAZOLE 500 MG: 500 INJECTION, SOLUTION INTRAVENOUS at 08:15

## 2021-01-01 RX ADMIN — METOPROLOL TARTRATE 25 MG: 25 TABLET, FILM COATED ORAL at 23:28

## 2021-01-01 RX ADMIN — DEXTROSE AND SODIUM CHLORIDE 125 ML/HR: 5; 450 INJECTION, SOLUTION INTRAVENOUS at 23:47

## 2021-01-01 RX ADMIN — TAMSULOSIN HYDROCHLORIDE 0.4 MG: 0.4 CAPSULE ORAL at 09:55

## 2021-01-01 RX ADMIN — INSULIN LISPRO 3 UNITS: 100 INJECTION, SOLUTION INTRAVENOUS; SUBCUTANEOUS at 12:10

## 2021-01-01 RX ADMIN — MORPHINE SULFATE 4 MG: 4 INJECTION, SOLUTION INTRAMUSCULAR; INTRAVENOUS at 08:24

## 2021-01-01 RX ADMIN — TAZOBACTAM SODIUM AND PIPERACILLIN SODIUM 3.38 G: 375; 3 INJECTION, SOLUTION INTRAVENOUS at 15:45

## 2021-01-01 RX ADMIN — PANTOPRAZOLE SODIUM 40 MG: 40 TABLET, DELAYED RELEASE ORAL at 08:40

## 2021-01-01 RX ADMIN — METHYLPREDNISOLONE SODIUM SUCCINATE 125 MG: 125 INJECTION, POWDER, FOR SOLUTION INTRAMUSCULAR; INTRAVENOUS at 13:10

## 2021-01-01 RX ADMIN — Medication 200 MCG: at 08:57

## 2021-01-01 RX ADMIN — ONDANSETRON 4 MG: 2 INJECTION INTRAMUSCULAR; INTRAVENOUS at 14:59

## 2021-01-01 RX ADMIN — METOPROLOL TARTRATE 25 MG: 25 TABLET, FILM COATED ORAL at 08:31

## 2021-01-01 RX ADMIN — AMIODARONE HYDROCHLORIDE 200 MG: 200 TABLET ORAL at 18:18

## 2021-01-01 RX ADMIN — INSULIN LISPRO 2 UNITS: 100 INJECTION, SOLUTION INTRAVENOUS; SUBCUTANEOUS at 22:33

## 2021-01-01 RX ADMIN — ATORVASTATIN CALCIUM 40 MG: 40 TABLET, FILM COATED ORAL at 08:14

## 2021-01-01 RX ADMIN — DOCUSATE SODIUM 100 MG: 100 CAPSULE, LIQUID FILLED ORAL at 20:14

## 2021-01-01 RX ADMIN — DOCOSANOL 1 APPLICATION: 100 CREAM TOPICAL at 12:36

## 2021-01-01 RX ADMIN — PREDNISONE 10 MG: 10 TABLET ORAL at 08:24

## 2021-01-01 RX ADMIN — SODIUM CHLORIDE 125 MG: 9 INJECTION, SOLUTION INTRAVENOUS at 22:10

## 2021-01-01 RX ADMIN — ATORVASTATIN CALCIUM 40 MG: 40 TABLET, FILM COATED ORAL at 22:36

## 2021-01-01 RX ADMIN — DORZOLAMIDE HYDROCHLORIDE: 20 SOLUTION/ DROPS OPHTHALMIC at 08:44

## 2021-01-01 RX ADMIN — MORPHINE SULFATE 2 MG: 2 INJECTION, SOLUTION INTRAMUSCULAR; INTRAVENOUS at 13:58

## 2021-01-01 RX ADMIN — METOPROLOL TARTRATE 12.5 MG: 25 TABLET, FILM COATED ORAL at 09:12

## 2021-01-01 RX ADMIN — AMIODARONE HYDROCHLORIDE 200 MG: 200 TABLET ORAL at 17:08

## 2021-01-01 RX ADMIN — MICAFUNGIN SODIUM 100 MG: 100 INJECTION, POWDER, LYOPHILIZED, FOR SOLUTION INTRAVENOUS at 23:07

## 2021-01-01 RX ADMIN — LIDOCAINE HYDROCHLORIDE,EPINEPHRINE BITARTRATE 10 ML: 10; .01 INJECTION, SOLUTION INFILTRATION; PERINEURAL at 14:37

## 2021-01-01 RX ADMIN — ASPIRIN 81 MG: 81 TABLET, COATED ORAL at 08:47

## 2021-01-01 RX ADMIN — DEXTROSE AND SODIUM CHLORIDE 75 ML/HR: 5; 450 INJECTION, SOLUTION INTRAVENOUS at 23:29

## 2021-01-01 RX ADMIN — ONDANSETRON 4 MG: 2 INJECTION INTRAMUSCULAR; INTRAVENOUS at 10:23

## 2021-01-01 RX ADMIN — OXYCODONE HYDROCHLORIDE AND ACETAMINOPHEN 1 TABLET: 7.5; 325 TABLET ORAL at 17:01

## 2021-01-01 RX ADMIN — TAZOBACTAM SODIUM AND PIPERACILLIN SODIUM 3.38 G: 375; 3 INJECTION, SOLUTION INTRAVENOUS at 17:47

## 2021-01-01 RX ADMIN — TAMSULOSIN HYDROCHLORIDE 0.4 MG: 0.4 CAPSULE ORAL at 09:26

## 2021-01-01 RX ADMIN — SODIUM CHLORIDE 10 ML: 9 INJECTION, SOLUTION INTRAVENOUS at 11:57

## 2021-01-01 RX ADMIN — TAMSULOSIN HYDROCHLORIDE 0.4 MG: 0.4 CAPSULE ORAL at 08:16

## 2021-01-01 RX ADMIN — ASPIRIN 81 MG: 81 TABLET, COATED ORAL at 08:08

## 2021-01-01 RX ADMIN — TAZOBACTAM SODIUM AND PIPERACILLIN SODIUM 3.38 G: 375; 3 INJECTION, SOLUTION INTRAVENOUS at 10:07

## 2021-01-01 RX ADMIN — MEROPENEM 1 G: 1 INJECTION, POWDER, FOR SOLUTION INTRAVENOUS at 17:18

## 2021-01-01 RX ADMIN — PROPOFOL 150 MG: 10 INJECTION, EMULSION INTRAVENOUS at 14:59

## 2021-01-01 RX ADMIN — TAMSULOSIN HYDROCHLORIDE 0.4 MG: 0.4 CAPSULE ORAL at 08:31

## 2021-01-01 RX ADMIN — CLINDAMYCIN PHOSPHATE 900 MG: 900 INJECTION, SOLUTION INTRAVENOUS at 02:10

## 2021-01-01 RX ADMIN — SODIUM CHLORIDE, PRESERVATIVE FREE 10 ML: 5 INJECTION INTRAVENOUS at 08:48

## 2021-01-01 RX ADMIN — SODIUM CHLORIDE, PRESERVATIVE FREE 10 ML: 5 INJECTION INTRAVENOUS at 01:49

## 2021-01-01 RX ADMIN — AMIODARONE HYDROCHLORIDE 200 MG: 200 TABLET ORAL at 08:08

## 2021-01-01 RX ADMIN — DOCUSATE SODIUM 100 MG: 100 CAPSULE, LIQUID FILLED ORAL at 20:08

## 2021-01-01 RX ADMIN — METOPROLOL TARTRATE 12.5 MG: 25 TABLET, FILM COATED ORAL at 09:41

## 2021-01-01 RX ADMIN — SODIUM BICARBONATE 50 MEQ: 84 INJECTION, SOLUTION INTRAVENOUS at 05:33

## 2021-01-01 RX ADMIN — SUCCINYLCHOLINE CHLORIDE 100 MG: 20 INJECTION, SOLUTION INTRAMUSCULAR; INTRAVENOUS at 12:53

## 2021-01-01 RX ADMIN — ATORVASTATIN CALCIUM 40 MG: 40 TABLET, FILM COATED ORAL at 20:52

## 2021-01-01 RX ADMIN — MEROPENEM 1 G: 1 INJECTION, POWDER, FOR SOLUTION INTRAVENOUS at 03:33

## 2021-01-01 RX ADMIN — TAZOBACTAM SODIUM AND PIPERACILLIN SODIUM 4.5 G: 500; 4 INJECTION, SOLUTION INTRAVENOUS at 18:37

## 2021-01-01 RX ADMIN — METRONIDAZOLE 500 MG: 500 INJECTION, SOLUTION INTRAVENOUS at 22:52

## 2021-01-01 RX ADMIN — PANTOPRAZOLE SODIUM 40 MG: 40 TABLET, DELAYED RELEASE ORAL at 08:47

## 2021-01-01 RX ADMIN — PANTOPRAZOLE SODIUM 40 MG: 40 INJECTION, POWDER, FOR SOLUTION INTRAVENOUS at 05:43

## 2021-01-01 RX ADMIN — ONDANSETRON 4 MG: 2 INJECTION INTRAMUSCULAR; INTRAVENOUS at 09:22

## 2021-01-01 RX ADMIN — ASPIRIN 81 MG: 81 TABLET, COATED ORAL at 14:34

## 2021-01-01 RX ADMIN — DOCOSANOL 1 APPLICATION: 100 CREAM TOPICAL at 09:07

## 2021-01-01 RX ADMIN — MEROPENEM 1 G: 1 INJECTION, POWDER, FOR SOLUTION INTRAVENOUS at 13:08

## 2021-01-01 RX ADMIN — Medication 1 CAPSULE: at 09:41

## 2021-01-01 RX ADMIN — AMIODARONE HYDROCHLORIDE 200 MG: 200 TABLET ORAL at 09:08

## 2021-01-01 RX ADMIN — MEROPENEM 1 G: 1 INJECTION, POWDER, FOR SOLUTION INTRAVENOUS at 04:00

## 2021-01-01 RX ADMIN — METOPROLOL TARTRATE 25 MG: 25 TABLET, FILM COATED ORAL at 21:04

## 2021-01-01 RX ADMIN — ROPINIROLE HYDROCHLORIDE 0.25 MG: 0.5 TABLET, FILM COATED ORAL at 20:45

## 2021-01-01 RX ADMIN — Medication 1 CAPSULE: at 12:03

## 2021-01-01 RX ADMIN — ALBUMIN HUMAN 12.5 G: 0.25 SOLUTION INTRAVENOUS at 14:07

## 2021-01-01 RX ADMIN — SODIUM CHLORIDE, POTASSIUM CHLORIDE, SODIUM LACTATE AND CALCIUM CHLORIDE 500 ML: 600; 310; 30; 20 INJECTION, SOLUTION INTRAVENOUS at 00:25

## 2021-01-01 RX ADMIN — METOPROLOL TARTRATE 25 MG: 25 TABLET, FILM COATED ORAL at 21:35

## 2021-01-01 RX ADMIN — DOCOSANOL 1 APPLICATION: 100 CREAM TOPICAL at 21:32

## 2021-01-01 RX ADMIN — MICAFUNGIN SODIUM 100 MG: 100 INJECTION, POWDER, LYOPHILIZED, FOR SOLUTION INTRAVENOUS at 20:34

## 2021-01-01 RX ADMIN — METOPROLOL TARTRATE 50 MG: 50 TABLET, FILM COATED ORAL at 20:40

## 2021-01-01 RX ADMIN — ROCURONIUM BROMIDE 50 MG: 10 INJECTION, SOLUTION INTRAVENOUS at 08:47

## 2021-01-01 RX ADMIN — TAMSULOSIN HYDROCHLORIDE 0.4 MG: 0.4 CAPSULE ORAL at 09:07

## 2021-01-01 RX ADMIN — TAMSULOSIN HYDROCHLORIDE 0.4 MG: 0.4 CAPSULE ORAL at 08:24

## 2021-01-01 RX ADMIN — MORPHINE SULFATE 2 MG: 2 INJECTION, SOLUTION INTRAMUSCULAR; INTRAVENOUS at 06:06

## 2021-01-01 RX ADMIN — TAMSULOSIN HYDROCHLORIDE 0.4 MG: 0.4 CAPSULE ORAL at 08:14

## 2021-01-01 RX ADMIN — TAZOBACTAM SODIUM AND PIPERACILLIN SODIUM 4.5 G: 500; 4 INJECTION, SOLUTION INTRAVENOUS at 17:57

## 2021-01-01 RX ADMIN — ROPINIROLE HYDROCHLORIDE 0.25 MG: 0.5 TABLET, FILM COATED ORAL at 20:08

## 2021-01-01 RX ADMIN — METOPROLOL TARTRATE 12.5 MG: 25 TABLET, FILM COATED ORAL at 08:54

## 2021-01-01 RX ADMIN — DORZOLAMIDE HYDROCHLORIDE: 20 SOLUTION/ DROPS OPHTHALMIC at 08:04

## 2021-01-01 RX ADMIN — INSULIN LISPRO 2 UNITS: 100 INJECTION, SOLUTION INTRAVENOUS; SUBCUTANEOUS at 22:10

## 2021-01-01 RX ADMIN — REMDESIVIR 200 MG: 100 INJECTION, POWDER, LYOPHILIZED, FOR SOLUTION INTRAVENOUS at 02:21

## 2021-01-01 RX ADMIN — METOPROLOL TARTRATE 50 MG: 50 TABLET, FILM COATED ORAL at 13:27

## 2021-01-01 RX ADMIN — AMIODARONE HYDROCHLORIDE 200 MG: 200 TABLET ORAL at 16:25

## 2021-01-01 RX ADMIN — TAMSULOSIN HYDROCHLORIDE 0.4 MG: 0.4 CAPSULE ORAL at 09:41

## 2021-01-01 RX ADMIN — PANTOPRAZOLE SODIUM 40 MG: 40 TABLET, DELAYED RELEASE ORAL at 08:00

## 2021-01-01 RX ADMIN — TAZOBACTAM SODIUM AND PIPERACILLIN SODIUM 3.38 G: 375; 3 INJECTION, SOLUTION INTRAVENOUS at 23:57

## 2021-01-01 RX ADMIN — HEPARIN SODIUM 5000 UNITS: 5000 INJECTION, SOLUTION INTRAVENOUS; SUBCUTANEOUS at 08:16

## 2021-01-01 RX ADMIN — ROPINIROLE HYDROCHLORIDE 0.25 MG: 0.5 TABLET, FILM COATED ORAL at 22:12

## 2021-01-01 RX ADMIN — AMIODARONE HYDROCHLORIDE 200 MG: 200 TABLET ORAL at 09:07

## 2021-01-01 RX ADMIN — Medication 200 MCG: at 08:54

## 2021-01-01 RX ADMIN — METOPROLOL TARTRATE 12.5 MG: 25 TABLET, FILM COATED ORAL at 20:43

## 2021-01-01 RX ADMIN — TAZOBACTAM SODIUM AND PIPERACILLIN SODIUM 4.5 G: 500; 4 INJECTION, SOLUTION INTRAVENOUS at 01:58

## 2021-01-01 RX ADMIN — CEFEPIME HYDROCHLORIDE 2 G: 2 INJECTION, POWDER, FOR SOLUTION INTRAVENOUS at 16:01

## 2021-01-01 RX ADMIN — METOPROLOL TARTRATE 25 MG: 25 TABLET, FILM COATED ORAL at 17:30

## 2021-01-01 RX ADMIN — SODIUM CHLORIDE, PRESERVATIVE FREE 10 ML: 5 INJECTION INTRAVENOUS at 08:07

## 2021-01-01 RX ADMIN — ALBUMIN HUMAN: 0.05 INJECTION, SOLUTION INTRAVENOUS at 09:10

## 2021-01-01 RX ADMIN — PREDNISONE 10 MG: 10 TABLET ORAL at 09:41

## 2021-01-01 RX ADMIN — CALCIUM CHLORIDE 0.5 G: 100 INJECTION INTRAVENOUS; INTRAVENTRICULAR at 09:24

## 2021-01-01 RX ADMIN — TAZOBACTAM SODIUM AND PIPERACILLIN SODIUM 3.38 G: 375; 3 INJECTION, SOLUTION INTRAVENOUS at 17:58

## 2021-01-01 RX ADMIN — ROPINIROLE HYDROCHLORIDE 0.25 MG: 0.5 TABLET, FILM COATED ORAL at 20:28

## 2021-01-01 RX ADMIN — TAMSULOSIN HYDROCHLORIDE 0.4 MG: 0.4 CAPSULE ORAL at 16:19

## 2021-01-01 RX ADMIN — SODIUM CHLORIDE 1000 ML: 9 INJECTION, SOLUTION INTRAVENOUS at 18:36

## 2021-01-01 RX ADMIN — MEROPENEM 1 G: 1 INJECTION, POWDER, FOR SOLUTION INTRAVENOUS at 17:10

## 2021-01-01 RX ADMIN — LIDOCAINE HYDROCHLORIDE 50 MG: 10 INJECTION, SOLUTION EPIDURAL; INFILTRATION; INTRACAUDAL; PERINEURAL at 08:47

## 2021-01-01 RX ADMIN — METOPROLOL TARTRATE 25 MG: 25 TABLET, FILM COATED ORAL at 19:58

## 2021-01-01 RX ADMIN — INSULIN LISPRO 4 UNITS: 100 INJECTION, SOLUTION INTRAVENOUS; SUBCUTANEOUS at 22:33

## 2021-01-01 RX ADMIN — TAZOBACTAM SODIUM AND PIPERACILLIN SODIUM 4.5 G: 500; 4 INJECTION, SOLUTION INTRAVENOUS at 17:07

## 2021-01-01 RX ADMIN — PREDNISONE 10 MG: 10 TABLET ORAL at 09:26

## 2021-01-01 RX ADMIN — TAZOBACTAM SODIUM AND PIPERACILLIN SODIUM 3.38 G: 375; 3 INJECTION, SOLUTION INTRAVENOUS at 17:17

## 2021-01-01 RX ADMIN — PHENYLEPHRINE HYDROCHLORIDE 0.2 MCG/KG/MIN: 10 INJECTION INTRAVENOUS at 12:05

## 2021-01-01 RX ADMIN — TAZOBACTAM SODIUM AND PIPERACILLIN SODIUM 4.5 G: 500; 4 INJECTION, SOLUTION INTRAVENOUS at 01:14

## 2021-01-01 RX ADMIN — MICAFUNGIN SODIUM 100 MG: 100 INJECTION, POWDER, LYOPHILIZED, FOR SOLUTION INTRAVENOUS at 23:30

## 2021-01-01 RX ADMIN — DORZOLAMIDE HYDROCHLORIDE: 20 SOLUTION/ DROPS OPHTHALMIC at 20:47

## 2021-01-01 RX ADMIN — AMIODARONE HYDROCHLORIDE 200 MG: 200 TABLET ORAL at 17:53

## 2021-01-01 RX ADMIN — PANTOPRAZOLE SODIUM 40 MG: 40 INJECTION, POWDER, FOR SOLUTION INTRAVENOUS at 05:07

## 2021-01-01 RX ADMIN — ROPINIROLE HYDROCHLORIDE 0.25 MG: 0.5 TABLET, FILM COATED ORAL at 21:10

## 2021-01-01 RX ADMIN — INSULIN LISPRO 2 UNITS: 100 INJECTION, SOLUTION INTRAVENOUS; SUBCUTANEOUS at 11:43

## 2021-01-01 RX ADMIN — ONDANSETRON 4 MG: 2 INJECTION INTRAMUSCULAR; INTRAVENOUS at 12:36

## 2021-01-01 RX ADMIN — TAMSULOSIN HYDROCHLORIDE 0.4 MG: 0.4 CAPSULE ORAL at 08:39

## 2021-01-01 RX ADMIN — BUMETANIDE 1 MG: 1 TABLET ORAL at 09:08

## 2021-01-01 RX ADMIN — FENTANYL CITRATE 25 MCG: 0.05 INJECTION, SOLUTION INTRAMUSCULAR; INTRAVENOUS at 11:04

## 2021-01-01 RX ADMIN — HEPARIN SODIUM 5000 UNITS: 5000 INJECTION, SOLUTION INTRAVENOUS; SUBCUTANEOUS at 05:43

## 2021-01-01 RX ADMIN — PREDNISONE 10 MG: 10 TABLET ORAL at 08:08

## 2021-01-01 RX ADMIN — TAMSULOSIN HYDROCHLORIDE 0.4 MG: 0.4 CAPSULE ORAL at 10:07

## 2021-01-01 RX ADMIN — HEPARIN SODIUM 200 UNITS: 1000 INJECTION, SOLUTION INTRAVENOUS; SUBCUTANEOUS at 11:28

## 2021-01-01 RX ADMIN — EPINEPHRINE 1 MG: 0.1 INJECTION INTRACARDIAC; INTRAVENOUS at 05:31

## 2021-01-01 RX ADMIN — PHENYLEPHRINE HYDROCHLORIDE 0.1 MCG/KG/MIN: 10 INJECTION INTRAVENOUS at 08:57

## 2021-01-01 RX ADMIN — CLINDAMYCIN PHOSPHATE 900 MG: 900 INJECTION, SOLUTION INTRAVENOUS at 09:16

## 2021-01-01 RX ADMIN — PREDNISONE 10 MG: 10 TABLET ORAL at 09:56

## 2021-01-01 RX ADMIN — TAZOBACTAM SODIUM AND PIPERACILLIN SODIUM 3.38 G: 375; 3 INJECTION, SOLUTION INTRAVENOUS at 09:06

## 2021-01-01 RX ADMIN — SODIUM CHLORIDE 1000 ML: 9 INJECTION, SOLUTION INTRAVENOUS at 18:34

## 2021-01-01 RX ADMIN — DOCOSANOL 1 APPLICATION: 100 CREAM TOPICAL at 08:40

## 2021-01-01 RX ADMIN — POTASSIUM & SODIUM PHOSPHATES POWDER PACK 280-160-250 MG 2 PACKET: 280-160-250 PACK at 12:19

## 2021-01-01 RX ADMIN — HYDROMORPHONE HYDROCHLORIDE 0.25 MG: 1 INJECTION, SOLUTION INTRAMUSCULAR; INTRAVENOUS; SUBCUTANEOUS at 18:57

## 2021-01-01 RX ADMIN — DOCOSANOL 1 APPLICATION: 100 CREAM TOPICAL at 09:08

## 2021-01-01 RX ADMIN — Medication 1 CAPSULE: at 08:14

## 2021-01-01 RX ADMIN — SODIUM CHLORIDE, PRESERVATIVE FREE 10 ML: 5 INJECTION INTRAVENOUS at 09:07

## 2021-01-01 RX ADMIN — DOCOSANOL 1 APPLICATION: 100 CREAM TOPICAL at 07:42

## 2021-01-01 RX ADMIN — GLYCOPYRROLATE 0.4 MG: 0.2 INJECTION INTRAMUSCULAR; INTRAVENOUS at 15:02

## 2021-01-01 RX ADMIN — MICAFUNGIN SODIUM 100 MG: 100 INJECTION, POWDER, LYOPHILIZED, FOR SOLUTION INTRAVENOUS at 20:51

## 2021-01-01 RX ADMIN — METOPROLOL TARTRATE 50 MG: 50 TABLET, FILM COATED ORAL at 21:10

## 2021-01-01 RX ADMIN — PANTOPRAZOLE SODIUM 40 MG: 40 TABLET, DELAYED RELEASE ORAL at 09:08

## 2021-01-01 RX ADMIN — Medication 100 MCG: at 15:11

## 2021-01-01 RX ADMIN — SODIUM CHLORIDE, PRESERVATIVE FREE 10 ML: 5 INJECTION INTRAVENOUS at 21:11

## 2021-01-01 RX ADMIN — Medication 5000 UNITS: at 08:29

## 2021-01-01 RX ADMIN — SODIUM CHLORIDE, PRESERVATIVE FREE 10 ML: 5 INJECTION INTRAVENOUS at 21:00

## 2021-01-01 RX ADMIN — HEPARIN SODIUM 5000 UNITS: 5000 INJECTION, SOLUTION INTRAVENOUS; SUBCUTANEOUS at 16:31

## 2021-01-01 RX ADMIN — AMIODARONE HYDROCHLORIDE 200 MG: 200 TABLET ORAL at 22:34

## 2021-01-01 RX ADMIN — DORZOLAMIDE HYDROCHLORIDE: 20 SOLUTION/ DROPS OPHTHALMIC at 21:05

## 2021-01-01 RX ADMIN — MICAFUNGIN SODIUM 100 MG: 100 INJECTION, POWDER, LYOPHILIZED, FOR SOLUTION INTRAVENOUS at 21:06

## 2021-01-01 RX ADMIN — DOCOSANOL 1 APPLICATION: 100 CREAM TOPICAL at 20:47

## 2021-01-01 RX ADMIN — AMIODARONE HYDROCHLORIDE 200 MG: 200 TABLET ORAL at 17:55

## 2021-01-01 RX ADMIN — INSULIN LISPRO 2 UNITS: 100 INJECTION, SOLUTION INTRAVENOUS; SUBCUTANEOUS at 21:10

## 2021-01-01 RX ADMIN — DEXTROSE MONOHYDRATE 75 ML/HR: 50 INJECTION, SOLUTION INTRAVENOUS at 03:59

## 2021-01-01 RX ADMIN — MAGNESIUM SULFATE HEPTAHYDRATE 2 G: 2 INJECTION, SOLUTION INTRAVENOUS at 20:41

## 2021-01-01 RX ADMIN — DORZOLAMIDE HYDROCHLORIDE: 20 SOLUTION/ DROPS OPHTHALMIC at 21:09

## 2021-01-01 RX ADMIN — LIDOCAINE HYDROCHLORIDE: 20 SOLUTION ORAL; TOPICAL at 12:24

## 2021-01-01 RX ADMIN — DOCOSANOL 1 APPLICATION: 100 CREAM TOPICAL at 17:55

## 2021-09-23 PROBLEM — A41.9 SEPSIS WITH ACUTE ORGAN DYSFUNCTION (HCC): Status: ACTIVE | Noted: 2021-01-01

## 2021-09-23 PROBLEM — R57.9 SHOCK (HCC): Status: ACTIVE | Noted: 2021-01-01

## 2021-09-23 PROBLEM — R79.89 ELEVATED LFTS: Status: ACTIVE | Noted: 2021-01-01

## 2021-09-23 PROBLEM — R65.20 SEPSIS WITH ACUTE ORGAN DYSFUNCTION (HCC): Status: ACTIVE | Noted: 2021-01-01

## 2021-09-23 PROBLEM — N39.0 UTI (URINARY TRACT INFECTION): Status: ACTIVE | Noted: 2021-01-01

## 2021-09-23 PROBLEM — I51.89 COMBINED SYSTOLIC AND DIASTOLIC CARDIAC DYSFUNCTION: Status: ACTIVE | Noted: 2021-01-01

## 2021-09-23 PROBLEM — U07.1 COVID-19 VIRUS INFECTION: Status: ACTIVE | Noted: 2021-01-01

## 2021-09-23 PROBLEM — N20.0 NEPHROLITHIASIS: Status: ACTIVE | Noted: 2021-01-01

## 2021-09-23 PROBLEM — R10.9 ABDOMINAL PAIN: Status: ACTIVE | Noted: 2021-01-01

## 2021-09-23 PROBLEM — Z86.2 HISTORY OF SARCOIDOSIS: Status: ACTIVE | Noted: 2021-01-01

## 2021-09-23 PROBLEM — N17.9 AKI (ACUTE KIDNEY INJURY) (HCC): Status: ACTIVE | Noted: 2021-01-01

## 2021-09-23 PROBLEM — K80.20 CHOLELITHIASIS: Status: ACTIVE | Noted: 2021-01-01

## 2021-09-24 PROBLEM — R93.2 ABNORMAL CT SCAN, LIVER: Status: ACTIVE | Noted: 2021-01-01

## 2021-09-27 NOTE — ANESTHESIA PREPROCEDURE EVALUATION
Anesthesia Evaluation     Patient summary reviewed and Nursing notes reviewed   no history of anesthetic complications:  NPO Solid Status: > 8 hours  NPO Liquid Status: > 2 hours           Airway   Mallampati: II  TM distance: >3 FB  Neck ROM: full  No difficulty expected  Dental    (+) upper dentures and lower dentures    Pulmonary - normal exam    breath sounds clear to auscultation  (+) sleep apnea (No CPAP),     ROS comment: Covid 12/20, did not require hospitalization; had a false positive study during this stay, two subsequent negative studies  Cardiovascular - normal exam    ECG reviewed  Rhythm: regular  Rate: normal    (+) hypertension, CABG, CHF (EF 25-30%) Systolic <55%,     ROS comment: 9/24/21 Echo:  · Left ventricular ejection fraction appears to be 26 - 30%  · There is global hypokinesis. The posterior wall appears more focally hypokinetic  · Moderately reduced right ventricular systolic function noted.  · Mild dilation of the aortic root is present measuring 4.1 cm.  · The left atrial cavity is moderately dilated  · No significant valvular stenosis or regurgitation.    Neuro/Psych- negative ROS  GI/Hepatic/Renal/Endo    (+) obesity,   renal disease CRI, diabetes mellitus type 2,     Musculoskeletal     Abdominal    Substance History      OB/GYN          Other   chronic steroid use                    Anesthesia Plan    ASA 4     general   (A-line for hemodynamic monitoring; stress dose steroids)  intravenous induction     Anesthetic plan, all risks, benefits, and alternatives have been provided, discussed and informed consent has been obtained with: patient.    Plan discussed with CRNA.

## 2021-09-27 NOTE — ANESTHESIA POSTPROCEDURE EVALUATION
Patient: Jeremías Soto    Procedure Summary     Date: 09/27/21 Room / Location:  [facility]    Anesthesia Start: 1245 Anesthesia Stop: 1529    Procedure: CHOLECYSTECTOMY LAPAROSCOPIC INTRAOPERATIVE CHOLANGIOGRAM (N/A Abdomen) Diagnosis: Cholangitis    Surgeons: Gaudencio Wolfe MD Provider: Farhan Kitchen MD    Anesthesia Type: general ASA Status: 4          Anesthesia Type: general    Vitals  Vitals Value Taken Time   /79 09/27/21 1524   Temp     Pulse 60 09/27/21 1528   Resp     SpO2 98 % 09/27/21 1528   Vitals shown include unvalidated device data.        Post Anesthesia Care and Evaluation    Patient location during evaluation: PACU  Patient participation: complete - patient participated  Level of consciousness: awake and alert  Pain management: adequate  Airway patency: patent  Anesthetic complications: No anesthetic complications  PONV Status: none  Cardiovascular status: hemodynamically stable and acceptable  Respiratory status: nonlabored ventilation, acceptable and nasal cannula  Hydration status: acceptable

## 2021-09-27 NOTE — ANESTHESIA PROCEDURE NOTES
Airway  Urgency: elective    Date/Time: 9/27/2021 12:54 PM  Airway not difficult    General Information and Staff    Patient location during procedure: OR  CRNA: Cara Morse CRNA    Indications and Patient Condition  Indications for airway management: airway protection    Preoxygenated: yes  MILS not maintained throughout  Mask difficulty assessment: 0 - not attempted    Final Airway Details  Final airway type: endotracheal airway      Successful airway: ETT  Cuffed: yes   Successful intubation technique: direct laryngoscopy  Facilitating devices/methods: intubating stylet  Endotracheal tube insertion site: oral  Blade: Ashok  Blade size: 4  ETT size (mm): 7.5  Cormack-Lehane Classification: grade I - full view of glottis  Placement verified by: chest auscultation and capnometry   Measured from: lips  ETT/EBT  to lips (cm): 22  Number of attempts at approach: 1  Assessment: lips, teeth, and gum same as pre-op and atraumatic intubation    Additional Comments  Negative epigastric sounds, Breath sound equal bilaterally with symmetric chest rise and fall

## 2021-09-27 NOTE — ANESTHESIA PROCEDURE NOTES
Arterial Line      Patient reassessed immediately prior to procedure    Patient location during procedure: pre-op  Start time: 9/27/2021 12:15 PM  Stop Time:9/27/2021 12:21 PM       Line placed for hemodynamic monitoring.  Performed By   Anesthesiologist: Farhan Kitchen MD  Preanesthetic Checklist  Completed: patient identified, IV checked, site marked, risks and benefits discussed, surgical consent, monitors and equipment checked, pre-op evaluation and timeout performed  Arterial Line Prep   Sterile Tech: cap, gloves and sterile barriers  Prep: ChloraPrep  Patient monitoring: blood pressure monitoring, continuous pulse oximetry and EKG  Arterial Line Procedure   Laterality:right  Location:  radial artery  Catheter size: 20 G   Guidance: ultrasound guided and palpation technique  Number of attempts: 1  Successful placement: yes  Post Assessment   Dressing Type: line sutured, occlusive dressing applied, secured with tape and wrist guard applied.   Complications no  Circ/Move/Sens Assessment: normal and unchanged.   Patient Tolerance: patient tolerated the procedure well with no apparent complications

## 2021-09-28 PROBLEM — K80.50 CHOLEDOCHOLITHIASIS: Status: ACTIVE | Noted: 2021-01-01

## 2021-09-28 NOTE — ANESTHESIA PROCEDURE NOTES
Airway  Urgency: elective    Airway not difficult    General Information and Staff    Patient location during procedure: OR    Indications and Patient Condition  Indications for airway management: airway protection    Preoxygenated: yes  MILS not maintained throughout  Mask difficulty assessment: 1 - vent by mask    Final Airway Details  Final airway type: endotracheal airway      Successful airway: ETT  Cuffed: yes   Successful intubation technique: direct laryngoscopy  Endotracheal tube insertion site: oral  Blade: Ashok  Blade size: 3  ETT size (mm): 7.5  Cormack-Lehane Classification: grade I - full view of glottis  Placement verified by: chest auscultation and capnometry   Measured from: lips  ETT/EBT  to lips (cm): 20  Number of attempts at approach: 1  Assessment: lips, teeth, and gum same as pre-op and atraumatic intubation

## 2021-09-28 NOTE — ANESTHESIA POSTPROCEDURE EVALUATION
Patient: Jeremías Soto    Procedure Summary     Date: 09/28/21 Room / Location:  [facility]    Anesthesia Start: 1455 Anesthesia Stop: 1542    Procedure: ENDOSCOPIC RETROGRADE CHOLANGIOPANCREATOGRAPHY (N/A ) Diagnosis:       Choledocholithiasis      (Choledocholithiasis [K80.50])    Surgeons: Brunner, Mark I, MD Provider: Erich Landeros MD    Anesthesia Type: general ASA Status: 4          Anesthesia Type: general    Vitals  Vitals Value Taken Time   /83 09/28/21 1542   Temp     Pulse 62 09/28/21 1542   Resp 16 09/28/21 1542   SpO2 92 % 09/28/21 1542           Post Anesthesia Care and Evaluation    Patient location during evaluation: PACU  Patient participation: complete - patient participated  Level of consciousness: awake  Pain management: adequate  Airway patency: patent  Anesthetic complications: No anesthetic complications  PONV Status: none  Cardiovascular status: acceptable  Respiratory status: acceptable  Hydration status: acceptable

## 2021-09-28 NOTE — ANESTHESIA PREPROCEDURE EVALUATION
Anesthesia Evaluation     Patient summary reviewed and Nursing notes reviewed   NPO Solid Status: > 8 hours  NPO Liquid Status: > 8 hours           Airway   Mallampati: II  TM distance: >3 FB  Neck ROM: full  No difficulty expected  Dental    (+) upper dentures and lower dentures    Pulmonary    (+) shortness of breath, sleep apnea (No Rx),   (-) COPD, asthma, not a smoker    ROS comment: Pos Covid 12/2020  Cardiovascular     ECG reviewed    (+) hypertension, past MI  >12 months, CAD, CABG (2004 ) >6 Months, cardiac stents (2005 ) more than 12 months ago hyperlipidemia,   (-) dysrhythmias, angina    ROS comment: ECG NSR     ECHO 2021 EF 26 - 30% global hypokinesis. The posterior -focally hypokinetic  Moderately reduced RV syst fxn ·   Mild dilation of the aortic root is present measuring 4.1 cm.  LA cavity is moderately dilated  No significant valvular stenosis or regurgitation.    Neuro/Psych  (+) CVA ( no residual old 3 years),     (-) seizures  GI/Hepatic/Renal/Endo    (+)   renal disease (creat 2.1) CRI, diabetes mellitus type 2,     Musculoskeletal     Abdominal    Substance History      OB/GYN          Other   chronic steroid use    history of cancer    ROS/Med Hx Other: K 4.5       Phys Exam Other: Mac 4  Grade  1 view Benke               Anesthesia Plan    ASA 4     general   (GA   Clearsight Contin BP ( in view of Low EF)  )  intravenous induction     Anesthetic plan, all risks, benefits, and alternatives have been provided, discussed and informed consent has been obtained with: patient.    Plan discussed with CRNA.

## 2021-09-29 PROBLEM — R65.20 SEPSIS WITH ACUTE ORGAN DYSFUNCTION (HCC): Status: RESOLVED | Noted: 2021-01-01 | Resolved: 2021-01-01

## 2021-09-29 PROBLEM — A41.9 SEPSIS WITH ACUTE ORGAN DYSFUNCTION (HCC): Status: RESOLVED | Noted: 2021-01-01 | Resolved: 2021-01-01

## 2021-09-29 PROBLEM — N39.0 UTI (URINARY TRACT INFECTION): Status: RESOLVED | Noted: 2021-01-01 | Resolved: 2021-01-01

## 2021-09-29 PROBLEM — K80.20 CHOLELITHIASIS: Status: RESOLVED | Noted: 2021-01-01 | Resolved: 2021-01-01

## 2021-09-29 PROBLEM — K80.50 CHOLEDOCHOLITHIASIS: Status: RESOLVED | Noted: 2021-01-01 | Resolved: 2021-01-01

## 2021-09-29 PROBLEM — R57.9 SHOCK (HCC): Status: RESOLVED | Noted: 2021-01-01 | Resolved: 2021-01-01

## 2021-09-29 NOTE — OUTREACH NOTE
Prep Survey      Responses   [facility] facility patient discharged from?  [facility]   Is LACE score < 7 ?  No   Emergency Room discharge w/ pulse ox?  No   Eligibility  Readm Mgmt   Discharge diagnosis  septic shock,  UTI,  choleithiasis,  choledocholithiasis   Does the patient have one of the following disease processes/diagnoses(primary or secondary)?  Sepsis   Does the patient have Home health ordered?  No   Is there a DME ordered?  No   Prep survey completed?  Yes          Sammi Tobar RN

## 2021-09-30 NOTE — OUTREACH NOTE
Sepsis Week 1 Survey      Responses   [facility] in [place] patient discharged from?  [facility]   Does the patient have one of the following disease processes/diagnoses(primary or secondary)?  Sepsis   Week 1 attempt successful?  Yes   Call start time  1110   Call end time  1115   Discharge diagnosis  septic shock,  UTI,  choleithiasis,  choledocholithiasis   Meds reviewed with patient/caregiver?  Yes   Is the patient having any side effects they believe may be caused by any medication additions or changes?  No   Does the patient have all medications related to this admission filled (includes all antibiotics, inhalers, nebulizers,steroids,etc.)  Yes   Is the patient taking all medications as directed (includes completed medication regime)?  Yes   Does the patient have a primary care provider?   Yes   Does the patient have an appointment with their PCP within 7 days of discharge?  Yes   Has the patient kept scheduled appointments due by today?  N/A   Has home health visited the patient within 72 hours of discharge?  N/A   Psychosocial issues?  No   Did the patient receive a copy of their discharge instructions?  Yes   Nursing interventions  Reviewed instructions with patient   What is the patient's perception of their health status since discharge?  Improving   Nursing interventions  Nurse provided patient education   Is the patient/caregiver able to teach back Sepsis?  S - Shivering,fever or very cold, E - Extreme pain or generalized discomfort (worst ever,especially abdomen), P - Pale or discolored skin, S - Sleepy, difficult to arouse,confused, I -   I feel like I might die-a feeling of hopelessness, S - Short of breath   Nursing interventions  Nurse provided reassurance to patient   Is patient/caregiver able to teach back steps to recovery at home?  Set small, achievable goals for return to baseline health, Rest and regain strength, Make a list of questions for PCP appoinment, Eat a balanced diet, Exercise as  tolerated   Is the patient/caregiver able to teach back signs and symptoms of worsening condition:  Fever, Hyperthermia, Rapid heart rate (>90), Shortness of breath/rapid respiratory rate, Edema   If the patient is a current smoker, are they able to teach back resources for cessation?  Not a smoker   Is the patient/caregiver able to teach back the hierarchy of who to call/visit for symptoms/problems? PCP, Specialist, Home health nurse, Urgent Care, ED, 911  Yes   Additional teach back comments  DM and ankles/legs swell, PCP appt is Monday, arms are going down. Will confirm ERCP appt for [facility].   Week 1 call completed?  Yes   Wrap up additional comments  Improving. He is feeling better he says.          Domenica Newton RN

## 2021-10-05 PROBLEM — L03.311 ABDOMINAL WALL CELLULITIS: Status: ACTIVE | Noted: 2021-01-01

## 2021-10-05 PROBLEM — N18.30 CKD (CHRONIC KIDNEY DISEASE), STAGE III (HCC): Status: ACTIVE | Noted: 2021-01-01

## 2021-10-05 PROBLEM — I25.10 CAD (CORONARY ARTERY DISEASE): Status: ACTIVE | Noted: 2021-01-01

## 2021-10-05 PROBLEM — T81.40XA POST-OPERATIVE INFECTION: Status: ACTIVE | Noted: 2021-01-01

## 2021-10-05 PROBLEM — T81.43XA POSTOPERATIVE INTRA-ABDOMINAL ABSCESS: Status: ACTIVE | Noted: 2021-01-01

## 2021-10-05 PROBLEM — A41.9 SEPSIS (HCC): Status: ACTIVE | Noted: 2021-01-01

## 2021-10-05 PROBLEM — N18.9 ACUTE KIDNEY INJURY SUPERIMPOSED ON CHRONIC KIDNEY DISEASE (HCC): Status: ACTIVE | Noted: 2021-01-01

## 2021-10-05 PROBLEM — R77.8 ELEVATED TROPONIN: Status: ACTIVE | Noted: 2021-01-01

## 2021-10-05 PROBLEM — N17.9 ACUTE KIDNEY INJURY SUPERIMPOSED ON CHRONIC KIDNEY DISEASE (HCC): Status: ACTIVE | Noted: 2021-01-01

## 2021-10-05 NOTE — CONSULTS
Jeremías Soto  1955  [id number]    Date of Consult: 10/5/2021  Requesting Provider: Vincent Crawford MD  Evaluating Physician: Diaz Moreno MD    Chief Complaint: abdominal pain and redness    Reason for Consultation: sepsis secondary to an abdominal wall abscess/cellulitis    History of present illness:    Jeremías Soto is a 66 y.o.  Yr old male with history of coronary artery disease, CK D stage III B, diabetes mellitus type 2, hyperlipidemia, hypertension, sarcoidosis, and history of CVA who I recently evaluated during an admission to [facility] last month for enterococcus bacteremia and Clostridium perfringens bacteremia due to a biliary source/gallbladder source (cholangitis and cholecystitis).  The patient underwent laparoscopic cholecystectomy on 9/27 and ERCP with sphincterotomy stone extraction on 9/28.  He additionally had aerococcus urinae from urine culture. Due to his baseline renal dysfunction he did have a Groshong catheter placed for IV antibiotics.  He clinically improved with improvement in his LFTs and sepsis. He was discharged on 9/29 with plans to continue Zosyn at least until 10/11/21. I have not yet seen him in outpatient follow-up but he was scheduled to follow-up with me tomorrow on 10/6.      He was doing well apparently until 2-3 days ago when he noticed redness developing over his right abdominal wall.  The erythema has gotten progressively worse with some swelling and warmth and tenderness.  As far as I know he did not contact our clinic regarding this erythema. He was not having any fevers or chills at home.  He presented to the ER early this morning for evaluation of this redness. Since arrival, he was noted to have a maximum temperature of 99.1°F. labs showed a CRP of 46.11, troponin elevated to 0.07, proBNP of 8353, INR of 2.72, White blood cell count of 24.54 (up from 13.29 just before discharge), hemoglobin of 12, lactic acid of 4.1-->2.9, creatinine of  2.84 (up from 2.15 just prior to discharge). COVID-19 PCR was negative. White blood cell count is now improved to 13.69 on antibiotics.creatinine is worse at 3.09 today.  LFTs remain elevated with an ALT of 94, an AST of 137, an alkaline phosphatase of 176, and a total bilirubin of 1.4. CT abdomen and pelvis was done yesterday and showed cholecystectomy with a fluid collection in the gallbladder fossa containing a bubble of air with differential considerations including a seroma/hematoma, biloma, or possible developing abscess.  No biliary obstruction was seen.  There was also fat stranding in the ventral abdominal wall and right flank that could reflect bland edema or cellulitis. The patient's antibiotics have been changed to vancomycin, cefepime, and Flagyl. Repeat blood cultures and urine culture are in progress. Surgery consult has been placed. Plan is for IR drainage procedure but having to wait on this due to his anticoagulation that he has been on. He underwent NM Hepatobiliary scan today with no evidence for abnormal tracer activity to suggest leak.  The infectious disease team has been consulted for recommendations.          Past Medical History:   Diagnosis Date   • Cancer (CMS/HCC)     skin   • Coronary artery disease    • Diabetes mellitus (CMS/HCC)    • Elevated cholesterol    • Heart attack (CMS/HCC)    • Hypertension    • Sarcoid    • Sleep apnea     no cpap   • SOB (shortness of breath)    • Stroke (CMS/HCC)     Pt. states that he has had 2 mini strokes       Past Surgical History:   Procedure Laterality Date   • CARDIAC CATHETERIZATION      cardiac stent x2 after s/p CABG   • CARDIAC SURGERY      2002   • CATARACT EXTRACTION, BILATERAL     • CHOLECYSTECTOMY WITH INTRAOPERATIVE CHOLANGIOGRAM N/A 9/27/2021    Procedure: CHOLECYSTECTOMY LAPAROSCOPIC INTRAOPERATIVE CHOLANGIOGRAM;  Surgeon: Gaudencio Wolfe MD;  Location: [facility];  Service: General;  Laterality: N/A;   • COLONOSCOPY     • CORONARY  ARTERY BYPASS GRAFT     • ENDOSCOPY     • ERCP N/A 9/28/2021    Procedure: ENDOSCOPIC RETROGRADE CHOLANGIOPANCREATOGRAPHY;  Surgeon: Brunner, Mark I, MD;  Location: [facility] ENDOSCOPY;  Service: Gastroenterology;  Laterality: N/A;  sphincterotomy made, balloon sweep of bile duct done with 9-12 balloon.    • EXTRACORPOREAL SHOCKWAVE LITHOTRIPSY (ESWL), STENT INSERTION/REMOVAL Bilateral 8/21/2020    Procedure: EXTRACORPOREAL SHOCKWAVE LITHOTRIPSY BILATERAL WITH STENT PLACEMENT LEFT;  Surgeon: Ethan Barnes Jr., MD;  Location: [facility] OR;  Service: Urology;  Laterality: Bilateral;   • SKIN CANCER EXCISION       Social history:  The patient is .  He lives in [place] [facility].  No history of tobacco, alcohol, or illicit drug use.    Family history:  family history includes COPD in his mother; Cancer in his brother; Diabetes in his father and sister; Heart disease in his father; Hypertension in his father; Obesity in his sister.    No Known Allergies    Medication:  Current Facility-Administered Medications   Medication Dose Route Frequency Provider Last Rate Last Admin   • atorvastatin (LIPITOR) tablet 40 mg  40 mg Oral Daily Laquita Toth APRN   40 mg at 10/05/21 0814   • cefepime (MAXIPIME) 2 g/100 mL 0.9% NS (mbp)  2 g Intravenous Q24H Corey Schmitt IV, PharmD       • dextrose (D50W) 25 g/ 50mL Intravenous Solution 25 g  25 g Intravenous Q15 Min PRN Laquita Toth APRN       • dextrose (GLUTOSE) oral gel 15 g  15 g Oral Q15 Min PRN Laquita Toth APRN       • dorzolamide (TRUSOPT) 2 % 1 drop, timolol (TIMOPTIC) 0.5 % 1 drop for Cosopt 22.3-6.8 mg/mL   Both Eyes BID Laquita Toth APRN       • glucagon (human recombinant) (GLUCAGEN DIAGNOSTIC) injection 1 mg  1 mg Subcutaneous Q15 Min PRN Laquita Toth APRN       • heparin (porcine) infusion  - ADS Override Pull            • insulin lispro (humaLOG) injection 0-7 Units  0-7 Units Subcutaneous Q6H Laquita Toth APRN       • iopamidol  (ISOVUE-300) 61 % injection  - ADS Override Pull            • lactobacillus acidophilus (RISAQUAD) capsule 1 capsule  1 capsule Oral Daily Laquita Toth APRN   1 capsule at 10/05/21 0814   • metoprolol tartrate (LOPRESSOR) tablet 25 mg  25 mg Oral BID Laquita Toth APRN   25 mg at 10/05/21 0814   • metroNIDAZOLE (FLAGYL) 500 mg/100mL IVPB  500 mg Intravenous Q8H Laquita Toth APRN 0 mL/hr at 10/04/21 2331 500 mg at 10/05/21 0815   • oxyCODONE-acetaminophen (PERCOCET) 5-325 MG per tablet 1 tablet  1 tablet Oral Q4H PRN Dianna Shah DO       • pantoprazole (PROTONIX) EC tablet 40 mg  40 mg Oral Daily Corey Schmitt IV, PharmD   40 mg at 10/05/21 0814   • Pharmacy to dose vancomycin   Does not apply Continuous PRN Laquita Toth APRN       • predniSONE (DELTASONE) tablet 10 mg  10 mg Oral Daily Laquita Toth APRN   10 mg at 10/05/21 0814   • rOPINIRole (REQUIP) tablet 0.25 mg  0.25 mg Oral Nightly Dianna Shah DO   0.25 mg at 10/05/21 0149   • Sodium Chloride (PF) 0.9 % 10 mL  10 mL Intravenous PRN Toño Lainez MD       • sodium chloride 0.9 % flush 10 mL  10 mL Intravenous Q12H Laquita Toth APRN   10 mL at 10/05/21 0815   • sodium chloride 0.9 % flush 10 mL  10 mL Intravenous PRN Laquita Toth APRN       • tamsulosin (FLOMAX) 24 hr capsule 0.4 mg  0.4 mg Oral Daily Laquita Toth APRN   0.4 mg at 10/05/21 0814   • vancomycin - no scheduled doses (Pharmacy dosing per levels)   Does not apply Daily Corey Schmitt IV, PharmD             Antibiotics:  Anti-Infectives (From admission, onward)    Ordered     Dose/Rate Route Frequency Start Stop    10/05/21 0136  cefepime (MAXIPIME) 2 g/100 mL 0.9% NS (mbp)     Note to Pharmacy: First dose given in ED.   Ordering Provider: Corey Schmitt IV, PharmD    2 g  over 4 Hours Intravenous Every 24 Hours 10/05/21 1200 10/12/21 1159    10/05/21 0144  vancomycin - no scheduled doses (Pharmacy dosing per levels)     Ordering Provider: Oskar  Corey HUERTA IV, PharmD     Does not apply Daily 10/05/21 0900 10/07/21 0859    10/05/21 0134  Pharmacy to dose vancomycin     Ordering Provider: Laquita Toth APRN     Does not apply Continuous PRN 10/05/21 0133 10/10/21 0132    10/04/21 2101  vancomycin 1750 mg/500 mL 0.9% NS IVPB ([facility])     Ordering Provider: Toño Lainez MD    20 mg/kg × 81.9 kg (Adjusted)  250 mL/hr over 120 Minutes Intravenous Once 10/04/21 2103 10/05/21 0132    10/04/21 2101  cefepime (MAXIPIME) 2 g/100 mL 0.9% NS (mbp)     Ordering Provider: Toño Lainez MD    2 g  200 mL/hr over 30 Minutes Intravenous Once 10/04/21 2103 10/04/21 2250    10/04/21 2101  metroNIDAZOLE (FLAGYL) 500 mg/100mL IVPB     Laquita Toth APRN reviewed the order on 10/05/21 0135.   Ordering Provider: Laquita Toth APRN    500 mg  over 30 Minutes Intravenous Every 8 Hours 10/04/21 2103 10/09/21 2102            Review of Systems    Constitutional-- No Fever, chills or sweats.  Appetite good, and no malaise. No fatigue.  Heent-- No new vision, hearing or throat complaints.  No epistaxis or oral sores.  Denies odynophagia or dysphagia.    CV-- No chest pain, palpitation or syncope  Resp-- No SOB/cough/Hemoptysis  GI- right-sided abdominal pain and redness. No nausea, vomiting, or diarrhea.  No hematochezia, melena, or hematemesis. Denies jaundice or chronic liver disease.  -- No dysuria, hematuria, or flank pain.  Denies hesitancy, urgency or flank pain.  Lymph- no swollen lymph nodes in neck/axilla or groin.   Heme- No active bruising or bleeding; no Hx of DVT or PE.  MS-- no swelling or pain in the bones or joints of arms/legs.  No new back pain.  Neuro-- No acute focal weakness or numbness in the arms or legs.  No seizures.  Skin--aside from abdominal wall redness and swelling and tenderness--> No rashes, lesions, ulcers. No skin breakdown    12 systems were reviewed and were negative otherwise for acute complaints, except as above and per  "HPI    Physical Exam:   Vital Signs   /74   Pulse 79   Temp 98.4 °F (36.9 °C) (Oral)   Resp 16   Ht 170.2 cm (67\")   Wt 102 kg (224 lb 8 oz)   SpO2 94%   BMI 35.16 kg/m²     GENERAL: Awake and alert, in no acute distress. Sitting up in bed  HENT: Normocephalic, atraumatic.   Oropharynx clear without evidence of thrush or exudate. No evidence of peridontal disease.    Eyes: Pupils equal and round. No conjunctival injection. No icterus.  NECK: Supple without nuchal rigidity. No mass.  HEART: RRR; No murmur, rubs, gallops.   LUNGS: Clear to auscultation bilaterally without wheezing, rales, rhonchi. Normal respiratory effort. Nonlabored.  ABDOMEN: Soft, right-sided abdominal tenderness to palpation, some mild erythema noted over right abdominal wall near prior incision sites, no drainage noted, nondistended. Positive bowel sounds. No rebound or guarding. NO mass or HSM.  EXT:  No cyanosis, clubbing or edema. No cord.  :  Without Meyers catheter.  MSK: FROM without joint effusions noted arms/legs.    SKIN: Other than abdominal wall, No rashes noted over exposed skin. No jaundice. No peripheral stigmata of infective endocarditis.  NEURO: Oriented to PPT. Normal speech and cognition  PSYCHIATRIC: Normal insight and judgement. Cooperative with PE    Groshong cath site is without erythema or drainage    Laboratory Data    Results from last 7 days   Lab Units 10/05/21  0757 10/04/21  2041   WBC 10*3/mm3 13.69* 24.54*   HEMOGLOBIN g/dL 10.6* 12.0*   HEMATOCRIT % 35.9* 38.3   PLATELETS 10*3/mm3 130* 196     Results from last 7 days   Lab Units 10/05/21  0841   SODIUM mmol/L 138   POTASSIUM mmol/L 4.7   CHLORIDE mmol/L 106   CO2 mmol/L 20.0*   BUN mg/dL 32*   CREATININE mg/dL 3.09*   GLUCOSE mg/dL 99   CALCIUM mg/dL 8.1*     Results from last 7 days   Lab Units 10/05/21  0841   ALK PHOS U/L 176*   BILIRUBIN mg/dL 1.4*   ALT (SGPT) U/L 94*   AST (SGOT) U/L 137*         Results from last 7 days   Lab Units " 10/04/21  2041   CRP mg/dL 46.11*       Estimated Creatinine Clearance: 26.1 mL/min (A) (by C-G formula based on SCr of 3.09 mg/dL (H)).       Microbiology:  Blood culture ×2: In process    Urine culture: In process    Radiology:  CT Abdomen Pelvis Without Contrast    Result Date: 10/4/2021  1. Cholecystectomy with a fluid collection in the gallbladder fossa containing a bubble of air. Differential considerations include seroma/hematoma, biloma, or possibly a developing abscess. No biliary obstruction is seen. 2. Lung nodules in the visualized lung bases, not all of which have been previously evaluated. Therefore, chest CT follow-up in 6 months is recommended per Fleischner criteria. 3. No acute findings in the GI tract. There is colonic diverticulosis. 4. Left renal atrophy with bilateral nonobstructing renal stones. There are no ureteral stones, and there is no hydronephrosis. 5. Fat stranding in the ventral abdominal wall and right flank as above could reflect bland edema or cellulitis. 6. Additional findings as above. Recommend consideration for referral to [facility]. For questions or to make appointment call [phone]. Signer Name: Caden Rodriguez MD  Signed: 10/4/2021 10:22 PM  Workstation Name: [facility]  [facility] ERCP pancreatic and biliary ducts    Result Date: 9/28/2021  Intraoperative fluoroscopy was utilized during ERCP. Please see procedure report for full details.  D:  09/28/2021 E:  09/28/2021    This report was finalized on 9/28/2021 5:44 PM by Dr. Jevon Hartmann.      US Renal Limited    Result Date: 9/30/2021  Wall thickening of the bladder with debris seen within the bladder. There is a cyst identified on the left kidney with the remainder of the renal ultrasound grossly unremarkable.  D:  09/29/2021 E:  09/29/2021    This report was finalized on 9/30/2021 4:21 PM by Dr. Beth Downs MD.      XR Chest 1 View    Result Date:  10/4/2021  There appears to be slightly increased density at the right lung base, and it is uncertain if this may represent developing pneumonia. Signer Name: Laquita Kelly MD  Signed: 10/4/2021 9:46 PM  Workstation Name: [id number]  [facility] Cholangiogram Operative    Result Date: 9/27/2021  Intraoperative fluoroscopy utilized during intraoperative cholangiogram.  D:  09/27/2021 E:  09/27/2021   This report was finalized on 9/27/2021 4:34 PM by Dr. Jevon Hartmann.      IR Insert Tunneled CV Catheter Without Port 5 Plus    Result Date: 9/29/2021  Impression:    Successful ultrasound and fluoroscopic guided right internal jugular vein route dual lumen power line  placement as described above.  Thank you for the opportunity to assist in the care of your patient.  This report was finalized on 9/29/2021 11:39 AM by Thomas Paige MD.       I independently read the patient's CT abdomen and pelvis from 10/4-I agree with the above report    Impression:     Problems:  -Sepsis with leukocytosis with neutrophilia, lactic acidosis, elevated pro calcitonin level- due to intra-abdominal source/abdominal wall cellulitis  -Abdominal wall cellulitis  -Possible developing intra-abdominal abscess vs. Seroma. No signs of biliary leak on NM hepatobiliary scan  -Recent cholangitis and cholecystitis status post recent cholecystectomy on 9/27 and ERCP on 9/28  -Recent enterococcus bacteremia  -Recent Clostridium perfringens bacteremia  -Recent pancreatitis  -Macrocytic anemia  -Elevated troponin level  -Elevated LFTs  -RUDY on CKD stage IIIB- due to sepsis vs dehydration  -Coronary artery disease status post CABG/stents  -systolic heart failure, LVEF was 26-30% on echo in 09/2021  -history of cardiac thrombus, on anticoagulation with eliquis  -Diabetes mellitus type 2 with hyperglycemia  -Essential hypertension  -Hyperlipidemia  -Sarcoidosis/chronic prednisone  -prolonged QTc interval    PLAN: Thank you for  asking us to see Jeremías Soto, I recommend the following:     -continue to follow cbc with diff, cmp, crp, and lactic acid  -follow pending blood cultures and urine cx  -I agree with IR drainage procedure for fluid collection as soon as we can do this safely. Cardiology ok with holding Eliquis for 48 hrs and then doing procedure on 10/7. I will follow cultures from this procedure  -stop vancomycin given his renal dysfunction  -Stop cefepime and flagyl  -Restart Zosyn  -Start empiric Daptomycin  -Start empiric Micafungin (avoiding fluconazole in the setting of his prolonged QTc interval)  -surgery following. No signs of biliary leak on NM hepatobiliary scan  -weekly Groshong cath dressing changes    I discussed in length with the patient's wife over the phone today    Complex MDM. The patient has significant risk for further morbidity and mortality in the setting of his multiple complex medical issues.     Diaz Moreno MD  10/5/2021

## 2021-10-05 NOTE — PROGRESS NOTES
Pharmacy Consult-Vancomycin Dosing  Jeremías Soto is a  66 y.o. male receiving vancomycin therapy.     Indication: SSTI, sepsis  Consulting Provider: Hospital Medicine  ID Consult: Y    Goal Trough:    Current Antimicrobial Therapy  Anti-Infectives (From admission, onward)      Ordered     Dose/Rate Route Frequency Start Stop    10/05/21 0136  cefepime (MAXIPIME) 2 g/100 mL 0.9% NS (mbp)     Note to Pharmacy: First dose given in ED.   Ordering Provider: Corey Schmitt IV, PharmD    2 g  over 4 Hours Intravenous Every 24 Hours 10/05/21 1200 10/12/21 1159    10/05/21 0144  vancomycin - no scheduled doses (Pharmacy dosing per levels)     Ordering Provider: Corey Schmitt IV, PharmD     Does not apply Daily 10/05/21 0900 10/07/21 0859    10/05/21 0134  Pharmacy to dose vancomycin     Ordering Provider: Laquita Toth APRN     Does not apply Continuous PRN 10/05/21 0133 10/10/21 0132    10/04/21 2101  vancomycin 1750 mg/500 mL 0.9% NS IVPB ([facility])     Ordering Provider: Toño Lainez MD    20 mg/kg × 81.9 kg (Adjusted)  250 mL/hr over 120 Minutes Intravenous Once 10/04/21 2103 10/05/21 0132    10/04/21 2101  cefepime (MAXIPIME) 2 g/100 mL 0.9% NS (mbp)     Ordering Provider: Toño Lainez MD    2 g  200 mL/hr over 30 Minutes Intravenous Once 10/04/21 2103 10/04/21 2250    10/04/21 2101  metroNIDAZOLE (FLAGYL) 500 mg/100mL IVPB     Laquita Toth APRN reviewed the order on 10/05/21 0135.   Ordering Provider: Laquita Toth APRN    500 mg  over 30 Minutes Intravenous Every 8 Hours 10/04/21 2103 10/09/21 2102            Allergies  Allergies as of 10/04/2021    (No Known Allergies)       Labs    Results from last 7 days   Lab Units 10/05/21  0841 10/04/21  2041 09/29/21  0721   BUN mg/dL 32* 27* 28*   CREATININE mg/dL 3.09* 2.84* 1.82*       Results from last 7 days   Lab Units 10/05/21  0757 10/04/21  2041   WBC 10*3/mm3 13.69* 24.54*       Evaluation of Dosing     Last Dose Received in the  "ED/Outside Facility: 1750 mg @ 2322  Is Patient on Dialysis or Renal Replacement: N    Ht - 170.2 cm (67\")  Wt - 102 kg (224 lb 8 oz)    Estimated Creatinine Clearance: 26.1 mL/min (A) (by C-G formula based on SCr of 3.09 mg/dL (H)).    Intake & Output (last 3 days)         10/02 0701 - 10/03 0700 10/03 0701 - 10/04 0700 10/04 0701 - 10/05 0700 10/05 0701 - 10/06 0700    IV Piggyback   1200     Total Intake(mL/kg)   1200 (11.8)     Urine (mL/kg/hr)   100     Total Output   100     Net   +1100                     Microbiology and Radiology  Microbiology Results (last 10 days)       Procedure Component Value - Date/Time    COVID PRE-OP / PRE-PROCEDURE SCREENING ORDER (NO ISOLATION) - Swab, Nasopharynx [[id number]]  (Normal) Collected: 10/05/21 0015    Lab Status: Final result Specimen: Swab from Nasopharynx Updated: 10/05/21 0043    Narrative:      The following orders were created for panel order COVID PRE-OP / PRE-PROCEDURE SCREENING ORDER (NO ISOLATION) - Swab, Nasopharynx.  Procedure                               Abnormality         Status                     ---------                               -----------         ------                     COVID-19, ABBOTT IN-HOUS...[[id number]]  Normal              Final result                 Please view results for these tests on the individual orders.    COVID-19, ABBOTT IN-HOUSE,NASAL Swab (NO TRANSPORT MEDIA) 2 HR TAT - Swab, Nasopharynx [[id number]]  (Normal) Collected: 10/05/21 0015    Lab Status: Final result Specimen: Swab from Nasopharynx Updated: 10/05/21 0043     COVID19 Presumptive Negative    Narrative:      Fact sheet for providers: https://www.fda.gov/media/590817/download     Fact sheet for patients: https://www.fda.gov/media/407919/download    Test performed by PCR.  If inconsistent with clinical signs and symptoms patient should be tested with different authorized molecular test.    Urine Culture - Urine, Urine, Clean Catch [[id number]]  (Normal) Collected: " 10/04/21 2151    Lab Status: Preliminary result Specimen: Urine, Clean Catch Updated: 10/05/21 1414     Urine Culture No growth    Blood Culture - Blood, Hand, Right [[id number]] Collected: 09/26/21 1046    Lab Status: Final result Specimen: Blood from Hand, Right Updated: 10/01/21 1146     Blood Culture No growth at 5 days    Blood Culture - Blood, Hand, Left [[id number]] Collected: 09/26/21 1046    Lab Status: Final result Specimen: Blood from Hand, Left Updated: 10/01/21 1146     Blood Culture No growth at 5 days            Vancomycin Levels:                Results from last 7 days   Lab Units 10/05/21  1156   VANCOMYCIN TR mcg/mL 16.10         Assessment/Plan:    Advanced age obese male with acute on chronic kidney disease. Chart reviewed, micro history evaluated with significant history of Enterococcus faecalis and C. Perfringens  Status post 1750 mg (17.2 mg/kg) IV vancomycin load on 10/4 @2332.  Random level collected ~12hrs post dose and is therapeutic at 16.1 mcg/mL. However, will not give a dose this evening given increase in Scr. Will obtain random level with AM on 10/6    Thank you for this consult.  Alcira Henriquez, PharmD, BCPS  10/5/2021  14:47 EDT

## 2021-10-05 NOTE — CONSULTS
"Pharmacy Consult-Vancomycin Dosing  Jeremías Soto is a  66 y.o. male receiving vancomycin therapy.     Indication: SSTI, sepsis  Consulting Provider: Hospital Medicine  ID Consult: Y    Goal Trough:    Current Antimicrobial Therapy  Anti-Infectives (From admission, onward)      Ordered     Dose/Rate Route Frequency Start Stop    10/05/21 0136  cefepime (MAXIPIME) 2 g/100 mL 0.9% NS (mbp)     Note to Pharmacy: First dose given in ED.   Ordering Provider: Corey Schmitt IV, PharmD    2 g  over 4 Hours Intravenous Every 24 Hours 10/05/21 1200 10/12/21 1159    10/05/21 0134  Pharmacy to dose vancomycin     Ordering Provider: Laquita Toth APRN     Does not apply Continuous PRN 10/05/21 0133 10/10/21 0132    10/04/21 2101  vancomycin 1750 mg/500 mL 0.9% NS IVPB ([facility])     Ordering Provider: Toño Lainez MD    20 mg/kg × 81.9 kg (Adjusted)  250 mL/hr over 120 Minutes Intravenous Once 10/04/21 2103 10/05/21 0132    10/04/21 2101  cefepime (MAXIPIME) 2 g/100 mL 0.9% NS (mbp)     Ordering Provider: Toño Lainez MD    2 g  200 mL/hr over 30 Minutes Intravenous Once 10/04/21 2103 10/04/21 2250    10/04/21 2101  metroNIDAZOLE (FLAGYL) 500 mg/100mL IVPB     Laquita Toth APRN reviewed the order on 10/05/21 0135.   Ordering Provider: Laquita Toth APRN    500 mg  over 30 Minutes Intravenous Every 8 Hours 10/04/21 2103 10/09/21 2102            Allergies  Allergies as of 10/04/2021    (No Known Allergies)       Labs    Results from last 7 days   Lab Units 10/04/21  2041 09/29/21  0721 09/28/21  0516   BUN mg/dL 27* 28* 29*   CREATININE mg/dL 2.84* 1.82* 2.15*       Results from last 7 days   Lab Units 10/04/21  2041 09/28/21  0516   WBC 10*3/mm3 24.54* 13.29*       Evaluation of Dosing     Last Dose Received in the ED/Outside Facility: 1750 mg @ 2322  Is Patient on Dialysis or Renal Replacement: N    Ht - 170.2 cm (67\")  Wt - 102 kg (224 lb 14.4 oz)    Estimated Creatinine Clearance: 28.3 mL/min " (A) (by C-G formula based on SCr of 2.84 mg/dL (H)).    Intake & Output (last 3 days)         10/02 0701 - 10/03 0700 10/03 0701 - 10/04 0700 10/04 0701 - 10/05 0700    IV Piggyback   1200    Total Intake(mL/kg)   1200 (11.8)    Net   +1200                   Microbiology and Radiology  Microbiology Results (last 10 days)       Procedure Component Value - Date/Time    COVID PRE-OP / PRE-PROCEDURE SCREENING ORDER (NO ISOLATION) - Swab, Nasopharynx [[id number]]  (Normal) Collected: 10/05/21 0015    Lab Status: Final result Specimen: Swab from Nasopharynx Updated: 10/05/21 0043    Narrative:      The following orders were created for panel order COVID PRE-OP / PRE-PROCEDURE SCREENING ORDER (NO ISOLATION) - Swab, Nasopharynx.  Procedure                               Abnormality         Status                     ---------                               -----------         ------                     COVID-19, [facility]OTT IN-HOUS...[[id number]]  Normal              Final result                 Please view results for these tests on the individual orders.    COVID-19, [facility] IN-HOUSE,NASAL Swab (NO TRANSPORT MEDIA) 2 HR TAT - Swab, Nasopharynx [[id number]]  (Normal) Collected: 10/05/21 0015    Lab Status: Final result Specimen: Swab from Nasopharynx Updated: 10/05/21 0043     COVID19 Presumptive Negative    Narrative:      Fact sheet for providers: https://www.fda.gov/media/451357/download     Fact sheet for patients: https://www.fda.gov/media/601824/download    Test performed by PCR.  If inconsistent with clinical signs and symptoms patient should be tested with different authorized molecular test.    Blood Culture - Blood, Hand, Right [[id number]] Collected: 09/26/21 1046    Lab Status: Final result Specimen: Blood from Hand, Right Updated: 10/01/21 1146     Blood Culture No growth at 5 days    Blood Culture - Blood, Hand, Left [[id number]] Collected: 09/26/21 1046    Lab Status: Final result Specimen: Blood from Hand, Left Updated:  10/01/21 1146     Blood Culture No growth at 5 days    COVID PRE-OP / PRE-PROCEDURE SCREENING ORDER (NO ISOLATION) - Swab, Nasopharynx [[id number]]  (Normal) Collected: 09/25/21 0600    Lab Status: Final result Specimen: Swab from Nasopharynx Updated: 09/25/21 1654    Narrative:      The following orders were created for panel order COVID PRE-OP / PRE-PROCEDURE SCREENING ORDER (NO ISOLATION) - Swab, Nasopharynx.  Procedure                               Abnormality         Status                     ---------                               -----------         ------                     COVID-19, APTIMA PANTHER...[[id number]]  Normal              Final result                 Please view results for these tests on the individual orders.    COVID-19, APTIMA PANTHER BONNY IN-HOUSE NP/OP SWAB IN UTM/VTM/SALINE TRANSPORT MEDIA 24HR TAT - Swab, Nasopharynx [[id number]]  (Normal) Collected: 09/25/21 0600    Lab Status: Final result Specimen: Swab from Nasopharynx Updated: 09/25/21 1654     COVID19 Not Detected    Narrative:      Fact sheet for providers: https://www.fda.gov/media/912876/download     Fact sheet for patients: https://www.fda.gov/media/663833/download    Test performed by RT PCR.            Vancomycin Levels:                        Assessment/Plan:    Advanced age obese male with acute on chronic kidney disease. Chart reviewed, micro history evaluated with significant history of Enterococcus faecalis and C. Perfringens  Status post 1750 mg (17.2 mg/kg) IV vancomycin load before midnight.  Random level at noon today to assess exposure/clearance.  Further dosing per rounding clinical pharmacist later today.    Thank you for this consult.  Corey Schmitt IV, PharmD, BCPS  10/5/2021  01:41 EDT

## 2021-10-05 NOTE — PROGRESS NOTES
[facility] Medicine Services  ADMISSION FOLLOW-UP NOTE          Patient admitted after midnight, H&P by my partner performed earlier on today's date reviewed.  Interim findings, labs, and charting also reviewed.        The [facility] Hospital Problem List has been managed and updated to include any new diagnoses:  Active Hospital Problems    Diagnosis  POA   • **Sepsis (HCC) [A41.9]  Yes   • Post-operative infection [T81.40XA]  Yes   • Abdominal wall cellulitis [L03.311]  Yes   • Postoperative intra-abdominal abscess [T81.43XA]  Yes   • Elevated troponin [R77.8]  Yes   • CAD (coronary artery disease) [I25.10]  Yes   • CKD (chronic kidney disease), stage III (HCC) [N18.30]  Yes   • Acute kidney injury superimposed on chronic kidney disease (HCC) [N17.9, N18.9]  Yes   • Elevated LFTs [R79.89]  Yes   • Combined systolic and diastolic cardiac dysfunction (EF < 50% 2018) [I51.89]  Yes   • History of sarcoidosis (occular) [Z86.2]  Yes   • Moderate obstructive sleep apnea [G47.33]  Yes   • Diabetes mellitus (HCC) [E11.9]  Yes   • Hypertension [I10]  Yes      Resolved Hospital Problems   No resolved problems to display.         ADDITIONAL PLAN:  - detailed assessment and plan from admission reviewed  -Seen and evaluated at bedside today    Summary: This is a 66-year-old male with systolic CHF (46-50%), chronic multiorgan disease, admitted 9/23-9/29 w/ enterococcal/clostridial bacteremia, cholangitis, questionable pyelonephritis-s/p lap farooq 9/27 and ERCP 9/28 discharged home on IV Zosyn with a planned completion date 10/11 who returned with several days worsening erythema of the abdomen and imaging in the ED concerning for fluid collection in the gallbladder fossa    Assessment/plan    Sepsis, favor abdominal source  Abnormal abdominal fluid collection  Recent cholecystitis  Recent enterococcal/clostridial bacteremia  -S/p lap farooq 9/27, ERCP 9/28  -Unclear if 2.5 cm x 8.5 cm fluid collection is  abscess, seroma, biloma, etc.  -This a.m. continue vancomycin/cefepime/Flagyl, ID consult pending  -Discussed with GS, initial plan for draining of fluid, however INR 3.59, in the interim obtain HIDA scan  -GES planning cardiology consultation for opinion on reversal of INR (takes Eliquis)  -Continue probiotic    RUDY on CKD 3  -Baseline Cr 1.9-2.3; EGFR 30  -Labs pending this morning, monitor  -Recent RUDY last admission    DM type 2, A1c 7.3%, without long-term use of insulin  -Accu-Cheks with SSI    CAD with prior CABG/stent  Chronic systolic/diastolic CHF  Mild troponin elevation  Hx multiple TIA  -Echo 9/24/2021 EF 26-30%  -Continue atorvastatin, Lopressor  -Chronically on Eliquis, currently on hold  -Lisinopril on hold for RUDY    Elevated LFTs  -Recent admission cholangitis, stable labs     Hypertension  Hyperlipidemia  -Continues on statin; if LFTs trending up may need to hold    Ocular sarcoidosis  Bilateral lung nodules  -Repeat dedicated chest CT 6 months outpatient regarding nodules  -Continue chronic oral prednisone 10 mg    Obesity, BMI 35.16 kg/M2  EVA  Hx COVID-19 December 2020    Don Draper, DO  10/05/21

## 2021-10-05 NOTE — H&P
[facility] Medicine Services  HISTORY AND PHYSICAL    Patient Name: Jeremías Soto  : 1955  MRN: [id number]  Primary Care Physician: Vincent Crawford MD  Date of admission: 10/4/2021    Subjective   Subjective     Chief Complaint:  Abdominal redness, swelling     HPI:  Jeremías Soto is a 66 y.o. male combined systolic/diastolic CHF w/ EF 46-50%, CAD s/p CABG + Stents, HTN, HLD, T2DM, CKD Stage III, EVA not on CPAP, Sarcoidosis (data deficit), COVID 2020 who presented to the ED w/ c/o abdominal redness and swelling.   Pt admitted  through  w/ severe sepsis, enterococcus/clostridium bacteremia, acute cholangitis and complicated UTI/pyelonephritis. Pt initially required ICU admission. Pt presented w/ fever, leukocytosis, hypotension, elevated bilirubin and elevated LFTs. Dr. Wolfe performed laparoscopic cholecystectomy with intraoperative cholangiogram on 2021. GI consulted and pt underwent an ERCP on  that was c/w several black tones/sludge. Pt seen by ID and discharged on IV Zosyn through 10/11/2021. Pt also seen by Nephrology for RUDY thought to be 2/2 severe sepsis. Kidney function improved during admission. Prior to being d/c home pt had a Groshong catheter placed for long-term access.   Pt reports that he had been doing ok at home until 2-3 days ago when he noted erythema on his right abdomin. The erythema has progressively gotten worse and pt reports edema, warmth and discomfort. Pt also c/o decreased oral intake 2/2 intermittent nausea and decreased appetite.   Pt denies fever/chills, chest pain, dyspnea, cough, V/D, dysuria, edema, syncope, confusion.   Pt evaluated in the ED. CT abd/pel concerning for abdominal wall cellulitis and possible intra abdominal abscess. Labs and VS c/w severe sepsis w/ WBC 24.5, lactic acid 4.1, heart rate 105. Pt admitted to the hospital medicine service for further evaluation.     COVID Details:     Symptoms:    [x] NONE [] Fever []  Cough [] Shortness of breath [] Change in taste/smell      The patient qualifies to receive the vaccine, but they have not yet received it.    Review of Systems   Constitutional: Positive for appetite change. Negative for chills, diaphoresis, fatigue and fever.        Decreased oral intake 2/2 decreased appetite and nausea.    HENT: Negative.  Negative for congestion, rhinorrhea, sinus pressure, sinus pain, sneezing, sore throat and trouble swallowing.    Eyes: Negative.  Negative for visual disturbance.   Respiratory: Negative.  Negative for cough, shortness of breath and wheezing.    Cardiovascular: Negative.  Negative for chest pain, palpitations and leg swelling.   Gastrointestinal: Positive for abdominal pain and nausea. Negative for abdominal distention, blood in stool, constipation, diarrhea and vomiting.   Endocrine: Negative.    Genitourinary: Positive for decreased urine volume. Negative for difficulty urinating, dysuria, flank pain, frequency, hematuria and urgency.   Musculoskeletal: Negative.  Negative for arthralgias, myalgias, neck pain and neck stiffness.   Skin: Negative.  Negative for wound.   Neurological: Negative.  Negative for dizziness, tremors, seizures, syncope, facial asymmetry, speech difficulty, weakness, light-headedness, numbness and headaches.   Hematological: Negative.  Does not bruise/bleed easily.   Psychiatric/Behavioral: Negative.  Negative for confusion.   All other systems reviewed and are negative.     Personal History     Past Medical History:   Diagnosis Date   • Cancer (CMS/HCC)     skin   • Coronary artery disease    • Diabetes mellitus (CMS/HCC)    • Elevated cholesterol    • Heart attack (CMS/HCC)    • Hypertension    • Sarcoid    • Sleep apnea     no cpap   • SOB (shortness of breath)    • Stroke (CMS/HCC)     Pt. states that he has had 2 mini strokes     Past Surgical History:   Procedure Laterality Date   • CARDIAC CATHETERIZATION       cardiac stent x2 after s/p CABG   • CARDIAC SURGERY      2002   • CATARACT EXTRACTION, BILATERAL     • CHOLECYSTECTOMY WITH INTRAOPERATIVE CHOLANGIOGRAM N/A 9/27/2021    Procedure: CHOLECYSTECTOMY LAPAROSCOPIC INTRAOPERATIVE CHOLANGIOGRAM;  Surgeon: Gaudencio Wolfe MD;  Location: [facility] OR;  Service: General;  Laterality: N/A;   • COLONOSCOPY     • CORONARY ARTERY BYPASS GRAFT     • ENDOSCOPY     • ERCP N/A 9/28/2021    Procedure: ENDOSCOPIC RETROGRADE CHOLANGIOPANCREATOGRAPHY;  Surgeon: Brunner, Mark I, MD;  Location: [facility] ENDOSCOPY;  Service: Gastroenterology;  Laterality: N/A;  sphincterotomy made, balloon sweep of bile duct done with 9-12 balloon.    • EXTRACORPOREAL SHOCKWAVE LITHOTRIPSY (ESWL), STENT INSERTION/REMOVAL Bilateral 8/21/2020    Procedure: EXTRACORPOREAL SHOCKWAVE LITHOTRIPSY BILATERAL WITH STENT PLACEMENT LEFT;  Surgeon: Ethan Barnes Jr., MD;  Location: [facility] OR;  Service: Urology;  Laterality: Bilateral;   • SKIN CANCER EXCISION       Family History: family history includes COPD in his mother; Cancer in his brother; Diabetes in his father and sister; Heart disease in his father; Hypertension in his father; Obesity in his sister. Otherwise pertinent FHx was reviewed and unremarkable.     Social History:  reports that he has never smoked. He has never used smokeless tobacco. He reports that he does not drink alcohol and does not use drugs.  Social History     Social History Narrative   • Not on file     Medications:  No current facility-administered medications on file prior to encounter.     Current Outpatient Medications on File Prior to Encounter   Medication Sig Dispense Refill   • apixaban (ELIQUIS) 5 MG tablet tablet Take 5 mg by mouth 2 (Two) Times a Day.     • atorvastatin (LIPITOR) 40 MG tablet Take 40 mg by mouth Daily.  1   • esomeprazole (nexIUM) 20 MG capsule Take 20 mg by mouth Every Morning Before Breakfast.     • glimepiride (AMARYL) 2 MG tablet Take 2 mg by  mouth Every Morning Before Breakfast.     • metoprolol tartrate (LOPRESSOR) 25 MG tablet Take 25 mg by mouth 2 (Two) Times a Day.  2   • predniSONE (DELTASONE) 10 MG tablet Take 10 mg by mouth Daily.     • tamsulosin (FLOMAX) 0.4 MG capsule 24 hr capsule Take 0.4 mg by mouth Daily.     • dicyclomine (BENTYL) 20 MG tablet Take 20 mg by mouth.     • dorzolamide-timolol (COSOPT) 22.3-6.8 MG/ML ophthalmic solution Administer 1 drop to both eyes 2 (Two) Times a Day.     • lactobacillus acidophilus (RISAQUAD) capsule capsule Take 1 capsule by mouth Daily for 20 days. 20 capsule 0   • lisinopril (PRINIVIL,ZESTRIL) 10 MG tablet Take 10 mg by mouth Daily.     • metFORMIN (GLUCOPHAGE) 500 MG tablet Take 500 mg by mouth 2 (Two) Times a Day.  1   • [] oxyCODONE-acetaminophen (PERCOCET) 5-325 MG per tablet Take 1 tablet by mouth Every 4 (Four) Hours As Needed for Moderate Pain  for up to 5 days. 10 tablet 0   • raNITIdine (ZANTAC) 150 MG tablet Zantac Maximum Strength 150 mg tablet   Daily     • rOPINIRole (REQUIP) 0.25 MG tablet Take 1 tablet by mouth Every Night. Take 1 hour before bedtime. 30 tablet 3       No Known Allergies    Objective   Objective     Vital Signs:   Temp:  [98.4 °F (36.9 °C)-99.1 °F (37.3 °C)] 98.4 °F (36.9 °C)  Heart Rate:  [] 87  Resp:  [16-18] 16  BP: ()/(56-74) 132/74    Physical Exam     Constitutional: Awake, alert; acutely ill appearing   Eyes: PERRLA, sclerae anicteric, no conjunctival injection  HENT: NCAT, mucous membranes moist  Neck: Supple, no thyromegaly, no lymphadenopathy, trachea midline  Respiratory: Clear to auscultation bilaterally, nonlabored respirations   Cardiovascular: RRR, no murmurs, rubs, or gallops, +1 edema BLE   Gastrointestinal: high pitched bowel sounds throughout; abdomin full/distended w/ generalized TTP; right abd w/ erythema, pitting edema and warmth   Musculoskeletal: Normal ROM bilaterally   Psychiatric: Appropriate affect, cooperative  Neurologic:  Oriented x 3, strength symmetric in all extremities, Cranial Nerves grossly intact to confrontation, speech clear  Skin: No rashes, lesions; right abdomin w/ erythema, edema and warmth; right chest w/ deep line port- secure     Results Reviewed:  I have personally reviewed most recent indicated data and agree with findings including:  [x]  Laboratory  [x]  Radiology  []  EKG/Telemetry  []  Pathology  []  Cardiac/Vascular Studies  []  Old records  []  Other:    LAB RESULTS:      Lab 10/04/21  2338 10/04/21  2041 09/28/21  0516   WBC  --  24.54* 13.29*   HEMOGLOBIN  --  12.0* 11.0*   HEMATOCRIT  --  38.3 34.8*   PLATELETS  --  196 109*   NEUTROS ABS  --  22.51* 12.33*   IMMATURE GRANS (ABS)  --  0.39* 0.14*   LYMPHS ABS  --  0.50* 0.36*   MONOS ABS  --  1.08* 0.45   EOS ABS  --  0.00 0.00   MCV  --  92.7 94.1   CRP  --  46.11*  --    LACTATE 2.9* 4.1*  --    PROTIME  --  27.7*  --    APTT  --  52.0*  --          Lab 10/04/21  2041 09/29/21  0721 09/28/21  0516   SODIUM 139 137 135*   POTASSIUM 4.7 3.8 4.5   CHLORIDE 102 108* 106   CO2 18.0* 19.0* 19.0*   ANION GAP 19.0* 10.0 10.0   BUN 27* 28* 29*   CREATININE 2.84* 1.82* 2.15*   GLUCOSE 82 117* 214*   CALCIUM 8.6 7.6* 7.6*   MAGNESIUM 2.0  --   --    PHOSPHORUS 3.6 2.1*  --          Lab 10/04/21  2041 09/29/21  0721 09/28/21  0516   TOTAL PROTEIN 6.1  --  5.3*   ALBUMIN 2.70* 2.60* 2.70*   GLOBULIN 3.4  --  2.6   ALT (SGPT) 95*  --  144*   AST (SGOT) 90*  --  90*   BILIRUBIN 2.0*  --  0.9   ALK PHOS 206*  --  184*   LIPASE 8*  --   --          Lab 10/04/21  2250 10/04/21  2041   PROBNP  --  8,353.0*   TROPONIN T 0.054* 0.071*   PROTIME  --  27.7*   INR  --  2.72*             Lab 09/29/21  0721   IRON 16*   IRON SATURATION 7*   TIBC 246*   TRANSFERRIN 165*   FERRITIN 163.10         Brief Urine Lab Results  (Last result in the past 365 days)      Color   Clarity   Blood   Leuk Est   Nitrite   Protein   CREAT   Urine HCG        10/04/21 2151 Yellow Slightly Cloudy Trace  Small (1+) Negative 30 mg/dL (1+)             Microbiology Results (last 10 days)     Procedure Component Value - Date/Time    COVID PRE-OP / PRE-PROCEDURE SCREENING ORDER (NO ISOLATION) - Swab, Nasopharynx [[id number]]  (Normal) Collected: 10/05/21 0015    Lab Status: Final result Specimen: Swab from Nasopharynx Updated: 10/05/21 0043    Narrative:      The following orders were created for panel order COVID PRE-OP / PRE-PROCEDURE SCREENING ORDER (NO ISOLATION) - Swab, Nasopharynx.  Procedure                               Abnormality         Status                     ---------                               -----------         ------                     COVID-19, ABBOTT IN-HOUS...[[id number]]  Normal              Final result                 Please view results for these tests on the individual orders.    COVID-19, ABBOTT IN-HOUSE,NASAL Swab (NO TRANSPORT MEDIA) 2 HR TAT - Swab, Nasopharynx [[id number]]  (Normal) Collected: 10/05/21 0015    Lab Status: Final result Specimen: Swab from Nasopharynx Updated: 10/05/21 0043     COVID19 Presumptive Negative    Narrative:      Fact sheet for providers: https://www.fda.gov/media/612367/download     Fact sheet for patients: https://www.fda.gov/media/224729/download    Test performed by PCR.  If inconsistent with clinical signs and symptoms patient should be tested with different authorized molecular test.    Blood Culture - Blood, Hand, Right [[id number]] Collected: 09/26/21 1046    Lab Status: Final result Specimen: Blood from Hand, Right Updated: 10/01/21 1146     Blood Culture No growth at 5 days    Blood Culture - Blood, Hand, Left [[id number]] Collected: 09/26/21 1046    Lab Status: Final result Specimen: Blood from Hand, Left Updated: 10/01/21 1146     Blood Culture No growth at 5 days    COVID PRE-OP / PRE-PROCEDURE SCREENING ORDER (NO ISOLATION) - Swab, Nasopharynx [[id number]]  (Normal) Collected: 09/25/21 0600    Lab Status: Final result Specimen: Swab from Nasopharynx  Updated: 09/25/21 1654    Narrative:      The following orders were created for panel order COVID PRE-OP / PRE-PROCEDURE SCREENING ORDER (NO ISOLATION) - Swab, Nasopharynx.  Procedure                               Abnormality         Status                     ---------                               -----------         ------                     COVID-19, APTIMA PANTHER...[[id number]]  Normal              Final result                 Please view results for these tests on the individual orders.    COVID-19, APTIMA PANTHER BONNY IN-HOUSE NP/OP SWAB IN UTM/VTM/SALINE TRANSPORT MEDIA 24HR TAT - Swab, Nasopharynx [[id number]]  (Normal) Collected: 09/25/21 0600    Lab Status: Final result Specimen: Swab from Nasopharynx Updated: 09/25/21 1654     COVID19 Not Detected    Narrative:      Fact sheet for providers: https://www.fda.gov/media/777748/download     Fact sheet for patients: https://www.fda.gov/media/752895/download    Test performed by RT PCR.          CT Abdomen Pelvis Without Contrast    Result Date: 10/4/2021  CT Abdomen Pelvis WO INDICATION: Right side abdominal pain. Cellulitis. Recent cholecystectomy. TECHNIQUE: CT of the abdomen and pelvis without IV contrast. Coronal and sagittal reconstructions were obtained.  Radiation dose reduction techniques included automated exposure control or exposure modulation based on body size. Count of known CT and cardiac nuc med studies performed in previous 12 months: 2. COMPARISON: 9/23/2021 FINDINGS: 9 mm noncalcified nodules are noted in both lower lobes. These are stable from CT abdomen of 1/23/2018 and benign. There is a 9 mm nodule in the left upper lobe abutting the fissure. There is an additional nodule in the right lower lobe measuring 6 mm. The latter 2 nodules are not imaged on the older prior study and cannot be compared. There is chronic scarring in the lung bases, right greater than left. There are partially calcified lymph nodes in both rita and in the  azygoesophageal recess compatible with old granulomatous disease. There is a soft tissue nodule in the left posterolateral chest wall, likely a sebaceous cyst measuring 2.7 x 1.1 cm. This is unchanged from the recent prior exam. There is extensive coronary artery disease, and there is atherosclerotic disease. No aortic aneurysm is seen. There has been interval cholecystectomy. There is a fluid collection in the gallbladder fossa that is not well characterized without contrast. It does contain a bubble of gas. It is nearly isodense to the fatty liver. It measures at least 2.5 cm in thickness and 8.5 cm in length. This could reflect a seroma/hematoma or possibly a biloma. Abscess is also in the differential diagnosis. There is no evidence of biliary obstruction. There is marked fatty atrophy of the pancreas. There is severe atrophy of the left kidney with multiple left renal cysts. There are nonobstructing stones in both kidneys with one of the larger on the left side measuring 11 mm. No ureteral stones are seen on either side, and there is no hydronephrosis. The spleen and adrenal glands are normal. Urinary bladder has a trabeculated wall with multiple bladder wall diverticula. Patient may be status post prostatectomy or at least a TURP. Correlate with history. There is colonic diverticulosis without diverticulitis or acute colitis. The appendix is normal. No small bowel obstruction is seen. Stomach and duodenum are within normal limits. There is some fat stranding in the ventral abdominal wall to the right of midline, as well as in the right flank. This could reflect some bland edema or cellulitis. There are bilateral L5 pars defects with mild grade 1 L5-S1 spondylolisthesis.     Impression: 1. Cholecystectomy with a fluid collection in the gallbladder fossa containing a bubble of air. Differential considerations include seroma/hematoma, biloma, or possibly a developing abscess. No biliary obstruction is seen. 2.  Lung nodules in the visualized lung bases, not all of which have been previously evaluated. Therefore, chest CT follow-up in 6 months is recommended per Fleischner criteria. 3. No acute findings in the GI tract. There is colonic diverticulosis. 4. Left renal atrophy with bilateral nonobstructing renal stones. There are no ureteral stones, and there is no hydronephrosis. 5. Fat stranding in the ventral abdominal wall and right flank as above could reflect bland edema or cellulitis. 6. Additional findings as above. Recommend consideration for referral to [facility]. For questions or to make appointment call [phone]. Signer Name: Caden Rodriguez MD  Signed: 10/4/2021 10:22 PM  Workstation Name: [email or web address]  [facility]    XR Chest 1 View    Result Date: 10/4/2021  CR Chest 1 Vw INDICATION: Infectious workup, recent surgery/sepsis COMPARISON:  None available. FINDINGS: Portable AP view(s) of the chest.  Right PICC line terminates at the cavoatrial junction. There is bilateral platelike atelectasis. The heart appears enlarged. It is uncertain if there may be some developing pneumonia at the right lung base.     Impression: There appears to be slightly increased density at the right lung base, and it is uncertain if this may represent developing pneumonia. Signer Name: Laquita Kelly MD  Signed: 10/4/2021 9:46 PM  Workstation Name: [id number]  [facility]      Results for orders placed during the hospital encounter of 09/23/21    Adult Transthoracic Echo Complete W/ Cont if Necessary Per Protocol    Interpretation Summary  · Left ventricular ejection fraction appears to be 26 - 30%  · There is global hypokinesis. The posterior wall appears more focally hypokinetic  · Moderately reduced right ventricular systolic function noted.  · Mild dilation of the aortic root is present measuring 4.1 cm.  · The left atrial cavity is moderately dilated  · No  significant valvular stenosis or regurgitation.      Assessment/Plan   Assessment & Plan       Sepsis (HCC)    Diabetes mellitus (HCC)    Hypertension    Moderate obstructive sleep apnea    History of sarcoidosis (occular)    Elevated LFTs    Combined systolic and diastolic cardiac dysfunction (EF < 50% 2018)    Post-operative infection    Abdominal wall cellulitis    Postoperative intra-abdominal abscess    Elevated troponin    CAD (coronary artery disease)    CKD (chronic kidney disease), stage III (HCC)    Acute kidney injury superimposed on chronic kidney disease (HCC)    Jeremías Soto is a 66 y.o. male combined systolic/diastolic CHF w/ EF 46-50%, CAD s/p CABG + Stents, HTN, HLD, T2DM, CKD Stage III, EVA not on CPAP, Sarcoidosis (data deficit), COVID 19 December 2020 who presented to the ED w/ c/o abdominal redness and swelling. Pt admitted 9/23 through 9/29 w/ severe sepsis, enterococcus/clostridium bacteremia, acute cholangitis and complicated UTI/pyelonephritis.    **Sepsis   **Abdominal wall cellulitis and possible intra-abdominal abscess   **Recent Enterococcus/Clostridium Bacteremia, aerococcus UTI, acute cholangitis   -s/p lap choley w/  intraoperative cholangiogram on 9/27/2021.  Notable for filling defects of the common bile duct and inflamed friable gallbladder; GI performed ERCP on 9/28 Notable for several black stones and sludge.  -pt d/c on IV Zosyn thru 10/11/21 per ID recommendations; Groshong placed prior to d/c for long-term access   -meets sepsis criteria based on  bpm, WBC 24.54, lactic acid 4.1, CRP 46.11, RUDY and source per CT abd/pel   -CT abd/pel obtained; results above   -CXR w/ slight increased density in right ling base; pt with no respiratory symptoms currently   -blood cx pending   -UA w/ 6-12 WBC, 1+ bacteria, 13-20 squam cells; urine culture pending   -initial lactic acid 4.1; reflex 2.9  -procal pending   -IV Vanc, Cefepime and Flagyl started in ED; continue pending  ID recommendations (IV Zosyn held for now)   -s/p 1 liter LR bolus given in ED; holding further IVF for now   -NPO   -pt on Eliquis; held for now- last dose Monday morning   -continue probiotic (started by GI last admission)   -coags, cbc, cmp, type/screen- w/ am labs   -baseline EKG pending   -ID consult in am; known to Dr. Callahan   -General Surgery consult in am     **RUDY on CKD Stage III  -creatinine on d/c was 1.82 on 9/29 and now 2.84   -creatinine up to 2.7 on 9/24 thought to be 2/2 sepsis; trend improved during hospitalization   -holding routine Lisinopril   -UA concerning for recurrent UTI; urine culture pending   -consider Nephrology consult pending trend     **Elevated troponin   **Hx of CAD w/ remote CABG/stents   -troponin 0.071 and 0.054; trend   -EKG pending   -continuing routine statin, lopressor   -denies chest pain, dyspnea   -pt taking Eliquis- (unclear why); last dose Monday morning- held for now pending General Surgery consult this morning   -consider Cardiology consult pending trend     **Systolic/diastolic CHF  -ECHO 9/24/21 w/ EF 26-30%   -stable  -daily weights  -proBNP 8,353.0  -s/p 1 liter LR bolus given in ED; holding further IVF for now     **Elevated LFTS  -2/2 recent admit w/ acute cholangitis, etc  -improving  -trend     **T2DM  -holding routine glimepiride  -hem a1c pending   -FSBG q 3 hours w/ q 3 hour coverage while NPO     **HTN  **HLD   -continue Lopressor w/ hold parameters  -continue statin   -holding lisinopril 2/2 RUDY     **Hx of Sarcoidosis (occular)   -continue routine Cosopt eye gtts  -continue daily Prednisone     DVT prophylaxis:  Eliquis- held for now 2/2 anticipated surgical intervention     CODE STATUS:    Code Status: CPR  Medical Interventions (Level of Support Prior to Arrest): Full    This note has been completed as part of a split-shared workflow.     Signature: Electronically signed by ASHER Wyatt, 10/05/21, 2:12 AM EDT.      Attending   Admission  Attestation       I have seen and examined the patient, performing an independent face-to-face diagnostic evaluation with plan of care reviewed and developed with the advanced practice clinician (APC).      Brief Summary Statement:   Jeremías Soto is a 66 y.o. male with PMHx of CHF, CAD s/p CABG, HTN, HLD, T2DM, stage 3 CKD, sarcoidosis, and EVA. Pt presented to [facility] ED with increased abd pain and erythema of abdomen.  Patient was admitted to [facility] on [id number] with severe sepsis, Enterococcus/Clostridium bacteremia and acute cholangitis with complicated UTI and pyelonephritis.  He was admitted to ICU initially.  Patient underwent laparoscopic cholecystectomy with intraoperative cholangiogram on 9/27/2021.  Then subsequently underwent ERCP on 9/28/2021.  Upon discharge patient was followed by ID and continued on IV Zosyn as an outpatient.  Patient presents today again with worsening pain and erythema of the abdomen.  He also reported increased edema and worsening nausea.  Upon arrival to the ED patient was noted to have white count 24,000 and a lactic of 4.1.  Therefore decision was made to admit patient to hospitalist group for severe sepsis.  CT of the abdomen and pelvis noted abdominal wall cellulitis and possible intra-abdominal abscess.  Therefore the decision was made to admit patient to hospitalist service and consult ID in the a.m..     Remainder of detailed HPI is as noted by APC and has been reviewed and/or edited by me for completeness.    Attending Physical Exam:  Constitutional: Awake, alert  Eyes: PERRLA, sclerae anicteric, no conjunctival injection  HENT: NCAT, mucous membranes moist  Neck: Supple, no thyromegaly, no lymphadenopathy, trachea midline  Respiratory: Clear to auscultation bilaterally, nonlabored respirations   Cardiovascular: RRR, no murmurs, rubs, or gallops, palpable pedal pulses bilaterally  Gastrointestinal: high pitched bowel sounds, diffuse tenderness, and  diffuse cellulitis   Musculoskeletal: No bilateral ankle edema, no clubbing or cyanosis to extremities  Psychiatric: Appropriate affect, cooperative  Neurologic: Oriented x 3, strength symmetric in all extremities, Cranial Nerves grossly intact to confrontation, speech clear  Skin: No rashes, Groshong in place       Brief Assessment/Plan :  See detailed assessment and plan developed with APC which I have reviewed and/or edited for completeness.    Admission Status: I believe that this patient meets INPATIENT status due to sepsis.  I feel patient’s risk for adverse outcomes and need for care warrant INPATIENT evaluation and I predict the patient’s care encounter to likely last beyond 2 midnights.    Dianna Shah, DO  10/05/21

## 2021-10-05 NOTE — PLAN OF CARE
Goal Outcome Evaluation:  Plan of Care Reviewed With: patient        Progress: no change  Outcome Summary: Admitted pt to floor.  Oriented to room and routine.  Skin assessment completed with Erna.  Skin tear on left arm, black blisters on and around lips, scattered bruising and scab on back noted, WOC consulted. Pitting edema in legs, +2.  No other issues noted.  VSS.  Bed alarm on, bed in lowest position and call light within reach.  Will continue to monitor.

## 2021-10-05 NOTE — NURSING NOTE
WOC consult for:  Lip blisters and left arm skin tear     Assessment and Care provided:   1. Lip blisters also extends into the lower face region and a few spots.  Patient states that they are painful to touch and prevent him from shaving.  I suspect that these are some sort of viral lesions.  2. Left arm skin tear is dry.     Recommendation(s):  Try to speak with a physician about lesions to lips and lower face.  May need antiviral or another medication.  For left arm skin tear use Xeroform Mepilex change daily.    *Maintain good skin care, keep dry, turn q 2 hr with wedge if possible, keep heels elevated and offloaded with heel boots.    *Apply z-guard to bottom and bony prominences daily and as needed with incontinence episodes.  *Follow C.A.R.E protocol for medical devices (Bipap, reagan, Ng tube, etc)      All skin interventions in place. Patient is on waffle mattress.   I will order a low-air-loss specialty bed, patient's bottom is red but blanchable and due to massive amounts of ascites and swelling he cannot turn himself well.  He will still need every 2 hour turning in addition to specialty bed.    Head to toe assessment completed. WOC orders placed.  Discussed plan of care with RN. Thank you for consulting WOCN.  Will  sign off.  Please call with questions or if needs arise.

## 2021-10-05 NOTE — CASE MANAGEMENT/SOCIAL WORK
Discharge Planning Assessment  [facility]     Patient Name: Jeremías Soto  MRN: [id number]  Today's Date: 10/5/2021    Admit Date: 10/4/2021    Discharge Needs Assessment     Row Name 10/05/21 1213       Living Environment    Lives With  spouse    Name(s) of Who Lives With Patient  Yaneth Soto(spouse)    Current Living Arrangements  home/apartment/condo    Primary Care Provided by  self    Provides Primary Care For  no one, unable/limited ability to care for self    Family Caregiver if Needed  parent(s)    Family Caregiver Names  Yaneth Soto    Quality of Family Relationships  helpful;involved;supportive    Able to Return to Prior Arrangements  yes    Living Arrangement Comments  CM spoke with pt and pt's spouse at bedside with permission, regarding discharge plan.  Pt resides in [facility] with spouse.       Transition Planning    Patient/Family Anticipates Transition to  home with family    Patient/Family Anticipated Services at Transition      Transportation Anticipated  family or friend will provide       Discharge Needs Assessment    Readmission Within the Last 30 Days  previous discharge plan unsuccessful    Current Outpatient/Agency/Support Group  infusion therapy, home    Equipment Currently Used at Home  walker, rolling    Concerns to be Addressed  discharge planning    Equipment Needed After Discharge  walker, rolling    Discharge Coordination/Progress  Pt has Medicare and [facility] Supp insurance with prescription coverage. Pt uses [facility] in [address] in [place].  Pt reports he does not have a POA or living will.  Pt has a history of cholecystectomy and came in with post operative infection. Pt states he was independent of ADLs until a couple months ago when he started feeling bad.  Pt reports he has been recieving IV antbiotics  at home through [facility]).  Plan home with spouse.  [facility] following  CM will cont to follow for discharge needs.        Discharge  Plan     Row Name 10/05/21 1225       Plan    Plan  discharge plan    Plan Comments  Plan home with spouse. CM will cont to follow for discharge needs.    Final Discharge Disposition Code  30 - still a patient        Continued Care and Services - Admitted Since 10/4/2021    Coordination has not been started for this encounter.       Expected Discharge Date and Time     Expected Discharge Date Expected Discharge Time    Oct 10, 2021         Demographic Summary     Row Name 10/05/21 8520       General Information    General Information Comments  Pt's PCP is Vincent Crawford       Contact Information    Permission Granted to Share Info With      Contact Information Obtained for      Contact Information Comments  Yaneth Haddadullough(spouse): [phone]        Functional Status    No documentation.       Psychosocial    No documentation.       Abuse/Neglect    No documentation.       Legal    No documentation.       Substance Abuse    No documentation.       Patient Forms    No documentation.           Corina Shi RN

## 2021-10-05 NOTE — CONSULTS
" [facility]   [address], [address]  [place], [place], [address] [phone]  [email or web address]           INPATIENT CONSULTATION NOTE    Patient Care Team:  Patient Care Team:  Vincent Crawford MD as PCP - General (Family Medicine)  Manjula Owens MD as Consulting Physician (Cardiology)    Requesting Provider and Reason for consultation: The patient is being seen today at the request of Dr. Wolfe for anticoagulation recommendations.     Chief complaint:   Chief Complaint   Patient presents with   • Post-op Problem            HPI:    Jeremías Soto is a 66 y.o. male.    Cardiac focused problem list:  1. CAD  a. Status post CABG x2 (LIMA to LAD, sequential SVG to the PDA and OM1) with Dr. Reyes 7/2002.  b. Status post 2.5 x 24 Taxus to the circumflex with Dr. Mancini in 10/2004.  c. Status post 2.5 x 14 mm [place] stent to the diagonal with Dr. Mancini 6/2011.  2. Systolic heart failure  a. EF 26 to 30% 9/2021, was 46 to 50% in 8/2020.  3. Cardiac thrombus (data deficient) initially diagnosed approximately 2 years ago at time of CVA has been on Eliquis since.  4. Hypertension  5. Hyperlipidemia  6. Diabetes mellitus  7. CKD stage III  8. Obstructive sleep apnea  9. Sarcoidosis  10. COVID-19 12/2020  11. Sepsis 9/2021  12. Cholangitis  13. CVA    The patient presented to [facility] ED on 10/5/2021 with complaints of erythema on his right abdomen with associated warmth and discomfort.  He also had decreased oral intake secondary to nausea and decreased appetite.  He denies chest pain, shortness of breath, palpitations, lightheadedness, syncope, orthopnea, PND.  He has chronic lower extremity edema which has been stable.  He has been taking Eliquis for approximately 2 years since being diagnosed with a cardiac thrombus at time of a stroke.  He reports the thrombus is \"encapsulated\".  He reports prior discussions of being taken off Eliquis, but it was ultimately " decided to leave him on Eliquis long-term.  His last dose of Eliquis was on 10/4/2021.  His EF was noted to be decreased at time of last admission.  He denies any known history of heart failure.    His primary cardiologist is Dr. Lynch at [facility].  He has not had a recent ischemic evaluation and notes his last cath was at the time of his most recent stent in 2011.    Review of Systems:  Positive for nausea, decreased appetite, right sided abdominal erythema.  All other systems reviewed and are negative.    PFSH:  Patient Active Problem List   Diagnosis   • Snoring   • Diabetes mellitus (HCC)   • Hypertension   • Overweight   • Moderate obstructive sleep apnea   • Abdominal pain   • Nephrolithiasis   • History of sarcoidosis (occular)   • Recurrent COVID-19 virus infection (12/2020, 9/2021)   • RUDY (acute kidney injury) (HCC)   • Elevated LFTs   • Combined systolic and diastolic cardiac dysfunction (EF < 50% 2018)   • Abnormal CT scan, liver   • Post-operative infection   • Abdominal wall cellulitis   • Sepsis (HCC)   • Postoperative intra-abdominal abscess   • Elevated troponin   • CAD (coronary artery disease)   • CKD (chronic kidney disease), stage III (HCC)   • Acute kidney injury superimposed on chronic kidney disease (HCC)       No current facility-administered medications on file prior to encounter.     Current Outpatient Medications on File Prior to Encounter   Medication Sig Dispense Refill   • apixaban (ELIQUIS) 5 MG tablet tablet Take 5 mg by mouth 2 (Two) Times a Day.     • atorvastatin (LIPITOR) 40 MG tablet Take 40 mg by mouth Daily.  1   • esomeprazole (nexIUM) 20 MG capsule Take 20 mg by mouth Every Morning Before Breakfast.     • glimepiride (AMARYL) 2 MG tablet Take 2 mg by mouth Every Morning Before Breakfast.     • metoprolol tartrate (LOPRESSOR) 25 MG tablet Take 25 mg by mouth 2 (Two) Times a Day.  2   • predniSONE (DELTASONE) 10 MG tablet Take 10 mg by mouth Daily.     • tamsulosin  (FLOMAX) 0.4 MG capsule 24 hr capsule Take 0.4 mg by mouth Daily.     • dicyclomine (BENTYL) 20 MG tablet Take 20 mg by mouth.     • dorzolamide-timolol (COSOPT) 22.3-6.8 MG/ML ophthalmic solution Administer 1 drop to both eyes 2 (Two) Times a Day.     • lactobacillus acidophilus (RISAQUAD) capsule capsule Take 1 capsule by mouth Daily for 20 days. 20 capsule 0   • lisinopril (PRINIVIL,ZESTRIL) 10 MG tablet Take 10 mg by mouth Daily.     • metFORMIN (GLUCOPHAGE) 500 MG tablet Take 500 mg by mouth 2 (Two) Times a Day.  1   • [] oxyCODONE-acetaminophen (PERCOCET) 5-325 MG per tablet Take 1 tablet by mouth Every 4 (Four) Hours As Needed for Moderate Pain  for up to 5 days. 10 tablet 0   • raNITIdine (ZANTAC) 150 MG tablet Zantac Maximum Strength 150 mg tablet   Daily     • rOPINIRole (REQUIP) 0.25 MG tablet Take 1 tablet by mouth Every Night. Take 1 hour before bedtime. 30 tablet 3     No Known Allergies    Social History     Socioeconomic History   • Marital status:      Spouse name: Not on file   • Number of children: Not on file   • Years of education: Not on file   • Highest education level: Not on file   Tobacco Use   • Smoking status: Never Smoker   • Smokeless tobacco: Never Used   Substance and Sexual Activity   • Alcohol use: No   • Drug use: No   • Sexual activity: Defer     Comment:      Family History   Problem Relation Age of Onset   • COPD Mother    • Diabetes Father    • Heart disease Father    • Hypertension Father    • Diabetes Sister    • Obesity Sister    • Cancer Brother             Objective:     Vital Sign Min/Max for last 24 hours  Temp  Min: 97.9 °F (36.6 °C)  Max: 99.1 °F (37.3 °C)   BP  Min: 92/56  Max: 145/74   Pulse  Min: 75  Max: 105   Resp  Min: 16  Max: 18   SpO2  Min: 93 %  Max: 96 %   No data recorded      Intake/Output Summary (Last 24 hours) at 10/5/2021 1119  Last data filed at 10/5/2021 0700  Gross per 24 hour   Intake 1200 ml   Output 100 ml   Net 1100 ml            Vitals:    10/05/21 1114   BP: 133/70   Pulse: 75   Resp: 16   Temp: 97.9 °F (36.6 °C)   SpO2:        CONSTITUTIONAL: Well-nourished. In no acute distress.   SKIN: Warm and dry. No rashes noted  HEENT: Head is normocephalic and atraumatic. Mucous membranes are pink and moist.   NECK: Supple without masses or thyromegaly.   LUNGS: Normal effort. Clear to auscultation bilaterally without wheezing, rhonchi, or rales noted.   CARDIOVASCULAR: The heart has a regular rate and rhythm with a normal S1 and S2. There is no murmur, gallop, rub, or click appreciated.   PERIPHERAL VASCULAR: Carotid upstroke is 2+ bilaterally and without bruits. Radial pulses are 2+ bilaterally. There is 1-2+ bilateral lower extremity edema.   ABDOMEN: Normal bowel sounds.  Right sided erythema, warmth with tenderness with palpitation. No hepatosplenomegaly  MUSCULOSKELETAL:  No digital cyanosis  NEUROLOGICAL: Nonfocal.  PSYCHIATRIC: Alert, orientated x 3, appropriate affect and mood    Labs, results reviewed by myself:  Lab Results   Component Value Date    TROPONINT 0.048 (C) 10/05/2021       Glucose   Date Value Ref Range Status   10/05/2021 99 65 - 99 mg/dL Final     BUN   Date Value Ref Range Status   10/05/2021 32 (H) 8 - 23 mg/dL Final     Creatinine   Date Value Ref Range Status   10/05/2021 3.09 (H) 0.76 - 1.27 mg/dL Final     Sodium   Date Value Ref Range Status   10/05/2021 138 136 - 145 mmol/L Final     Potassium   Date Value Ref Range Status   10/05/2021 4.7 3.5 - 5.2 mmol/L Final     Chloride   Date Value Ref Range Status   10/05/2021 106 98 - 107 mmol/L Final     CO2   Date Value Ref Range Status   10/05/2021 20.0 (L) 22.0 - 29.0 mmol/L Final     Calcium   Date Value Ref Range Status   10/05/2021 8.1 (L) 8.6 - 10.5 mg/dL Final     Total Protein   Date Value Ref Range Status   10/05/2021 5.4 (L) 6.0 - 8.5 g/dL Final     Albumin   Date Value Ref Range Status   10/05/2021 2.20 (L) 3.50 - 5.20 g/dL Final     ALT (SGPT)   Date Value  Ref Range Status   10/05/2021 94 (H) 1 - 41 U/L Final     AST (SGOT)   Date Value Ref Range Status   10/05/2021 137 (H) 1 - 40 U/L Final     Alkaline Phosphatase   Date Value Ref Range Status   10/05/2021 176 (H) 39 - 117 U/L Final     Total Bilirubin   Date Value Ref Range Status   10/05/2021 1.4 (H) 0.0 - 1.2 mg/dL Final     eGFR Non  Amer   Date Value Ref Range Status   10/05/2021 20 (L) >60 mL/min/1.73 Final     BUN/Creatinine Ratio   Date Value Ref Range Status   10/05/2021 10.4 7.0 - 25.0 Final     Anion Gap   Date Value Ref Range Status   10/05/2021 12.0 5.0 - 15.0 mmol/L Final       No results found for: CHOL  No results found for: TRIG  No results found for: HDL  No results found for: LDL  No components found for: LDLDIRECTC      WBC   Date Value Ref Range Status   10/05/2021 13.69 (H) 3.40 - 10.80 10*3/mm3 Final     RBC   Date Value Ref Range Status   10/05/2021 3.57 (L) 4.14 - 5.80 10*6/mm3 Final     Hemoglobin   Date Value Ref Range Status   10/05/2021 10.6 (L) 13.0 - 17.7 g/dL Final     Hematocrit   Date Value Ref Range Status   10/05/2021 35.9 (L) 37.5 - 51.0 % Final     MCV   Date Value Ref Range Status   10/05/2021 100.6 (H) 79.0 - 97.0 fL Final     MCH   Date Value Ref Range Status   10/05/2021 29.7 26.6 - 33.0 pg Final     MCHC   Date Value Ref Range Status   10/05/2021 29.5 (L) 31.5 - 35.7 g/dL Final     RDW   Date Value Ref Range Status   10/05/2021 13.8 12.3 - 15.4 % Final     RDW-SD   Date Value Ref Range Status   10/05/2021 51.1 37.0 - 54.0 fl Final     MPV   Date Value Ref Range Status   10/05/2021 9.6 6.0 - 12.0 fL Final     Platelets   Date Value Ref Range Status   10/05/2021 130 (L) 140 - 450 10*3/mm3 Final     Neutrophil %   Date Value Ref Range Status   10/05/2021 86.8 (H) 42.7 - 76.0 % Final     Lymphocyte %   Date Value Ref Range Status   10/05/2021 5.4 (L) 19.6 - 45.3 % Final     Monocyte %   Date Value Ref Range Status   10/05/2021 6.3 5.0 - 12.0 % Final     Eosinophil %    Date Value Ref Range Status   10/05/2021 0.4 0.3 - 6.2 % Final     Basophil %   Date Value Ref Range Status   10/05/2021 0.4 0.0 - 1.5 % Final     Immature Grans %   Date Value Ref Range Status   10/05/2021 0.7 (H) 0.0 - 0.5 % Final     Neutrophils, Absolute   Date Value Ref Range Status   10/05/2021 11.89 (H) 1.70 - 7.00 10*3/mm3 Final     Lymphocytes, Absolute   Date Value Ref Range Status   10/05/2021 0.74 0.70 - 3.10 10*3/mm3 Final     Monocytes, Absolute   Date Value Ref Range Status   10/05/2021 0.86 0.10 - 0.90 10*3/mm3 Final     Eosinophils, Absolute   Date Value Ref Range Status   10/05/2021 0.05 0.00 - 0.40 10*3/mm3 Final     Basophils, Absolute   Date Value Ref Range Status   10/05/2021 0.05 0.00 - 0.20 10*3/mm3 Final     Immature Grans, Absolute   Date Value Ref Range Status   10/05/2021 0.10 (H) 0.00 - 0.05 10*3/mm3 Final     nRBC   Date Value Ref Range Status   10/05/2021 0.0 0.0 - 0.2 /100 WBC Final         Diagnostic Data:    EKG: Sinus rhythm with occasional PVCs, T wave abnormality    Results for orders placed during the hospital encounter of 09/23/21    Adult Transthoracic Echo Complete W/ Cont if Necessary Per Protocol    Interpretation Summary  · Left ventricular ejection fraction appears to be 26 - 30%  · There is global hypokinesis. The posterior wall appears more focally hypokinetic  · Moderately reduced right ventricular systolic function noted.  · Mild dilation of the aortic root is present measuring 4.1 cm.  · The left atrial cavity is moderately dilated  · No significant valvular stenosis or regurgitation.      Tele: Sinus rhythm with occasional PVCs         Assessment and Plan:     ASSESSMENT:  1. Elevated troponin  a. Appears chronically elevated.  Patient without angina.    2. Sepsis/abdominal wall cellulitis  a. ID following.  b. Fluid collection noted in the gallbladder fossa per CT of the abdomen and pelvis.  Surgery following for possible drainage.  3. RUDY on  CKD  4. CAD  a. Status post remote CABG and stenting.  b. Denies anginal symptoms.  Has not had a recent ischemic evaluation.  5. Systolic heart failure  a. EF 26 to 30% at time of last admission 9/2021.  Was 46 to 50% per TTE in 2020.  b. Lisinopril being held due to RUDY.  c. On beta-blocker at home.  6. History of cardiac thrombus (data deficient)  7. Hypertension  a. Stable on current outpatient medication  8. Hyperlipidemia  a. High intensity statin as an outpatient.    PLAN:  1. Obtain records from [facility] for most recent office note and cardiac testing for review.   2. Continue to hold Eliquis for now.  Would not recommend proceeding with surgery until Eliquis has been held for 48 hours, no sooner than 10/7.  Would not recommend reversal agent at this time.   3. ABX per infectious disease.   4. Continue atorvastatin, betablocker.  5. Resume lisinopril once renal function improves.    Scribed for Dr. Owens by Josselin Ortega, APRN. 10/5/2021  14:54 EDT      I Manjula Owens MD personally performed the services described in this documentation as scribed by the above individual in my presence, and it is both accurate and complete.    Manjula Owens MD, FACC    Addendum: Received records from [facility].  An MRI was performed July 2019 which showed evidence of LV wall hypokinesis in the apical lateral and mid inferolateral and apical inferior segments with a large left ventricular thrombus.  NOAC prescribed at that time.  We'll plan to repeat limited echocardiogram postoperatively with Lumason to evaluate for thrombus.    Manjula Owens MD, FACC

## 2021-10-05 NOTE — CONSULTS
Patient Name:  Jeremías Soto  YOB: 1955  [id number]       Patient Care Team:  Vincent Crawford MD as PCP - General (Family Medicine)      General Surgery Consult Note     Date of Consultation: 10/05/21    Consulting Physician - Don Draper DO    Reason for Consult - post-op sepsis secondary to possible post-op cellulitis/abscess    Subjective     I have been asked to see  Jeremías Soto , a 66 y.o. male in consultation forpost-op sepsis secondary to possible post-op cellulitis/abscess from Dr. Draper. The patient has a recent history of cholangitis who underwent laparoscopic cholecystectomy on 9/27 and ERCP with stone extraction on 9/28. The patient was then discharged with IV antibiotics. The patient initially was feeling well until the last 2-3 days, when he developed right sided abdominal pain with nausea and redness over the right portion of his abdomen. He then presented to the ER, where he was noted to have elevated WBC, lactic acid, and tachycardia. He was noted to have cellulitis of his right abdominal wall. CT scan was performed showing fluid collection in gallbladder fossa with bubble of air. We have been consulted for further evaluation.      Allergy: No Known Allergies    Medications:  atorvastatin, 40 mg, Oral, Daily  cefepime, 2 g, Intravenous, Q24H  dorzolamide-timolol, , Both Eyes, BID  heparin (porcine), , ,   insulin lispro, 0-7 Units, Subcutaneous, Q6H  iopamidol, , ,   lactobacillus acidophilus, 1 capsule, Oral, Daily  metoprolol tartrate, 12.5 mg, Oral, BID  metroNIDAZOLE, 500 mg, Intravenous, Q8H  pantoprazole, 40 mg, Oral, Daily  predniSONE, 10 mg, Oral, Daily  rOPINIRole, 0.25 mg, Oral, Nightly  sodium chloride, 10 mL, Intravenous, Q12H  tamsulosin, 0.4 mg, Oral, Daily  vancomycin (dosing per levels), , Does not apply, Daily      Pharmacy to dose vancomycin,       No current facility-administered medications on file prior to encounter.     Current  Outpatient Medications on File Prior to Encounter   Medication Sig   • apixaban (ELIQUIS) 5 MG tablet tablet Take 5 mg by mouth 2 (Two) Times a Day.   • atorvastatin (LIPITOR) 40 MG tablet Take 40 mg by mouth Daily.   • esomeprazole (nexIUM) 20 MG capsule Take 20 mg by mouth Every Morning Before Breakfast.   • glimepiride (AMARYL) 2 MG tablet Take 2 mg by mouth Every Morning Before Breakfast.   • metoprolol tartrate (LOPRESSOR) 25 MG tablet Take 25 mg by mouth 2 (Two) Times a Day.   • predniSONE (DELTASONE) 10 MG tablet Take 10 mg by mouth Daily.   • tamsulosin (FLOMAX) 0.4 MG capsule 24 hr capsule Take 0.4 mg by mouth Daily.   • dicyclomine (BENTYL) 20 MG tablet Take 20 mg by mouth.   • dorzolamide-timolol (COSOPT) 22.3-6.8 MG/ML ophthalmic solution Administer 1 drop to both eyes 2 (Two) Times a Day.   • lactobacillus acidophilus (RISAQUAD) capsule capsule Take 1 capsule by mouth Daily for 20 days.   • lisinopril (PRINIVIL,ZESTRIL) 10 MG tablet Take 10 mg by mouth Daily.   • metFORMIN (GLUCOPHAGE) 500 MG tablet Take 500 mg by mouth 2 (Two) Times a Day.   • [] oxyCODONE-acetaminophen (PERCOCET) 5-325 MG per tablet Take 1 tablet by mouth Every 4 (Four) Hours As Needed for Moderate Pain  for up to 5 days.   • raNITIdine (ZANTAC) 150 MG tablet Zantac Maximum Strength 150 mg tablet   Daily   • rOPINIRole (REQUIP) 0.25 MG tablet Take 1 tablet by mouth Every Night. Take 1 hour before bedtime.       PMHx:   Past Medical History:   Diagnosis Date   • Cancer (CMS/HCC)     skin   • Coronary artery disease    • Diabetes mellitus (CMS/HCC)    • Elevated cholesterol    • Heart attack (CMS/HCC)    • Hypertension    • Sarcoid    • Sleep apnea     no cpap   • SOB (shortness of breath)    • Stroke (CMS/HCC)     Pt. states that he has had 2 mini strokes       Past Surgical History:  Past Surgical History:   Procedure Laterality Date   • CARDIAC CATHETERIZATION      cardiac stent x2 after s/p CABG   • CARDIAC SURGERY          • CATARACT EXTRACTION, BILATERAL     • CHOLECYSTECTOMY WITH INTRAOPERATIVE CHOLANGIOGRAM N/A 9/27/2021    Procedure: CHOLECYSTECTOMY LAPAROSCOPIC INTRAOPERATIVE CHOLANGIOGRAM;  Surgeon: Gaudencio Wolfe MD;  Location: [facility] OR;  Service: General;  Laterality: N/A;   • COLONOSCOPY     • CORONARY ARTERY BYPASS GRAFT     • ENDOSCOPY     • ERCP N/A 9/28/2021    Procedure: ENDOSCOPIC RETROGRADE CHOLANGIOPANCREATOGRAPHY;  Surgeon: Brunner, Mark I, MD;  Location: [facility] ENDOSCOPY;  Service: Gastroenterology;  Laterality: N/A;  sphincterotomy made, balloon sweep of bile duct done with 9-12 balloon.    • EXTRACORPOREAL SHOCKWAVE LITHOTRIPSY (ESWL), STENT INSERTION/REMOVAL Bilateral 8/21/2020    Procedure: EXTRACORPOREAL SHOCKWAVE LITHOTRIPSY BILATERAL WITH STENT PLACEMENT LEFT;  Surgeon: Ethan Barnes Jr., MD;  Location: [facility] OR;  Service: Urology;  Laterality: Bilateral;   • SKIN CANCER EXCISION          Family History:   Family History   Problem Relation Age of Onset   • COPD Mother    • Diabetes Father    • Heart disease Father    • Hypertension Father    • Diabetes Sister    • Obesity Sister    • Cancer Brother         Social History:   Social History     Socioeconomic History   • Marital status:      Spouse name: Not on file   • Number of children: Not on file   • Years of education: Not on file   • Highest education level: Not on file   Tobacco Use   • Smoking status: Never Smoker   • Smokeless tobacco: Never Used   Substance and Sexual Activity   • Alcohol use: No   • Drug use: No   • Sexual activity: Defer     Comment:          Review of Systems        Constitutional: No fevers, chills or malaise   Eyes: Denies visual changes    Cardiovascular: Denies chest pain, palpitations   Pulmonary: Denies cough or shortness of breath   Abdominal/ GI: See HPI    Genitourinary: Denies dysuria or hematuria   Musculoskeletal: Denies any but chronic joint aches, pains or deformities   Psychiatric: No  "recent mood changes   Neurologic: No paresthesias or loss of function          Objective     Physical Exam:      Vital Signs  /74   Pulse 79   Temp 98.4 °F (36.9 °C) (Oral)   Resp 16   Ht 170.2 cm (67\")   Wt 102 kg (224 lb 8 oz)   SpO2 94%   BMI 35.16 kg/m²     Intake/Output Summary (Last 24 hours) at 10/5/2021 1032  Last data filed at 10/5/2021 0700  Gross per 24 hour   Intake 1200 ml   Output 100 ml   Net 1100 ml         Physical Exam:    Head: Normocephalic, atraumatic.   Eyes: Pupils equal, round, react to light and accommodation.   Mouth: Oral mucosa without lesions  Neck: No masses, lymphadenopathy or carotid bruits bilaterally  CV: Rhythm and rate regular, no murmurs, rubs or gallops  Lungs: Clear to auscultation bilaterally  Abdomen: Bowel sounds positive, soft, induration and erythema around right flank without fluctuation or crepitus, hematoma at periphery, no diffuse abdominal pain or rebound/guarding  Groin : No obvious hernias bilaterally  Extremities:  No cyanosis, clubbing or edema bilaterally  Lymphatics: No abnormal lymphadenopathy appreciated  Neurologic: No gross deficits      Results Review: I have personally reviewed all of the recent lab and imaging results available at this time.   WBCs 24.5 -> 13.7  Hgb 12 -> 10.6  Cr 2.84 -> 3.09  ALT 95 -> 94  AST 90 -> 137  Tbili 2 -> 1.4  CT:  IMPRESSION:     1. Cholecystectomy with a fluid collection in the gallbladder fossa containing a bubble of air. Differential considerations include seroma/hematoma, biloma, or possibly a developing abscess. No biliary obstruction is seen.  2. Lung nodules in the visualized lung bases, not all of which have been previously evaluated. Therefore, chest CT follow-up in 6 months is recommended per Fleischner criteria.  3. No acute findings in the GI tract. There is colonic diverticulosis.  4. Left renal atrophy with bilateral nonobstructing renal stones. There are no ureteral stones, and there is no " hydronephrosis.  5. Fat stranding in the ventral abdominal wall and right flank as above could reflect bland edema or cellulitis.  6. Additional findings as above.        Assessment/Plan     Assessment and Plan:    Pt POD#8 lap farooq with new abdominal wall cellulitis around lateral-most incision and increased WBCs, nausea  Unclear if patient has abscess given the CT is noncontrast and he has Surgicel in the area of dissection which could lead to a lashon of air. Given his chronic renal insufficiency, likely not able to obtain contrast CT -> recommend IR-guided drainage with US. However, patient is currently anticoagulated with INR >3 -> consult cardiology to see if appropriate to reverse anticoagulation so this procedure can be done  Will order HIDA scan to assess for cystic duct leak. If positive, will need GI consult for ERCP with stent placement  Consult ID to assess for needed changes in antibiotics given new cellulitis  Keep NPO with IV fluid for now        I discussed the patient's findings and my recommendations with the patient and/or family, as well as the primary team     Gaudencio Wolfe MD  10/05/21  10:32 EDT

## 2021-10-05 NOTE — PROGRESS NOTES
Clinical Nutrition   Reason For Visit: Identified at risk by screening criteria, MST score 2+    Patient Name: Jeremías Soto  YOB: 1955  MRN: [id number]  Date of Encounter: 10/05/21 11:52 EDT  Admission date: 10/4/2021        Nutrition Assessment     Admission Problem List:  Sepsis  Elevated LFTs  Abdominal wall cellulitis  Postoperative intra-abdominal abscess  Intermittent nausea  RUDY on CKD stage 3      Applicable PMH:  HTN, HLD  CHF  CAD s/p CABG and stents  T2DM  CKD stage 3  Sarcoidosis (occular)  Covid-19 (12/2020)  S/p CCY (9/27/21)      Applicable medical tests/procedures since admission:  (10/5) s/p ultrasound guided abscess drain       Reported/Observed/Food/Nutrition Related History   Patient and wife present during visit. Patient's wife provides majority of information. Reports patient wasn't eating very well [facility] but did eat well 9/29-9/30. On 10/1 patient began having poor appetite/intermittent nausea and poor PO intake (</=25% of usual). Reports patient weighed 218 lbs on (9/23) but unsure what patient weighs at this time. RD requested standing scale weight from RN. NPO at this time. Agrees to try clear liquid ONS drinks (no orange flavor). Denies food allergies and difficulty chewing/swallowing.    Anthropometrics   Height: 67 in  Weight: 224 lbs (bed scale weight 10/5 per [facility] doc)  BMI: 35.2  BMI classification: Obese Class II: 35-39.9kg/m2   IBW: 148 lbs    UBW: 218 lbs (9/23/21) per wife  Per EMR (GUSTAVO) - 219 lbs (6/17/21), 226 lbs (2/18/21), 219 lbs (12/10/20)    Weight change: RD waiting for standing weight to be obtained prior to evaluating recent weight changes.    Labs reviewed   Labs reviewed: Yes    Results from last 7 days   Lab Units 10/05/21  0841 10/04/21  2041 09/29/21  0721   SODIUM mmol/L 138 139 137   POTASSIUM mmol/L 4.7 4.7 3.8   CHLORIDE mmol/L 106 102 108*   CO2 mmol/L 20.0* 18.0* 19.0*   BUN mg/dL 32* 27* 28*    CREATININE mg/dL 3.09* 2.84* 1.82*   GLUCOSE mg/dL 99 82 117*   CALCIUM mg/dL 8.1* 8.6 7.6*   PHOSPHORUS mg/dL  --  3.6 2.1*   MAGNESIUM mg/dL  --  2.0  --        Medications reviewed   Medications reviewed: Yes  Pertinent: antibiotic, probiotic, insulin, protonix, steroid    Current Nutrition Prescription   PO: NPO Diet    Average PO intake: insufficient data    Nutrition Diagnosis     Problem Inadequate oral intake   Etiology Decreased appetite, altered GI function s/p CCY, nausea   Signs/Symptoms <75% of usual PO intake x 12 days (since 9/23)     Intervention   Intervention: Follow treatment progress, Care plan reviewed, Interview for preferences, Await begin PO, Supplement provided    -Ordered Boost Breeze/Ensure Clear supplement drinks to begin when PO diet initiated.  -Requested standing weight from nursing staff.    Goal:   General: Nutrition support treatment  PO: Initiate diet as medically appropriate    Monitoring/Evaluation:   Monitoring/Evaluation: Per protocol, I&O, Pertinent labs, Weight, GI status, Symptoms    Opal Car RD  Time Spent: 45 min

## 2021-10-05 NOTE — OUTREACH NOTE
Sepsis Week 2 Survey      Responses   [facility] patient discharged from?  [facility]   Does the patient have one of the following disease processes/diagnoses(primary or secondary)?  Sepsis   Week 2 attempt successful?  No   Revoke  Readmitted          Carolina Meredith RN

## 2021-10-06 NOTE — PROGRESS NOTES
Records received from [facility].  Eliquis prescribed in 7/19 for large LV thrombus by MRI.  Manjula Owens MD, FACC

## 2021-10-06 NOTE — CASE MANAGEMENT/SOCIAL WORK
Continued Stay Note  [facility]     Patient Name: Jeremías Soto  MRN: [id number]  Today's Date: 10/6/2021    Admit Date: 10/4/2021    Discharge Plan     Row Name 10/06/21 1710       Plan    Plan  discharge plan    Plan Comments  CM spoke with spouse in pt's room.  Pt sleeping. [facility] following. Pt having an IR guided drain tomorrow. Plan is home at discharge. CM will cont to follow.    Final Discharge Disposition Code  30 - still a patient        Discharge Codes    No documentation.       Expected Discharge Date and Time     Expected Discharge Date Expected Discharge Time    Oct 10, 2021             Corina Shi RN

## 2021-10-06 NOTE — PROGRESS NOTES
[facility] Medicine Services  PROGRESS NOTE    Patient Name: Jeremías Soto  : 1955  MRN: [id number]    Date of Admission: 10/4/2021  Primary Care Physician: Vincent Crawford MD    Subjective   Subjective     CC:  Mouth sores    HPI:  Complaining of painful sores around his mouth, had them when he left the hospital and have not improved.  Does feel that his abdominal pain is improving with current antibiotics.    ROS:  Gen- No fevers, chills  CV- No chest pain, palpitations  Resp- No cough, dyspnea  GI- No N/V/D, yes abd pain     Objective   Objective     Vital Signs:   Temp:  [97.5 °F (36.4 °C)-97.9 °F (36.6 °C)] 97.6 °F (36.4 °C)  Heart Rate:  [70-79] 75  Resp:  [15-16] 16  BP: (133-163)/(70-90) 161/85     Physical Exam:  Constitutional: Awake, alert, mildly ill-appearing male sitting up in bed  HENT: NCAT, mucous membranes moist, perioral scabbed lesions  Respiratory: Clear to auscultation bilaterally, respiratory effort normal   Cardiovascular: RRR, no murmurs, rubs, or gallops, palpable radial pulse  Gastrointestinal: Abdomen soft, nondistended, BS present, RT sided tenderness with blanching erythema  Musculoskeletal: No bilateral ankle edema  Psychiatric: Appropriate affect, cooperative  Neurologic: Speech clear, moving all extremity spontaneously    Results Reviewed:  LAB RESULTS:      Lab 10/05/21  0757 10/04/21  2338 10/04/21  2250 10/04/21  2041   WBC 13.69*  --   --  24.54*   HEMOGLOBIN 10.6*  --   --  12.0*   HEMATOCRIT 35.9*  --   --  38.3   PLATELETS 130*  --   --  196   NEUTROS ABS 11.89*  --   --  22.51*   IMMATURE GRANS (ABS) 0.10*  --   --  0.39*   LYMPHS ABS 0.74  --   --  0.50*   MONOS ABS 0.86  --   --  1.08*   EOS ABS 0.05  --   --  0.00   .6*  --   --  92.7   CRP  --   --   --  46.11*   PROCALCITONIN  --   --  8.70*  --    LACTATE  --  2.9*  --  4.1*   PROTIME 34.2*  --   --  27.7*   APTT 61.5*  --   --  52.0*         Lab 10/05/21  0841  10/05/21  0757 10/04/21  2041 09/29/21  0721   SODIUM 138  --  139 137   POTASSIUM 4.7  --  4.7 3.8   CHLORIDE 106  --  102 108*   CO2 20.0*  --  18.0* 19.0*   ANION GAP 12.0  --  19.0* 10.0   BUN 32*  --  27* 28*   CREATININE 3.09*  --  2.84* 1.82*   GLUCOSE 99  --  82 117*   CALCIUM 8.1*  --  8.6 7.6*   MAGNESIUM  --   --  2.0  --    PHOSPHORUS  --   --  3.6 2.1*   HEMOGLOBIN A1C  --  7.30*  --   --          Lab 10/05/21  0841 10/04/21  2041 09/29/21  0721   TOTAL PROTEIN 5.4* 6.1  --    ALBUMIN 2.20* 2.70* 2.60*   GLOBULIN 3.2 3.4  --    ALT (SGPT) 94* 95*  --    AST (SGOT) 137* 90*  --    BILIRUBIN 1.4* 2.0*  --    ALK PHOS 176* 206*  --    LIPASE  --  8*  --          Lab 10/05/21  0841 10/05/21  0757 10/04/21  2250 10/04/21  2041   PROBNP  --   --   --  8,353.0*   TROPONIN T 0.048*  --  0.054* 0.071*   PROTIME  --  34.2*  --  27.7*   INR  --  3.59*  --  2.72*             Lab 10/05/21  0757 09/29/21  0721   IRON  --  16*   IRON SATURATION  --  7*   TIBC  --  246*   TRANSFERRIN  --  165*   FERRITIN  --  163.10   ABO TYPING A  --    RH TYPING Positive  --    ANTIBODY SCREEN Negative  --          Brief Urine Lab Results  (Last result in the past 365 days)      Color   Clarity   Blood   Leuk Est   Nitrite   Protein   CREAT   Urine HCG        10/04/21 2151 Yellow Slightly Cloudy Trace Small (1+) Negative 30 mg/dL (1+)               Microbiology Results Abnormal     Procedure Component Value - Date/Time    Blood Culture - Blood, Arm, Right [[id number]] Collected: 10/04/21 2143    Lab Status: Preliminary result Specimen: Blood from Arm, Right Updated: 10/05/21 2215     Blood Culture No growth at 24 hours    Blood Culture - Blood, Arm, Left [[id number]] Collected: 10/04/21 2120    Lab Status: Preliminary result Specimen: Blood from Arm, Left Updated: 10/05/21 2215     Blood Culture No growth at 24 hours    COVID PRE-OP / PRE-PROCEDURE SCREENING ORDER (NO ISOLATION) - Swab, Nasopharynx [[id number]]  (Normal) Collected:  10/05/21 0015    Lab Status: Final result Specimen: Swab from Nasopharynx Updated: 10/05/21 0043    Narrative:      The following orders were created for panel order COVID PRE-OP / PRE-PROCEDURE SCREENING ORDER (NO ISOLATION) - Swab, Nasopharynx.  Procedure                               Abnormality         Status                     ---------                               -----------         ------                     COVID-19, ABBOTT IN-HOUS...[[id number]]  Normal              Final result                 Please view results for these tests on the individual orders.    COVID-19, ABBOTT IN-HOUSE,NASAL Swab (NO TRANSPORT MEDIA) 2 HR TAT - Swab, Nasopharynx [[id number]]  (Normal) Collected: 10/05/21 0015    Lab Status: Final result Specimen: Swab from Nasopharynx Updated: 10/05/21 0043     COVID19 Presumptive Negative    Narrative:      Fact sheet for providers: https://www.fda.gov/media/238913/download     Fact sheet for patients: https://www.fda.gov/media/748843/download    Test performed by PCR.  If inconsistent with clinical signs and symptoms patient should be tested with different authorized molecular test.          CT Abdomen Pelvis Without Contrast    Result Date: 10/4/2021  CT Abdomen Pelvis WO INDICATION: Right side abdominal pain. Cellulitis. Recent cholecystectomy. TECHNIQUE: CT of the abdomen and pelvis without IV contrast. Coronal and sagittal reconstructions were obtained.  Radiation dose reduction techniques included automated exposure control or exposure modulation based on body size. Count of known CT and cardiac nuc med studies performed in previous 12 months: 2. COMPARISON: 9/23/2021 FINDINGS: 9 mm noncalcified nodules are noted in both lower lobes. These are stable from CT abdomen of 1/23/2018 and benign. There is a 9 mm nodule in the left upper lobe abutting the fissure. There is an additional nodule in the right lower lobe measuring 6 mm. The latter 2 nodules are not imaged on the older prior  study and cannot be compared. There is chronic scarring in the lung bases, right greater than left. There are partially calcified lymph nodes in both rita and in the azygoesophageal recess compatible with old granulomatous disease. There is a soft tissue nodule in the left posterolateral chest wall, likely a sebaceous cyst measuring 2.7 x 1.1 cm. This is unchanged from the recent prior exam. There is extensive coronary artery disease, and there is atherosclerotic disease. No aortic aneurysm is seen. There has been interval cholecystectomy. There is a fluid collection in the gallbladder fossa that is not well characterized without contrast. It does contain a bubble of gas. It is nearly isodense to the fatty liver. It measures at least 2.5 cm in thickness and 8.5 cm in length. This could reflect a seroma/hematoma or possibly a biloma. Abscess is also in the differential diagnosis. There is no evidence of biliary obstruction. There is marked fatty atrophy of the pancreas. There is severe atrophy of the left kidney with multiple left renal cysts. There are nonobstructing stones in both kidneys with one of the larger on the left side measuring 11 mm. No ureteral stones are seen on either side, and there is no hydronephrosis. The spleen and adrenal glands are normal. Urinary bladder has a trabeculated wall with multiple bladder wall diverticula. Patient may be status post prostatectomy or at least a TURP. Correlate with history. There is colonic diverticulosis without diverticulitis or acute colitis. The appendix is normal. No small bowel obstruction is seen. Stomach and duodenum are within normal limits. There is some fat stranding in the ventral abdominal wall to the right of midline, as well as in the right flank. This could reflect some bland edema or cellulitis. There are bilateral L5 pars defects with mild grade 1 L5-S1 spondylolisthesis.     Impression: 1. Cholecystectomy with a fluid collection in the gallbladder  fossa containing a bubble of air. Differential considerations include seroma/hematoma, biloma, or possibly a developing abscess. No biliary obstruction is seen. 2. Lung nodules in the visualized lung bases, not all of which have been previously evaluated. Therefore, chest CT follow-up in 6 months is recommended per Fleischner criteria. 3. No acute findings in the GI tract. There is colonic diverticulosis. 4. Left renal atrophy with bilateral nonobstructing renal stones. There are no ureteral stones, and there is no hydronephrosis. 5. Fat stranding in the ventral abdominal wall and right flank as above could reflect bland edema or cellulitis. 6. Additional findings as above. Recommend consideration for referral to [facility]. For questions or to make appointment call [phone]. Signer Name: Caden Rodriguez MD  Signed: 10/4/2021 10:22 PM  Workstation Name: [email or web address]  [facility] Hepatobiliary Without CCK    Result Date: 10/5/2021  EXAMINATION: [facility] HEPATOBILIARY WITHOUT CCK- 10/05/2021  INDICATION: Abdominal pain, upper, recurrent,?post cholecystectomy  TECHNIQUE: Nuclear medicine hepatobiliary scan with radiopharmaceutical 5.5 mCi mebrofenin and subsequent imaging of the liver and biliary tract as well as small bowel.  COMPARISON: NONE  FINDINGS: Prompt hepatocyte uptake with biliary activity within 5 minutes and small bowel activity noted within 15 minutes without obstructive pathology. No evidence for abnormal tracer activity to suggest leak.      Impression: No evidence for abnormal tracer activity to suggest leak with normal activity in the biliary system and small bowel.  D: 10/05/2021 E: 10/05/2021    This report was finalized on 10/5/2021 8:59 PM by Dr. Jevon Hartmann.      XR Chest 1 View    Result Date: 10/4/2021  CR Chest 1 Vw INDICATION: Infectious workup, recent surgery/sepsis COMPARISON:  None available. FINDINGS: Portable AP view(s) of the chest.   Right PICC line terminates at the cavoatrial junction. There is bilateral platelike atelectasis. The heart appears enlarged. It is uncertain if there may be some developing pneumonia at the right lung base.     Impression: There appears to be slightly increased density at the right lung base, and it is uncertain if this may represent developing pneumonia. Signer Name: Laquita Kelly MD  Signed: 10/4/2021 9:46 PM  Workstation Name: [id number]  [facility]      Results for orders placed during the hospital encounter of 09/23/21    Adult Transthoracic Echo Complete W/ Cont if Necessary Per Protocol    Interpretation Summary  · Left ventricular ejection fraction appears to be 26 - 30%  · There is global hypokinesis. The posterior wall appears more focally hypokinetic  · Moderately reduced right ventricular systolic function noted.  · Mild dilation of the aortic root is present measuring 4.1 cm.  · The left atrial cavity is moderately dilated  · No significant valvular stenosis or regurgitation.      I have reviewed the medications:  Scheduled Meds:atorvastatin, 40 mg, Oral, Daily  DAPTOmycin, 6 mg/kg (Adjusted), Intravenous, Q48H  dorzolamide-timolol, , Both Eyes, BID  insulin lispro, 0-7 Units, Subcutaneous, 4x Daily With Meals & Nightly  lactobacillus acidophilus, 1 capsule, Oral, Daily  metoprolol tartrate, 12.5 mg, Oral, BID  micafungin (MYCAMINE) IV, 100 mg, Intravenous, Q24H  pantoprazole, 40 mg, Oral, Daily  piperacillin-tazobactam, 3.375 g, Intravenous, Q8H  predniSONE, 10 mg, Oral, Daily  rOPINIRole, 0.25 mg, Oral, Nightly  sodium chloride, 10 mL, Intravenous, Q12H  tamsulosin, 0.4 mg, Oral, Daily      Continuous Infusions:   PRN Meds:.dextrose  •  dextrose  •  glucagon (human recombinant)  •  oxyCODONE-acetaminophen  •  Sodium Chloride (PF)  •  sodium chloride    Assessment/Plan   Assessment & Plan     Active Hospital Problems    Diagnosis  POA   • **Sepsis (HCC) [A41.9]  Yes   •  Post-operative infection [T81.40XA]  Yes   • Abdominal wall cellulitis [L03.311]  Yes   • Postoperative intra-abdominal abscess [T81.43XA]  Yes   • Elevated troponin [R77.8]  Yes   • CAD (coronary artery disease) [I25.10]  Yes   • CKD (chronic kidney disease), stage III (HCC) [N18.30]  Yes   • Acute kidney injury superimposed on chronic kidney disease (HCC) [N17.9, N18.9]  Yes   • Elevated LFTs [R79.89]  Yes   • Combined systolic and diastolic cardiac dysfunction (EF < 50% 2018) [I51.89]  Yes   • History of sarcoidosis (occular) [Z86.2]  Yes   • Moderate obstructive sleep apnea [G47.33]  Yes   • Diabetes mellitus (HCC) [E11.9]  Yes   • Hypertension [I10]  Yes      Resolved Hospital Problems   No resolved problems to display.        Brief Hospital Course to date:  Jeremías Soto is a 66 y.o. male with systolic CHF (46-50%), chronic multiorgan disease, admitted 9/23-9/29 w/ enterococcal/clostridial bacteremia, cholangitis, questionable pyelonephritis-s/p lap farooq 9/27 and ERCP 9/28 discharged home on IV Zosyn with a planned completion date 10/11 who returned with several days worsening erythema of the abdomen and imaging in the ED concerning for fluid collection in the gallbladder fossa     Assessment/plan     Sepsis, favor abdominal source  Abnormal abdominal fluid collection  Recent cholecystitis  Recent enterococcal/clostridial bacteremia  -S/p lap farooq 9/27, ERCP 9/28  -2.5 cm x 8.5 cm fluid collection on CT; discussed with Dr. Wolfe, concern for seroma/hematoma although abscess is not entirely ruled out; no biliary leak on HIDA  -ID following, antibiotics adjusted to daptomycin/Zosyn  -Currently recent Eliquis use/elevated INR prohibitive of any drain; cardiology recommending no procedure prior to 10/7 and not reversing anticoagulation  -Continue probiotic  -Hold statin with elevated LFTs and daptomycin use     RUDY on CKD 3  -Baseline Cr 1.9-2.3; EGFR 30  -Creatinine improved today    Perioral  lesions  -Empiric Valtrex, renally dosed     DM type 2, A1c 7.3%, without long-term use of insulin  -Accu-Cheks with SSI     CAD with prior CABG/stent  Chronic systolic/diastolic CHF  Mild troponin elevation  Hx multiple TIA  -Echo 9/24/2021 EF 26-30%  -Continue Lopressor  -Chronically on Eliquis, currently on hold  -Lisinopril on hold for RUDY     Elevated LFTs  -Recent admission cholangitis, holding statin as noted     Hypertension  Hyperlipidemia     Ocular sarcoidosis  Bilateral lung nodules  -Repeat dedicated chest CT 6 months outpatient regarding nodules  -Continue chronic oral prednisone 10 mg     Obesity, BMI 35.16 kg/M2  EVA  Hx COVID-19 December 2020    DVT prophylaxis:  No DVT prophylaxis order currently exists.          Disposition: I expect the patient to be discharged TBD, continues broad-spectrum antibiotics, anticoagulation held for possible fluid collection    CODE STATUS:   Code Status and Medical Interventions:   Ordered at: 10/05/21 0134     Code Status:    CPR     Medical Interventions (Level of Support Prior to Arrest):    Full       Don Draper, DO  10/06/21

## 2021-10-06 NOTE — ED PROVIDER NOTES
EMERGENCY DEPARTMENT ENCOUNTER    Pt Name: Jeremías Soto  MRN: [id number]  Pt :   1955  Room Number:  S548/1  Date of encounter:  10/4/2021  PCP: Vincent Crawford MD  ED Provider: Toño Lainez MD    Historian: Patient, spouse      HPI:  Chief Complaint: Postoperative abdominal pain        Context: Jeremías Soto is a 66 y.o. male who presents to the ED c/o just over 24 hours of worsening, severe, right-sided abdominal pain following recent cholecystectomy.  The pain has been progressively worsening with time and he knows of nothing that makes it better.  He has had nausea without vomiting.  He was recently hospitalized for ascending cholangitis, Enterococcus bacteremia, and underwent both ERCP and cholecystectomy on the .  He was discharged on Zosyn through PICC line.  He is initially been improving since going home but over the last couple of days has been developing redness, pain of the right abdomen.  He has had chills without measured fever.  He describes generalized weakness and malaise.  No other complaints at this time.      PAST MEDICAL HISTORY  Past Medical History:   Diagnosis Date   • Cancer (CMS/HCC)     skin   • Coronary artery disease    • Diabetes mellitus (CMS/HCC)    • Elevated cholesterol    • Heart attack (CMS/HCC)    • Hypertension    • Sarcoid    • Sleep apnea     no cpap   • SOB (shortness of breath)    • Stroke (CMS/HCC)     Pt. states that he has had 2 mini strokes         PAST SURGICAL HISTORY  Past Surgical History:   Procedure Laterality Date   • CARDIAC CATHETERIZATION      cardiac stent x2 after s/p CABG   • CARDIAC SURGERY         • CATARACT EXTRACTION, BILATERAL     • CHOLECYSTECTOMY WITH INTRAOPERATIVE CHOLANGIOGRAM N/A 2021    Procedure: CHOLECYSTECTOMY LAPAROSCOPIC INTRAOPERATIVE CHOLANGIOGRAM;  Surgeon: Gaudencio Wolfe MD;  Location: [facility];  Service: General;  Laterality: N/A;   • COLONOSCOPY     • CORONARY ARTERY BYPASS GRAFT      • ENDOSCOPY     • ERCP N/A 9/28/2021    Procedure: ENDOSCOPIC RETROGRADE CHOLANGIOPANCREATOGRAPHY;  Surgeon: Brunner, Mark I, MD;  Location: [facility] ENDOSCOPY;  Service: Gastroenterology;  Laterality: N/A;  sphincterotomy made, balloon sweep of bile duct done with 9-12 balloon.    • EXTRACORPOREAL SHOCKWAVE LITHOTRIPSY (ESWL), STENT INSERTION/REMOVAL Bilateral 8/21/2020    Procedure: EXTRACORPOREAL SHOCKWAVE LITHOTRIPSY BILATERAL WITH STENT PLACEMENT LEFT;  Surgeon: Ethan Barnes Jr., MD;  Location: [facility] OR;  Service: Urology;  Laterality: Bilateral;   • SKIN CANCER EXCISION           FAMILY HISTORY  Family History   Problem Relation Age of Onset   • COPD Mother    • Diabetes Father    • Heart disease Father    • Hypertension Father    • Diabetes Sister    • Obesity Sister    • Cancer Brother          SOCIAL HISTORY  Social History     Socioeconomic History   • Marital status:      Spouse name: Not on file   • Number of children: Not on file   • Years of education: Not on file   • Highest education level: Not on file   Tobacco Use   • Smoking status: Never Smoker   • Smokeless tobacco: Never Used   Substance and Sexual Activity   • Alcohol use: No   • Drug use: No   • Sexual activity: Defer     Comment:          ALLERGIES  Patient has no known allergies.        REVIEW OF SYSTEMS  Review of Systems       All systems reviewed and negative except for those discussed in HPI.       PHYSICAL EXAM    I have reviewed the triage vital signs and nursing notes.    ED Triage Vitals [10/04/21 1842]   Temp Heart Rate Resp BP SpO2   99.1 °F (37.3 °C) 105 16 98/63 96 %      Temp src Heart Rate Source Patient Position BP Location FiO2 (%)   Oral Monitor Sitting Left arm --       Physical Exam  GENERAL:   Appears ill and in mild distress  HENT: Nares patent.  Dry mucous membranes  EYES: No scleral icterus.  CV: Regular rhythm, mild tachycardia  RESPIRATORY: Normal effort.  No audible wheezes, rales or  rhonchi.  ABDOMEN: Distended but not rigid he has erythema and warmth over most of the right abdomen surrounding surgical incisions concerning for cellulitis, guards in the right upper quadrant and right lower quadrant.  No CVA tenderness  MUSCULOSKELETAL: No deformities.  Bilateral pitting edema over the lower extremities  NEURO: Alert, moves all extremities, follows commands.  Technically alert and oriented but confused likely related to illness/fatigue  SKIN: Warm, dry, no rash visualized.        LAB RESULTS  Recent Results (from the past 24 hour(s))   Urinalysis With Microscopic If Indicated (No Culture) - Urine, Clean Catch    Collection Time: 10/04/21  9:51 PM    Specimen: Urine, Clean Catch   Result Value Ref Range    Color, UA Yellow Yellow, Straw    Appearance, UA Slightly Cloudy (A) Clear    pH, UA 6.0 5.0 - 8.0    Specific Gravity, UA 1.020 1.005 - 1.030    Glucose, UA Negative Negative    Ketones, UA Trace (A) Negative    Bilirubin, UA Negative Negative    Blood, UA Trace (A) Negative    Protein, UA 30 mg/dL (1+) (A) Negative    Leuk Esterase, UA Small (1+) (A) Negative    Nitrite, UA Negative Negative    Urobilinogen, UA 0.2 E.U./dL 0.2 - 1.0 E.U./dL   Urinalysis, Microscopic Only - Urine, Clean Catch    Collection Time: 10/04/21  9:51 PM    Specimen: Urine, Clean Catch   Result Value Ref Range    RBC, UA 0-2 None Seen, 0-2 /HPF    WBC, UA 6-12 (A) None Seen, 0-2 /HPF    Bacteria, UA 1+ (A) None Seen, Trace /HPF    Squamous Epithelial Cells, UA 13-20 (A) None Seen, 0-2 /HPF    Yeast, UA Small/1+ Yeast None Seen /HPF    Hyaline Casts, UA None Seen 0 - 6 /LPF    Methodology Automated Microscopy    Urine Culture - Urine, Urine, Clean Catch    Collection Time: 10/04/21  9:51 PM    Specimen: Urine, Clean Catch   Result Value Ref Range    Urine Culture No growth    Troponin    Collection Time: 10/04/21 10:50 PM    Specimen: Blood   Result Value Ref Range    Troponin T 0.054 (C) 0.000 - 0.030 ng/mL    Procalcitonin    Collection Time: 10/04/21 10:50 PM    Specimen: Blood   Result Value Ref Range    Procalcitonin 8.70 (H) 0.00 - 0.25 ng/mL   STAT Lactic Acid, Reflex    Collection Time: 10/04/21 11:38 PM    Specimen: Blood   Result Value Ref Range    Lactate 2.9 (C) 0.5 - 2.0 mmol/L   COVID-19, [facility] IN-HOUSE,NASAL Swab (NO TRANSPORT MEDIA) 2 HR TAT - Swab, Nasopharynx    Collection Time: 10/05/21 12:15 AM    Specimen: Nasopharynx; Swab   Result Value Ref Range    COVID19 Presumptive Negative Presumptive Negative - Ref. Range   ECG 12 Lead    Collection Time: 10/05/21  1:49 AM   Result Value Ref Range    QT Interval 432 ms    QTC Interval 510 ms   POC Glucose Once    Collection Time: 10/05/21  3:57 AM    Specimen: Blood   Result Value Ref Range    Glucose 75 70 - 130 mg/dL   POC Glucose Once    Collection Time: 10/05/21  5:09 AM    Specimen: Blood   Result Value Ref Range    Glucose 72 70 - 130 mg/dL   POC Glucose Once    Collection Time: 10/05/21  5:55 AM    Specimen: Blood   Result Value Ref Range    Glucose 101 70 - 130 mg/dL   POC Glucose Once    Collection Time: 10/05/21  7:22 AM    Specimen: Blood   Result Value Ref Range    Glucose 91 70 - 130 mg/dL   Protime-INR    Collection Time: 10/05/21  7:57 AM    Specimen: Blood   Result Value Ref Range    Protime 34.2 (H) 11.4 - 14.4 Seconds    INR 3.59 (H) 0.85 - 1.16   aPTT    Collection Time: 10/05/21  7:57 AM    Specimen: Blood   Result Value Ref Range    PTT 61.5 (C) 22.0 - 39.0 seconds   Type & Screen    Collection Time: 10/05/21  7:57 AM    Specimen: Blood   Result Value Ref Range    ABO Type A     RH type Positive     Antibody Screen Negative     T&S Expiration Date 10/8/2021 11:59:59 PM    CBC Auto Differential    Collection Time: 10/05/21  7:57 AM    Specimen: Blood   Result Value Ref Range    WBC 13.69 (H) 3.40 - 10.80 10*3/mm3    RBC 3.57 (L) 4.14 - 5.80 10*6/mm3    Hemoglobin 10.6 (L) 13.0 - 17.7 g/dL    Hematocrit 35.9 (L) 37.5 - 51.0 %    .6  (H) 79.0 - 97.0 fL    MCH 29.7 26.6 - 33.0 pg    MCHC 29.5 (L) 31.5 - 35.7 g/dL    RDW 13.8 12.3 - 15.4 %    RDW-SD 51.1 37.0 - 54.0 fl    MPV 9.6 6.0 - 12.0 fL    Platelets 130 (L) 140 - 450 10*3/mm3    Neutrophil % 86.8 (H) 42.7 - 76.0 %    Lymphocyte % 5.4 (L) 19.6 - 45.3 %    Monocyte % 6.3 5.0 - 12.0 %    Eosinophil % 0.4 0.3 - 6.2 %    Basophil % 0.4 0.0 - 1.5 %    Immature Grans % 0.7 (H) 0.0 - 0.5 %    Neutrophils, Absolute 11.89 (H) 1.70 - 7.00 10*3/mm3    Lymphocytes, Absolute 0.74 0.70 - 3.10 10*3/mm3    Monocytes, Absolute 0.86 0.10 - 0.90 10*3/mm3    Eosinophils, Absolute 0.05 0.00 - 0.40 10*3/mm3    Basophils, Absolute 0.05 0.00 - 0.20 10*3/mm3    Immature Grans, Absolute 0.10 (H) 0.00 - 0.05 10*3/mm3    nRBC 0.0 0.0 - 0.2 /100 WBC   Hemoglobin A1c    Collection Time: 10/05/21  7:57 AM    Specimen: Blood   Result Value Ref Range    Hemoglobin A1C 7.30 (H) 4.80 - 5.60 %   POC Glucose Once    Collection Time: 10/05/21  8:33 AM    Specimen: Blood   Result Value Ref Range    Glucose 86 70 - 130 mg/dL   Comprehensive Metabolic Panel    Collection Time: 10/05/21  8:41 AM    Specimen: Blood   Result Value Ref Range    Glucose 99 65 - 99 mg/dL    BUN 32 (H) 8 - 23 mg/dL    Creatinine 3.09 (H) 0.76 - 1.27 mg/dL    Sodium 138 136 - 145 mmol/L    Potassium 4.7 3.5 - 5.2 mmol/L    Chloride 106 98 - 107 mmol/L    CO2 20.0 (L) 22.0 - 29.0 mmol/L    Calcium 8.1 (L) 8.6 - 10.5 mg/dL    Total Protein 5.4 (L) 6.0 - 8.5 g/dL    Albumin 2.20 (L) 3.50 - 5.20 g/dL    ALT (SGPT) 94 (H) 1 - 41 U/L    AST (SGOT) 137 (H) 1 - 40 U/L    Alkaline Phosphatase 176 (H) 39 - 117 U/L    Total Bilirubin 1.4 (H) 0.0 - 1.2 mg/dL    eGFR Non African Amer 20 (L) >60 mL/min/1.73    Globulin 3.2 gm/dL    A/G Ratio 0.7 g/dL    BUN/Creatinine Ratio 10.4 7.0 - 25.0    Anion Gap 12.0 5.0 - 15.0 mmol/L   Troponin    Collection Time: 10/05/21  8:41 AM    Specimen: Blood   Result Value Ref Range    Troponin T 0.048 (C) 0.000 - 0.030 ng/mL   POC  Glucose Once    Collection Time: 10/05/21 10:05 AM    Specimen: Blood   Result Value Ref Range    Glucose 86 70 - 130 mg/dL   ECG 12 Lead    Collection Time: 10/05/21 10:16 AM   Result Value Ref Range    QT Interval 484 ms    QTC Interval 551 ms   POC Glucose Once    Collection Time: 10/05/21 11:12 AM    Specimen: Blood   Result Value Ref Range    Glucose 81 70 - 130 mg/dL   Vancomycin, Trough    Collection Time: 10/05/21 11:56 AM    Specimen: Blood   Result Value Ref Range    Vancomycin Trough 16.10 5.00 - 20.00 mcg/mL   POC Glucose Once    Collection Time: 10/05/21 12:19 PM    Specimen: Blood   Result Value Ref Range    Glucose 78 70 - 130 mg/dL   POC Glucose Once    Collection Time: 10/05/21  2:49 PM    Specimen: Blood   Result Value Ref Range    Glucose 78 70 - 130 mg/dL   POC Glucose Once    Collection Time: 10/05/21  5:14 PM    Specimen: Blood   Result Value Ref Range    Glucose 80 70 - 130 mg/dL   POC Glucose Once    Collection Time: 10/05/21  8:05 PM    Specimen: Blood   Result Value Ref Range    Glucose 87 70 - 130 mg/dL       If labs were ordered, I independently reviewed the results.        RADIOLOGY  CT Abdomen Pelvis Without Contrast    Result Date: 10/4/2021  CT Abdomen Pelvis WO INDICATION: Right side abdominal pain. Cellulitis. Recent cholecystectomy. TECHNIQUE: CT of the abdomen and pelvis without IV contrast. Coronal and sagittal reconstructions were obtained.  Radiation dose reduction techniques included automated exposure control or exposure modulation based on body size. Count of known CT and cardiac nuc med studies performed in previous 12 months: 2. COMPARISON: 9/23/2021 FINDINGS: 9 mm noncalcified nodules are noted in both lower lobes. These are stable from CT abdomen of 1/23/2018 and benign. There is a 9 mm nodule in the left upper lobe abutting the fissure. There is an additional nodule in the right lower lobe measuring 6 mm. The latter 2 nodules are not imaged on the older prior study and  cannot be compared. There is chronic scarring in the lung bases, right greater than left. There are partially calcified lymph nodes in both rita and in the azygoesophageal recess compatible with old granulomatous disease. There is a soft tissue nodule in the left posterolateral chest wall, likely a sebaceous cyst measuring 2.7 x 1.1 cm. This is unchanged from the recent prior exam. There is extensive coronary artery disease, and there is atherosclerotic disease. No aortic aneurysm is seen. There has been interval cholecystectomy. There is a fluid collection in the gallbladder fossa that is not well characterized without contrast. It does contain a bubble of gas. It is nearly isodense to the fatty liver. It measures at least 2.5 cm in thickness and 8.5 cm in length. This could reflect a seroma/hematoma or possibly a biloma. Abscess is also in the differential diagnosis. There is no evidence of biliary obstruction. There is marked fatty atrophy of the pancreas. There is severe atrophy of the left kidney with multiple left renal cysts. There are nonobstructing stones in both kidneys with one of the larger on the left side measuring 11 mm. No ureteral stones are seen on either side, and there is no hydronephrosis. The spleen and adrenal glands are normal. Urinary bladder has a trabeculated wall with multiple bladder wall diverticula. Patient may be status post prostatectomy or at least a TURP. Correlate with history. There is colonic diverticulosis without diverticulitis or acute colitis. The appendix is normal. No small bowel obstruction is seen. Stomach and duodenum are within normal limits. There is some fat stranding in the ventral abdominal wall to the right of midline, as well as in the right flank. This could reflect some bland edema or cellulitis. There are bilateral L5 pars defects with mild grade 1 L5-S1 spondylolisthesis.     1. Cholecystectomy with a fluid collection in the gallbladder fossa containing a  bubble of air. Differential considerations include seroma/hematoma, biloma, or possibly a developing abscess. No biliary obstruction is seen. 2. Lung nodules in the visualized lung bases, not all of which have been previously evaluated. Therefore, chest CT follow-up in 6 months is recommended per Fleischner criteria. 3. No acute findings in the GI tract. There is colonic diverticulosis. 4. Left renal atrophy with bilateral nonobstructing renal stones. There are no ureteral stones, and there is no hydronephrosis. 5. Fat stranding in the ventral abdominal wall and right flank as above could reflect bland edema or cellulitis. 6. Additional findings as above. Recommend consideration for referral to [facility]. For questions or to make appointment call [phone]. Signer Name: Caden Rodriguez MD  Signed: 10/4/2021 10:22 PM  Workstation Name: [email or web address]  [facility] Hepatobiliary Without CCK    Result Date: 10/5/2021  EXAMINATION: [facility] HEPATOBILIARY WITHOUT CCK- 10/05/2021  INDICATION: Abdominal pain, upper, recurrent,?post cholecystectomy  TECHNIQUE: Nuclear medicine hepatobiliary scan with radiopharmaceutical 5.5 mCi mebrofenin and subsequent imaging of the liver and biliary tract as well as small bowel.  COMPARISON: NONE  FINDINGS: Prompt hepatocyte uptake with biliary activity within 5 minutes and small bowel activity noted within 15 minutes without obstructive pathology. No evidence for abnormal tracer activity to suggest leak.      No evidence for abnormal tracer activity to suggest leak with normal activity in the biliary system and small bowel.  D: 10/05/2021 E: 10/05/2021        XR Chest 1 View    Result Date: 10/4/2021  CR Chest 1 Vw INDICATION: Infectious workup, recent surgery/sepsis COMPARISON:  None available. FINDINGS: Portable AP view(s) of the chest.  Right PICC line terminates at the cavoatrial junction. There is bilateral platelike atelectasis. The  heart appears enlarged. It is uncertain if there may be some developing pneumonia at the right lung base.     There appears to be slightly increased density at the right lung base, and it is uncertain if this may represent developing pneumonia. Signer Name: Laquita Kelly MD  Signed: 10/4/2021 9:46 PM  Workstation Name: [id number]  [facility]      I ordered and reviewed the above noted radiographic studies.      I viewed images of the chest x-ray which reveals cardiomegaly consistent with diagnosis of CHF and right lower lung opacity    See radiologist's dictation for official interpretation.        PROCEDURES    Procedures    ECG 12 Lead   Final Result   Test Reason : + troponin   Blood Pressure :   */*   mmHG   Vent. Rate :  78 BPM     Atrial Rate :  78 BPM      P-R Int : 138 ms          QRS Dur : 110 ms       QT Int : 484 ms       P-R-T Axes :  16 -20 110 degrees      QTc Int : 551 ms      Sinus rhythm with occasional premature ventricular complexes   Low voltage QRS   Inferior infarct , age undetermined   Anterolateral infarct , age undetermined   Prolonged QT   Abnormal ECG   No previous ECGs available   Confirmed by RONY PACHECO MD ([id number]) on 10/5/2021 6:11:36 PM      Referred By: JORGITO           Confirmed By: RONY PACHECO MD      ECG 12 Lead   Final Result   Test Reason : + troponin   Blood Pressure :   */*   mmHG   Vent. Rate :  84 BPM     Atrial Rate :  84 BPM      P-R Int : 134 ms          QRS Dur : 100 ms       QT Int : 432 ms       P-R-T Axes :   6 -20 112 degrees      QTc Int : 510 ms      Sinus rhythm with premature atrial complexes   Low voltage QRS   Inferior infarct (cited on or before 30-OCT-2015)   Possible Anterolateral infarct (cited on or before 23-SEP-2021)   Prolonged QT   Abnormal ECG   When compared with ECG of 04-OCT-2021 20:44, (Unconfirmed)   Significant changes have occurred   Confirmed by RONY PACHECO MD ([id number]) on 10/5/2021 6:11:07 PM      Referred By:             Confirmed By: RONY PACHECO MD      ECG 12 Lead    (Results Pending)       MEDICATIONS GIVEN IN ER    Medications   Sodium Chloride (PF) 0.9 % 10 mL (has no administration in time range)   atorvastatin (LIPITOR) tablet 40 mg (40 mg Oral Given 10/5/21 0814)   dorzolamide (TRUSOPT) 2 % 1 drop, timolol (TIMOPTIC) 0.5 % 1 drop for Cosopt 22.3-6.8 mg/mL ( Both Eyes Given 10/5/21 2044)   lactobacillus acidophilus (RISAQUAD) capsule 1 capsule (1 capsule Oral Given 10/5/21 0814)   tamsulosin (FLOMAX) 24 hr capsule 0.4 mg (0.4 mg Oral Given 10/5/21 0814)   predniSONE (DELTASONE) tablet 10 mg (10 mg Oral Given 10/5/21 0814)   sodium chloride 0.9 % flush 10 mL (10 mL Intravenous Given 10/5/21 2045)   sodium chloride 0.9 % flush 10 mL (has no administration in time range)   rOPINIRole (REQUIP) tablet 0.25 mg (0.25 mg Oral Given 10/5/21 2052)   oxyCODONE-acetaminophen (PERCOCET) 5-325 MG per tablet 1 tablet ( [facility] 0820[facility]   pantoprazole (PROTONIX) EC tablet 40 mg (40 mg Oral Given 10/5/21 0814)   heparin (porcine) infusion  - ADS Override Pull (has no administration in time range)   iopamidol (ISOVUE-300) 61 % injection  - ADS Override Pull (  Canceled Entry 10/5/21 1204)   metoprolol tartrate (LOPRESSOR) half tablet 12.5 mg (12.5 mg Oral Given 10/5/21 2043)   dextrose (GLUTOSE) oral gel 15 g (has no administration in time range)   dextrose (D50W) 25 g/ 50mL Intravenous Solution 25 g (has no administration in time range)   glucagon (human recombinant) (GLUCAGEN DIAGNOSTIC) injection 1 mg (has no administration in time range)   insulin lispro (humaLOG) injection 0-7 Units (0 Units Subcutaneous Not Given 10/5/21 2045)   DAPTOmycin (CUBICIN) 500 mg/50 mL 0.9% sodium chloride IVPB (500 mg Intravenous New Bag 10/5/21 2044)   micafungin 100 mg/100 mL 0.9% NS IVPB (mbp) (100 mg Intravenous New Bag 10/5/21 4772)   piperacillin-tazobactam (ZOSYN) 3.375 g in iso-osmotic dextrose 50 ml (premix) (has no administration  in time range)   lactated ringers bolus 1,000 mL (0 mL Intravenous Stopped 10/4/21 2249)   vancomycin 1750 mg/500 mL 0.9% NS IVPB (BHS) (1,750 mg Intravenous New Bag 10/4/21 2332)   cefepime (MAXIPIME) 2 g/100 mL 0.9% NS (mbp) (0 g Intravenous Stopped 10/4/21 2250)   dextrose (D50W) 25 g/ 50mL Intravenous Solution 25 mL (25 mL Intravenous Given 10/5/21 0530)   technetium Tc 99m mebrofenin (CHOLETEC) injection 1 dose (1 dose Intravenous Given 10/5/21 1245)         PROGRESS, DATA ANALYSIS, CONSULTS, AND MEDICAL DECISION MAKING    All labs have been independently reviewed by me.  All radiology studies have been reviewed by me and the radiologist dictating the report.   EKG's have been independently viewed and interpreted by me.      Differential diagnoses: Postoperative infection, peritonitis, pneumonia, urinary tract infection, sepsis           In summary this is a 66-year-old man who recently was admitted for sepsis with ascending cholangitis and bacteremia who underwent ERCP and cholecystectomy and was discharged on IV Zosyn who returns because of 1 to 2 days of worsening right-sided abdominal pain, redness, warmth.  Started on broad-spectrum antibiotics and 1 L of IV fluids.  He has lower extremity edema so we will hold further crystalloid resuscitation until obtaining BNP.  BNP significantly elevated at 8000.  He has elevated lactate and significant creatinine elevation representing acute kidney injury.  CT scan of the abdomen pelvis shows inflammation of the abdominal wall consistent with cellulitis but also shows fluid collection in the gallbladder space concerning for seroma versus abscess versus hematoma.  Discussed with on-call general surgeon who recommends continuing resuscitation and broad-spectrum antibiotics but will continue to follow along.  Medicine team consulted for admission.    45 minutes of critical care provided. This time excludes other billable procedures. Time does include preparation of  documents, medical consultations, review of old records, and direct bedside care. Patient was at high risk for life-threatening deterioration due to systemic infection following recent hospitalization with multiple surgeries, bacteremia, currently receiving broad-spectrum IV antibiotics with elevated lactate, acute kidney injury, profound leukocytosis, complicated by likely new diagnosis of CHF.      AS OF 21:00 EDT VITALS:    BP - 163/86  HR - 75  TEMP - 97.5 °F (36.4 °C) (Oral)  O2 SATS - 96%        DIAGNOSIS  Final diagnoses:   Cellulitis of abdominal wall   Infection of superficial incisional surgical site after procedure, initial encounter   Leukocytosis, unspecified type   Acute cystitis without hematuria   RUDY (acute kidney injury) (HCC)   Acute congestive heart failure, unspecified heart failure type (HCC)         DISPOSITION  Admit to medicine with surgery following             Toño Lainez MD  10/05/21 5248

## 2021-10-06 NOTE — PROGRESS NOTES
"Patient Name:  Jeremías Soto  YOB: 1955  [id number]    Surgery Progress Note    Date of visit: 10/6/2021    Subjective   No acute events overnight. Continues with poor PO intake due to anorexia/nausea. No fevers/chills. Right sided pain improved.          Objective       /85 (BP Location: Left arm, Patient Position: Lying)   Pulse 75   Temp 97.6 °F (36.4 °C) (Oral)   Resp 16   Ht 170.2 cm (67\")   Wt 98.1 kg (216 lb 4.3 oz)   SpO2 96%   BMI 33.87 kg/m²     Intake/Output Summary (Last 24 hours) at 10/6/2021 0900  Last data filed at 10/6/2021 0300  Gross per 24 hour   Intake 500 ml   Output 565 ml   Net -65 ml       CV:  Rhythm regular and rate regular  L:  Clear to auscultation bilaterally  Abd:  Bowel sounds positive, soft, decreased erthema and tenderness over right flank, no diffuse abdominal pain or rebound/guarding  Ext:  No cyanosis, clubbing, edema    Recent labs and imaging that are back at this time have been reviewed.   WBCs 13.7 -> 10.8  Hgb 10.6 -> 10.3  Cr 2.76  Tbili 1.1  HIDA:  IMPRESSION:  No evidence for abnormal tracer activity to suggest leak  with normal activity in the biliary system and small bowel.         Assessment/Plan     Pt POD#9 lap farooq with new abdominal wall cellulitis around lateral-most incision and increased WBCs, nausea  Unclear if patient has abscess given the CT is noncontrast and he has Surgicel in the area of dissection which could lead to a lashon of air. Given his chronic renal insufficiency, likely not able to obtain contrast CT -> recommend IR-guided drainage with US.   Eliquis being held; follow coag labs; will make NPO p MN  Abx per ID        Gaudencio Wolfe MD  10/6/2021  09:00 EDT      "

## 2021-10-07 NOTE — PROGRESS NOTES
[facility]  PROGRESS NOTE    Patient Name: Jeremías Soto  : 1955  MRN: [id number]    Date of Admission: 10/4/2021  Primary Care Physician: Vincent Crawford MD    Subjective   Subjective     CC:  Abdominal pain    HPI:  Had worsening abdominal pain into the evening last night, notably worse with eating.  As needed morphine improved symptoms.  This morning he still feels unwell but his pain is markedly improved compared to yesterday.  Just returned from CT ordered by surgery    ROS:  Gen- No fevers, chills  CV- No chest pain, palpitations  Resp- No cough, dyspnea  GI- No N/V/D, yes abd pain    Objective   Objective     Vital Signs:   Temp:  [98.1 °F (36.7 °C)-98.6 °F (37 °C)] 98.2 °F (36.8 °C)  Heart Rate:  [73-81] 79  Resp:  [16] 16  BP: (129-170)/(82-98) 129/82  Flow (L/min):  [2] 2     Physical Exam:  Constitutional: Awake, alert, moderately ill-appearing male laying in bed  HENT: NCAT, mucous membranes moist, perioral scabbed lesions  Respiratory: Clear to auscultation bilaterally, respiratory effort normal   Cardiovascular: RRR, no murmurs, rubs, or gallops, palpable radial pulse  Gastrointestinal: Doughy feeling erythema over the RT abdominal wall, mild tenderness over the RUQ, central obesity, nondistended, BS present  Musculoskeletal: No bilateral ankle edema  Psychiatric: Appropriate affect, cooperative  Neurologic: Speech clear, moving all extremity spontaneously    Results Reviewed:  LAB RESULTS:      Lab 10/07/21  0433 10/06/21  0742 10/06/21  0735 10/05/21  0757 10/04/21  2338 10/04/21  2250 10/04/21  2041   WBC 17.25* 10.81* 10.93* 13.69*  --   --  24.54*   HEMOGLOBIN 11.3* 10.3* 10.1* 10.6*  --   --  12.0*   HEMATOCRIT 36.5* 31.8* 31.5* 35.9*  --   --  38.3   PLATELETS 161 168 170 130*  --   --  196   NEUTROS ABS 14.91*  --  9.51* 11.89*  --   --  22.51*   IMMATURE GRANS (ABS) 0.31*  --  0.06* 0.10*  --   --  0.39*   LYMPHS ABS 0.61*  --   0.55* 0.74  --   --  0.50*   MONOS ABS 1.35*  --  0.72 0.86  --   --  1.08*   EOS ABS 0.02  --  0.07 0.05  --   --  0.00   MCV 96.1 92.4 92.4 100.6*  --   --  92.7   CRP  --   --   --   --   --   --  46.11*   PROCALCITONIN  --   --   --   --   --  8.70*  --    LACTATE  --   --   --   --  2.9*  --  4.1*   PROTIME  --  23.7*  --  34.2*  --   --  27.7*   APTT  --  61.2*  --  61.5*  --   --  52.0*         Lab 10/07/21  0433 10/06/21  0742 10/05/21  0841 10/05/21  0757 10/04/21  2041   SODIUM 142 138 138  --  139   POTASSIUM 5.3* 3.6 4.7  --  4.7   CHLORIDE 107 106 106  --  102   CO2 20.0* 19.0* 20.0*  --  18.0*   ANION GAP 15.0 13.0 12.0  --  19.0*   BUN 37* 37* 32*  --  27*   CREATININE 2.91* 2.76* 3.09*  --  2.84*   GLUCOSE 164* 72 99  --  82   CALCIUM 8.6 8.1* 8.1*  --  8.6   MAGNESIUM  --   --   --   --  2.0   PHOSPHORUS  --   --   --   --  3.6   HEMOGLOBIN A1C  --   --   --  7.30*  --          Lab 10/07/21  0433 10/06/21  0742 10/05/21  0841 10/04/21  2041   TOTAL PROTEIN 5.9* 5.4* 5.4* 6.1   ALBUMIN 2.50* 2.20* 2.20* 2.70*   GLOBULIN 3.4 3.2 3.2 3.4   ALT (SGPT) 73* 77* 94* 95*   AST (SGOT) 80* 103* 137* 90*   BILIRUBIN 1.5* 1.1 1.4* 2.0*   ALK PHOS 228* 165* 176* 206*   LIPASE  --   --   --  8*         Lab 10/06/21  0742 10/05/21  0841 10/05/21  0757 10/04/21  2250 10/04/21  2041   PROBNP  --   --   --   --  8,353.0*   TROPONIN T  --  0.048*  --  0.054* 0.071*   PROTIME 23.7*  --  34.2*  --  27.7*   INR 2.22*  --  3.59*  --  2.72*             Lab 10/05/21  0757   ABO TYPING A   RH TYPING Positive   ANTIBODY SCREEN Negative         Brief Urine Lab Results  (Last result in the past 365 days)      Color   Clarity   Blood   Leuk Est   Nitrite   Protein   CREAT   Urine HCG        10/04/21 2151 Yellow Slightly Cloudy Trace Small (1+) Negative 30 mg/dL (1+)               Microbiology Results Abnormal     Procedure Component Value - Date/Time    Blood Culture - Blood, Arm, Left [[id number]] Collected: 10/04/21 2120    Lab  Status: Preliminary result Specimen: Blood from Arm, Left Updated: 10/06/21 2215     Blood Culture No growth at 2 days    Blood Culture - Blood, Arm, Right [[id number]] Collected: 10/04/21 2143    Lab Status: Preliminary result Specimen: Blood from Arm, Right Updated: 10/06/21 2215     Blood Culture No growth at 2 days    Blood Culture - Blood, Arm, Left [[id number]] Collected: 10/05/21 0734    Lab Status: Preliminary result Specimen: Blood from Arm, Left Updated: 10/06/21 0903     Blood Culture No growth at 24 hours    COVID PRE-OP / PRE-PROCEDURE SCREENING ORDER (NO ISOLATION) - Swab, Nasopharynx [[id number]]  (Normal) Collected: 10/05/21 0015    Lab Status: Final result Specimen: Swab from Nasopharynx Updated: 10/05/21 0043    Narrative:      The following orders were created for panel order COVID PRE-OP / PRE-PROCEDURE SCREENING ORDER (NO ISOLATION) - Swab, Nasopharynx.  Procedure                               Abnormality         Status                     ---------                               -----------         ------                     COVID-19, ABBOTT IN-HOUS...[[id number]]  Normal              Final result                 Please view results for these tests on the individual orders.    COVID-19, ABBOTT IN-HOUSE,NASAL Swab (NO TRANSPORT MEDIA) 2 HR TAT - Swab, Nasopharynx [[id number]]  (Normal) Collected: 10/05/21 0015    Lab Status: Final result Specimen: Swab from Nasopharynx Updated: 10/05/21 0043     COVID19 Presumptive Negative    Narrative:      Fact sheet for providers: https://www.fda.gov/media/090141/download     Fact sheet for patients: https://www.fda.gov/media/878841/download    Test performed by PCR.  If inconsistent with clinical signs and symptoms patient should be tested with different authorized molecular test.          NM Hepatobiliary Without CCK    Result Date: 10/5/2021  EXAMINATION: NM HEPATOBILIARY WITHOUT CCK- 10/05/2021  INDICATION: Abdominal pain, upper, recurrent,?post  cholecystectomy  TECHNIQUE: Nuclear medicine hepatobiliary scan with radiopharmaceutical 5.5 mCi mebrofenin and subsequent imaging of the liver and biliary tract as well as small bowel.  COMPARISON: NONE  FINDINGS: Prompt hepatocyte uptake with biliary activity within 5 minutes and small bowel activity noted within 15 minutes without obstructive pathology. No evidence for abnormal tracer activity to suggest leak.      Impression: No evidence for abnormal tracer activity to suggest leak with normal activity in the biliary system and small bowel.  D: 10/05/2021 E: 10/05/2021    This report was finalized on 10/5/2021 8:59 PM by Dr. Jevon Hartmann.        Results for orders placed during the hospital encounter of 09/23/21    Adult Transthoracic Echo Complete W/ Cont if Necessary Per Protocol    Interpretation Summary  · Left ventricular ejection fraction appears to be 26 - 30%  · There is global hypokinesis. The posterior wall appears more focally hypokinetic  · Moderately reduced right ventricular systolic function noted.  · Mild dilation of the aortic root is present measuring 4.1 cm.  · The left atrial cavity is moderately dilated  · No significant valvular stenosis or regurgitation.      I have reviewed the medications:  Scheduled Meds:DAPTOmycin, 6 mg/kg (Adjusted), Intravenous, Q48H  docosanol, 1 application, Topical, 5x Daily  dorzolamide-timolol, , Both Eyes, BID  insulin lispro, 0-7 Units, Subcutaneous, 4x Daily With Meals & Nightly  lactobacillus acidophilus, 1 capsule, Oral, Daily  metoprolol tartrate, 12.5 mg, Oral, BID  micafungin (MYCAMINE) IV, 100 mg, Intravenous, Q24H  pantoprazole, 40 mg, Oral, Daily  piperacillin-tazobactam, 3.375 g, Intravenous, Q8H  predniSONE, 10 mg, Oral, Daily  rOPINIRole, 0.25 mg, Oral, Nightly  sodium chloride, 10 mL, Intravenous, Q12H  tamsulosin, 0.4 mg, Oral, Daily  valACYclovir, 500 mg, Oral, Q12H      Continuous Infusions:   PRN Meds:.dextrose  •  dextrose  •  glucagon (human  recombinant)  •  Morphine  •  oxyCODONE-acetaminophen  •  Sodium Chloride (PF)  •  sodium chloride    Assessment/Plan   Assessment & Plan     Active Hospital Problems    Diagnosis  POA   • **Sepsis (HCC) [A41.9]  Yes   • Post-operative infection [T81.40XA]  Yes   • Abdominal wall cellulitis [L03.311]  Yes   • Postoperative intra-abdominal abscess [T81.43XA]  Yes   • Elevated troponin [R77.8]  Yes   • CAD (coronary artery disease) [I25.10]  Yes   • CKD (chronic kidney disease), stage III (HCC) [N18.30]  Yes   • Acute kidney injury superimposed on chronic kidney disease (HCC) [N17.9, N18.9]  Yes   • Elevated LFTs [R79.89]  Yes   • Combined systolic and diastolic cardiac dysfunction (EF < 50% 2018) [I51.89]  Yes   • History of sarcoidosis (occular) [Z86.2]  Yes   • Moderate obstructive sleep apnea [G47.33]  Yes   • Diabetes mellitus (HCC) [E11.9]  Yes   • Hypertension [I10]  Yes      Resolved Hospital Problems   No resolved problems to display.        Brief Hospital Course to date:  Jeremías Soto is a 66 y.o. male with systolic CHF (46-50%), chronic multiorgan disease, admitted 9/23-9/29 w/ enterococcal/clostridial bacteremia, cholangitis, questionable pyelonephritis-s/p lap farooq 9/27 and ERCP 9/28 discharged home on IV Zosyn with a planned completion date 10/11 who returned with several days worsening erythema of the abdomen and imaging in the ED concerning for fluid collection in the gallbladder fossa now on empiric antibiotics with some initial improvement then new development of abdominal pain     Assessment/plan     Sepsis, abdominal source  Abnormal abdominal fluid collection  Recent cholecystitis  Recent enterococcal/clostridial bacteremia  -S/p lap farooq 9/27, ERCP 9/28  -2.5 cm x 8.5 cm fluid collection on initial CT, unclear etiology given recent surgery  -ID following, antibiotics changed again to meropenem, clindamycin; daptomycin continued  -Statin on hold while on daptomycin  -Eliquis on hold  since 10/5  -Continue probiotic  -Worsening abdominal pain last night, CT ordered by surgery today, radiology interpretation pending, on my view there is a new hyperdensity/fluid collection around the liver, discussed with general surgery who were concerned about a bleed and planning for reversal of anticoagulation, possible need for surgery tomorrow    LV thrombus  -Per cardiology note 10/6, records from the [facility] demonstrate prescription of Eliquis 7/19 for a large LV thrombus found on MRI  -Anticipate need for resumption of anticoagulation once possible/severe bleeding rule out     RUDY on CKD 3  Hyperkalemia  -Baseline Cr 1.9-2.3; EGFR 30  -Renal function slightly worse, potassium 5.3, repeat this afternoon, possible Lokelma pending results    Perioral lesions  -Empiric Valtrex, renally dosed     DM type 2, A1c 7.3%, without long-term use of insulin  -Accu-Cheks with SSI     CAD with prior CABG/stent  Chronic systolic/diastolic CHF  Mild troponin elevation  Hx multiple TIA  -Echo 9/24/2021 EF 26-30%  -Continue Lopressor  -Chronically on Eliquis, currently on hold  -Lisinopril on hold for RUDY     Elevated LFTs  -Recent admission cholangitis, holding statin as noted     Hypertension  Hyperlipidemia     Ocular sarcoidosis  Bilateral lung nodules  -Repeat dedicated chest CT 6 months outpatient regarding nodules  -Continue chronic oral prednisone 10 mg     Obesity, BMI 35.16 kg/M2  EVA  Hx COVID-19 December 2020    DVT prophylaxis:  No DVT prophylaxis order currently exists.     AM-PAC 6 Clicks Score (PT): 18 (10/06/21 1932)    Disposition: Multiple acute ongoing issues, anticipate he will be here through the weekend at minimum, anticipate need for rehab at discharge    CODE STATUS:   Code Status and Medical Interventions:   Ordered at: 10/05/21 0134     Code Status:    CPR     Medical Interventions (Level of Support Prior to Arrest):    Full       Don Draper, DO  10/07/21

## 2021-10-07 NOTE — PROGRESS NOTES
"Patient Name:  Jeremías Soto  YOB: 1955  [id number]    Surgery Progress Note    Date of visit: 10/7/2021    Subjective   Subjective: Reports feeling light-headed. Not hungry.         Objective     Objective:     /82 (BP Location: Left arm, Patient Position: Lying)   Pulse 79   Temp 98.2 °F (36.8 °C) (Oral)   Resp 16   Ht 170.2 cm (67\")   Wt 97.5 kg (215 lb)   SpO2 96%   BMI 33.67 kg/m²     Intake/Output Summary (Last 24 hours) at 10/7/2021 0707  Last data filed at 10/7/2021 0100  Gross per 24 hour   Intake 410 ml   Output 325 ml   Net 85 ml       CV:  Rhythm regular and rate regular   L:  Clear  to auscultation bilaterally   Abd:  Bowel sounds positive , soft, nontender. Continues to have cellulitis of the right lateral abdominal wall. No crepitus or palpable abscess  Ext:  No cyanosis, clubbing, edema    Recent labs that are back at this time have been reviewed.        Assessment/Plan     Assessment/ Plan:    Problem List Items Addressed This Visit        Genitourinary and Reproductive     RUDY (acute kidney injury) (HCC)       Other    Post-operative infection-I am unsure that the small fluid collection seen in the gallbladder fossa is a positive the patient's increasing white count or illness.  Think this more is related to some cellulitis of his right flank.  No drainable collection at this time.  Tentative plan is for IR guided drain of his gallbladder fossa.  Will follow.      Other Visit Diagnoses     Cellulitis of abdominal wall    -  Primary    Leukocytosis, unspecified type        Acute cystitis without hematuria        Acute congestive heart failure, unspecified heart failure type (HCC)               Active Hospital Problems    Diagnosis  POA   • **Sepsis (HCC) [A41.9]  Yes   • Post-operative infection [T81.40XA]  Yes   • Abdominal wall cellulitis [L03.311]  Yes   • Postoperative intra-abdominal abscess [T81.43XA]  Yes   • Elevated troponin [R77.8]  Yes   • CAD (coronary " artery disease) [I25.10]  Yes   • CKD (chronic kidney disease), stage III (HCC) [N18.30]  Yes   • Acute kidney injury superimposed on chronic kidney disease (HCC) [N17.9, N18.9]  Yes   • Elevated LFTs [R79.89]  Yes   • Combined systolic and diastolic cardiac dysfunction (EF < 50% 2018) [I51.89]  Yes   • History of sarcoidosis (occular) [Z86.2]  Yes   • Moderate obstructive sleep apnea [G47.33]  Yes   • Diabetes mellitus (HCC) [E11.9]  Yes   • Hypertension [I10]  Yes      Resolved Hospital Problems   No resolved problems to display.              David Callahan MD  10/7/2021  07:07 EDT    CT scan of the abdomen and pelvis was personally viewed by me as well as discussed with the attending radiologist and the hospitalist.  In essence he has a large hematoma above the liver, which is new since 3 days ago.  He is over 10 days out from his operation.  I suspect is related to his anticoagulation, and overall poor clinical status, though his recent operation may have been a starting point as well.  His current INR is 2.3, but his hemoglobin is stable.  He is maintaining his blood pressure, and his heart rate is normal.  We will reverse his anticoagulation with FFP and vitamin K, and follow him clinically.  I will continue to follow this patient closely with you.    David Callahan MD  12:51 EDT      I discussed the CT scan findings with the patient and his wife at the bedside.  After careful review of the images, I think the best course of action for this frail patient would be to resuscitate him overnight and correct his anticoagulation, and then plan for a laparoscopic washout of the hematoma tomorrow.  This would enable us to both assess the cause of his bleeding and remove any potential infectious source from the hematoma, as well as provide an opportunity for further drainage and debridement of the abdominal cavity in a safe manner.  I have also explained that we will likely open one of his lateralmost trocar site  incisions a bit wider to allow for packing of the area to relieve any potential infectious source that may be present.  They understand and agreed to proceed.  Continue to reverse his anticoagulation today, with planned operation in the morning.    David Callahan MD  13:42 EDT

## 2021-10-07 NOTE — NURSING NOTE
Repositioned patient from modified swimmers to supine onto stretcher from fluoro bed. After placing back in supine, patient noted to have large skin tear on left arm.  Candice Hickey, Dr Brunner aware. Pt's arms noted to be edematous, paper thin with many bruises and a bandage was on left arm above skin tear prior to procedure. Plan to consult WOC and determine best treatment plan for wound care.

## 2021-10-07 NOTE — PROGRESS NOTES
Jeremías Soto  1955  [id number]    Date of Consult: 10/5/21  Requesting Provider: Vincent Crawford MD  Evaluating Physician: Diaz Moreno MD    Chief Complaint: abdominal pain and redness    Reason for Consultation: sepsis secondary to an abdominal wall abscess/cellulitis    History of present illness:    Jeremías Soto is a 66 y.o.  Yr old male with history of coronary artery disease, CK D stage III B, diabetes mellitus type 2, hyperlipidemia, hypertension, sarcoidosis, and history of CVA who I recently evaluated during an admission to [facility] last month for enterococcus bacteremia and Clostridium perfringens bacteremia due to a biliary source/gallbladder source (cholangitis and cholecystitis).  The patient underwent laparoscopic cholecystectomy on 9/27 and ERCP with sphincterotomy stone extraction on 9/28.  He additionally had aerococcus urinae from urine culture. Due to his baseline renal dysfunction he did have a Groshong catheter placed for IV antibiotics.  He clinically improved with improvement in his LFTs and sepsis. He was discharged on 9/29 with plans to continue Zosyn at least until 10/11/21. I have not yet seen him in outpatient follow-up but he was scheduled to follow-up with me tomorrow on 10/6.      He was doing well apparently until 2-3 days ago when he noticed redness developing over his right abdominal wall.  The erythema has gotten progressively worse with some swelling and warmth and tenderness.  As far as I know he did not contact our clinic regarding this erythema. He was not having any fevers or chills at home.  He presented to the ER early this morning for evaluation of this redness. Since arrival, he was noted to have a maximum temperature of 99.1°F. labs showed a CRP of 46.11, troponin elevated to 0.07, proBNP of 8353, INR of 2.72, White blood cell count of 24.54 (up from 13.29 just before discharge), hemoglobin of 12, lactic acid of 4.1-->2.9, creatinine of  2.84 (up from 2.15 just prior to discharge). COVID-19 PCR was negative. White blood cell count is now improved to 13.69 on antibiotics.creatinine is worse at 3.09 today.  LFTs remain elevated with an ALT of 94, an AST of 137, an alkaline phosphatase of 176, and a total bilirubin of 1.4. CT abdomen and pelvis was done yesterday and showed cholecystectomy with a fluid collection in the gallbladder fossa containing a bubble of air with differential considerations including a seroma/hematoma, biloma, or possible developing abscess.  No biliary obstruction was seen.  There was also fat stranding in the ventral abdominal wall and right flank that could reflect bland edema or cellulitis. The patient's antibiotics have been changed to vancomycin, cefepime, and Flagyl. Repeat blood cultures and urine culture are in progress. Surgery consult has been placed. Plan is for IR drainage procedure but having to wait on this due to his anticoagulation that he has been on. He underwent NM Hepatobiliary scan today with no evidence for abnormal tracer activity to suggest leak.  The infectious disease team has been consulted for recommendations.    Subjective:    10/6/21: the patient states that his abdominal pain has been improving although he was having some increased right-sided abdominal pain after I went in the room today as he has just eaten something. No fevers. Tolerating the abx well without ADRs.      Past Medical History:   Diagnosis Date   • Cancer (CMS/HCC)     skin   • Coronary artery disease    • Diabetes mellitus (CMS/HCC)    • Elevated cholesterol    • Heart attack (CMS/HCC)    • Hypertension    • Sarcoid    • Sleep apnea     no cpap   • SOB (shortness of breath)    • Stroke (CMS/HCC)     Pt. states that he has had 2 mini strokes       Past Surgical History:   Procedure Laterality Date   • CARDIAC CATHETERIZATION      cardiac stent x2 after s/p CABG   • CARDIAC SURGERY      2002   • CATARACT EXTRACTION, BILATERAL     •  CHOLECYSTECTOMY WITH INTRAOPERATIVE CHOLANGIOGRAM N/A 9/27/2021    Procedure: CHOLECYSTECTOMY LAPAROSCOPIC INTRAOPERATIVE CHOLANGIOGRAM;  Surgeon: Gaudencio Wolfe MD;  Location:  [facility];  Service: General;  Laterality: N/A;   • COLONOSCOPY     • CORONARY ARTERY BYPASS GRAFT     • ENDOSCOPY     • ERCP N/A 9/28/2021    Procedure: ENDOSCOPIC RETROGRADE CHOLANGIOPANCREATOGRAPHY;  Surgeon: Brunner, Mark I, MD;  Location:  [facility];  Service: Gastroenterology;  Laterality: N/A;  sphincterotomy made, balloon sweep of bile duct done with 9-12 balloon.    • EXTRACORPOREAL SHOCKWAVE LITHOTRIPSY (ESWL), STENT INSERTION/REMOVAL Bilateral 8/21/2020    Procedure: EXTRACORPOREAL SHOCKWAVE LITHOTRIPSY BILATERAL WITH STENT PLACEMENT LEFT;  Surgeon: Ethan Barnes Jr., MD;  Location:  [facility];  Service: Urology;  Laterality: Bilateral;   • SKIN CANCER EXCISION       Social history:  The patient is .  He lives in [facility].  No history of tobacco, alcohol, or illicit drug use.    Family history:  family history includes COPD in his mother; Cancer in his brother; Diabetes in his father and sister; Heart disease in his father; Hypertension in his father; Obesity in his sister.    No Known Allergies    Medication:  Current Facility-Administered Medications   Medication Dose Route Frequency Provider Last Rate Last Admin   • DAPTOmycin (CUBICIN) 500 mg/50 mL 0.9% sodium chloride IVPB  6 mg/kg (Adjusted) Intravenous Q48H Diaz Moreno MD   500 mg at 10/05/21 2044   • dextrose (D50W) 25 g/ 50mL Intravenous Solution 25 g  25 g Intravenous Q15 Min PRN Don Draper DO       • dextrose (GLUTOSE) oral gel 15 g  15 g Oral Q15 Min PRN Don Draper DO       • docosanol (ABREVA) 10 % cream 1 application  1 application Topical 5x Daily Don Draper DO   1 application at 10/06/21 2047   • dorzolamide (TRUSOPT) 2 % 1 drop, timolol (TIMOPTIC) 0.5 % 1 drop for Cosopt 22.3-6.8 mg/mL   Both  Eyes BID Laquita Toth APRN   Given at 10/06/21 2047   • glucagon (human recombinant) (GLUCAGEN DIAGNOSTIC) injection 1 mg  1 mg Subcutaneous Q15 Min PRN Don Drapre DO       • insulin lispro (humaLOG) injection 0-7 Units  0-7 Units Subcutaneous 4x Daily With Meals & Nightly Don Draper DO       • lactobacillus acidophilus (RISAQUAD) capsule 1 capsule  1 capsule Oral Daily Laquita Toth APRN   1 capsule at 10/06/21 0848   • metoprolol tartrate (LOPRESSOR) half tablet 12.5 mg  12.5 mg Oral BID Don Draper DO   12.5 mg at 10/06/21 2045   • micafungin 100 mg/100 mL 0.9% NS IVPB (mbp)  100 mg Intravenous Q24H Diaz Moreno MD   100 mg at 10/06/21 1701   • morphine injection 2 mg  2 mg Intravenous Q3H PRN Don Draper DO   2 mg at 10/06/21 1846   • oxyCODONE-acetaminophen (PERCOCET) 7.5-325 MG per tablet 1 tablet  1 tablet Oral Q4H PRN Don Draper DO   1 tablet at 10/06/21 2053   • pantoprazole (PROTONIX) EC tablet 40 mg  40 mg Oral Daily Corey Schmitt IV, PharmD   40 mg at 10/06/21 0848   • piperacillin-tazobactam (ZOSYN) 3.375 g in iso-osmotic dextrose 50 ml (premix)  3.375 g Intravenous Q8H Kenneth Grace RPH   3.375 g at 10/06/21 1701   • predniSONE (DELTASONE) tablet 10 mg  10 mg Oral Daily Laquita Toth APRN   10 mg at 10/06/21 0848   • rOPINIRole (REQUIP) tablet 0.25 mg  0.25 mg Oral Nightly Dianna Shah DO   0.25 mg at 10/06/21 2045   • Sodium Chloride (PF) 0.9 % 10 mL  10 mL Intravenous PRN Toño Lainez MD       • sodium chloride 0.9 % flush 10 mL  10 mL Intravenous Q12H Laquita Toth APRN   10 mL at 10/06/21 2048   • sodium chloride 0.9 % flush 10 mL  10 mL Intravenous PRN Laquita Toth APRN       • tamsulosin (FLOMAX) 24 hr capsule 0.4 mg  0.4 mg Oral Daily Laquita Toth APRN   0.4 mg at 10/06/21 0848   • valACYclovir (VALTREX) tablet 500 mg  500 mg Oral Q12H Don Draper DO   500 mg at 10/06/21 2045  "        Antibiotics:  Anti-Infectives (From admission, onward)    Ordered     Dose/Rate Route Frequency Start Stop    10/06/21 1506  valACYclovir (VALTREX) tablet 500 mg     Ordering Provider: Don Draper DO    500 mg Oral Every 12 Hours Scheduled 10/06/21 2100 10/07/21 2059    10/05/21 1655  piperacillin-tazobactam (ZOSYN) 3.375 g in iso-osmotic dextrose 50 ml (premix)     Diaz Moreno MD reviewed the order on 10/06/21 1056.   Ordering Provider: Kenneth Grace RPH    3.375 g  over 4 Hours Intravenous Every 8 Hours 10/06/21 0000 10/15/21 2359    10/05/21 1632  DAPTOmycin (CUBICIN) 500 mg/50 mL 0.9% sodium chloride IVPB     Diaz Moreno MD reviewed the order on 10/06/21 1056.   Ordering Provider: Diaz Moreno MD    6 mg/kg × 80.5 kg (Adjusted)  over 30 Minutes Intravenous Every 48 Hours 10/05/21 2100 10/13/21 2059    10/05/21 1632  micafungin 100 mg/100 mL 0.9% NS IVPB (mbp)     Diaz Moreno MD reviewed the order on 10/06/21 1056.   Ordering Provider: Diaz Moreno MD    100 mg  over 60 Minutes Intravenous Every 24 Hours 10/05/21 1800 10/12/21 1759    10/04/21 2101  vancomycin 1750 mg/500 mL 0.9% NS IVPB ([facility])     Ordering Provider: Toño Lainez MD    20 mg/kg × 81.9 kg (Adjusted)  250 mL/hr over 120 Minutes Intravenous Once 10/04/21 2103 10/05/21 0132    10/04/21 2101  cefepime (MAXIPIME) 2 g/100 mL 0.9% NS (mbp)     Ordering Provider: Toño Lainez MD    2 g  200 mL/hr over 30 Minutes Intravenous Once 10/04/21 2103 10/04/21 2250            Review of Systems    As above    Physical Exam:   Vital Signs   /93 (BP Location: Left arm, Patient Position: Lying)   Pulse 79   Temp 98.1 °F (36.7 °C) (Oral)   Resp 16   Ht 170.2 cm (67\")   Wt 98.1 kg (216 lb 4.3 oz)   SpO2 92%   BMI 33.87 kg/m²     GENERAL: Awake and alert, in no acute distress. Sitting up in bed  HENT: Normocephalic, atraumatic.   Oropharynx clear without evidence of thrush or " exudate. No evidence of peridontal disease.  External oral blisters noted  Eyes: Pupils equal and round. No conjunctival injection. No icterus.  NECK: Supple without nuchal rigidity. No mass.  HEART: RRR; No murmur, rubs, gallops.   LUNGS: Clear to auscultation bilaterally without wheezing, rales, rhonchi. Normal respiratory effort. Nonlabored.  ABDOMEN: Soft, right-sided abdominal tenderness to palpation- improving, erythema over right abdominal wall is stable, less indurated  EXT:  No cyanosis, clubbing or edema. No cord.  :  Without Meyers catheter.  MSK: FROM without joint effusions noted arms/legs.    SKIN: Other than abdominal wall, No rashes noted over exposed skin. No jaundice. No peripheral stigmata of infective endocarditis.  NEURO: Oriented to PPT. Normal speech and cognition  PSYCHIATRIC: Normal insight and judgement. Cooperative with PE    Groshong cath site is without erythema or drainage    Laboratory Data    Results from last 7 days   Lab Units 10/06/21  0742 10/06/21  0735 10/05/21  0757   WBC 10*3/mm3 10.81* 10.93* 13.69*   HEMOGLOBIN g/dL 10.3* 10.1* 10.6*   HEMATOCRIT % 31.8* 31.5* 35.9*   PLATELETS 10*3/mm3 168 170 130*     Results from last 7 days   Lab Units 10/06/21  0742   SODIUM mmol/L 138   POTASSIUM mmol/L 3.6   CHLORIDE mmol/L 106   CO2 mmol/L 19.0*   BUN mg/dL 37*   CREATININE mg/dL 2.76*   GLUCOSE mg/dL 72   CALCIUM mg/dL 8.1*     Results from last 7 days   Lab Units 10/06/21  0742   ALK PHOS U/L 165*   BILIRUBIN mg/dL 1.1   ALT (SGPT) U/L 77*   AST (SGOT) U/L 103*         Results from last 7 days   Lab Units 10/04/21  2041   CRP mg/dL 46.11*       Estimated Creatinine Clearance: 28.6 mL/min (A) (by C-G formula based on SCr of 2.76 mg/dL (H)).       Microbiology:  Blood culture ×2: In process    Urine culture: In process    Radiology:  CT Abdomen Pelvis Without Contrast    Result Date: 10/4/2021  1. Cholecystectomy with a fluid collection in the gallbladder fossa containing a bubble  of air. Differential considerations include seroma/hematoma, biloma, or possibly a developing abscess. No biliary obstruction is seen. 2. Lung nodules in the visualized lung bases, not all of which have been previously evaluated. Therefore, chest CT follow-up in 6 months is recommended per Fleischner criteria. 3. No acute findings in the GI tract. There is colonic diverticulosis. 4. Left renal atrophy with bilateral nonobstructing renal stones. There are no ureteral stones, and there is no hydronephrosis. 5. Fat stranding in the ventral abdominal wall and right flank as above could reflect bland edema or cellulitis. 6. Additional findings as above. Recommend consideration for referral to [facility]. For questions or to make appointment call [phone]. Signer Name: Caden Rodriguez MD  Signed: 10/4/2021 10:22 PM  Workstation Name: [id number]  [facility] Hepatobiliary Without CCK    Result Date: 10/5/2021  No evidence for abnormal tracer activity to suggest leak with normal activity in the biliary system and small bowel.  D: 10/05/2021 E: 10/05/2021    This report was finalized on 10/5/2021 8:59 PM by Dr. Jevon Hartmann.      XR Chest 1 View    Result Date: 10/4/2021  There appears to be slightly increased density at the right lung base, and it is uncertain if this may represent developing pneumonia. Signer Name: Laquita Kelly MD  Signed: 10/4/2021 9:46 PM  Workstation Name: [id number]  [facility]    IR Insert Tunneled CV Catheter Without Port 5 Plus    Result Date: 9/29/2021  Impression:    Successful ultrasound and fluoroscopic guided right internal jugular vein route dual lumen power line  placement as described above.  Thank you for the opportunity to assist in the care of your patient.  This report was finalized on 9/29/2021 11:39 AM by Thomas Paige MD.       I independently read the patient's CT abdomen and pelvis from 10/4-I agree  with the above report    Impression:     Problems:  -Sepsis with leukocytosis with neutrophilia, lactic acidosis, elevated pro calcitonin level- due to intra-abdominal source/abdominal wall cellulitis  -Abdominal wall cellulitis  -Possible developing intra-abdominal abscess vs. Seroma. No signs of biliary leak on NM hepatobiliary scan  -Recent cholangitis and cholecystitis status post recent cholecystectomy on 9/27 and ERCP on 9/28  -Recent enterococcus bacteremia  -Recent Clostridium perfringens bacteremia  -Recent pancreatitis  -Macrocytic anemia  -Elevated troponin level  -Elevated LFTs  -RUDY on CKD stage IIIB- due to sepsis vs dehydration  -Coronary artery disease status post CABG/stents  -systolic heart failure, LVEF was 26-30% on echo in 09/2021  -history of cardiac thrombus, on anticoagulation with eliquis  -Diabetes mellitus type 2 with hyperglycemia  -Essential hypertension  -Hyperlipidemia  -Sarcoidosis/chronic prednisone  -prolonged QTc interval  -elevated CPK level  -external oral blisters, possible herpes labialis    PLAN: Thank you for asking us to see Jeremías Soto, I recommend the following:     -continue to follow cbc with diff, cmp, crp, and lactic acid. Recheck CPK level in the AM as baseline was elevated  -follow pending blood cultures  -follow urine cx- yeast so far  -I agree with IR drainage procedure for fluid collection as soon as we can do this safely. Cardiology ok with holding Eliquis for 48 hrs and then doing procedure on 10/7. I will follow cultures from this procedure  -continue Zosyn  -continue empiric Daptomycin  -continue empiric Micafungin (avoiding fluconazole in the setting of his prolonged QTc interval)  -surgery following. No signs of biliary leak on NM hepatobiliary scan  -weekly Groshong cath dressing changes  -valtrex started by primary team for possible HSV    I discussed in length with the patient's wife at bedside today    I discussed with Dr. Draper  today    Complex MDM. The patient has significant risk for further morbidity and mortality in the setting of his multiple complex medical issues.     Diaz Moreno MD  10/6/2021

## 2021-10-08 NOTE — THERAPY EVALUATION
Patient Name: Jeremías Soto  : 1955    MRN: [id number]                              Today's Date: 10/8/2021       Admit Date: 10/4/2021    Visit Dx:     ICD-10-CM ICD-9-CM   1. Cellulitis of abdominal wall  L03.311 682.2   2. Infection of superficial incisional surgical site after procedure, initial encounter  T81.41XA 998.59   3. Leukocytosis, unspecified type  D72.829 288.60   4. Acute cystitis without hematuria  N30.00 595.0   5. RUDY (acute kidney injury) (HCC)  N17.9 584.9   6. Acute congestive heart failure, unspecified heart failure type (HCC)  I50.9 428.0   7. Abdominal wall cellulitis  L03.311 682.2     Patient Active Problem List   Diagnosis   • Snoring   • Diabetes mellitus (HCC)   • Hypertension   • Overweight   • Moderate obstructive sleep apnea   • Abdominal pain   • Nephrolithiasis   • History of sarcoidosis (occular)   • Recurrent COVID-19 virus infection (2020, 2021)   • RUDY (acute kidney injury) (HCC)   • Elevated LFTs   • Combined systolic and diastolic cardiac dysfunction (EF < 50% )   • Abnormal CT scan, liver   • Post-operative infection   • Abdominal wall cellulitis   • Sepsis (HCC)   • Postoperative intra-abdominal abscess   • Elevated troponin   • CAD (coronary artery disease)   • CKD (chronic kidney disease), stage III (HCC)   • Acute kidney injury superimposed on chronic kidney disease (HCC)     Past Medical History:   Diagnosis Date   • Cancer (HCC)     skin   • Coronary artery disease    • Diabetes mellitus (HCC)    • Elevated cholesterol    • Heart attack (HCC)    • Hypertension    • Sarcoid    • Sleep apnea     no cpap   • SOB (shortness of breath)    • Stroke (HCC)     Pt. states that he has had 2 mini strokes. No residual      Past Surgical History:   Procedure Laterality Date   • CARDIAC CATHETERIZATION      cardiac stent x2 after s/p CABG   • CATARACT EXTRACTION, BILATERAL     • CHOLECYSTECTOMY WITH INTRAOPERATIVE CHOLANGIOGRAM N/A 2021     Procedure: CHOLECYSTECTOMY LAPAROSCOPIC INTRAOPERATIVE CHOLANGIOGRAM;  Surgeon: Gaudencio Wolfe MD;  Location:  [facility];  Service: General;  Laterality: N/A;   • COLONOSCOPY     • CORONARY ARTERY BYPASS GRAFT  2002   • ENDOSCOPY     • ERCP N/A 9/28/2021    Procedure: ENDOSCOPIC RETROGRADE CHOLANGIOPANCREATOGRAPHY;  Surgeon: Brunner, Mark I, MD;  Location: [facility] ENDOSCOPY;  Service: Gastroenterology;  Laterality: N/A;  sphincterotomy made, balloon sweep of bile duct done with 9-12 balloon.    • EXTRACORPOREAL SHOCKWAVE LITHOTRIPSY (ESWL), STENT INSERTION/REMOVAL Bilateral 8/21/2020    Procedure: EXTRACORPOREAL SHOCKWAVE LITHOTRIPSY BILATERAL WITH STENT PLACEMENT LEFT;  Surgeon: Ethan Barnes Jr., MD;  Location:  [facility];  Service: Urology;  Laterality: Bilateral;   • SKIN CANCER EXCISION       General Information     Row Name 10/08/21 1446          Physical Therapy Time and Intention    Document Type  evaluation  -     Mode of Treatment  individual therapy;physical therapy  -     Row Name 10/08/21 1446          General Information    Patient Profile Reviewed  yes  -LM     Prior Level of Function  independent:;all household mobility;gait;ADL's  -LM     Existing Precautions/Restrictions  fall;other (see comments) s/p lap farooq - 9/27; s/p ERCP 9/28; s/p lap washout and drain placement with I&D of R flank cellulitis - 10/8; 2 LAWRENCE Drains; Abdominal Binder  -     Barriers to Rehab  none identified  -LM     Row Name 10/08/21 1446          Living Environment    Lives With  spouse  -     Row Name 10/08/21 1446          Home Main Entrance    Number of Stairs, Main Entrance  four  -LM     Stair Railings, Main Entrance  railings on both sides of stairs  -LM     Row Name 10/08/21 1446          Stairs Within Home, Primary    Number of Stairs, Within Home, Primary  none  -LM     Row Name 10/08/21 1446          Cognition    Orientation Status (Cognition)  oriented to;person;place;verbal cues/prompts needed  for orientation;time  -LM     Row Name 10/08/21 1446          Safety Issues, Functional Mobility    Safety Issues Affecting Function (Mobility)  insight into deficits/self-awareness;safety precaution awareness;safety precautions follow-through/compliance;sequencing abilities;problem-solving  -LM     Impairments Affecting Function (Mobility)  balance;cognition;endurance/activity tolerance;strength;pain  -LM     Comment, Safety Issues/Impairments (Mobility)  Pt very lethargic this PM from sx.  -LM       User Key  (r) = Recorded By, (t) = Taken By, (c) = Cosigned By    Initials Name Provider Type    LM Sienna Silveira, PT Physical Therapist        Mobility     Row Name 10/08/21 1446          Bed Mobility    Bed Mobility  supine-sit;sit-supine  -LM     Supine-Sit [place] (Bed Mobility)  moderate assist (50% patient effort);1 person assist;verbal cues  -LM     Sit-Supine [place] (Bed Mobility)  moderate assist (50% patient effort);1 person assist;verbal cues  -LM     Assistive Device (Bed Mobility)  bed rails;head of bed elevated  -LM     Comment (Bed Mobility)  Vc's for sequencing.  -LM     Row Name 10/08/21 1446          Sit-Stand Transfer    Sit-Stand [place] (Transfers)  moderate assist (50% patient effort);1 person assist;verbal cues  -LM     Assistive Device (Sit-Stand Transfers)  walker, front-wheeled  -LM     Row Name 10/08/21 1446          Gait/Stairs (Locomotion)    [place] Level (Gait)  moderate assist (50% patient effort);1 person assist;verbal cues  -LM     Assistive Device (Gait)  walker, front-wheeled  -LM     Distance in Feet (Gait)  3  -LM     Deviations/Abnormal Patterns (Gait)  harshad decreased;gait speed decreased  -LM     Bilateral Gait Deviations  knee buckling, bilateral;forward flexed posture  -LM     Comment (Gait/Stairs)  Pt took ~3 sidesteps towards the HOB.  B knee buckling noted.  Vc's for upright posture.  -LM       User Key  (r) = Recorded By, (t) = Taken By, (c) =  Cosigned By    Initials Name Provider Type    LM Sienna Silveira PT Physical Therapist        Obj/Interventions     Row Name 10/08/21 1446          Range of Motion Comprehensive    General Range of Motion  bilateral lower extremity ROM WFL  -LM     Row Name 10/08/21 1446          Strength Comprehensive (MMT)    General Manual Muscle Testing (MMT) Assessment  lower extremity strength deficits identified  -LM     Comment, General Manual Muscle Testing (MMT) Assessment  BLEs grossly 4/5 throughout  -LM     Row Name 10/08/21 1446          Balance    Balance Assessment  sitting static balance;standing static balance;standing dynamic balance  -LM     Static Sitting Balance  mild impairment;supported;sitting, edge of bed  -LM     Static Standing Balance  moderate impairment;supported  -LM     Dynamic Standing Balance  moderate impairment;supported  -LM     Row Name 10/08/21 1446          Sensory Assessment (Somatosensory)    Sensory Assessment (Somatosensory)  LE sensation intact  -LM       User Key  (r) = Recorded By, (t) = Taken By, (c) = Cosigned By    Initials Name Provider Type    Sienna Wilkinson PT Physical Therapist        Goals/Plan     Row Name 10/08/21 1446          Bed Mobility Goal 1 (PT)    Activity/Assistive Device (Bed Mobility Goal 1, PT)  sit to supine/supine to sit  -LM     [place] Level/Cues Needed (Bed Mobility Goal 1, PT)  independent  -LM     Time Frame (Bed Mobility Goal 1, PT)  long term goal (LTG);2 weeks  -LM     Row Name 10/08/21 1446          Transfer Goal 1 (PT)    Activity/Assistive Device (Transfer Goal 1, PT)  bed-to-chair/chair-to-bed  -LM     [place] Level/Cues Needed (Transfer Goal 1, PT)  modified independence  -LM     Time Frame (Transfer Goal 1, PT)  long term goal (LTG);2 weeks  -LM     Row Name 10/08/21 1446          Gait Training Goal 1 (PT)    Activity/Assistive Device (Gait Training Goal 1, PT)  gait (walking locomotion);assistive device use  -LM     [place] Level  (Gait Training Goal 1, PT)  modified independence  -LM     Distance (Gait Training Goal 1, PT)  150 feet  -LM     Time Frame (Gait Training Goal 1, PT)  long term goal (LTG);2 weeks  -LM       User Key  (r) = Recorded By, (t) = Taken By, (c) = Cosigned By    Initials Name Provider Type    LM Sienna Silveira, PT Physical Therapist        Clinical Impression     Row Name 10/08/21 1446          Pain    Additional Documentation  Pain Scale: Numbers Pre/Post-Treatment (Group)  -LM     Row Name 10/08/21 1446          Pain Scale: Numbers Pre/Post-Treatment    Pretreatment Pain Rating  0/10 - no pain  -LM     Posttreatment Pain Rating  0/10 - no pain  -LM     Row Name 10/08/21 1446          Plan of Care Review    Plan of Care Reviewed With  patient  -LM     Outcome Summary  PT evaluation completed.  Pt very lethargic throughout eval, but did wake more once mobility started.  Pt transferred supine<-->sit with ModAx1, stood with ModAx1, and took ~3 sidesteps towards the HOB with ModAx1 - multiple episodes of B knee buckling noted.  Skilled PT services warranted to improve mobility and safety.  Recommend inpt rehab at d/c.  -     Row Name 10/08/21 1446          Therapy Assessment/Plan (PT)    Rehab Potential (PT)  good, to achieve stated therapy goals  -LM     Criteria for Skilled Interventions Met (PT)  yes;meets criteria;skilled treatment is necessary  -     Row Name 10/08/21 1446          Vital Signs    Pre Systolic BP Rehab  138  -LM     Pre Treatment Diastolic BP  73  -LM     Pretreatment Heart Rate (beats/min)  88  -LM     Posttreatment Heart Rate (beats/min)  92  -LM     Pre SpO2 (%)  92  -LM     O2 Delivery Pre Treatment  supplemental O2  -LM     Post SpO2 (%)  91  -LM     O2 Delivery Post Treatment  supplemental O2  -LM     Pre Patient Position  Supine  -LM     Post Patient Position  Supine  -LM     Row Name 10/08/21 1446          Positioning and Restraints    Pre-Treatment Position  in bed  -LM     Post Treatment  Position  bed  -LM     In Bed  supine;call light within reach;encouraged to call for assist;exit alarm on;with family/caregiver;legs elevated;notified nsg  -LM       User Key  (r) = Recorded By, (t) = Taken By, (c) = Cosigned By    Initials Name Provider Type    Sienna Wilkinson PT Physical Therapist        Outcome Measures     Row Name 10/08/21 1446          How much help from another person do you currently need...    Turning from your back to your side while in flat bed without using bedrails?  3  -LM     Moving from lying on back to sitting on the side of a flat bed without bedrails?  2  -LM     Moving to and from a bed to a chair (including a wheelchair)?  2  -LM     Standing up from a chair using your arms (e.g., wheelchair, bedside chair)?  2  -LM     Climbing 3-5 steps with a railing?  2  -LM     To walk in hospital room?  2  -LM     AM-PAC 6 Clicks Score (PT)  13  -LM     Row Name 10/08/21 1446          Functional Assessment    Outcome Measure Options  AM-PAC 6 Clicks Basic Mobility (PT)  -       User Key  (r) = Recorded By, (t) = Taken By, (c) = Cosigned By    Initials Name Provider Type    LM Sienna Silveira PT Physical Therapist                       Physical Therapy Education                 Title: PT OT SLP Therapies (In Progress)     Topic: Physical Therapy (In Progress)     Point: Mobility training (Done)     Learning Progress Summary           Patient Acceptance, E, VU,NR by  at 10/8/2021 1537                   Point: Home exercise program (Not Started)     Learner Progress:  Not documented in this visit.          Point: Precautions (Done)     Learning Progress Summary           Patient Acceptance, E, VU,NR by  at 10/8/2021 1537                               User Key     Initials Effective Dates Name Provider Type [facility] 06/16/21 -  Sienna Silveira PT Physical Therapist PT              PT Recommendation and Plan  Planned Therapy Interventions (PT): balance training, bed mobility  training, gait training, motor coordination training, neuromuscular re-education, home exercise program, patient/family education, postural re-education, ROM (range of motion), stair training, strengthening, stretching, transfer training  Plan of Care Reviewed With: patient  Outcome Summary: PT evaluation completed.  Pt very lethargic throughout eval, but did wake more once mobility started.  Pt transferred supine<-->sit with ModAx1, stood with ModAx1, and took ~3 sidesteps towards the HOB with ModAx1 - multiple episodes of B knee buckling noted.  Skilled PT services warranted to improve mobility and safety.  Recommend inpt rehab at d/c.     Time Calculation:   PT Charges     Row Name 10/08/21 1446             Time Calculation    Start Time  1446  -LM      PT Received On  10/08/21  -LM      PT Goal Re-Cert Due Date  10/18/21  -LM         Untimed Charges    PT Eval/Re-eval Minutes  50  -LM         Total Minutes    Untimed Charges Total Minutes  50  -LM       Total Minutes  50  -LM        User Key  (r) = Recorded By, (t) = Taken By, (c) = Cosigned By    Initials Name Provider Type    LM Sienna Silveira, PT Physical Therapist        Therapy Charges for Today     Code Description Service Date Service Provider Modifiers Qty    [id number] HC PT EVAL MOD COMPLEXITY 4 10/8/2021 Sienna Silveira, PT GP 1          PT G-Codes  Outcome Measure Options: AM-PAC 6 Clicks Basic Mobility (PT)  AM-PAC 6 Clicks Score (PT): 13    Sienna Silveira PT  10/8/2021

## 2021-10-08 NOTE — PROGRESS NOTES
Jeremías Soto  1955  [id number]    Date of Consult: 10/5/21  Requesting Provider: Vincent Crawford MD  Evaluating Physician: Diaz Moreno MD    Chief Complaint: abdominal pain and redness    Reason for Consultation: sepsis secondary to an abdominal wall abscess/cellulitis    History of present illness:    Jeremías Soto is a 66 y.o.  Yr old male with history of coronary artery disease, CK D stage III B, diabetes mellitus type 2, hyperlipidemia, hypertension, sarcoidosis, and history of CVA who I recently evaluated during an admission to [facility] last month for enterococcus bacteremia and Clostridium perfringens bacteremia due to a biliary source/gallbladder source (cholangitis and cholecystitis).  The patient underwent laparoscopic cholecystectomy on 9/27 and ERCP with sphincterotomy stone extraction on 9/28.  He additionally had aerococcus urinae from urine culture. Due to his baseline renal dysfunction he did have a Groshong catheter placed for IV antibiotics.  He clinically improved with improvement in his LFTs and sepsis. He was discharged on 9/29 with plans to continue Zosyn at least until 10/11/21. I have not yet seen him in outpatient follow-up but he was scheduled to follow-up with me tomorrow on 10/6.      He was doing well apparently until 2-3 days ago when he noticed redness developing over his right abdominal wall.  The erythema has gotten progressively worse with some swelling and warmth and tenderness.  As far as I know he did not contact our clinic regarding this erythema. He was not having any fevers or chills at home.  He presented to the ER early this morning for evaluation of this redness. Since arrival, he was noted to have a maximum temperature of 99.1°F. labs showed a CRP of 46.11, troponin elevated to 0.07, proBNP of 8353, INR of 2.72, White blood cell count of 24.54 (up from 13.29 just before discharge), hemoglobin of 12, lactic acid of 4.1-->2.9, creatinine of  2.84 (up from 2.15 just prior to discharge). COVID-19 PCR was negative. White blood cell count is now improved to 13.69 on antibiotics.creatinine is worse at 3.09 today.  LFTs remain elevated with an ALT of 94, an AST of 137, an alkaline phosphatase of 176, and a total bilirubin of 1.4. CT abdomen and pelvis was done yesterday and showed cholecystectomy with a fluid collection in the gallbladder fossa containing a bubble of air with differential considerations including a seroma/hematoma, biloma, or possible developing abscess.  No biliary obstruction was seen.  There was also fat stranding in the ventral abdominal wall and right flank that could reflect bland edema or cellulitis. The patient's antibiotics have been changed to vancomycin, cefepime, and Flagyl. Repeat blood cultures and urine culture are in progress. Surgery consult has been placed. Plan is for IR drainage procedure but having to wait on this due to his anticoagulation that he has been on. He underwent NM Hepatobiliary scan today with no evidence for abnormal tracer activity to suggest leak.  The infectious disease team has been consulted for recommendations.    Subjective:    10/6/21: the patient states that his abdominal pain has been improving although he was having some increased right-sided abdominal pain after I went in the room today as he has just eaten something. No fevers. Tolerating the abx well without ADRs.    10/7/21: The patient is doing worse today.  Still having some right-sided abdominal pain.  He was taken for repeat CT abdomen and pelvis today as his leukocytosis worsened And he did have a new 16 x 6 cm hepatic fluid collection with intermittent heterogenous and hyperdense contents compatible with a large hematoma.  He did have a new small right pleural effusion with some adjacent consolidation or atelectasis. He denies any nausea.  No fevers    10/8/21: The patient was very somnolent today although he just recently got back from  the OR.  Dr. Callahan took the patient to the OR today and did not see any acute bleeding but did see some old blood/hematoma.  No necrotic fascia noted.  2 LAWRENCE drains left in place, one of the liver and another in the gallbladder fossa.  No fevers overnight.  Leukocytosis is slightly better.  His wife is at bedside and states that he is just been very somnolent after his procedure and during week but no other acute issues.      Past Medical History:   Diagnosis Date   • Cancer (HCC)     skin   • Coronary artery disease    • Diabetes mellitus (HCC)    • Elevated cholesterol    • Heart attack (HCC) 2003   • Hypertension    • Sarcoid    • Sleep apnea     no cpap   • SOB (shortness of breath)    • Stroke (HCC)     Pt. states that he has had 2 mini strokes. No residual        Past Surgical History:   Procedure Laterality Date   • CARDIAC CATHETERIZATION  2003    cardiac stent x2 after s/p CABG   • CATARACT EXTRACTION, BILATERAL     • CHOLECYSTECTOMY WITH INTRAOPERATIVE CHOLANGIOGRAM N/A 9/27/2021    Procedure: CHOLECYSTECTOMY LAPAROSCOPIC INTRAOPERATIVE CHOLANGIOGRAM;  Surgeon: Gaudencio Wolfe MD;  Location: [facility] OR;  Service: General;  Laterality: N/A;   • COLONOSCOPY     • CORONARY ARTERY BYPASS GRAFT  2002   • ENDOSCOPY     • ERCP N/A 9/28/2021    Procedure: ENDOSCOPIC RETROGRADE CHOLANGIOPANCREATOGRAPHY;  Surgeon: Brunner, Mark I, MD;  Location: [facility] ENDOSCOPY;  Service: Gastroenterology;  Laterality: N/A;  sphincterotomy made, balloon sweep of bile duct done with 9-12 balloon.    • EXTRACORPOREAL SHOCKWAVE LITHOTRIPSY (ESWL), STENT INSERTION/REMOVAL Bilateral 8/21/2020    Procedure: EXTRACORPOREAL SHOCKWAVE LITHOTRIPSY BILATERAL WITH STENT PLACEMENT LEFT;  Surgeon: Ethan Barnes Jr., MD;  Location: [facility] OR;  Service: Urology;  Laterality: Bilateral;   • SKIN CANCER EXCISION       Social history:  The patient is .  He lives in [facility].  No history of tobacco, alcohol, or illicit drug  use.    Family history:  family history includes COPD in his mother; Cancer in his brother; Diabetes in his father and sister; Heart disease in his father; Hypertension in his father; Obesity in his sister.    No Known Allergies    Medication:  Current Facility-Administered Medications   Medication Dose Route Frequency Provider Last Rate Last Admin   • clindamycin (CLEOCIN) 900 mg in dextrose 5% 50 mL IVPB (premix)  900 mg Intravenous Q8H David Callahan MD 50 mL/hr at 10/08/21 1130 900 mg at 10/08/21 1130   • DAPTOmycin (CUBICIN) 500 mg/50 mL 0.9% sodium chloride IVPB  6 mg/kg (Adjusted) Intravenous Q48H David Callahan MD   500 mg at 10/07/21 2111   • dextrose (D50W) 25 g/ 50mL Intravenous Solution 25 g  25 g Intravenous Q15 Min PRN David Callahan MD       • dextrose (GLUTOSE) oral gel 15 g  15 g Oral Q15 Min PRN David Callahan MD       • docosanol (ABREVA) 10 % cream 1 application  1 application Topical 5x Daily David Callahan MD   1 application at 10/07/21 2109   • docusate sodium (COLACE) capsule 100 mg  100 mg Oral BID David Callahan MD       • dorzolamide (TRUSOPT) 2 % 1 drop, timolol (TIMOPTIC) 0.5 % 1 drop for Cosopt 22.3-6.8 mg/mL   Both Eyes BID David Callahan MD   Given at 10/07/21 2109   • glucagon (human recombinant) (GLUCAGEN DIAGNOSTIC) injection 1 mg  1 mg Subcutaneous Q15 Min PRN David Callahan MD       • insulin lispro (humaLOG) injection 0-7 Units  0-7 Units Subcutaneous 4x Daily With Meals & Nightly David Callahan MD   2 Units at 10/07/21 2110   • lactobacillus acidophilus (RISAQUAD) capsule 1 capsule  1 capsule Oral Daily David Callahan MD   1 capsule at 10/07/21 0854   • meropenem (MERREM) 1 g/100 mL 0.9% NS VTB (mbp)  1 g Intravenous Q12H David Callahan MD   1 g at 10/08/21 0536   • metoprolol tartrate (LOPRESSOR) half tablet 12.5 mg  12.5 mg Oral BID David Callahan MD   12.5 mg at 10/08/21 0778   • micafungin 100 mg/100 mL 0.9% NS IVPB (mbp)  100  mg Intravenous Q24H David Callahan MD   100 mg at 10/07/21 1725   • morphine injection 2 mg  2 mg Intravenous Q3H PRN David Callahan MD   2 mg at 10/06/21 1846   • ondansetron (ZOFRAN) 2 mg/mL injection  - ADS Override Pull            • oxyCODONE-acetaminophen (PERCOCET) 5-325 MG per tablet 2 tablet  2 tablet Oral Q4H PRN David Callahan MD       • oxyCODONE-acetaminophen (PERCOCET) 7.5-325 MG per tablet 1 tablet  1 tablet Oral Q4H PRN David Callahan MD   1 tablet at 10/06/21 2053   • pantoprazole (PROTONIX) EC tablet 40 mg  40 mg Oral Daily David Callahan MD   40 mg at 10/07/21 0854   • predniSONE (DELTASONE) tablet 10 mg  10 mg Oral Daily David Callahan MD   10 mg at 10/07/21 0854   • rOPINIRole (REQUIP) tablet 0.25 mg  0.25 mg Oral Nightly David Callahan MD   0.25 mg at 10/07/21 2111   • Sodium Chloride (PF) 0.9 % 10 mL  10 mL Intravenous PRN David Callahan MD       • sodium chloride 0.9 % flush 10 mL  10 mL Intravenous Q12H David Callahan MD   10 mL at 10/07/21 2111   • sodium chloride 0.9 % flush 10 mL  10 mL Intravenous PRN David Callahan MD       • tamsulosin (FLOMAX) 24 hr capsule 0.4 mg  0.4 mg Oral Daily David Callahan MD   0.4 mg at 10/07/21 0854         Antibiotics:  Anti-Infectives (From admission, onward)    Ordered     Dose/Rate Route Frequency Start Stop    10/07/21 0852  meropenem (MERREM) 1 g/100 mL 0.9% NS VTB (mbp)     Ordering Provider: David Callahan MD    1 g  over 3 Hours Intravenous Every 12 Hours 10/07/21 1600 10/14/21 1559    10/07/21 0825  clindamycin (CLEOCIN) 900 mg in dextrose 5% 50 mL IVPB (premix)     Ordering Provider: David Callahan MD    900 mg  50 mL/hr over 60 Minutes Intravenous Every 8 Hours 10/07/21 1000 10/14/21 0959    10/07/21 0852  meropenem (MERREM) 1 g/100 mL 0.9% NS VTB (mbp)     Ordering Provider: Thierry Chinchilla, PharmD    1 g  over 30 Minutes Intravenous Once 10/07/21 0945 10/07/21 1338    10/06/21 1506   "valACYclovir (VALTREX) tablet 500 mg     Ordering Provider: Don Draper DO    500 mg Oral Every 12 Hours Scheduled 10/06/21 2100 10/07/21 0854    10/05/21 1632  DAPTOmycin (CUBICIN) 500 mg/50 mL 0.9% sodium chloride IVPB     Diaz Moreno MD reviewed the order on 10/06/21 1056.   Ordering Provider: David Callahan MD    6 mg/kg × 80.5 kg (Adjusted)  over 30 Minutes Intravenous Every 48 Hours 10/05/21 2100 10/13/21 2059    10/05/21 1632  micafungin 100 mg/100 mL 0.9% NS IVPB (mbp)     Diaz Moreno MD reviewed the order on 10/06/21 1056.   Ordering Provider: David Callahan MD    100 mg  over 60 Minutes Intravenous Every 24 Hours 10/05/21 1800 10/12/21 1759    10/04/21 2101  vancomycin 1750 mg/500 mL 0.9% NS IVPB ([facility])     Ordering Provider: Toño Lainez MD    20 mg/kg × 81.9 kg (Adjusted)  250 mL/hr over 120 Minutes Intravenous Once 10/04/21 2103 10/05/21 0132    10/04/21 2101  cefepime (MAXIPIME) 2 g/100 mL 0.9% NS (mbp)     Ordering Provider: Toño Lainez MD    2 g  200 mL/hr over 30 Minutes Intravenous Once 10/04/21 2103 10/04/21 2250            Review of Systems    As above    Physical Exam:   Vital Signs   /88 (BP Location: Left arm, Patient Position: Lying)   Pulse 83   Temp 97.8 °F (36.6 °C) (Oral)   Resp 18   Ht 170.2 cm (67\")   Wt 98.1 kg (216 lb 5 oz)   SpO2 91%   BMI 33.88 kg/m²     GENERAL: Lying in bed.  Frail and somnolent  HENT: Normocephalic, atraumatic.   Has some external oral blisters noted-unchanged  Eyes: No conjunctival injection. No icterus.  NECK: Supple without nuchal rigidity. No mass.  HEART: RRR; No murmur, rubs, gallops.   LUNGS: Clear to auscultation bilaterally without wheezing, rales, rhonchi. Normal respiratory effort. Nonlabored.  ABDOMEN: abdominal binder in place status post surgery.  2 LAWRENCE drains noted over the right side of his abdomen with some serosanguineous drainage.  EXT:  No cyanosis, clubbing or edema. No " cord.  :  Without Meyers catheter.  SKIN: no new rashes noted.  NEURO: Somnolent and difficult to wake up today.  PSYCHIATRIC: unable to assess  Groshong cath site is without erythema or drainage    Laboratory Data    Results from last 7 days   Lab Units 10/08/21  0617 10/07/21  0433 10/06/21  0742   WBC 10*3/mm3 14.57* 17.25* 10.81*   HEMOGLOBIN g/dL 9.3* 11.3* 10.3*   HEMATOCRIT % 30.0* 36.5* 31.8*   PLATELETS 10*3/mm3 142 161 168     Results from last 7 days   Lab Units 10/08/21  0617   SODIUM mmol/L 137   POTASSIUM mmol/L 4.1   CHLORIDE mmol/L 102   CO2 mmol/L 18.0*   BUN mg/dL 49*   CREATININE mg/dL 3.51*   GLUCOSE mg/dL 134*   CALCIUM mg/dL 8.0*     Results from last 7 days   Lab Units 10/08/21  0617   ALK PHOS U/L 182*   BILIRUBIN mg/dL 1.4*   ALT (SGPT) U/L 56*   AST (SGOT) U/L 59*         Results from last 7 days   Lab Units 10/04/21  2041   CRP mg/dL 46.11*       Estimated Creatinine Clearance: 22.5 mL/min (A) (by C-G formula based on SCr of 3.51 mg/dL (H)).       Microbiology:  Blood culture ×2: In process    Urine culture: In process    Radiology:  CT Abdomen Pelvis Without Contrast    Result Date: 10/7/2021  New 16 x 6 cm hepatic fluid collection with intermixed heterogeneous and hyperdense contents compatible with large hematoma. This appears new from the October 4, 2021 comparison scan. There is a small amount of free fluid in the pelvis which is also new from comparison. The remainder of the hematoma and blood products appear primarily intraparenchymal and subcapsular.  New small right pleural effusion with some adjacent consolidation or atelectasis, with component of pneumonia not entirely excluded.  Redemonstrated bladder wall thickening with several diverticula including small locules of air within an anterior diverticula. Finding most likely relates to any recent catheterization, however correlate with any clinical concern for cystitis. No additional acute findings in the abdomen and pelvis.   Finding of large hepatic hematoma discussed with Dr. Callahan by Corey Castillo via phone at 12:47 PM 10/7/2021   This report was finalized on 10/7/2021 12:48 PM by Corey Castillo.      CT Abdomen Pelvis Without Contrast    Result Date: 10/4/2021  1. Cholecystectomy with a fluid collection in the gallbladder fossa containing a bubble of air. Differential considerations include seroma/hematoma, biloma, or possibly a developing abscess. No biliary obstruction is seen. 2. Lung nodules in the visualized lung bases, not all of which have been previously evaluated. Therefore, chest CT follow-up in 6 months is recommended per Fleischner criteria. 3. No acute findings in the GI tract. There is colonic diverticulosis. 4. Left renal atrophy with bilateral nonobstructing renal stones. There are no ureteral stones, and there is no hydronephrosis. 5. Fat stranding in the ventral abdominal wall and right flank as above could reflect bland edema or cellulitis. 6. Additional findings as above. Recommend consideration for referral to [facility]. For questions or to make appointment call [phone]. Signer Name: Caden Rodriguez MD  Signed: 10/4/2021 10:22 PM  Workstation Name: [facility]  [facility] Hepatobiliary Without [facility]    Result Date: 10/5/2021  No evidence for abnormal tracer activity to suggest leak with normal activity in the biliary system and small bowel.  D: 10/05/2021 E: 10/05/2021    This report was finalized on 10/5/2021 8:59 PM by Dr. Jevon Hartmann.      XR Chest 1 View    Result Date: 10/4/2021  There appears to be slightly increased density at the right lung base, and it is uncertain if this may represent developing pneumonia. Signer Name: Laquita Kelly MD  Signed: 10/4/2021 9:46 PM  Workstation Name: [id number]  [facility]     I independently read the patient's CT abdomen and pelvis from 10/7 and I discussed the findings with   [place]    Impression:     Problems:  -Sepsis with leukocytosis with neutrophilia, lactic acidosis, elevated pro calcitonin level- due to intra-abdominal source/abdominal wall cellulitis. Leukocytosis is now improving some on 10/8  -Intra-abdominal hematoma, now status post washout procedure on 10/8  -Abdominal wall cellulitis- Appears slightly improved  -Possible developing intra-abdominal abscess vs. Seroma. No signs of biliary leak on NM hepatobiliary scan  -Recent cholangitis and cholecystitis status post recent cholecystectomy on 9/27 and ERCP on 9/28  -Recent enterococcus bacteremia  -Recent Clostridium perfringens bacteremia  -Recent pancreatitis  -Macrocytic anemia  -Elevated troponin level  -Elevated LFTs  -RUDY on CKD stage IIIB- due to sepsis vs dehydration  -Coronary artery disease status post CABG/stents  -systolic heart failure, LVEF was 26-30% on echo in 09/2021  -history of cardiac thrombus, on anticoagulation with eliquis  -Diabetes mellitus type 2 with hyperglycemia  -Essential hypertension  -Hyperlipidemia  -Sarcoidosis/chronic prednisone  -prolonged QTc interval  -elevated CPK level  -external oral blisters, possible herpes labialis    PLAN: Thank you for asking us to see Jeremías Soto, I recommend the following:     -continue to follow cbc with diff, cmp, crp, and lactic acid. Rechecked CPK and was ok  -follow pending blood cultures-No growth to date  -continue meropenem and clindamycin for now.  May wean off clindamycin soon if he continues to improve.  -continue empiric Daptomycin  -continue empiric Micafungin (avoiding fluconazole in the setting of his prolonged QTc interval)  -status post washout procedure today by Dr. Callahan.  2 LAWRENCE drains left in place.  Surgical cultures are pending  -weekly Groshong cath dressing changes  -valtrex started by primary team for possible HSV    I discussed in length with the patient's wife at bedside today    I discussed his complex case with Dr. Callahan  again today    Complex MDM. The patient has significant risk for further morbidity and mortality in the setting of his multiple complex medical issues.     Diaz Moreno MD  10/8/2021

## 2021-10-08 NOTE — CONSULTS
NAL Consult Note    Jeremías Soto  1955  [id number]    Date of Admit:  10/4/2021    Date of Consult: 10/8/2021        Requesting Provider: Vincent Crawford MD  Evaluating Physician: Corey Garcia MD        Reason for Consultation:  RUDY ON CKD.   History of present illness:    Patient is a 66 y.o.  Yr old male KNOWN TO NAL FROM RECENT EVALUATION HERE AT [facility] LAST MONTH FOR RUDY ( CREAT 1.94-2.77-1.82 ON 9/29/21 PRIOR TO DISCHARGE SECONDARY TO SEPSIS FROM GALLBLADDER AND S/P LAP SCARLETT ) ON CKD ( CREAT 2.2 1/2020 AND LIKELY SECONDARY TO NEPHROSCLEROSIS AND OBSTRUCTION FROM PREVIOUS RENAL STONES REQUIRING URETERAL STENTS ) ADMITTED WITH ABD WALL CELLULITIS AND HEPATIC HEMATOMA ( S/P EVACUATION TODAY ) AND WE ARE ASKED TO SEE FOR RECURRENT RUDY ( CREAT 3.09-3.51 OVER THE PAST 3 DAYS ). NO NSAIA USE. PATIENT VERY LETHARGIC POST-OP TODAY AND UNABLE TO PROVIDE ANY H/O.  HAD RENAL U/S 9/28/21: R KIDNEY 11.5 CM. L KIDNEY 7.5 CM. NO HYDRO.   YESTERDAY. ON DAPTO. ON ACE/METFORMIN/STATIN/LASIX PRIOR TO ADMISSION.     Past Medical History:   Diagnosis Date   • Cancer (HCC)     skin   • Coronary artery disease    • Diabetes mellitus (HCC)    • Elevated cholesterol    • Heart attack (HCC) 2003   • Hypertension    • Sarcoid    • Sleep apnea     no cpap   • SOB (shortness of breath)    • Stroke (HCC)     Pt. states that he has had 2 mini strokes. No residual        Past Surgical History:   Procedure Laterality Date   • CARDIAC CATHETERIZATION  2003    cardiac stent x2 after s/p CABG   • CATARACT EXTRACTION, BILATERAL     • CHOLECYSTECTOMY WITH INTRAOPERATIVE CHOLANGIOGRAM N/A 9/27/2021    Procedure: CHOLECYSTECTOMY LAPAROSCOPIC INTRAOPERATIVE CHOLANGIOGRAM;  Surgeon: Gaudencio Wolfe MD;  Location: [facility];  Service: General;  Laterality: N/A;   • COLONOSCOPY     • CORONARY ARTERY BYPASS GRAFT  2002   • ENDOSCOPY     • ERCP N/A 9/28/2021    Procedure: ENDOSCOPIC RETROGRADE CHOLANGIOPANCREATOGRAPHY;   Surgeon: Brunner, Mark I, MD;  Location: [facility];  Service: Gastroenterology;  Laterality: N/A;  sphincterotomy made, balloon sweep of bile duct done with 9-12 balloon.    • EXTRACORPOREAL SHOCKWAVE LITHOTRIPSY (ESWL), STENT INSERTION/REMOVAL Bilateral 8/21/2020    Procedure: EXTRACORPOREAL SHOCKWAVE LITHOTRIPSY BILATERAL WITH STENT PLACEMENT LEFT;  Surgeon: Ethan Barnes Jr., MD;  Location: [facility] OR;  Service: Urology;  Laterality: Bilateral;   • SKIN CANCER EXCISION         Social History     Socioeconomic History   • Marital status:      Spouse name: Not on file   • Number of children: Not on file   • Years of education: Not on file   • Highest education level: Not on file   Tobacco Use   • Smoking status: Never Smoker   • Smokeless tobacco: Never Used   Substance and Sexual Activity   • Alcohol use: No   • Drug use: No   • Sexual activity: Defer     Comment:        family history includes COPD in his mother; Cancer in his brother; Diabetes in his father and sister; Heart disease in his father; Hypertension in his father; Obesity in his sister. NO FH OF KIDNEY DZ.     No Known Allergies    Medication:    Current Facility-Administered Medications:   •  clindamycin (CLEOCIN) 900 mg in dextrose 5% 50 mL IVPB (premix), 900 mg, Intravenous, Q8H, David Callahan MD, Last Rate: 50 mL/hr at 10/08/21 1130, 900 mg at 10/08/21 1130  •  DAPTOmycin (CUBICIN) 500 mg/50 mL 0.9% sodium chloride IVPB, 6 mg/kg (Adjusted), Intravenous, Q48H, David Callahan MD, 500 mg at 10/07/21 2111  •  dextrose (D50W) 25 g/ 50mL Intravenous Solution 25 g, 25 g, Intravenous, Q15 Min PRN, David Callahan MD  •  dextrose (GLUTOSE) oral gel 15 g, 15 g, Oral, Q15 Min PRN, David Callahan MD  •  docosanol (ABREVA) 10 % cream 1 application, 1 application, Topical, 5x Daily, David Callahan MD, 1 application at 10/07/21 2109  •  docusate sodium (COLACE) capsule 100 mg, 100 mg, Oral, BID, David Callahan,  MD  •  dorzolamide (TRUSOPT) 2 % 1 drop, timolol (TIMOPTIC) 0.5 % 1 drop for Cosopt 22.3-6.8 mg/mL, , Both Eyes, BID, David Callahan MD, Given at 10/07/21 2109  •  glucagon (human recombinant) (GLUCAGEN DIAGNOSTIC) injection 1 mg, 1 mg, Subcutaneous, Q15 Min PRN, David Callahan MD  •  insulin lispro (humaLOG) injection 0-7 Units, 0-7 Units, Subcutaneous, 4x Daily With Meals & Nightly, David Callahan MD, 2 Units at 10/07/21 2110  •  lactobacillus acidophilus (RISAQUAD) capsule 1 capsule, 1 capsule, Oral, Daily, David Callahan MD, 1 capsule at 10/07/21 0854  •  meropenem (MERREM) 1 g/100 mL 0.9% NS VTB (mbp), 1 g, Intravenous, Q12H, David Callahan MD, 1 g at 10/08/21 0536  •  metoprolol tartrate (LOPRESSOR) half tablet 12.5 mg, 12.5 mg, Oral, BID, David Callahan MD, 12.5 mg at 10/08/21 0733  •  micafungin 100 mg/100 mL 0.9% NS IVPB (mbp), 100 mg, Intravenous, Q24H, David Callahan MD, 100 mg at 10/07/21 1725  •  morphine injection 2 mg, 2 mg, Intravenous, Q3H PRN, David Callahan MD, 2 mg at 10/06/21 1846  •  ondansetron (ZOFRAN) 2 mg/mL injection  - ADS Override Pull, , , ,   •  oxyCODONE-acetaminophen (PERCOCET) 5-325 MG per tablet 2 tablet, 2 tablet, Oral, Q4H PRN, David Callahan MD  •  oxyCODONE-acetaminophen (PERCOCET) 7.5-325 MG per tablet 1 tablet, 1 tablet, Oral, Q4H PRN, David Callahan MD, 1 tablet at 10/06/21 2053  •  pantoprazole (PROTONIX) EC tablet 40 mg, 40 mg, Oral, Daily, David Callahan MD, 40 mg at 10/07/21 0854  •  predniSONE (DELTASONE) tablet 10 mg, 10 mg, Oral, Daily, David Callahan MD, 10 mg at 10/07/21 0854  •  rOPINIRole (REQUIP) tablet 0.25 mg, 0.25 mg, Oral, Nightly, David Callahan MD, 0.25 mg at 10/07/21 2111  •  Sodium Chloride (PF) 0.9 % 10 mL, 10 mL, Intravenous, PRN, David Callahan MD  •  sodium chloride 0.9 % flush 10 mL, 10 mL, Intravenous, Q12H, David Callahan MD, 10 mL at 10/07/21 2111  •  sodium chloride 0.9 % flush 10 mL, 10  mL, Intravenous, PRN, David Callahan MD  •  sodium chloride 0.9 % infusion, 75 mL/hr, Intravenous, Continuous, JoseCorey MD  •  tamsulosin (FLOMAX) 24 hr capsule 0.4 mg, 0.4 mg, Oral, Daily, David Callahan MD, 0.4 mg at 10/07/21 0854    Medications Prior to Admission   Medication Sig Dispense Refill Last Dose   • apixaban (ELIQUIS) 5 MG tablet tablet Take 5 mg by mouth 2 (Two) Times a Day.   10/4/2021 at Unknown time   • atorvastatin (LIPITOR) 40 MG tablet Take 40 mg by mouth Daily.  1 10/4/2021 at Unknown time   • esomeprazole (nexIUM) 20 MG capsule Take 20 mg by mouth Every Morning Before Breakfast.   10/4/2021 at Unknown time   • glimepiride (AMARYL) 2 MG tablet Take 2 mg by mouth Every Morning Before Breakfast.   10/4/2021 at Unknown time   • metoprolol tartrate (LOPRESSOR) 25 MG tablet Take 25 mg by mouth 2 (Two) Times a Day.  2 10/4/2021 at Unknown time   • predniSONE (DELTASONE) 10 MG tablet Take 10 mg by mouth Daily.   10/4/2021 at Unknown time   • tamsulosin (FLOMAX) 0.4 MG capsule 24 hr capsule Take 0.4 mg by mouth Daily.   10/4/2021 at Unknown time   • dicyclomine (BENTYL) 20 MG tablet Take 20 mg by mouth.   Unknown at Unknown time   • dorzolamide-timolol (COSOPT) 22.3-6.8 MG/ML ophthalmic solution Administer 1 drop to both eyes 2 (Two) Times a Day.   Unknown at Unknown time   • lactobacillus acidophilus (RISAQUAD) capsule capsule Take 1 capsule by mouth Daily for 20 days. 20 capsule 0 Unknown at Unknown time   • lisinopril (PRINIVIL,ZESTRIL) 10 MG tablet Take 10 mg by mouth Daily.   Unknown at Unknown time   • metFORMIN (GLUCOPHAGE) 500 MG tablet Take 500 mg by mouth 2 (Two) Times a Day.  1 Unknown at Unknown time   • [] oxyCODONE-acetaminophen (PERCOCET) 5-325 MG per tablet Take 1 tablet by mouth Every 4 (Four) Hours As Needed for Moderate Pain  for up to 5 days. 10 tablet 0    • raNITIdine (ZANTAC) 150 MG tablet Zantac Maximum Strength 150 mg tablet   Daily   Unknown at  "Unknown time   • rOPINIRole (REQUIP) 0.25 MG tablet Take 1 tablet by mouth Every Night. Take 1 hour before bedtime. 30 tablet 3 Unknown at Unknown time       Review of Systems: UNOBTAINABLE. DR BUSTILLOS'S ROS FROM H&P 10/5/21 REVIEWED.        Physical Exam:   Vital Signs   Blood pressure 138/88, pulse 83, temperature 97.8 °F (36.6 °C), temperature source Oral, resp. rate 18, height 170.2 cm (67\"), weight 98.1 kg (216 lb 5 oz), SpO2 91 %.     GENERAL: LETHARGIC, in no acute distress.   HEENT: Normocephalic, atraumatic.  PER.  No conjunctivitis. No icterus. Oropharynx clear without evidence of thrush or exudate. No evidence of peridontal disease.    NECK: Supple without nuchal rigidity. No mass.  LYMPH: No painful cervical, axillary or inguinal lymphadenopathy.  HEART: RRR; No murmur, rubs, gallops. No bruits in neck, abdomen, or groins. TR-1+ edema  LUNGS: Normal respiratory effort. Nonlabored. No dullness.  No crepitus.  Clear to auscultation bilaterally without wheezing, rales, rhonchi.  ABDOMEN:  +tender/distended. Positive bowel sounds. ABD WALL BINDER AND DRAINS NOTED. NO mass or HSM.  JOINTS:  Full range of motion, no redness or tenderness.  EXT:  No cyanosis/clubbing..  :  BLADDER NOT DISTENDED.   SKIN: Warm and dry without rash  NEURO: LETHARGIC  PSYCHIATRIC: UNOBTAINABLE    Laboratory Data  Results from last 7 days   Lab Units 10/08/21  0617 10/07/21  0433 10/06/21  0742   HEMOGLOBIN g/dL 9.3* 11.3* 10.3*   HEMATOCRIT % 30.0* 36.5* 31.8*     Results from last 7 days   Lab Units 10/08/21  0617 10/07/21  1217 10/07/21  0433 10/06/21  0742 10/05/21  0841 10/05/21  0841 10/04/21  2041 10/04/21  2041   SODIUM mmol/L 137  --  142 138  --  138   < > 139   POTASSIUM mmol/L 4.1 5.3* 5.3* 3.6   < > 4.7   < > 4.7   CHLORIDE mmol/L 102  --  107 106  --  106   < > 102   CO2 mmol/L 18.0*  --  20.0* 19.0*  --  20.0*   < > 18.0*   BUN mg/dL 49*  --  37* 37*  --  32*   < > 27*   CREATININE mg/dL 3.51*  --  2.91* 2.76*  --  " 3.09*   < > 2.84*   CALCIUM mg/dL 8.0*  --  8.6 8.1*  --  8.1*   < > 8.6   PHOSPHORUS mg/dL  --   --   --   --   --   --   --  3.6   MAGNESIUM mg/dL  --   --   --   --   --   --   --  2.0   ALBUMIN g/dL 2.30*  --  2.50* 2.20*  --  2.20*   < > 2.70*    < > = values in this interval not displayed.     Results from last 7 days   Lab Units 10/08/21  0617   GLUCOSE mg/dL 134*     Results from last 7 days   Lab Units 10/04/21  2151   COLOR UA  Yellow   CLARITY UA  Slightly Cloudy*   PH, URINE  6.0   SPECIFIC GRAVITY, URINE  1.020   GLUCOSE UA  Negative   KETONES UA  Trace*   BILIRUBIN UA  Negative   PROTEIN UA  30 mg/dL (1+)*   BLOOD UA  Trace*   LEUKOCYTES UA  Small (1+)*   NITRITE UA  Negative     Results from last 7 days   Lab Units 10/08/21  0617   ALK PHOS U/L 182*   BILIRUBIN mg/dL 1.4*   ALT (SGPT) U/L 56*   AST (SGOT) U/L 59*     Estimated Creatinine Clearance: 22.5 mL/min (A) (by C-G formula based on SCr of 3.51 mg/dL (H)).    Radiology:        Impression: RECURRENT RUDY LIKELY SECONDARY TO ATN. WORSE GFR. STAGE 3-4 CKD. ANEMIA. ATROPHIC L KIDNEY. RECENT SEPSIS AND S/P EVACUATION HEPATIC HEMATOMA.       PLAN: Thank you for asking us to see Jeremías Soto, I recommend the following: GENTLE IVF WITH NS. CHECK URINE STUDIES AND REPEAT CPK. CHECK FE. MONITOR GFR/LYTES. HOPEFULLY RENAL FXN WILL IMPROVE PRIOR TO PATIENT NEEDING RRT AND DIALYSIS ACCESS PLACEMENT.       Corey Garcia MD  10/8/2021  15:14 EDT

## 2021-10-08 NOTE — BRIEF OP NOTE
CHOLECYSTECTOMY LAPAROSCOPIC  Progress Note    Jeremías Soto  10/8/2021    Pre-op Diagnosis:   Intra-abdominal hematoma       Post-Op Diagnosis Codes:  Same    Procedure/CPT® Codes:        Procedure(s):  1. LAPAROSCOPIC WASHOUT AND DRAIN PLACEMENT  2. INCISION AND DRAINAGE OF RIGHT FLANK CELLULITIS    Surgeon(s):  Erasmo Valle MD Shane, Matthew D., MD    Anesthesia: General    Staff:   Circulator: Divya Mansfield RN  Scrub Person: Sully Finley  Nursing Assistant: Tahmina Mcintosh PCT; Cat Barksdale PCT         Estimated Blood Loss: minimal    Urine Voided: * No values recorded between 10/8/2021  8:39 AM and 10/8/2021  9:30 AM *    Specimens:                Specimens     ID Source Type Tests Collected By Collected At Frozen?    1 Abdomen, Right Body Fluid · BODY FLUID CULTURE   David Callahan MD 10/8/21 0912     Description: periteneal fluid culture                Drains:   Closed/Suction Drain 1 Right Abdomen 10 Fr. (Active)       Closed/Suction Drain Lateral RUQ (Active)       REMOVED urethral Catheter (Removed)   Daily Indications Selected surgeries ( tract, abdomen) 09/27/21 1958   Site Assessment Clean;Skin intact 09/27/21 1958   Collection Container Standard drainage bag 09/27/21 1958   Securement Method Securing device 09/27/21 1958   Catheter care complete Yes 09/27/21 1958   Output (mL) 150 mL 09/28/21 0526       Findings:   1.  Nearly 2 L of old blood and clot suctioned from above the liver.  2.  No evidence of active bleeding identified  3.  10 flat LAWRENCE drain placed in the gallbladder fossa and brought out through a separate stab incision in the abdomen.  A second 10 flat LAWRENCE drain was placed over the liver and brought out through a separate stab incision  4.  Incision over the area of edema and cellulitis in the right abdomen demonstrated no necrotic tissue, no fluid collection or abscess.    Complications: None          David Callahan MD     Date: 10/8/2021  Time: 09:42  EDT

## 2021-10-08 NOTE — ANESTHESIA PREPROCEDURE EVALUATION
Anesthesia Evaluation     Patient summary reviewed and Nursing notes reviewed   no history of anesthetic complications:  NPO Solid Status: > 8 hours  NPO Liquid Status: > 2 hours           Airway   Mallampati: II  TM distance: >3 FB  Neck ROM: full  Possible difficult intubation  Dental    (+) poor dentition    Pulmonary    (+) shortness of breath, sleep apnea, decreased breath sounds,   Cardiovascular   Exercise tolerance: poor (<4 METS)    ECG reviewed  Rhythm: regular  Rate: normal    (+) hypertension well controlled 2 medications or greater, past MI  >12 months, CAD, CABG >6 Months, CHF Systolic <55%, hyperlipidemia,     ROS comment: EF 30%    Neuro/Psych  (+) CVA residual symptoms,     GI/Hepatic/Renal/Endo    (+) obesity,   renal disease CRI, diabetes mellitus type 2 poorly controlled using insulin,     Musculoskeletal     Abdominal   (+) obese,     Abdomen: soft.   Substance History      OB/GYN          Other   arthritis,    history of cancer remission                    Anesthesia Plan    ASA 3     general   (PT VISUALLY SOB)  intravenous induction     Anesthetic plan, all risks, benefits, and alternatives have been provided, discussed and informed consent has been obtained with: patient.    Plan discussed with CRNA.

## 2021-10-08 NOTE — PROGRESS NOTES
Jeremías Soto  1955  [id number]    Date of Consult: 10/5/21  Requesting Provider: Vincent Crawford MD  Evaluating Physician: Diaz Moreno MD    Chief Complaint: abdominal pain and redness    Reason for Consultation: sepsis secondary to an abdominal wall abscess/cellulitis    History of present illness:    Jeremías Soto is a 66 y.o.  Yr old male with history of coronary artery disease, CK D stage III B, diabetes mellitus type 2, hyperlipidemia, hypertension, sarcoidosis, and history of CVA who I recently evaluated during an admission to [facility] last month for enterococcus bacteremia and Clostridium perfringens bacteremia due to a biliary source/gallbladder source (cholangitis and cholecystitis).  The patient underwent laparoscopic cholecystectomy on 9/27 and ERCP with sphincterotomy stone extraction on 9/28.  He additionally had aerococcus urinae from urine culture. Due to his baseline renal dysfunction he did have a Groshong catheter placed for IV antibiotics.  He clinically improved with improvement in his LFTs and sepsis. He was discharged on 9/29 with plans to continue Zosyn at least until 10/11/21. I have not yet seen him in outpatient follow-up but he was scheduled to follow-up with me tomorrow on 10/6.      He was doing well apparently until 2-3 days ago when he noticed redness developing over his right abdominal wall.  The erythema has gotten progressively worse with some swelling and warmth and tenderness.  As far as I know he did not contact our clinic regarding this erythema. He was not having any fevers or chills at home.  He presented to the ER early this morning for evaluation of this redness. Since arrival, he was noted to have a maximum temperature of 99.1°F. labs showed a CRP of 46.11, troponin elevated to 0.07, proBNP of 8353, INR of 2.72, White blood cell count of 24.54 (up from 13.29 just before discharge), hemoglobin of 12, lactic acid of 4.1-->2.9, creatinine of  2.84 (up from 2.15 just prior to discharge). COVID-19 PCR was negative. White blood cell count is now improved to 13.69 on antibiotics.creatinine is worse at 3.09 today.  LFTs remain elevated with an ALT of 94, an AST of 137, an alkaline phosphatase of 176, and a total bilirubin of 1.4. CT abdomen and pelvis was done yesterday and showed cholecystectomy with a fluid collection in the gallbladder fossa containing a bubble of air with differential considerations including a seroma/hematoma, biloma, or possible developing abscess.  No biliary obstruction was seen.  There was also fat stranding in the ventral abdominal wall and right flank that could reflect bland edema or cellulitis. The patient's antibiotics have been changed to vancomycin, cefepime, and Flagyl. Repeat blood cultures and urine culture are in progress. Surgery consult has been placed. Plan is for IR drainage procedure but having to wait on this due to his anticoagulation that he has been on. He underwent NM Hepatobiliary scan today with no evidence for abnormal tracer activity to suggest leak.  The infectious disease team has been consulted for recommendations.    Subjective:    10/6/21: the patient states that his abdominal pain has been improving although he was having some increased right-sided abdominal pain after I went in the room today as he has just eaten something. No fevers. Tolerating the abx well without ADRs.    10/7/21: The patient is doing worse today.  Still having some right-sided abdominal pain.  He was taken for repeat CT abdomen and pelvis today as his leukocytosis worsened And he did have a new 16 x 6 cm hepatic fluid collection with intermittent heterogenous and hyperdense contents compatible with a large hematoma.  He did have a new small right pleural effusion with some adjacent consolidation or atelectasis. He denies any nausea.  No fevers      Past Medical History:   Diagnosis Date   • Cancer (CMS/HCC)     skin   • Coronary  artery disease    • Diabetes mellitus (CMS/HCC)    • Elevated cholesterol    • Heart attack (CMS/HCC)    • Hypertension    • Sarcoid    • Sleep apnea     no cpap   • SOB (shortness of breath)    • Stroke (CMS/HCC)     Pt. states that he has had 2 mini strokes       Past Surgical History:   Procedure Laterality Date   • CARDIAC CATHETERIZATION      cardiac stent x2 after s/p CABG   • CARDIAC SURGERY      2002   • CATARACT EXTRACTION, BILATERAL     • CHOLECYSTECTOMY WITH INTRAOPERATIVE CHOLANGIOGRAM N/A 9/27/2021    Procedure: CHOLECYSTECTOMY LAPAROSCOPIC INTRAOPERATIVE CHOLANGIOGRAM;  Surgeon: Gaudencio Wolfe MD;  Location:  [facility];  Service: General;  Laterality: N/A;   • COLONOSCOPY     • CORONARY ARTERY BYPASS GRAFT     • ENDOSCOPY     • ERCP N/A 9/28/2021    Procedure: ENDOSCOPIC RETROGRADE CHOLANGIOPANCREATOGRAPHY;  Surgeon: Brunner, Mark I, MD;  Location: [facility] END[facility];  Service: Gastroenterology;  Laterality: N/A;  sphincterotomy made, balloon sweep of bile duct done with 9-12 balloon.    • EXTRACORPOREAL SHOCKWAVE LITHOTRIPSY (ESWL), STENT INSERTION/REMOVAL Bilateral 8/21/2020    Procedure: EXTRACORPOREAL SHOCKWAVE LITHOTRIPSY BILATERAL WITH STENT PLACEMENT LEFT;  Surgeon: Ethan Barnes Jr., MD;  Location:  [facility];  Service: Urology;  Laterality: Bilateral;   • SKIN CANCER EXCISION       Social history:  The patient is .  He lives in [facility].  No history of tobacco, alcohol, or illicit drug use.    Family history:  family history includes COPD in his mother; Cancer in his brother; Diabetes in his father and sister; Heart disease in his father; Hypertension in his father; Obesity in his sister.    No Known Allergies    Medication:  Current Facility-Administered Medications   Medication Dose Route Frequency Provider Last Rate Last Admin   • clindamycin (CLEOCIN) 900 mg in dextrose 5% 50 mL IVPB (premix)  900 mg Intravenous Q8H Diaz Moreno MD 50 mL/hr at 10/07/21 2136  900 mg at 10/07/21 1821   • DAPTOmycin (CUBICIN) 500 mg/50 mL 0.9% sodium chloride IVPB  6 mg/kg (Adjusted) Intravenous Q48H Diaz Moreno MD   500 mg at 10/05/21 2044   • dextrose (D50W) 25 g/ 50mL Intravenous Solution 25 g  25 g Intravenous Q15 Min PRN Don Draper DO       • dextrose (GLUTOSE) oral gel 15 g  15 g Oral Q15 Min PRN Don Draper DO       • docosanol (ABREVA) 10 % cream 1 application  1 application Topical 5x Daily Don Draper DO   1 application at 10/07/21 1725   • dorzolamide (TRUSOPT) 2 % 1 drop, timolol (TIMOPTIC) 0.5 % 1 drop for Cosopt 22.3-6.8 mg/mL   Both Eyes BID Laquita Toth APRN   Given at 10/07/21 0854   • glucagon (human recombinant) (GLUCAGEN DIAGNOSTIC) injection 1 mg  1 mg Subcutaneous Q15 Min PRN Don Draper DO       • insulin lispro (humaLOG) injection 0-7 Units  0-7 Units Subcutaneous 4x Daily With Meals & Nightly Don Draper DO       • lactobacillus acidophilus (RISAQUAD) capsule 1 capsule  1 capsule Oral Daily Laquita Toth APRN   1 capsule at 10/07/21 0854   • meropenem (MERREM) 1 g/100 mL 0.9% NS VTB (mbp)  1 g Intravenous Q12H Thierry Chinchilla, [place]   1 g at 10/07/21 1724   • metoprolol tartrate (LOPRESSOR) half tablet 12.5 mg  12.5 mg Oral BID Don Draper DO   12.5 mg at 10/07/21 0854   • micafungin 100 mg/100 mL 0.9% NS IVPB (mbp)  100 mg Intravenous Q24H Diaz Moreno MD   100 mg at 10/07/21 1725   • morphine injection 2 mg  2 mg Intravenous Q3H PRN Don Draper DO   2 mg at 10/06/21 1846   • oxyCODONE-acetaminophen (PERCOCET) 7.5-325 MG per tablet 1 tablet  1 tablet Oral Q4H PRN David Callahan MD   1 tablet at 10/06/21 2053   • pantoprazole (PROTONIX) EC tablet 40 mg  40 mg Oral Daily Corey Schmitt IV, PharmD   40 mg at 10/07/21 0854   • predniSONE (DELTASONE) tablet 10 mg  10 mg Oral Daily Laquita Toth APRN   10 mg at 10/07/21 0854   • rOPINIRole (REQUIP) tablet 0.25 mg  0.25  mg Oral Nightly Dianna Shah DO   0.25 mg at 10/06/21 2045   • Sodium Chloride (PF) 0.9 % 10 mL  10 mL Intravenous PRN Toño Lainez MD       • sodium chloride 0.9 % flush 10 mL  10 mL Intravenous Q12H Laquita Toth APRN   10 mL at 10/06/21 2048   • sodium chloride 0.9 % flush 10 mL  10 mL Intravenous PRN Laquita Toth APRN       • tamsulosin (FLOMAX) 24 hr capsule 0.4 mg  0.4 mg Oral Daily Laquita Toth APRN   0.4 mg at 10/07/21 0854         Antibiotics:  Anti-Infectives (From admission, onward)    Ordered     Dose/Rate Route Frequency Start Stop    10/07/21 0852  meropenem (MERREM) 1 g/100 mL 0.9% NS VTB (mbp)     Ordering Provider: Thierry Chinchilla, PharmD    1 g  over 3 Hours Intravenous Every 12 Hours 10/07/21 1600 10/14/21 1559    10/07/21 0825  clindamycin (CLEOCIN) 900 mg in dextrose 5% 50 mL IVPB (premix)     Ordering Provider: Diaz Moreno MD    900 mg  50 mL/hr over 60 Minutes Intravenous Every 8 Hours 10/07/21 1000 10/14/21 0959    10/07/21 0852  meropenem (MERREM) 1 g/100 mL 0.9% NS VTB (mbp)     Ordering Provider: Thierry Chinchilla, PharmD    1 g  over 30 Minutes Intravenous Once 10/07/21 0945 10/07/21 1338    10/06/21 1506  valACYclovir (VALTREX) tablet 500 mg     Ordering Provider: Don Draper DO    500 mg Oral Every 12 Hours Scheduled 10/06/21 2100 10/07/21 0854    10/05/21 1632  DAPTOmycin (CUBICIN) 500 mg/50 mL 0.9% sodium chloride IVPB     Diaz Moreno MD reviewed the order on 10/06/21 1056.   Ordering Provider: Diaz Moreno MD    6 mg/kg × 80.5 kg (Adjusted)  over 30 Minutes Intravenous Every 48 Hours 10/05/21 2100 10/13/21 2059    10/05/21 1632  micafungin 100 mg/100 mL 0.9% NS IVPB (mbp)     Diaz Moreno MD reviewed the order on 10/06/21 1056.   Ordering Provider: Diaz Moreno MD    100 mg  over 60 Minutes Intravenous Every 24 Hours 10/05/21 1800 10/12/21 1759    10/04/21 2101  vancomycin 1750 mg/500 mL 0.9% NS IVPB ([facility])     Ordering  "Provider: Toño Lainez MD    20 mg/kg × 81.9 kg (Adjusted)  250 mL/hr over 120 Minutes Intravenous Once 10/04/21 2103 10/05/21 0132    10/04/21 2101  cefepime (MAXIPIME) 2 g/100 mL 0.9% NS (mbp)     Ordering Provider: Toño Lainez MD    2 g  200 mL/hr over 30 Minutes Intravenous Once 10/04/21 2103 10/04/21 2250            Review of Systems    As above    Physical Exam:   Vital Signs   /72   Pulse 93   Temp 98.2 °F (36.8 °C) (Oral)   Resp 16   Ht 170.2 cm (67\")   Wt 97.5 kg (215 lb)   SpO2 96%   BMI 33.67 kg/m²     GENERAL: Lying in bed.  Frail and somnolent  HENT: Normocephalic, atraumatic.   Has some external oral blisters noted-unchanged  Eyes: No conjunctival injection. No icterus.  NECK: Supple without nuchal rigidity. No mass.  HEART: RRR; No murmur, rubs, gallops.   LUNGS: Clear to auscultation bilaterally without wheezing, rales, rhonchi. Normal respiratory effort. Nonlabored.  ABDOMEN: Soft, right-sided abdominal tenderness to palpation. Still with some tenderness but not as severe. Erythema appears to be improving somewhat.  EXT:  No cyanosis, clubbing or edema. No cord.  :  Without Meyers catheter.  MSK: FROM without joint effusions noted arms/legs.    SKIN: Other than abdominal wall, No rashes noted over exposed skin. No jaundice. No peripheral stigmata of infective endocarditis.  NEURO: Somnolent but Oriented to PPT. Normal speech and cognition  PSYCHIATRIC: Normal insight and judgement. Cooperative with [facility] cath site is without erythema or drainage    Laboratory Data    Results from last 7 days   Lab Units 10/07/21  0433 10/06/21  0742 10/06/21  0735   WBC 10*3/mm3 17.25* 10.81* 10.93*   HEMOGLOBIN g/dL 11.3* 10.3* 10.1*   HEMATOCRIT % 36.5* 31.8* 31.5*   PLATELETS 10*3/mm3 161 168 170     Results from last 7 days   Lab Units 10/07/21  1217 10/07/21  0433 10/07/21  0433   SODIUM mmol/L  --   --  142   POTASSIUM mmol/L 5.3*   < > 5.3*   CHLORIDE mmol/L  --   " --  107   CO2 mmol/L  --   --  20.0*   BUN mg/dL  --   --  37*   CREATININE mg/dL  --   --  2.91*   GLUCOSE mg/dL  --   --  164*   CALCIUM mg/dL  --   --  8.6    < > = values in this interval not displayed.     Results from last 7 days   Lab Units 10/07/21  0433   ALK PHOS U/L 228*   BILIRUBIN mg/dL 1.5*   ALT (SGPT) U/L 73*   AST (SGOT) U/L 80*         Results from last 7 days   Lab Units 10/04/21  2041   CRP mg/dL 46.11*       Estimated Creatinine Clearance: 27 mL/min (A) (by C-G formula based on SCr of 2.91 mg/dL (H)).       Microbiology:  Blood culture ×2: In process    Urine culture: In process    Radiology:  CT Abdomen Pelvis Without Contrast    Result Date: 10/7/2021  New 16 x 6 cm hepatic fluid collection with intermixed heterogeneous and hyperdense contents compatible with large hematoma. This appears new from the October 4, 2021 comparison scan. There is a small amount of free fluid in the pelvis which is also new from comparison. The remainder of the hematoma and blood products appear primarily intraparenchymal and subcapsular.  New small right pleural effusion with some adjacent consolidation or atelectasis, with component of pneumonia not entirely excluded.  Redemonstrated bladder wall thickening with several diverticula including small locules of air within an anterior diverticula. Finding most likely relates to any recent catheterization, however correlate with any clinical concern for cystitis. No additional acute findings in the abdomen and pelvis.  Finding of large hepatic hematoma discussed with Dr. Callahan by Corey Castillo via phone at 12:47 PM 10/7/2021   This report was finalized on 10/7/2021 12:48 PM by Corey Castillo.      CT Abdomen Pelvis Without Contrast    Result Date: 10/4/2021  1. Cholecystectomy with a fluid collection in the gallbladder fossa containing a bubble of air. Differential considerations include seroma/hematoma, biloma, or possibly a developing abscess. No biliary  obstruction is seen. 2. Lung nodules in the visualized lung bases, not all of which have been previously evaluated. Therefore, chest CT follow-up in 6 months is recommended per Fleischner criteria. 3. No acute findings in the GI tract. There is colonic diverticulosis. 4. Left renal atrophy with bilateral nonobstructing renal stones. There are no ureteral stones, and there is no hydronephrosis. 5. Fat stranding in the ventral abdominal wall and right flank as above could reflect bland edema or cellulitis. 6. Additional findings as above. Recommend consideration for referral to [facility]. For questions or to make appointment call [phone]. Signer Name: Caden Rodriguez MD  Signed: 10/4/2021 10:22 PM  Workstation Name: [email or web address]  [facility] Hepatobiliary Without CCK    Result Date: 10/5/2021  No evidence for abnormal tracer activity to suggest leak with normal activity in the biliary system and small bowel.  D: 10/05/2021 E: 10/05/2021    This report was finalized on 10/5/2021 8:59 PM by Dr. Jevon Hartmann.      XR Chest 1 View    Result Date: 10/4/2021  There appears to be slightly increased density at the right lung base, and it is uncertain if this may represent developing pneumonia. Signer Name: Laquita Kelly MD  Signed: 10/4/2021 9:46 PM  Workstation Name: [id number]  [facility]     I independently read the patient's CT abdomen and pelvis from 10/7 and I discussed the findings with Dr. Callahan    Impression:     Problems:  -Sepsis with leukocytosis with neutrophilia, lactic acidosis, elevated pro calcitonin level- due to intra-abdominal source/abdominal wall cellulitis. Leukocytosis is worse 10/7  -Intra-abdominal hematoma  -Abdominal wall cellulitis- Appears slightly improved  -Possible developing intra-abdominal abscess vs. Seroma. No signs of biliary leak on NM hepatobiliary scan  -Recent cholangitis and cholecystitis status post  recent cholecystectomy on 9/27 and ERCP on 9/28  -Recent enterococcus bacteremia  -Recent Clostridium perfringens bacteremia  -Recent pancreatitis  -Macrocytic anemia  -Elevated troponin level  -Elevated LFTs  -RUDY on CKD stage IIIB- due to sepsis vs dehydration  -Coronary artery disease status post CABG/stents  -systolic heart failure, LVEF was 26-30% on echo in 09/2021  -history of cardiac thrombus, on anticoagulation with eliquis  -Diabetes mellitus type 2 with hyperglycemia  -Essential hypertension  -Hyperlipidemia  -Sarcoidosis/chronic prednisone  -prolonged QTc interval  -elevated CPK level  -external oral blisters, possible herpes labialis    PLAN: Thank you for asking us to see Jeremías Soto, I recommend the following:     -continue to follow cbc with diff, cmp, crp, and lactic acid. Rechecked CPK and was ok  -follow pending blood cultures-No growth to date  -urine cx- yeast   -Stop Zosyn  -Started meropenem and clindamycin today in order to broaden him out as his leukocytosis is getting worse  -continue empiric Daptomycin  -continue empiric Micafungin (avoiding fluconazole in the setting of his prolonged QTc interval)  -surgery following. Dr. Callahan plans to take the patient to the OR tomorrow for washout of his hematoma to assess the cause of bleeding and assess for infection.  May help establish source control. His INR will be reversed this evening  -weekly Groshong cath dressing changes  -valtrex started by primary team for possible HSV    I discussed in length with the patient's wife at bedside today    I discussed his complex case with Dr. Callahan today    Complex [facility]. The patient has significant risk for further morbidity and mortality in the setting of his multiple complex medical issues.     Diaz Moreno MD  10/7/2021

## 2021-10-08 NOTE — PROGRESS NOTES
Clinical Nutrition   Reason For Visit: Follow-up protocol    Patient Name: Jeremías Soto  YOB: 1955  MRN: [id number]  Date of Encounter: 10/07/21 21:40 EDT  Admission date: 10/4/2021    -Pt sleeping soundly at time of visit. Wife confirms last interview stating UBW for pt is 218 lbs and pt had not taken any signif p.o 4-5 da PTA.     -If cont unable to take p.o MD to consider best alternate feeding route: feeding tube vs central PN or peripheral PN.     For EN Peptamen 1.5 begin 25 ml/hr advance by 15 ml/hr ev 8 hr w tolerance to goal 55 ml/hr Water at 40 ml/hr for 1815 Kcal 103% est need, 82 g % est need 934 ml H20 in TF 1814 total ml Free Water    For TPN D20% AA6 % begin 25 ml/hr advance by 15 ml/hr ev 8 hr to goal 55 ml/hr Give 250 ml SMOF Lipid/da for 1702 Kcal 97% est need, 79 g PRO 98% est need    For PPN std soln begin 25 ml/hr advance by 25 ml/hr ev 8 hr to goal 100 ml/hr Give 350 ml SMOF Lipid/da for 1689 Kcal 96% est need, 84 g % est need.     Nutrition Assessment     Admission Problem List:  Sepsis  Elevated LFTs  Abdominal wall cellulitis  Postoperative intra-abdominal abscess  Intermittent nausea  RUDY on CKD stage 3  Perioral lesions  Skin tear on arm  Presence of LV thrombus      Applicable PMH:  HTN, HLD  CHF  CAD s/p CABG and stents  T2DM  CKD stage 3  Sarcoidosis (occular)  Covid-19 (12/2020)  S/p CCY (9/27/21)      Applicable medical tests/procedures since admission:  (10/5) s/p ultrasound guided abscess drain       Reported/Observed/Food/Nutrition Related History     10/7: Wife reaffirms rpt.     10/5:  Patient and wife present during visit. Patient's wife provides majority of information. Reports patient wasn't eating very well during most of previous [facility] admission (9/23-9/29), but did eat well 9/29-9/30. On 10/1 patient began having poor appetite/intermittent nausea and poor PO intake (</=25% of usual). Reports patient weighed 218 lbs on (9/23) but unsure  what patient weighs at this time. RD requested standing scale weight from RNLeonora QUARLES at this time. Agrees to try clear liquid ONS drinks (no orange flavor). Denies food allergies and difficulty chewing/swallowing.    Anthropometrics   Height: 67 in  Weight: 215 lbs (bed scale weight 10/7 per ns doc)  BMI: 33.67  BMI classification: Obese Class I: 30-34.9kg/m2   IBW: 148 lbs    UBW: 218 lbs (9/23/21) per wife  Per EMR (MDOV) - 219 lbs (6/17/21), 226 lbs (2/18/21), 219 lbs (12/10/20)    Weight change: RD waiting for standing weight to be obtained prior to evaluating recent weight changes.    Labs reviewed   Labs reviewed: Yes    Results from last 7 days   Lab Units 10/07/21  1217 10/07/21  0433 10/06/21  0742 10/05/21  0841 10/05/21  0841 10/04/21  2041 10/04/21  2041   SODIUM mmol/L  --  142 138  --  138   < > 139   POTASSIUM mmol/L 5.3* 5.3* 3.6   < > 4.7   < > 4.7   CHLORIDE mmol/L  --  107 106  --  106   < > 102   CO2 mmol/L  --  20.0* 19.0*  --  20.0*   < > 18.0*   BUN mg/dL  --  37* 37*  --  32*   < > 27*   CREATININE mg/dL  --  2.91* 2.76*  --  3.09*   < > 2.84*   GLUCOSE mg/dL  --  164* 72  --  99   < > 82   CALCIUM mg/dL  --  8.6 8.1*  --  8.1*   < > 8.6   PHOSPHORUS mg/dL  --   --   --   --   --   --  3.6   MAGNESIUM mg/dL  --   --   --   --   --   --  2.0    < > = values in this interval not displayed.       Medications reviewed   Medications reviewed: Yes  Pertinent: antibiotic, probiotic, insulin, protonix, steroid, micafungin    Needs Assessment 10/7     Weight used: 97.6 Kg wt on 10/7  IBW: 67.3 Kg    Estimated Energy needs: ~ 1755 Kcal/da based on 18 Kcal/Kg       Estimated Protein needs: ~ 81 g/da based on 1.2g/Kg IBW  Pt w CKD       Estimated Fluid needs: ~ 1755 ml/hr per RDA method      Current Nutrition Prescription   PO: NPO Diet  NPO Diet    Average PO intake: insufficient data    Nutrition Diagnosis     10/5, 10/7  Problem Inadequate oral intake   Etiology Decreased appetite, altered GI function  s/p CCY, nausea   Signs/Symptoms <75% of usual PO intake x 12 days (since 9/23)   Status: ongoing     Intervention   Intervention: Follow treatment progress, Care plan reviewed, Await begin PO, Alt route feeding recs provided    -If cont unable to take p.o MD to consider best alternate feeding route: feeding tube vs central PN or peripheral PN.     For EN Peptamen 1.5 begin 25 ml/hr advance by 15 ml/hr ev 8 hr w tolerance to goal 55 ml/hr Water at 40 ml/hr for 1815 Kcal 103% est need, 82 g % est need 934 ml H20 in TF 1814 total ml Free Water    For TPN D20% AA6 % begin 25 ml/hr advance by 15 ml/hr ev 8 hr to goal 55 ml/hr Give 250 ml SMOF Lipid/da for 1702 Kcal 97% est need, 79 g PRO 98% est need    For PPN std soln begin 25 ml/hr advance by 25 ml/hr ev 8 hr to goal 100 ml/hr Give 350 ml SMOF Lipid/da for 1689 Kcal 96% est need, 84 g % est need.       Goal:   General: Nutrition support treatment  PO: Initiate diet as medically appropriate    Monitoring/Evaluation:   Monitoring/Evaluation: Per protocol, I&O, Pertinent labs, Weight, GI status, Symptoms    Jessica Augustin RD  Time Spent: 30 min

## 2021-10-08 NOTE — PROGRESS NOTES
"Patient Name:  Jeremías Soto  YOB: 1955  [id number]    Surgery Post - Operative Note    Date of visit: 10/8/2021    Subjective   Subjective: Feeling better somewhat. Tolerating a little PO.       Objective     Objective:    /70 (BP Location: Left arm, Patient Position: Sitting)   Pulse 90   Temp 97.8 °F (36.6 °C) (Axillary)   Resp 18   Ht 170.2 cm (67\")   Wt 98.1 kg (216 lb 5 oz)   SpO2 93%   BMI 33.88 kg/m²     CV:  Rate  regular and rhythm  regular  L:  Clear  to auscultation bilaterally   ABD:  Soft, appropriately tender. Dressings  clean, dry and intact , LAWRENCE drains serosanguinous.   EXT:  No cyanosis, clubbing or edema         Assessment/Plan     Assessment/ Plan: Doing well after Lap washout. Continue Pulmonary toilet    Problem List Items Addressed This Visit        Genitourinary and Reproductive     RUDY (acute kidney injury) (HCC)       Other    Post-operative infection    Abdominal wall cellulitis    Relevant Orders    Body Fluid Culture - Body Fluid, Abdomen, Right (Completed)      Other Visit Diagnoses     Cellulitis of abdominal wall    -  Primary    Leukocytosis, unspecified type        Acute cystitis without hematuria        Acute congestive heart failure, unspecified heart failure type (HCC)               Active Hospital Problems    Diagnosis  POA   • **Sepsis (HCC) [A41.9]  Yes   • Post-operative infection [T81.40XA]  Yes   • Abdominal wall cellulitis [L03.311]  Yes   • Postoperative intra-abdominal abscess [T81.43XA]  Yes   • Elevated troponin [R77.8]  Yes   • CAD (coronary artery disease) [I25.10]  Yes   • CKD (chronic kidney disease), stage III (HCC) [N18.30]  Yes   • Acute kidney injury superimposed on chronic kidney disease (HCC) [N17.9, N18.9]  Yes   • Elevated LFTs [R79.89]  Yes   • Combined systolic and diastolic cardiac dysfunction (EF < 50% 2018) [I51.89]  Yes   • History of sarcoidosis (occular) [Z86.2]  Yes   • Moderate obstructive sleep apnea [G47.33]  " Yes   • Diabetes mellitus (HCC) [E11.9]  Yes   • Hypertension [I10]  Yes      Resolved Hospital Problems   No resolved problems to display.            David Callahan MD  10/8/2021  17:55 EDT

## 2021-10-08 NOTE — PROGRESS NOTES
[facility] Medicine Services  PROGRESS NOTE    Patient Name: Jeremías Soto  : 1955  MRN: [id number]    Date of Admission: 10/4/2021  Primary Care Physician: Vincent Crawford MD    Subjective   Subjective     CC:  Abdominal pain    HPI:  Seen in the pacu. Having some nausea. Received zofran and droperidol without improvement. Having pain.     ROS:  General: denies fevers or chills  CV: denies chest pain  Resp: denies shortness of breath  Abd: as per HPI      Objective   Objective     Vital Signs:   Temp:  [97.8 °F (36.6 °C)-98.7 °F (37.1 °C)] 98.7 °F (37.1 °C)  Heart Rate:  [84-93] 93  Resp:  [16] 16  BP: (108-141)/(66-83) 136/74  Flow (L/min):  [2-3] 3     Physical Exam:  Constitutional: Awake, alert, somnolent and appears nauseous holding container to face.   HENT: NCAT,perioral scabbed lesions  Respiratory: Clear to auscultation bilaterally, respiratory effort normal   Cardiovascular: RRR, no murmurs, rubs, or gallops, palpable radial pulse  Gastrointestinal: abd binder in place  Musculoskeletal: No bilateral ankle edema  Psychiatric: Appropriate affect, cooperative  Neurologic: Speech clear, moving all extremity spontaneously    Results Reviewed:  LAB RESULTS:      Lab 10/08/21  0617 10/07/21  0826 10/07/21  0433 10/06/21  0742 10/06/21  0735 10/05/21  0757 10/04/21  2338 10/04/21  2250 10/04/21  2041 10/04/21  2041   WBC 14.57*  --  17.25* 10.81* 10.93* 13.69*  --   --    < > 24.54*   HEMOGLOBIN 9.3*  --  11.3* 10.3* 10.1* 10.6*  --   --    < > 12.0*   HEMATOCRIT 30.0*  --  36.5* 31.8* 31.5* 35.9*  --   --    < > 38.3   PLATELETS 142  --  161 168 170 130*  --   --    < > 196   NEUTROS ABS 12.45*  --  14.91*  --  9.51* 11.89*  --   --   --  22.51*   IMMATURE GRANS (ABS) 0.22*  --  0.31*  --  0.06* 0.10*  --   --   --  0.39*   LYMPHS ABS 0.54*  --  0.61*  --  0.55* 0.74  --   --   --  0.50*   MONOS ABS 1.28*  --  1.35*  --  0.72 0.86  --   --   --  1.08*   EOS ABS 0.04   --  0.02  --  0.07 0.05  --   --   --  0.00   MCV 94.9  --  96.1 92.4 92.4 100.6*  --   --    < > 92.7   CRP  --   --   --   --   --   --   --   --   --  46.11*   PROCALCITONIN  --   --   --   --   --   --   --  8.70*  --   --    LACTATE  --   --   --   --   --   --  2.9*  --   --  4.1*   PROTIME 23.5* 25.1*  --  23.7*  --  34.2*  --   --   --  27.7*   APTT 46.6*  --   --  61.2*  --  61.5*  --   --   --  52.0*    < > = values in this interval not displayed.         Lab 10/08/21  0617 10/07/21  1217 10/07/21  0433 10/06/21  0742 10/05/21  0841 10/05/21  0757 10/04/21  2041 10/04/21  2041   SODIUM 137  --  142 138 138  --   --  139   POTASSIUM 4.1 5.3* 5.3* 3.6 4.7  --    < > 4.7   CHLORIDE 102  --  107 106 106  --   --  102   CO2 18.0*  --  20.0* 19.0* 20.0*  --   --  18.0*   ANION GAP 17.0*  --  15.0 13.0 12.0  --   --  19.0*   BUN 49*  --  37* 37* 32*  --   --  27*   CREATININE 3.51*  --  2.91* 2.76* 3.09*  --   --  2.84*   GLUCOSE 134*  --  164* 72 99  --   --  82   CALCIUM 8.0*  --  8.6 8.1* 8.1*  --   --  8.6   MAGNESIUM  --   --   --   --   --   --   --  2.0   PHOSPHORUS  --   --   --   --   --   --   --  3.6   HEMOGLOBIN A1C  --   --   --   --   --  7.30*  --   --     < > = values in this interval not displayed.         Lab 10/08/21  0617 10/07/21  0433 10/06/21  0742 10/05/21  0841 10/04/21  2041   TOTAL PROTEIN 5.8* 5.9* 5.4* 5.4* 6.1   ALBUMIN 2.30* 2.50* 2.20* 2.20* 2.70*   GLOBULIN 3.5 3.4 3.2 3.2 3.4   ALT (SGPT) 56* 73* 77* 94* 95*   AST (SGOT) 59* 80* 103* 137* 90*   BILIRUBIN 1.4* 1.5* 1.1 1.4* 2.0*   ALK PHOS 182* 228* 165* 176* 206*   LIPASE  --   --   --   --  8*         Lab 10/08/21  0617 10/07/21  0826 10/06/21  0742 10/05/21  0841 10/05/21  0757 10/04/21  2250 10/04/21  2041   PROBNP  --   --   --   --   --   --  8,353.0*   TROPONIN T  --   --   --  0.048*  --  0.054* 0.071*   PROTIME 23.5* 25.1* 23.7*  --  34.2*  --  27.7*   INR 2.20* 2.39* 2.22*  --  3.59*  --  2.72*             Lab  10/05/21  0757   ABO TYPING A   RH TYPING Positive   ANTIBODY SCREEN Negative         Brief Urine Lab Results  (Last result in the past 365 days)      Color   Clarity   Blood   Leuk Est   Nitrite   Protein   CREAT   Urine HCG        10/04/21 2151 Yellow Slightly Cloudy Trace Small (1+) Negative 30 mg/dL (1+)               Microbiology Results Abnormal     Procedure Component Value - Date/Time    Blood Culture - Blood, Arm, Right [[id number]] Collected: 10/04/21 2143    Lab Status: Preliminary result Specimen: Blood from Arm, Right Updated: 10/07/21 2215     Blood Culture No growth at 3 days    Blood Culture - Blood, Arm, Left [[id number]] Collected: 10/04/21 2120    Lab Status: Preliminary result Specimen: Blood from Arm, Left Updated: 10/07/21 2215     Blood Culture No growth at 3 days    Blood Culture - Blood, Arm, Left [[id number]] Collected: 10/05/21 0734    Lab Status: Preliminary result Specimen: Blood from Arm, Left Updated: 10/07/21 0901     Blood Culture No growth at 2 days    COVID PRE-OP / PRE-PROCEDURE SCREENING ORDER (NO ISOLATION) - Swab, Nasopharynx [[id number]]  (Normal) Collected: 10/05/21 0015    Lab Status: Final result Specimen: Swab from Nasopharynx Updated: 10/05/21 0043    Narrative:      The following orders were created for panel order COVID PRE-OP / PRE-PROCEDURE SCREENING ORDER (NO ISOLATION) - Swab, Nasopharynx.  Procedure                               Abnormality         Status                     ---------                               -----------         ------                     COVID-19, ABBOTT IN-HOUS...[[id number]]  Normal              Final result                 Please view results for these tests on the individual orders.    COVID-19, ABBOTT IN-HOUSE,NASAL Swab (NO TRANSPORT MEDIA) 2 HR TAT - Swab, Nasopharynx [[id number]]  (Normal) Collected: 10/05/21 0015    Lab Status: Final result Specimen: Swab from Nasopharynx Updated: 10/05/21 0043     COVID19 Presumptive Negative     Narrative:      Fact sheet for providers: https://www.fda.gov/media/192716/download     Fact sheet for patients: https://www.fda.gov/media/844739/download    Test performed by PCR.  If inconsistent with clinical signs and symptoms patient should be tested with different authorized molecular test.          CT Abdomen Pelvis Without Contrast    Result Date: 10/7/2021  EXAMINATION: CT ABDOMEN PELVIS WO CONTRAST-  INDICATION: Eval source of fever, recent CCY, right flank cellulitis; L03.311-Cellulitis of abdominal wall; T81.41XA-Infection following a procedure, superficial incisional surgical site, initial encounter; D72.829-Elevated white blood cell count, unspecified; N30.00-Acute cystitis without hematuria; N17.9-Acute kidney failure, unspecified; I50.9-Heart failure, unspecified  TECHNIQUE: Axial noncontrast CT of the abdomen and pelvis with multiplanar reconstruction  The radiation dose reduction device was turned on for each scan per the ALARA (As Low as Reasonably Achievable) protocol.  COMPARISON: 10/4/2021  FINDINGS: Evaluation of the lung bases demonstrates increased small pleural effusion on the right with adjacent volume loss versus consolidation. Several small pulmonary nodules are unchanged. The body wall soft tissues demonstrate some mild anasarca and superficial subcutaneous edema, greatest along the right flank. There is a new heterogeneous large perihepatic fluid collection concerning for acute hematoma, primarily hypodense with intermixed areas of increased density in addition to presumed hyperdense acute blood products more anteriorly. A component of superinfection is not entirely excluded. This hematoma measures approximately 16 x 6 cm. There is an unchanged ill-defined area of focal fluid along the gallbladder fossa adjacent to the cholecystectomy clips, again measuring around 2 cm, indeterminate for seroma versus abscess. The spleen, bilateral adrenal glands are homogeneous without evidence of  suspicious focal lesion. There is unchanged marked atrophy of the pancreatic parenchyma. Redemonstrated atrophy of the left kidney with calcifications and cysts. Unremarkable right kidney. Small and large bowel loops are nondilated. The appendix is normal. There is no suspicious focal bowel wall thickening. There is a small amount of free fluid in the dependent pelvis, new from comparison. Numerous bladder diverticula are unchanged, with some questionable bladder wall thickening and areas of gas formation within the bladder diverticula.      Impression: New 16 x 6 cm hepatic fluid collection with intermixed heterogeneous and hyperdense contents compatible with large hematoma. This appears new from the October 4, 2021 comparison scan. There is a small amount of free fluid in the pelvis which is also new from comparison. The remainder of the hematoma and blood products appear primarily intraparenchymal and subcapsular.  New small right pleural effusion with some adjacent consolidation or atelectasis, with component of pneumonia not entirely excluded.  Redemonstrated bladder wall thickening with several diverticula including small locules of air within an anterior diverticula. Finding most likely relates to any recent catheterization, however correlate with any clinical concern for cystitis. No additional acute findings in the abdomen and pelvis.  Finding of large hepatic hematoma discussed with Dr. Callahan by Corey Castillo via phone at 12:47 PM 10/7/2021   This report was finalized on 10/7/2021 12:48 PM by Corey Castillo.        Results for orders placed during the hospital encounter of 09/23/21    Adult Transthoracic Echo Complete W/ Cont if Necessary Per Protocol    Interpretation Summary  · Left ventricular ejection fraction appears to be 26 - 30%  · There is global hypokinesis. The posterior wall appears more focally hypokinetic  · Moderately reduced right ventricular systolic function noted.  · Mild dilation  of the aortic root is present measuring 4.1 cm.  · The left atrial cavity is moderately dilated  · No significant valvular stenosis or regurgitation.      I have reviewed the medications:  Scheduled Meds:clindamycin, 900 mg, Intravenous, Q8H  DAPTOmycin, 6 mg/kg (Adjusted), Intravenous, Q48H  [MAR Hold] docosanol, 1 application, Topical, 5x Daily  [MAR Hold] dorzolamide-timolol, , Both Eyes, BID  [MAR Hold] insulin lispro, 0-7 Units, Subcutaneous, 4x Daily With Meals & Nightly  [MAR Hold] lactobacillus acidophilus, 1 capsule, Oral, Daily  meropenem, 1 g, Intravenous, Q12H  metoprolol tartrate, 12.5 mg, Oral, BID  micafungin (MYCAMINE) IV, 100 mg, Intravenous, Q24H  [MAR Hold] pantoprazole, 40 mg, Oral, Daily  [MAR Hold] predniSONE, 10 mg, Oral, Daily  [MAR Hold] rOPINIRole, 0.25 mg, Oral, Nightly  [MAR Hold] sodium chloride, 10 mL, Intravenous, Q12H  sodium chloride, 10 mL, Intravenous, Q12H  [MAR Hold] tamsulosin, 0.4 mg, Oral, Daily      Continuous Infusions:sodium chloride, 9 mL/hr, Last Rate: 9 mL/hr (10/08/21 0713)      PRN Meds:.•  bupivacaine-EPINEPHrine PF  •  [MAR Hold] dextrose  •  [MAR Hold] dextrose  •  [MAR Hold] glucagon (human recombinant)  •  midazolam  •  [MAR Hold] Morphine  •  [MAR Hold] oxyCODONE-acetaminophen  •  [MAR Hold] Sodium Chloride (PF)  •  [MAR Hold] sodium chloride  •  sodium chloride  •  sodium chloride  •  sterile water    Assessment/Plan   Assessment & Plan     Active Hospital Problems    Diagnosis  POA   • **Sepsis (HCC) [A41.9]  Yes   • Post-operative infection [T81.40XA]  Yes   • Abdominal wall cellulitis [L03.311]  Yes   • Postoperative intra-abdominal abscess [T81.43XA]  Yes   • Elevated troponin [R77.8]  Yes   • CAD (coronary artery disease) [I25.10]  Yes   • CKD (chronic kidney disease), stage III (HCC) [N18.30]  Yes   • Acute kidney injury superimposed on chronic kidney disease (HCC) [N17.9, N18.9]  Yes   • Elevated LFTs [R79.89]  Yes   • Combined systolic and diastolic  cardiac dysfunction (EF < 50% 2018) [I51.89]  Yes   • History of sarcoidosis (occular) [Z86.2]  Yes   • Moderate obstructive sleep apnea [G47.33]  Yes   • Diabetes mellitus (HCC) [E11.9]  Yes   • Hypertension [I10]  Yes      Resolved Hospital Problems   No resolved problems to display.        Brief Hospital Course to date:  Jeremías Soto is a 66 y.o. male with systolic CHF (46-50%), chronic multiorgan disease, admitted 9/23-9/29 w/ enterococcal/clostridial bacteremia, cholangitis, questionable pyelonephritis-s/p lap farooq 9/27 and ERCP 9/28 discharged home on IV Zosyn with a planned completion date 10/11 who returned with several days worsening erythema of the abdomen and imaging in the ED concerning for fluid collection in the gallbladder fossa now on empiric antibiotics with some initial improvement then new development of abdominal pain     Sepsis, abdominal source  Abnormal abdominal fluid collection  Recent cholecystitis  Recent enterococcal/clostridial bacteremia  -S/p lap farooq 9/27, ERCP 9/28  -2.5 cm x 8.5 cm fluid collection on initial CT, unclear etiology given recent surgery  -ID following, antibiotics changed again to meropenem, clindamycin; daptomycin continued  -Statin on hold while on daptomycin  -Eliquis on hold since 10/5  -Continue probiotic  -CT abdomen pelvis with new 16 x 6 hepatic fluid collection and hematoma.  -Dr. Callahan following.  OR today for laparoscopic washout.  - fluid culture pending.   -ID following.  Continue meropenem, Clinda, Dapto, micafungin, Valtrex  - CBC and CMP in am.   - INR in the am    LV thrombus  -Per cardiology note 10/6, [facility] the [facility] demonstrate prescription of Eliquis 7/19 for a large LV thrombus found on MRI  -Anticipate need for resumption of anticoagulation once possible/severe bleeding rule out    Elevated INR, improving  - Trended down but still remains elevated     RUDY on CKD 3, worsening  Hyperkalemia, resolved  Metabolic  acidosis  -Baseline Cr 1.9-2.3; EGFR 30  - creatinine trended up overnight, repeat BMP in a.m.    Perioral lesions  -Empiric Valtrex, renally dosed     DM type 2, A1c 7.3%, without long-term use of insulin  -Accu-Cheks with SSI     CAD with prior CABG/stent  Chronic systolic/diastolic CHF  Mild troponin elevation  Hx multiple TIA  -Echo 9/24/2021 EF 26-30%  -Continue Lopressor  -Chronically on Eliquis, currently on hold  -Lisinopril on hold for RUDY     Elevated LFTs  -Recent admission cholangitis, holding statin as noted  - CMP in am     Hypertension  Hyperlipidemia     Ocular sarcoidosis  Bilateral lung nodules  -Repeat dedicated chest CT 6 months outpatient regarding nodules  -Continue chronic oral prednisone 10 mg     Obesity, BMI 35.16 kg/M2  EVA  Hx COVID-19 December 2020    DVT prophylaxis:  Mechanical DVT prophylaxis orders are present.     AM-PAC 6 Clicks Score (PT): 18 (10/06/21 1932)    Disposition: Multiple acute ongoing issues, anticipate he will be here through the weekend at minimum, anticipate need for rehab at discharge    CODE STATUS:   Code Status and Medical Interventions:   Ordered at: 10/05/21 0134     Code Status:    CPR     Medical Interventions (Level of Support Prior to Arrest):    Full     This patient's problems and plans were partially entered by my partner and updated as appropriate by me 10/08/21. Today is my first day evaluating this patient's active medical problems. I Personally reviewed chart and adjusted note to reflect daily changes in management/clinical condition      Gayle Butler MD  10/08/21

## 2021-10-08 NOTE — PROGRESS NOTES
Immediately postop today.  We will check an echocardiogram for LVEF with Lumason to evaluate for possible LV thrombus on Monday.  Manjula Owens MD, FACC

## 2021-10-08 NOTE — ANESTHESIA PROCEDURE NOTES
Airway  Urgency: elective    Date/Time: 10/8/2021 8:48 AM  Airway not difficult    General Information and Staff    Patient location during procedure: OR  CRNA: Roman Potter CRNA    Indications and Patient Condition  Indications for airway management: airway protection    Preoxygenated: yes  MILS not maintained throughout  Mask difficulty assessment: 0 - not attempted    Final Airway Details  Final airway type: endotracheal airway      Successful airway: ETT  Cuffed: yes   Successful intubation technique: direct laryngoscopy and RSI  Facilitating devices/methods: intubating stylet and cricoid pressure  Endotracheal tube insertion site: oral  Blade: Tripathi  Blade size: 2  ETT size (mm): 7.5  Cormack-Lehane Classification: grade I - full view of glottis  Placement verified by: chest auscultation and capnometry   Cuff volume (mL): 10  Measured from: lips  ETT/EBT  to lips (cm): 23  Number of attempts at approach: 1  Assessment: lips, teeth, and gum same as pre-op and atraumatic intubation    Additional Comments  Negative epigastric sounds, Breath sound equal bilaterally with symmetric chest rise and fall

## 2021-10-08 NOTE — PLAN OF CARE
Goal Outcome Evaluation:  Plan of Care Reviewed With: patient           Outcome Summary: PT evaluation completed.  Pt very lethargic throughout eval, but did wake more once mobility started.  Pt transferred supine<-->sit with ModAx1, stood with ModAx1, and took ~3 sidesteps towards the HOB with ModAx1 - multiple episodes of B knee buckling noted.  Skilled PT services warranted to improve mobility and safety.  Recommend inpt rehab at d/c.

## 2021-10-08 NOTE — CASE MANAGEMENT/SOCIAL WORK
Continued Stay Note  [facility]     Patient Name: Jeremías Soto  MRN: [id number]  Today's Date: 10/8/2021    Admit Date: 10/4/2021    Discharge Plan     Row Name 10/08/21 1445       Plan    Plan  discharge plan    Plan Comments  Pt sleeping when CM visited.  CM spoke with pt's spouse in room and discharge plan pending hospital course.  Per discussion in MDR,  pt will be in hospital over weekiend as pt had washout procedure with  LAWRENCE drains placed per Dr Callahan  Surgical cultures pending and [facility] following. CM will follow up Monday    Final Discharge Disposition Code  06 - home with home health care        Discharge Codes    No documentation.       Expected Discharge Date and Time     Expected Discharge Date Expected Discharge Time    Oct 10, 2021             Corina Shi RN

## 2021-10-08 NOTE — OP NOTE
OPERATIVE NOTE    Patient Name:  Jeremías Soto  YOB: 1955  [id number]    Date of Surgery:  10/8/2021      PREOPERATIVE DIAGNOSIS: Intra-abdominal hematoma      POSTOPERATIVE DIAGNOSIS: Same        PROCEDURE PERFORMED:  1.  Laparoscopic washout and drain placement  2.  Incision and drainage of right flank cellulitis       SURGEON: David Callahan MD       Circulator: Divya Mansfield RN  Scrub Person: Sully Finley  Nursing Assistant: Tahmina Mcintosh PCT; Cat Barksdale PCT            SPECIMENS: Old hematoma for culture       ANESTHESIA: General.        FINDINGS:   1.  Nearly 2 L of old blood and clot suctioned from above the liver.  2.  No evidence of active bleeding identified  3.  10 flat LAWRENCE drain placed in the gallbladder fossa and brought out through a separate stab incision in the abdomen.  A second 10 flat LAWRENCE drain was placed over the liver and brought out through a separate stab incision  4.  Incision over the area of edema and cellulitis in the right abdomen demonstrated no necrotic tissue, no fluid collection or abscess.         INDICATIONS: The patient is a 66 y.o. male who presented with sepsis nearly a week after laparoscopic cholecystectomy.  He has multiple medical comorbidities including chronic coronary artery disease, ventricular clot, diabetes mellitus type 2, chronic kidney disease stage III, hypertension, sarcoidosis, and previous stroke.  On initial CT scan on admission, there was perhaps some small fluid collection in the gallbladder fossa.  However, no clear evidence for the cause of his sepsis was identified.  HIDA scan revealed no evidence of bile leak.  He developed a cellulitis of the right flank as well that was treated with antibiotics, but continued to have increasing white count.  CT scanning from yesterday demonstrated the sudden development of a large amount of blood and clot above the liver.  He was coagulopathic at that time, and so we  resuscitated him in reverse his anticoagulation using FFP, vitamin K, and Kcentra.  He is now ready for a laparoscopic washout.  We also discussed opening the area of the cellulitis during the same operation, to evaluate for a possibly undiagnosed soft tissue infection.  The risks and benefits were discussed like the patient and his family, and they agreed to proceed.       DESCRIPTION OF PROCEDURE:      After obtaining informed consent, the patient was taken to the operating room and placed in supine  position. After appropriate DVT and antibiotic prophylaxis, general anesthesia was induced. TAP blocks were placed by the anesthesia staff bilaterally to aid in post-op pain control . The abdomen  was prepped and draped in standard sterile fashion, and after infiltrating the skin with local anesthetic the previous midline 12 mm trocar site was opened in the mid abdomen.  Blunt dissection was carried down to the fascia.  The previous fascial stitches had pulled through and were removed.  A new figure-of-eight suture of 0 Vicryl was then placed.  A blunt trocar was then advanced to this incision into the abdominal cavity.  The abdomen was insufflated with carbon dioxide gas to a pressure of 15 mmHg.  At this point the laparoscope was advanced to the trocar and the abdominal contents inspected.  There is no evidence of bowel, bladder, or visceral injury with entrance of the trocar.  At this point after infiltrating the skin with local anesthetic, both the subxiphoid 11 mm trocar site was opened as was the lateralmost 5 mm trocar site.  Trochars were placed through each of these areas under direct visualization.  There was no evidence of abscess or infection of the abdominal wall in these areas.    The omentum was adhesed to the costal margin and bluntly taken down.  Upon doing so, a large amount of old blood and clot was evacuated.  This was nearly 2 L and some was sent off for culture.  All clot was suctioned free.   Similarly, the gallbladder fossa was bluntly identified by slowly peeling away the omentum gently.  There was no active bleeding in this location.  Again there was clot in this location that was suctioned clear.  Again it should be noted that the liver had evidence of chronic liver disease with macronodular cirrhosis.    In short, no active bleeding area was able to be seen, but this appeared to be a fairly chronic process.  The abdomen was irrigated with over 3 L of saline.  No bleeding was encountered.  There was no clear evidence of bile leak.  A 10 flat LAWRENCE drain was placed over the liver, and brought out through a separate stab incision in the right lateral abdomen.  It was sutured to the skin using a nylon suture.  In similar fashion, another 10 flat LAWRENCE drain was placed in the gallbladder fossa, and brought out through the lateralmost trocar site.  It was also secured to skin using a nylon suture.  All trochars were then removed under direct and laparoscopic visualization, and the abdomen was desufflated.  The fascia at the camera port site was closed using the previously placed 0 Vicryl suture.  The wounds were irrigated with saline until clear and closed in each area using running subcuticular sutures.    Attention was turned to the area of cellulitis on the right flank inferior to the LAWRENCE drain sites.  A transverse 5 cm incision was then made over the area of cellulitis and dissection carried down to the abdominal wall.  All tissue was viable, and there was no purulence discovered.  The wound was then packed with saline soaked gauze and covered with a dry sterile dressing.    The LAWRENCE drains were placed to bulb suction, and they as well as the laparoscopic sites were dressed in standard sterile fashion and covered with dry sterile dressings.  An abdominal binder was placed.    All sponge and needle counts were correct times two at the completion of the procedure. The patient recovered from anesthesia, was  extubated in the operating room  and was transported to the PACU  in stable condition.          David Callahan MD  10/8/2021  09:45 EDT

## 2021-10-08 NOTE — ANESTHESIA POSTPROCEDURE EVALUATION
Patient: Jeremías Soto    Procedure Summary     Date: 10/08/21 [facility]  [facility]    Anesthesia Start: 0839 Anesthesia Stop:     Procedure: LAPAROSCOPIC WASHOUT (N/A Abdomen) Diagnosis:     Surgeons: David Callahan MD Provider: Kevin Morales MD    Anesthesia Type: general ASA Status: 3          Anesthesia Type: general    Vitals  Vitals Value Taken Time   /71 10/08/21 0953   Temp     Pulse 81 10/08/21 0955   Resp     SpO2 93 % 10/08/21 0955   Vitals shown include unvalidated device data.        Post Anesthesia Care and Evaluation    Patient location during evaluation: PACU  Patient participation: complete - patient participated  Level of consciousness: awake  Pain score: 0  Pain management: adequate  Airway patency: patent  Anesthetic complications: No anesthetic complications  PONV Status: none  Cardiovascular status: acceptable and stable  Respiratory status: nasal cannula, unassisted, acceptable and spontaneous ventilation  Hydration status: acceptable

## 2021-10-08 NOTE — PLAN OF CARE
Problem: Fall Injury Risk  Goal: Absence of Fall and Fall-Related Injury  Outcome: Ongoing, Not Progressing  Intervention: Identify and Manage Contributors to Fall Injury Risk  Recent Flowsheet Documentation  Taken 10/8/2021 1800 by Maria Isabel Matamoros RN  Medication Review/Management: medications reviewed  Taken 10/8/2021 1600 by Maria Isabel Matamoros RN  Medication Review/Management: medications reviewed  Taken 10/8/2021 1400 by Maria Isabel Matamoros RN  Medication Review/Management: medications reviewed  Intervention: Promote Injury-Free Environment  Recent Flowsheet Documentation  Taken 10/8/2021 1800 by Maria Isabel Matamoros RN  Safety Promotion/Fall Prevention:   activity supervised   assistive device/personal items within reach   clutter free environment maintained   fall prevention program maintained   nonskid shoes/slippers when out of bed   safety round/check completed  Taken 10/8/2021 1600 by Maria Isabel Matamoros RN  Safety Promotion/Fall Prevention:   activity supervised   assistive device/personal items within reach   clutter free environment maintained   fall prevention program maintained   nonskid shoes/slippers when out of bed   safety round/check completed  Taken 10/8/2021 1400 by Maria Isabel Matamoros RN  Safety Promotion/Fall Prevention:   activity supervised   assistive device/personal items within reach   clutter free environment maintained   fall prevention program maintained   nonskid shoes/slippers when out of bed   safety round/check completed     Problem: Diabetes Comorbidity  Goal: Blood Glucose Level Within Desired Range  Outcome: Ongoing, Not Progressing     Problem: Adult Inpatient Plan of Care  Goal: Plan of Care Review  Outcome: Ongoing, Not Progressing  Goal: Patient-Specific Goal (Individualized)  Outcome: Ongoing, Not Progressing  Goal: Absence of Hospital-Acquired Illness or Injury  Outcome: Ongoing, Not Progressing  Intervention: Identify and Manage Fall Risk  Recent Flowsheet Documentation  Taken 10/8/2021 1800 by  Matamoros, Maria Isabel, RN  Safety Promotion/Fall Prevention:   activity supervised   assistive device/personal items within reach   clutter free environment maintained   fall prevention program maintained   nonskid shoes/slippers when out of bed   safety round/check completed  Taken 10/8/2021 1600 by Maria Isabel Matamoros RN  Safety Promotion/Fall Prevention:   activity supervised   assistive device/personal items within reach   clutter free environment maintained   fall prevention program maintained   nonskid shoes/slippers when out of bed   safety round/check completed  Taken 10/8/2021 1400 by Maria Isabel Matamoros RN  Safety Promotion/Fall Prevention:   activity supervised   assistive device/personal items within reach   clutter free environment maintained   fall prevention program maintained   nonskid shoes/slippers when out of bed   safety round/check completed  Intervention: Prevent Skin Injury  Recent Flowsheet Documentation  Taken 10/8/2021 1800 by Maria Isabel Matamoros RN  Body Position: weight shift assistance provided  Skin Protection:   adhesive use limited   incontinence pads utilized   tubing/devices free from skin contact  Taken 10/8/2021 1600 by Maria Isabel Matamoros RN  Body Position: weight shift assistance provided  Skin Protection:   adhesive use limited   incontinence pads utilized   tubing/devices free from skin contact  Taken 10/8/2021 1400 by Maria Isabel Matamoros RN  Body Position: position changed independently  Skin Protection:   adhesive use limited   incontinence pads utilized   tubing/devices free from skin contact  Intervention: Prevent and Manage VTE (venous thromboembolism) Risk  Recent Flowsheet Documentation  Taken 10/8/2021 1400 by Maria Isabel Matamoros RN  VTE Prevention/Management:   bilateral   dorsiflexion/plantar flexion performed   bleeding risk factor(s) identified  Intervention: Prevent Infection  Recent Flowsheet Documentation  Taken 10/8/2021 1800 by Maria Isabel Matamoros RN  Infection Prevention: environmental surveillance  performed  Taken 10/8/2021 1600 by Maria Isabel Matamoros RN  Infection Prevention: environmental surveillance performed  Taken 10/8/2021 1400 by Maria Isabel Matamoros RN  Infection Prevention: environmental surveillance performed  Goal: Optimal Comfort and Wellbeing  Outcome: Ongoing, Not Progressing  Intervention: Provide Person-Centered Care  Recent Flowsheet Documentation  Taken 10/8/2021 1400 by Maria Isabel Matamoros RN  Trust Relationship/Rapport:   care explained   choices provided   thoughts/feelings acknowledged  Goal: Readiness for Transition of Care  Outcome: Ongoing, Not Progressing     Problem: Skin Injury Risk Increased  Goal: Skin Health and Integrity  Outcome: Ongoing, Not Progressing  Intervention: Optimize Skin Protection  Recent Flowsheet Documentation  Taken 10/8/2021 1800 by Maria Isabel Matamoros RN  Pressure Reduction Techniques:   frequent weight shift encouraged   weight shift assistance provided  Head of Bed ([facility]): [facility] elevated  Pressure Reduction Devices:   specialty bed utilized   positioning supports utilized  Skin Protection:   adhesive use limited   incontinence pads utilized   tubing/devices free from skin contact  Taken 10/8/2021 1600 by Maria Isabel Matamoros RN  Pressure Reduction Techniques:   frequent weight shift encouraged   weight shift assistance provided  Head of Bed ([facility]): [facility] elevated  Pressure Reduction Devices:   specialty bed utilized   positioning supports utilized  Skin Protection:   adhesive use limited   incontinence pads utilized   tubing/devices free from skin contact  Taken 10/8/2021 1400 by Maria Isabel Matamoros RN  Pressure Reduction Techniques:   frequent weight shift encouraged   weight shift assistance provided  Head of Bed ([facility]OB): [facility] elevated  Pressure Reduction Devices:   specialty bed utilized   positioning supports utilized  Skin Protection:   adhesive use limited   incontinence pads utilized   tubing/devices free from skin contact   Goal Outcome Evaluation:

## 2021-10-09 NOTE — PROGRESS NOTES
"[facility]  Progress Note       LOS: 4 days   Patient Care Team:  Vincent Crawford MD as PCP - General (Family Medicine)  Manjula Owens MD as Consulting Physician (Cardiology)    Chief Complaint:  Abd wall cellulitis, CAD, CHF, and Hx of LV thrombus     Subjective  Nursing staff at bedside this am during visit. Had drainage from site saturating gown, sheets. Nursing staff reinforcing bandages. Pt became more SOB overnight. States is slightly better this AM . Per nursing staff patient seems more \"puffy\" this AM.  Denies CP.  Presently breathing better.  Interval History: POD 1 status post laparoscopic washout and drain placement.  Incision and drainage of right flank cellulitis      Review of Systems:   Pertinent positives in HPI, all others reviewed and negative.      Objective       Current Facility-Administered Medications:   •  clindamycin (CLEOCIN) 900 mg in dextrose 5% 50 mL IVPB (premix), 900 mg, Intravenous, Q8H, David Callahan MD, Last Rate: 50 mL/hr at 10/09/21 0842, 900 mg at 10/09/21 0842  •  DAPTOmycin (CUBICIN) 500 mg/50 mL 0.9% sodium chloride IVPB, 6 mg/kg (Adjusted), Intravenous, Q48H, David Callahan MD, 500 mg at 10/07/21 2111  •  dextrose (D50W) 25 g/ 50mL Intravenous Solution 25 g, 25 g, Intravenous, Q15 Min PRN, David Callahan MD  •  dextrose (D5W) 5 % infusion, 75 mL/hr, Intravenous, Continuous, Corey Garcia MD  •  dextrose (GLUTOSE) oral gel 15 g, 15 g, Oral, Q15 Min PRN, David Callahan MD  •  docosanol (ABREVA) 10 % cream 1 application, 1 application, Topical, 5x Daily, David Callahan MD, 1 application at 10/09/21 1200  •  docusate sodium (COLACE) capsule 100 mg, 100 mg, Oral, BID, David Callahan MD, 100 mg at 10/09/21 0839  •  dorzolamide (TRUSOPT) 2 % 1 drop, timolol (TIMOPTIC) 0.5 % 1 drop for Cosopt 22.3-6.8 mg/mL, , Both Eyes, BID, David Callahan MD, Given at 10/09/21 0840  •  ferric gluconate " (FERRLECIT)125 MG in sodium chloride 0.9 % 100 mL IVPB, 125 mg, Intravenous, Daily Before Supper, Corey Garcia MD  •  glucagon (human recombinant) (GLUCAGEN DIAGNOSTIC) injection 1 mg, 1 mg, Subcutaneous, Q15 Min PRN, David Callahan MD  •  insulin lispro (humaLOG) injection 0-7 Units, 0-7 Units, Subcutaneous, 4x Daily With Meals & Nightly, David Callahan MD, 2 Units at 10/09/21 1200  •  lactobacillus acidophilus (RISAQUAD) capsule 1 capsule, 1 capsule, Oral, Daily, David Callahan MD, 1 capsule at 10/09/21 0839  •  meropenem (MERREM) 1 g/100 mL 0.9% NS VTB (mbp), 1 g, Intravenous, Q12H, David Callahan MD, 1 g at 10/09/21 0354  •  metoprolol tartrate (LOPRESSOR) half tablet 12.5 mg, 12.5 mg, Oral, BID, David Callahan MD, 12.5 mg at 10/09/21 0840  •  micafungin 100 mg/100 mL 0.9% NS IVPB (mbp), 100 mg, Intravenous, Q24H, David Callahan MD, 100 mg at 10/08/21 1728  •  morphine injection 2 mg, 2 mg, Intravenous, Q3H PRN, David Callahan MD, 2 mg at 10/06/21 1846  •  oxyCODONE-acetaminophen (PERCOCET) 5-325 MG per tablet 2 tablet, 2 tablet, Oral, Q4H PRN, David Callahan MD  •  oxyCODONE-acetaminophen (PERCOCET) 7.5-325 MG per tablet 1 tablet, 1 tablet, Oral, Q4H PRN, David Callahan MD, 1 tablet at 10/06/21 2053  •  pantoprazole (PROTONIX) EC tablet 40 mg, 40 mg, Oral, Daily, David Callahan MD, 40 mg at 10/09/21 0840  •  predniSONE (DELTASONE) tablet 10 mg, 10 mg, Oral, Daily, David Callahan MD, 10 mg at 10/09/21 0839  •  rOPINIRole (REQUIP) tablet 0.25 mg, 0.25 mg, Oral, Nightly, David Callahan MD, 0.25 mg at 10/08/21 2014  •  Sodium Chloride (PF) 0.9 % 10 mL, 10 mL, Intravenous, PRN, David Callahan MD  •  sodium chloride 0.9 % flush 10 mL, 10 mL, Intravenous, Q12H, David Callahan MD, 10 mL at 10/07/21 2111  •  sodium chloride 0.9 % flush 10 mL, 10 mL, Intravenous, PRN, David Callahan MD  •  tamsulosin (FLOMAX) 24 hr capsule 0.4 mg, 0.4 mg, Oral,  "Daily, David Callahan MD, 0.4 mg at 10/09/21 0839    Vital Sign Min/Max for last 24 hours  Temp  Min: 97.8 °F (36.6 °C)  Max: 98.1 °F (36.7 °C)   BP  Min: 130/70  Max: 148/80   Pulse  Min: 75  Max: 90   Resp  Min: 16  Max: 18   SpO2  Min: 93 %  Max: 93 %   Flow (L/min)  Min: 2  Max: 2   Weight  Min: 98.9 kg (218 lb)  Max: 98.9 kg (218 lb)     Flowsheet Rows      First Filed Value   Admission Height 177.8 cm (70\") Documented at 10/04/2021 1842   Admission Weight 95.3 kg (210 lb) Documented at 10/04/2021 1842            Intake/Output Summary (Last 24 hours) at 10/9/2021 1206  Last data filed at 10/9/2021 1015  Gross per 24 hour   Intake 1428.8 ml   Output 650 ml   Net 778.8 ml       Physical Exam:     General Appearance:    Alert, cooperative, in no acute distress   Lungs:    Decreased breath sounds right lower base greater than left base otherwise clear    Heart:   Regular rhythm normal S1-S2 there is an S4   Chest Wall:    No abnormalities observed   Abdomen:     Normal bowel sounds, no acute tenderness,  2 drains in place with abdominal incisions packed and dressed    Extremities:   Moves all extremities well, +1-2 edema in Upper and lower extremities. + skin tear of left arm    Pulses:   Pulses palpable and equal bilaterally   Skin:   No bleeding, + bruising         Results Review:   Results from last 7 days   Lab Units 10/09/21  0448 10/08/21  0617 10/07/21  0433   WBC 10*3/mm3 14.44* 14.57* 17.25*   HEMOGLOBIN g/dL 8.1* 9.3* 11.3*   HEMATOCRIT % 25.6* 30.0* 36.5*   PLATELETS 10*3/mm3 207 142 161     Results from last 7 days   Lab Units 10/09/21  0448 10/08/21  0617 10/08/21  0617 10/07/21  1217 10/07/21  0433 10/07/21  0433   SODIUM mmol/L 150*  --  137  --   --  142   POTASSIUM mmol/L 4.8  --  4.1 5.3*   < > 5.3*   CHLORIDE mmol/L 114*   < > 102  --    < > 107   CO2 mmol/L 19.0*   < > 18.0*  --    < > 20.0*   BUN mg/dL 61*  --  49*  --   --  37*   CREATININE mg/dL 3.31*  --  3.51*  --   --  2.91*   GLUCOSE " mg/dL 139*   < > 134*  --    < > 164*    < > = values in this interval not displayed.      Results from last 7 days   Lab Units 10/05/21  0757   HEMOGLOBIN A1C % 7.30*             Results from last 7 days   Lab Units 10/04/21  2041   PROBNP pg/mL 8,353.0*     Results from last 7 days   Lab Units 10/09/21  0448 10/08/21  0617 10/08/21  0617 10/07/21  0826 10/06/21  0742   PROTIME Seconds 17.0*  --  23.5* 25.1*  --    INR  1.43*   < > 2.20* 2.39*   < >   APTT seconds 40.1*   < > 46.6*  --    < >    < > = values in this interval not displayed.     Results from last 7 days   Lab Units 10/09/21  0448 10/07/21  0433 10/06/21  0742 10/05/21  0841 10/04/21  2250 10/04/21  2041   CK TOTAL U/L 41 182 673*  --   --   --    TROPONIN T ng/mL  --   --   --  0.048* 0.054* 0.071*       Intake/Output Summary (Last 24 hours) at 10/9/2021 1206  Last data filed at 10/9/2021 1015  Gross per 24 hour   Intake 1428.8 ml   Output 650 ml   Net 778.8 ml       I personally viewed and interpreted the patient's EKG/Telemetry data      Telemetry:NSR  beats per minute.  No atrial fibrillation    Ejection Fraction  No results found for: EF    Echo EF Estimated  Lab Results   Component Value Date    ECHOEFEST 50 08/26/2020         Present on Admission:  • Post-operative infection  • Abdominal wall cellulitis  • Sepsis ([facility])  • Postoperative intra-abdominal abscess  • Elevated LFTs  • Diabetes mellitus ([facility])  • Combined systolic and diastolic cardiac dysfunction (EF < 50% 2018)  • History of sarcoidosis ([facility])  • Hypertension  • Moderate obstructive sleep apnea  • Elevated troponin  • CAD (coronary artery disease)  • CKD (chronic kidney disease), stage III ([facility])  • Acute kidney injury superimposed on chronic kidney disease ([facility])    Assessment/Plan      1)CAD/ischemic cardiomyopathy/congestive heart failure  -Prior CABG/stents  -Chronic/diastolic heart failure  -Mild troponin elevation on admission - likely chronic, currently chest pain-free  with no anginal equivalents  -History of multiple TIAs  -Echocardiogram 9/24/2021 LVEF 26-30%   -Continue Lopressor, home lisinopril on hold due to acute kidney injury  -appears somewhat volume overloaded my will defer to Nephrology re; continuation of fluids vs. Possible gentle diuresis . Intake net up 1.3L, continue strict I/0     2) History of LV thrombus per records from [facility] patient has previously been anticoagulated with Eliquis currently on hold given recent surgical intervention.  Per Dr. Owens note yesterday we will pursue echocardiogram with [facility] Monday for reassessment of LV thrombus.    3) Sepsis/abdominal wall cellulitis  - 10/8/21  laparoscopic washout and drain placement.  Incision and drainage of right flank cellulitis  -Surgical management per Dr. Callahan  -Infectious disease managing antibiotics    4)AK on CKD  10/8 Cr was 3.51-creatinine 3.3 today.  Sodium 150 today.  Per nephrology.      Electronically signed by ASHER Hurd, 10/09/21, 7:11 AM EDT.      I, Mark Lynch MD, personally performed the services face to face as described and documented by the above named individual. I have made any necessary edits and it is both accurate and complete 10/9/2021  12:06 EDT

## 2021-10-09 NOTE — PROGRESS NOTES
"Patient Name:  Jeremías Soto  YOB: 1955  [id number]    Surgery Progress Note    Date of visit: 10/9/2021    Subjective   Subjective: intermittent requiring oxygen supplementation. Pain controlled with PO meds.          Objective     Objective    /80 (BP Location: Right arm, Patient Position: Lying)   Pulse 77   Temp 98.1 °F (36.7 °C) (Oral)   Resp 16   Ht 170.2 cm (67\")   Wt 98.9 kg (218 lb)   SpO2 93%   BMI 34.14 kg/m²     Intake/Output Summary (Last 24 hours) at 10/9/2021 0903  Last data filed at 10/9/2021 0700  Gross per 24 hour   Intake 1928.8 ml   Output 710 ml   Net 1218.8 ml       CV:  Rhythm regular and rate regular  L:  Clear to auscultation bilaterally, symmetric chest rise, no wheezing/rales  Abd:  Bowel sounds positive, soft, appropriately tender, LAWRENCE x2 RUQ and Rt lateral quadrant with SS drainage; lateral wall dressing with moderate SS strike through; no signs/symptoms of overt infection  Ext:  No cyanosis, clubbing, edema    Labs that are back at this time have been reviewed.        Assessment/Plan     Assessment/ Plan:  Patient is POD1 from laparoscopic wash-out. Continue with JPs to bulb suction, strip drains every 6 hrs. Continue with dressing changes BID. Encourage pulmonary toilet and increase ambulation. Cultures pending.     Problem List Items Addressed This Visit        Genitourinary and Reproductive     RUDY (acute kidney injury) (HCC)       Other    Post-operative infection    Abdominal wall cellulitis    Relevant Orders    Body Fluid Culture - Body Fluid, Abdomen, Right (Completed)      Other Visit Diagnoses     Cellulitis of abdominal wall    -  Primary    Leukocytosis, unspecified type        Acute cystitis without hematuria        Acute congestive heart failure, unspecified heart failure type (HCC)               Active Hospital Problems    Diagnosis  POA   • **Sepsis (HCC) [A41.9]  Yes   • Post-operative infection [T81.40XA]  Yes   • Abdominal wall " cellulitis [L03.311]  Yes   • Postoperative intra-abdominal abscess [T81.43XA]  Yes   • Elevated troponin [R77.8]  Yes   • CAD (coronary artery disease) [I25.10]  Yes   • CKD (chronic kidney disease), stage III (HCC) [N18.30]  Yes   • Acute kidney injury superimposed on chronic kidney disease (HCC) [N17.9, N18.9]  Yes   • Elevated LFTs [R79.89]  Yes   • Combined systolic and diastolic cardiac dysfunction (EF < 50% 2018) [I51.89]  Yes   • History of sarcoidosis (occular) [Z86.2]  Yes   • Moderate obstructive sleep apnea [G47.33]  Yes   • Diabetes mellitus (HCC) [E11.9]  Yes   • Hypertension [I10]  Yes      Resolved Hospital Problems   No resolved problems to display.            Erasmo Valle MD  10/9/2021  09:03 EDT      I have personally performed the key portions of the history and physical exam, and I have edited the above note to better reflect my complete history and physical exam.    His wife reports some confusion this morning, but he did get up and ambulate to the bathroom.  He is tolerating diet and having bowel function.    CV:  Rhythm  regular and rate regular   L:  Mild rhonchi to auscultation bilaterally   Abd:  Bowel sounds positive, soft, less tender. Dressings c/d/i. LAWRENCE drains more serous now  Ext:  No cyanosis, clubbing, edema    Labs: Labs reviewed HGB 8.1      Assessment/ Plan:    Stable after washout. He seems to be making some slow improvement after operation, and I suspect that his decreased HGB is likely more reflective of his past loss than ongoing bleeding. Discussed with Dr. Butler, and she will transfuse 1 unit. Continue to work with PT, and hold anticoagulation for now (may be able to resume tomorrow if stable). I will also advance his diet.    Problem List Items Addressed This Visit        Genitourinary and Reproductive     RUDY (acute kidney injury) (HCC)       Other    Post-operative infection    Abdominal wall cellulitis    Relevant Orders    Body Fluid Culture - Body Fluid, Abdomen,  Right (Completed)      Other Visit Diagnoses     Cellulitis of abdominal wall    -  Primary    Leukocytosis, unspecified type        Acute cystitis without hematuria        Acute congestive heart failure, unspecified heart failure type (HCC)                  David Callahan MD  11:20 EDT

## 2021-10-09 NOTE — PROGRESS NOTES
[facility] Medicine Services  PROGRESS NOTE    Patient Name: Jeremías Soto  : 1955  MRN: [id number]    Date of Admission: 10/4/2021  Primary Care Physician: Vincent Crawford MD    Subjective   Subjective     CC:  Abdominal pain    HPI:  Patient is doing okay this morning.  Has no complaints.  Denies any abdominal pain.  Tolerating some p.o.  Denies any shortness of breath.  Wife at bedside.  Requiring oxygen.    ROS:  General: denies fevers or chills  CV: denies chest pain  Resp: denies shortness of breath  Abd: denies abd pain, nausea        Objective   Objective     Vital Signs:   Temp:  [97.5 °F (36.4 °C)-98.7 °F (37.1 °C)] 97.9 °F (36.6 °C)  Heart Rate:  [75-93] 75  Resp:  [16-18] 18  BP: (119-142)/(68-88) 139/74  Flow (L/min):  [2-10] 2     Physical Exam:  Constitutional: Awake, alert, no acute distress but appears ill  HENT: perioral scabbed lesions  Respiratory: Clear to auscultation bilaterally, respiratory effort normal   Cardiovascular: RRR, no murmurs, rubs, or gallops, palpable radial pulse  Gastrointestinal: Mildly tender with palpation, LAWRENCE drain in place  Musculoskeletal: No bilateral ankle edema  Psychiatric: Appropriate affect, cooperative  Neurologic: Speech clear, moving all extremity spontaneously    Results Reviewed:  LAB RESULTS:      Lab 10/08/21  0617 10/07/21  0826 10/07/21  0433 10/06/21  0742 10/06/21  0735 10/05/21  0757 10/04/21  2338 10/04/21  2250 10/04/21  2041 10/04/21  2041   WBC 14.57*  --  17.25* 10.81* 10.93* 13.69*  --   --    < > 24.54*   HEMOGLOBIN 9.3*  --  11.3* 10.3* 10.1* 10.6*  --   --    < > 12.0*   HEMATOCRIT 30.0*  --  36.5* 31.8* 31.5* 35.9*  --   --    < > 38.3   PLATELETS 142  --  161 168 170 130*  --   --    < > 196   NEUTROS ABS 12.45*  --  14.91*  --  9.51* 11.89*  --   --   --  22.51*   IMMATURE GRANS (ABS) 0.22*  --  0.31*  --  0.06* 0.10*  --   --   --  0.39*   LYMPHS ABS 0.54*  --  0.61*  --  0.55* 0.74  --   --   --   0.50*   MONOS ABS 1.28*  --  1.35*  --  0.72 0.86  --   --   --  1.08*   EOS ABS 0.04  --  0.02  --  0.07 0.05  --   --   --  0.00   MCV 94.9  --  96.1 92.4 92.4 100.6*  --   --    < > 92.7   CRP  --   --   --   --   --   --   --   --   --  46.11*   PROCALCITONIN  --   --   --   --   --   --   --  8.70*  --   --    LACTATE  --   --   --   --   --   --  2.9*  --   --  4.1*   PROTIME 23.5* 25.1*  --  23.7*  --  34.2*  --   --   --  27.7*   APTT 46.6*  --   --  61.2*  --  61.5*  --   --   --  52.0*    < > = values in this interval not displayed.         Lab 10/08/21  0617 10/07/21  1217 10/07/21  0433 10/06/21  0742 10/05/21  0841 10/05/21  0757 10/04/21  2041 10/04/21  2041   SODIUM 137  --  142 138 138  --   --  139   POTASSIUM 4.1 5.3* 5.3* 3.6 4.7  --    < > 4.7   CHLORIDE 102  --  107 106 106  --   --  102   CO2 18.0*  --  20.0* 19.0* 20.0*  --   --  18.0*   ANION GAP 17.0*  --  15.0 13.0 12.0  --   --  19.0*   BUN 49*  --  37* 37* 32*  --   --  27*   CREATININE 3.51*  --  2.91* 2.76* 3.09*  --   --  2.84*   GLUCOSE 134*  --  164* 72 99  --   --  82   CALCIUM 8.0*  --  8.6 8.1* 8.1*  --   --  8.6   MAGNESIUM  --   --   --   --   --   --   --  2.0   PHOSPHORUS  --   --   --   --   --   --   --  3.6   HEMOGLOBIN A1C  --   --   --   --   --  7.30*  --   --     < > = values in this interval not displayed.         Lab 10/08/21  0617 10/07/21  0433 10/06/21  0742 10/05/21  0841 10/04/21  2041   TOTAL PROTEIN 5.8* 5.9* 5.4* 5.4* 6.1   ALBUMIN 2.30* 2.50* 2.20* 2.20* 2.70*   GLOBULIN 3.5 3.4 3.2 3.2 3.4   ALT (SGPT) 56* 73* 77* 94* 95*   AST (SGOT) 59* 80* 103* 137* 90*   BILIRUBIN 1.4* 1.5* 1.1 1.4* 2.0*   ALK PHOS 182* 228* 165* 176* 206*   LIPASE  --   --   --   --  8*         Lab 10/08/21  0617 10/07/21  0826 10/06/21  0742 10/05/21  0841 10/05/21  0757 10/04/21  2250 10/04/21  2041   PROBNP  --   --   --   --   --   --  8,353.0*   TROPONIN T  --   --   --  0.048*  --  0.054* 0.071*   PROTIME 23.5* 25.1* 23.7*  --   34.2*  --  27.7*   INR 2.20* 2.39* 2.22*  --  3.59*  --  2.72*             Lab 10/05/21  0757   ABO TYPING A   RH TYPING Positive   ANTIBODY SCREEN Negative         Brief Urine Lab Results  (Last result in the past 365 days)      Color   Clarity   Blood   Leuk Est   Nitrite   Protein   CREAT   Urine HCG        10/08/21 1823             186.2               Microbiology Results Abnormal     Procedure Component Value - Date/Time    Blood Culture - Blood, Arm, Right [[id number]] Collected: 10/04/21 2143    Lab Status: Preliminary result Specimen: Blood from Arm, Right Updated: 10/08/21 2215     Blood Culture No growth at 4 days    Blood Culture - Blood, Arm, Left [[id number]] Collected: 10/04/21 2120    Lab Status: Preliminary result Specimen: Blood from Arm, Left Updated: 10/08/21 2215     Blood Culture No growth at 4 days    Eosinophil Smear - Urine, Urine, Clean Catch [[id number]]  (Normal) Collected: 10/08/21 1823    Lab Status: Final result Specimen: Urine, Clean Catch Updated: 10/08/21 2003     Eosinophil Smear 0 % EOS/100 Cells     Narrative:      No eosinophil seen    Body Fluid Culture - Body Fluid, Abdomen, Right [[id number]] Collected: 10/08/21 0912    Lab Status: Preliminary result Specimen: Body Fluid from Abdomen, Right Updated: 10/08/21 1103     Gram Stain Few (2+) WBCs seen      No organisms seen    Blood Culture - Blood, Arm, Left [[id number]] Collected: 10/05/21 0734    Lab Status: Preliminary result Specimen: Blood from Arm, Left Updated: 10/08/21 0901     Blood Culture No growth at 3 days    COVID PRE-OP / PRE-PROCEDURE SCREENING ORDER (NO ISOLATION) - Swab, Nasopharynx [[id number]]  (Normal) Collected: 10/05/21 0015    Lab Status: Final result Specimen: Swab from Nasopharynx Updated: 10/05/21 0043    Narrative:      The following orders were created for panel order COVID PRE-OP / PRE-PROCEDURE SCREENING ORDER (NO ISOLATION) - Swab, Nasopharynx.  Procedure                               Abnormality          Status                     ---------                               -----------         ------                     COVID-19, ABBOTT IN-HOUS...[[id number]]  Normal              Final result                 Please view results for these tests on the individual orders.    COVID-19, ABBOTT IN-HOUSE,NASAL Swab (NO TRANSPORT MEDIA) 2 HR TAT - Swab, Nasopharynx [[id number]]  (Normal) Collected: 10/05/21 0015    Lab Status: Final result Specimen: Swab from Nasopharynx Updated: 10/05/21 0043     COVID19 Presumptive Negative    Narrative:      Fact sheet for providers: https://www.fda.gov/media/137439/download     Fact sheet for patients: https://www.fda.gov/media/871687/download    Test performed by PCR.  If inconsistent with clinical signs and symptoms patient should be tested with different authorized molecular test.          CT Abdomen Pelvis Without Contrast    Result Date: 10/7/2021  EXAMINATION: CT ABDOMEN PELVIS WO CONTRAST-  INDICATION: Eval source of fever, recent CCY, right flank cellulitis; L03.311-Cellulitis of abdominal wall; T81.41XA-Infection following a procedure, superficial incisional surgical site, initial encounter; D72.829-Elevated white blood cell count, unspecified; N30.00-Acute cystitis without hematuria; N17.9-Acute kidney failure, unspecified; I50.9-Heart failure, unspecified  TECHNIQUE: Axial noncontrast CT of the abdomen and pelvis with multiplanar reconstruction  The radiation dose reduction device was turned on for each scan per the ALARA (As Low as Reasonably Achievable) protocol.  COMPARISON: 10/4/2021  FINDINGS: Evaluation of the lung bases demonstrates increased small pleural effusion on the right with adjacent volume loss versus consolidation. Several small pulmonary nodules are unchanged. The body wall soft tissues demonstrate some mild anasarca and superficial subcutaneous edema, greatest along the right flank. There is a new heterogeneous large perihepatic fluid collection concerning for  acute hematoma, primarily hypodense with intermixed areas of increased density in addition to presumed hyperdense acute blood products more anteriorly. A component of superinfection is not entirely excluded. This hematoma measures approximately 16 x 6 cm. There is an unchanged ill-defined area of focal fluid along the gallbladder fossa adjacent to the cholecystectomy clips, again measuring around 2 cm, indeterminate for seroma versus abscess. The spleen, bilateral adrenal glands are homogeneous without evidence of suspicious focal lesion. There is unchanged marked atrophy of the pancreatic parenchyma. Redemonstrated atrophy of the left kidney with calcifications and cysts. Unremarkable right kidney. Small and large bowel loops are nondilated. The appendix is normal. There is no suspicious focal bowel wall thickening. There is a small amount of free fluid in the dependent pelvis, new from comparison. Numerous bladder diverticula are unchanged, with some questionable bladder wall thickening and areas of gas formation within the bladder diverticula.      Impression: New 16 x 6 cm hepatic fluid collection with intermixed heterogeneous and hyperdense contents compatible with large hematoma. This appears new from the October 4, 2021 comparison scan. There is a small amount of free fluid in the pelvis which is also new from comparison. The remainder of the hematoma and blood products appear primarily intraparenchymal and subcapsular.  New small right pleural effusion with some adjacent consolidation or atelectasis, with component of pneumonia not entirely excluded.  Redemonstrated bladder wall thickening with several diverticula including small locules of air within an anterior diverticula. Finding most likely relates to any recent catheterization, however correlate with any clinical concern for cystitis. No additional acute findings in the abdomen and pelvis.  Finding of large hepatic hematoma discussed with Dr. Callahan by  Corey Castillo via phone at 12:47 PM 10/7/2021   This report was finalized on 10/7/2021 12:48 PM by Corey Castillo.      XR Chest 1 View    Result Date: 10/9/2021  CHEST X-RAY, 10/9/2021  HISTORY:  66-year-old male hospital inpatient with shortness of air, cellulitis.  TECHNIQUE: AP portable chest x-ray. COMPARISON: *  Chest x-ray, 10/4/2021. FINDINGS: Moderate cardiomegaly. Mildly increased and indistinct interstitial markings are new since the previous study. Correlate for mild vascular congestion or volume overload. Median sternotomy. Low lung volumes. Right basilar atelectasis with elevation right hemidiaphragm. Central venous catheter in good position.     Impression: 1. Potential mild vascular congestion or volume overload, new since 10/4/2021, correlate clinically. 2. Stable cardiomegaly. Median sternotomy. 3. Elevation right hemidiaphragm with scarring or atelectasis right lung base. Signer Name: Navneet Beaver MD  Signed: 10/9/2021 6:44 AM  Workstation Name: [email or web address]-  [facility]      Results for orders placed during the hospital encounter of 09/23/21    Adult Transthoracic Echo Complete W/ Cont if Necessary Per Protocol    Interpretation Summary  · Left ventricular ejection fraction appears to be 26 - 30%  · There is global hypokinesis. The posterior wall appears more focally hypokinetic  · Moderately reduced right ventricular systolic function noted.  · Mild dilation of the aortic root is present measuring 4.1 cm.  · The left atrial cavity is moderately dilated  · No significant valvular stenosis or regurgitation.      I have reviewed the medications:  Scheduled Meds:clindamycin, 900 mg, Intravenous, Q8H  DAPTOmycin, 6 mg/kg (Adjusted), Intravenous, Q48H  docosanol, 1 application, Topical, 5x Daily  docusate sodium, 100 mg, Oral, BID  dorzolamide-timolol, , Both Eyes, BID  insulin lispro, 0-7 Units, Subcutaneous, 4x Daily With Meals & Nightly  lactobacillus  acidophilus, 1 capsule, Oral, Daily  meropenem, 1 g, Intravenous, Q12H  metoprolol tartrate, 12.5 mg, Oral, BID  micafungin (MYCAMINE) IV, 100 mg, Intravenous, Q24H  pantoprazole, 40 mg, Oral, Daily  predniSONE, 10 mg, Oral, Daily  rOPINIRole, 0.25 mg, Oral, Nightly  sodium chloride, 10 mL, Intravenous, Q12H  tamsulosin, 0.4 mg, Oral, Daily      Continuous Infusions:   PRN Meds:.dextrose  •  dextrose  •  glucagon (human recombinant)  •  Morphine  •  oxyCODONE-acetaminophen  •  oxyCODONE-acetaminophen  •  Sodium Chloride (PF)  •  sodium chloride    Assessment/Plan   Assessment & Plan     Active Hospital Problems    Diagnosis  POA   • **Sepsis (HCC) [A41.9]  Yes   • Post-operative infection [T81.40XA]  Yes   • Abdominal wall cellulitis [L03.311]  Yes   • Postoperative intra-abdominal abscess [T81.43XA]  Yes   • Elevated troponin [R77.8]  Yes   • CAD (coronary artery disease) [I25.10]  Yes   • CKD (chronic kidney disease), stage III (HCC) [N18.30]  Yes   • Acute kidney injury superimposed on chronic kidney disease (HCC) [N17.9, N18.9]  Yes   • Elevated LFTs [R79.89]  Yes   • Combined systolic and diastolic cardiac dysfunction (EF < 50% 2018) [I51.89]  Yes   • History of sarcoidosis (occular) [Z86.2]  Yes   • Moderate obstructive sleep apnea [G47.33]  Yes   • Diabetes mellitus (HCC) [E11.9]  Yes   • Hypertension [I10]  Yes      Resolved Hospital Problems   No resolved problems to display.        Brief Hospital Course to date:  Jeremías Soto is a 66 y.o. male with systolic CHF (46-50%), chronic multiorgan disease, admitted 9/23-9/29 w/ enterococcal/clostridial bacteremia, cholangitis, questionable pyelonephritis-s/p lap farooq 9/27 and ERCP 9/28 discharged home on IV Zosyn with a planned completion date 10/11 who returned with several days worsening erythema of the abdomen and imaging in the ED concerning for fluid collection in the gallbladder fossa now on empiric antibiotics with some initial improvement then new  development of abdominal pain     Sepsis, abdominal source  Abnormal abdominal fluid collection  Recent cholecystitis  Recent enterococcal/clostridial bacteremia  -2.5 cm x 8.5 cm fluid collection on initial CT, unclear etiology given recent surgery  -S/p lap farooq 9/27, ERCP 9/28  -Repeat CT abdomen pelvis on 10/7/2021 with new 16 x 6 hepatic fluid collection and hematoma.  -Underwent laparoscopic washout and drain placement of intra-abdominal hematoma and incision and drainage of right flank cellulitis on 10/8/2021 per Dr. Callahan.  -Continue probiotic  -Dr. Callahan following.  Discussed with him today.  Recommends transfusion.  - fluid culture pending.   -ID following.  Continue meropenem, Clinda, Dapto, micafungin, Valtrex  -CBC and CMP in am.     Hypoxia  Shortness of breath  -Chest x-ray on 10/9/2021 shows vascular congestion new since 10/4/2021.  -Diuresis per nephrology given his worsening creatinine.    Postoperative anemia  -Hemoglobin down today  -Discussed with Dr. Callahan, will give blood transfusion today.  -IV iron per nephrology    Hypernatremia  -D5W per nephrology    LV thrombus  -Per cardiology note 10/6, [facility] from the [facility] demonstrate prescription of Eliquis 7/19 for a large LV thrombus found on MRI  -Cardiology following.  Check in echocardiogram for LVEF with Lumason to evaluate for LV thrombus on Monday.  -Anticipate need for resumption of anticoagulation once possible/severe bleeding rule out  -Eliquis on hold since 10/5    Elevated INR, improving  - Trended down but still remains elevated     RUDY on CKD 3, worsening  Hyperkalemia, resolved  Metabolic acidosis  -Baseline Cr 1.9-2.3; EGFR 30  -Nephrology consulted.  Likely secondary to ATN.  -Continue IV fluids per nephrology.    Perioral lesions  -Empiric Valtrex, renally dosed     DM type 2, A1c 7.3%, without long-term use of insulin  -Accu-Cheks with SSI     CAD with prior CABG/stent  Chronic systolic/diastolic CHF  Mild troponin  elevation  Hx multiple TIA  -Echo 9/24/2021 EF 26-30%  -Continue Lopressor  -Chronically on Eliquis, currently on hold  -Lisinopril on hold for RUDY     Elevated LFTs  -Recent admission cholangitis, holding statin as noted  - CMP in am     Hypertension  Hyperlipidemia  -Statin on hold while on daptomycin     Ocular sarcoidosis  Bilateral lung nodules  -Repeat dedicated chest CT 6 months outpatient regarding nodules  -Continue chronic oral prednisone 10 mg     Obesity, BMI 35.16 kg/M2  EVA  Hx COVID-19 December 2020    DVT prophylaxis:  Mechanical DVT prophylaxis orders are present.     AM-PAC 6 Clicks Score (PT): 13 (10/08/21 1446)    Disposition: Multiple acute ongoing issues, anticipate he will be here through the weekend at minimum, anticipate need for rehab at discharge    CODE STATUS:   Code Status and Medical Interventions:   Ordered at: 10/05/21 0134     Code Status:    CPR     Medical Interventions (Level of Support Prior to Arrest):    Full     I have prepared this progress note with copied portions of the prior day's progress note of my own authorship to preserve accuracy and maintain consistency of documentation. I have reviewed these portions and edited them for correctness. I verify that the above documentation accurately and truly represents the evaluation and management performed on today's date.     Gayle Butler MD  10/09/21

## 2021-10-09 NOTE — PROGRESS NOTES
"   LOS: 4 days    Patient Care Team:  Vincent Crawford MD as PCP - General (Family Medicine)  Manjula Owens MD as Consulting Physician (Cardiology)    Reason For Visit:  F/U RUDY ON CKD  Subjective           Review of Systems:    Pulm: No soa   CV:  No CP. GI: NO N/V/D.      Objective     clindamycin, 900 mg, Intravenous, Q8H  DAPTOmycin, 6 mg/kg (Adjusted), Intravenous, Q48H  docosanol, 1 application, Topical, 5x Daily  docusate sodium, 100 mg, Oral, BID  dorzolamide-timolol, , Both Eyes, BID  ferric gluconate, 125 mg, Intravenous, Daily Before Supper  insulin lispro, 0-7 Units, Subcutaneous, 4x Daily With Meals & Nightly  lactobacillus acidophilus, 1 capsule, Oral, Daily  meropenem, 1 g, Intravenous, Q12H  metoprolol tartrate, 12.5 mg, Oral, BID  micafungin (MYCAMINE) IV, 100 mg, Intravenous, Q24H  pantoprazole, 40 mg, Oral, Daily  predniSONE, 10 mg, Oral, Daily  rOPINIRole, 0.25 mg, Oral, Nightly  sodium chloride, 10 mL, Intravenous, Q12H  tamsulosin, 0.4 mg, Oral, Daily      dextrose, 75 mL/hr          Vital Signs:  Blood pressure 148/80, pulse 77, temperature 98.1 °F (36.7 °C), temperature source Oral, resp. rate 16, height 170.2 cm (67\"), weight 98.9 kg (218 lb), SpO2 93 %.    Flowsheet Rows      First Filed Value   Admission Height 177.8 cm (70\") Documented at 10/04/2021 1842   Admission Weight 95.3 kg (210 lb) Documented at 10/04/2021 1842          10/08 0701 - 10/09 0700  In: 1928.8 [I.V.:1278.8]  Out: 710 [Urine:150; Drains:510]    Physical Exam:    General Appearance: NAD, alert and cooperative, Ox3  Eyes: PER, conjunctivae and sclerae normal, no icterus  Lungs: respirations regular and unlabored, no crepitus, clear to auscultation  Heart/CV: regular rhythm & normal rate, no murmur, no gallop, no rub and 1+ edema  Abdomen: not distended, soft, non-tender, no masses,  bowel sounds present  Skin: No rash, Warm and dry    Radiology:            Labs: FE SAT 6 %. FERRITIN 718. CPK 41.  Results from " last 7 days   Lab Units 10/09/21  0448 10/08/21  0617 10/07/21  0433   WBC 10*3/mm3 14.44* 14.57* 17.25*   HEMOGLOBIN g/dL 8.1* 9.3* 11.3*   HEMATOCRIT % 25.6* 30.0* 36.5*   PLATELETS 10*3/mm3 207 142 161     Results from last 7 days   Lab Units 10/09/21  0448 10/08/21  0617 10/07/21  1217 10/07/21  0433 10/06/21  0742 10/06/21  0742 10/05/21  0841 10/04/21  2041   SODIUM mmol/L 150* 137  --  142  --  138   < > 139   POTASSIUM mmol/L 4.8 4.1 5.3* 5.3*   < > 3.6   < > 4.7   CHLORIDE mmol/L 114* 102  --  107  --  106   < > 102   CO2 mmol/L 19.0* 18.0*  --  20.0*  --  19.0*   < > 18.0*   BUN mg/dL 61* 49*  --  37*  --  37*   < > 27*   CREATININE mg/dL 3.31* 3.51*  --  2.91*  --  2.76*   < > 2.84*   CALCIUM mg/dL 8.0* 8.0*  --  8.6  --  8.1*   < > 8.6   PHOSPHORUS mg/dL 5.5*  --   --   --   --   --   --  3.6   MAGNESIUM mg/dL  --   --   --   --   --   --   --  2.0   ALBUMIN g/dL 2.50* 2.30*  --  2.50*  --  2.20*   < > 2.70*    < > = values in this interval not displayed.     Results from last 7 days   Lab Units 10/09/21  0448   GLUCOSE mg/dL 139*       Results from last 7 days   Lab Units 10/09/21  0448   ALK PHOS U/L 154*   BILIRUBIN mg/dL 1.0   ALT (SGPT) U/L 45*   AST (SGOT) U/L 40           Results from last 7 days   Lab Units 10/04/21  2151   COLOR UA  Yellow   CLARITY UA  Slightly Cloudy*   PH, URINE  6.0   SPECIFIC GRAVITY, URINE  1.020   GLUCOSE UA  Negative   KETONES UA  Trace*   BILIRUBIN UA  Negative   PROTEIN UA  30 mg/dL (1+)*   BLOOD UA  Trace*   LEUKOCYTES UA  Small (1+)*   NITRITE UA  Negative       Estimated Creatinine Clearance: 23.9 mL/min (A) (by C-G formula based on SCr of 3.31 mg/dL (H)).      Assessment       Sepsis (HCC)    Diabetes mellitus (HCC)    Hypertension    Moderate obstructive sleep apnea    History of sarcoidosis (occular)    Elevated LFTs    Combined systolic and diastolic cardiac dysfunction (EF < 50% 2018)    Post-operative infection    Abdominal wall cellulitis    Postoperative  intra-abdominal abscess    Elevated troponin    CAD (coronary artery disease)    CKD (chronic kidney disease), stage III (HCC)    Acute kidney injury superimposed on chronic kidney disease (HCC)            Impression: RUDY ON CKD. GFR STARTING TO IMPROVE. HYPERNATREMIA. ANEMIA WITH LOW FE STORES.            Recommendations: D5W IVF. IV FE.      Corey Garcia MD  10/09/21  11:45 EDT

## 2021-10-10 NOTE — CONSULTS
[facility] Gastroenterology Consult    Referring Provider: Vincent Crawford MD    PCP: Vincent Crawford MD    Reason for Consultation: bile leak     Chief complaint: abdominal pain     History of present illness:    Jeremías Soto is a 66 y.o. male who is admitted with a several day onset of worsening abdominal pain.  Patient is well known to this service having seen him during his recent admission from 9/23/21 through 09/29/2021 for acute cholecystitis and cholangitis for which he underwent a successful cholecystectomy and ERCP for retained stone.  Wife notes that he was discharged and felt well for a few days post-operative but on day three at home began to develop worsening abdominal pain; did develop some nausea, vomiting and warmth/edema on abdomen as well. Upon return to hospital, imaging concerning for a hepatic fluid collection; patient underwent successful laparoscopic washout and drain placement.  Drains with bilious output and concerning for bile leak.  GI consulted for consideration of ERCP with wall stent.  Past medical, surgical, social, and family histories are reviewed for accuracy.  No documented alleviating or exacerbating factors.  Does not endorse pain at time of exam.    Allergies:  Patient has no known allergies.    Scheduled Meds:  amiodarone, 200 mg, Oral, TID  DAPTOmycin, 6 mg/kg (Adjusted), Intravenous, Q48H  docosanol, 1 application, Topical, 5x Daily  docusate sodium, 100 mg, Oral, BID  dorzolamide-timolol, , Both Eyes, BID  ferric gluconate, 125 mg, Intravenous, Daily Before Supper  insulin lispro, 0-7 Units, Subcutaneous, 4x Daily With Meals & Nightly  lactobacillus acidophilus, 1 capsule, Oral, Daily  meropenem, 1 g, Intravenous, Q12H  metoprolol tartrate, 12.5 mg, Oral, BID  micafungin (MYCAMINE) IV, 100 mg, Intravenous, Q24H  pantoprazole, 40 mg, Oral, Daily  predniSONE, 10 mg, Oral, Daily  rOPINIRole, 0.25 mg, Oral, Nightly  sodium chloride, 10 mL, Intravenous, Q12H  tamsulosin,  0.4 mg, Oral, Daily         Infusions:       PRN Meds:  dextrose  •  dextrose  •  glucagon (human recombinant)  •  Morphine  •  oxyCODONE-acetaminophen  •  oxyCODONE-acetaminophen  •  Sodium Chloride (PF)  •  sodium chloride    Home Meds:  Medications Prior to Admission   Medication Sig Dispense Refill Last Dose   • apixaban (ELIQUIS) 5 MG tablet tablet Take 5 mg by mouth 2 (Two) Times a Day.   10/4/2021 at Unknown time   • atorvastatin (LIPITOR) 40 MG tablet Take 40 mg by mouth Daily.  1 10/4/2021 at Unknown time   • esomeprazole (nexIUM) 20 MG capsule Take 20 mg by mouth Every Morning Before Breakfast.   10/4/2021 at Unknown time   • glimepiride (AMARYL) 2 MG tablet Take 2 mg by mouth Every Morning Before Breakfast.   10/4/2021 at Unknown time   • metoprolol tartrate (LOPRESSOR) 25 MG tablet Take 25 mg by mouth 2 (Two) Times a Day.  2 10/4/2021 at Unknown time   • predniSONE (DELTASONE) 10 MG tablet Take 10 mg by mouth Daily.   10/4/2021 at Unknown time   • tamsulosin (FLOMAX) 0.4 MG capsule 24 hr capsule Take 0.4 mg by mouth Daily.   10/4/2021 at Unknown time   • dicyclomine (BENTYL) 20 MG tablet Take 20 mg by mouth.   Unknown at Unknown time   • dorzolamide-timolol (COSOPT) 22.3-6.8 MG/ML ophthalmic solution Administer 1 drop to both eyes 2 (Two) Times a Day.   Unknown at Unknown time   • lactobacillus acidophilus (RISAQUAD) capsule capsule Take 1 capsule by mouth Daily for 20 days. 20 capsule 0 Unknown at Unknown time   • lisinopril (PRINIVIL,ZESTRIL) 10 MG tablet Take 10 mg by mouth Daily.   Unknown at Unknown time   • metFORMIN (GLUCOPHAGE) 500 MG tablet Take 500 mg by mouth 2 (Two) Times a Day.  1 Unknown at Unknown time   • [] oxyCODONE-acetaminophen (PERCOCET) 5-325 MG per tablet Take 1 tablet by mouth Every 4 (Four) Hours As Needed for Moderate Pain  for up to 5 days. 10 tablet 0    • raNITIdine (ZANTAC) 150 MG tablet Zantac Maximum Strength 150 mg tablet   Daily   Unknown at Unknown time   •  rOPINIRole (REQUIP) 0.25 MG tablet Take 1 tablet by mouth Every Night. Take 1 hour before bedtime. 30 tablet 3 Unknown at Unknown time       ROS: Review of Systems   Constitutional: Positive for activity change, appetite change and fatigue. Negative for chills, diaphoresis, fever and unexpected weight change.   HENT: Negative for sore throat, trouble swallowing and voice change.    Eyes: Negative.    Respiratory: Negative for apnea, cough, choking, chest tightness, shortness of breath, wheezing and stridor.    Cardiovascular: Positive for leg swelling. Negative for chest pain and palpitations.   Gastrointestinal: Positive for abdominal pain and nausea. Negative for abdominal distention, anal bleeding, blood in stool, constipation, diarrhea, rectal pain and vomiting.   Endocrine: Negative.    Genitourinary: Negative.    Musculoskeletal: Negative.    Skin: Negative.    Allergic/Immunologic: Negative.    Neurological: Negative.    Hematological: Negative for adenopathy. Does not bruise/bleed easily.   Psychiatric/Behavioral: Negative.    All other systems reviewed and are negative.      PAST MED HX:  Past Medical History:   Diagnosis Date   • Cancer (HCC)     skin   • Coronary artery disease    • Diabetes mellitus (HCC)    • Elevated cholesterol    • Heart attack (HCC) 2003   • Hypertension    • Sarcoid    • Sleep apnea     no cpap   • SOB (shortness of breath)    • Stroke (HCC)     Pt. states that he has had 2 mini strokes. No residual        PAST SURG HX:  Past Surgical History:   Procedure Laterality Date   • CARDIAC CATHETERIZATION  2003    cardiac stent x2 after s/p CABG   • CATARACT EXTRACTION, BILATERAL     • CHOLECYSTECTOMY WITH INTRAOPERATIVE CHOLANGIOGRAM N/A 9/27/2021    Procedure: CHOLECYSTECTOMY LAPAROSCOPIC INTRAOPERATIVE CHOLANGIOGRAM;  Surgeon: Gaudencio Wolfe MD;  Location[facility];  Service: General;  Laterality: N/A;   • COLONOSCOPY     • CORONARY ARTERY BYPASS GRAFT  2002   • ENDOSCOPY     •  "ERCP N/A 9/28/2021    Procedure: ENDOSCOPIC RETROGRADE CHOLANGIOPANCREATOGRAPHY;  Surgeon: Brunner, Mark I, MD;  Location: [facility] ENDOSCOPY;  Service: Gastroenterology;  Laterality: N/A;  sphincterotomy made, balloon sweep of bile duct done with 9-12 balloon.    • EXTRACORPOREAL SHOCKWAVE LITHOTRIPSY (ESWL), STENT INSERTION/REMOVAL Bilateral 8/21/2020    Procedure: EXTRACORPOREAL SHOCKWAVE LITHOTRIPSY BILATERAL WITH STENT PLACEMENT LEFT;  Surgeon: Ethan Barnes Jr., MD;  Location: [facility] OR;  Service: Urology;  Laterality: Bilateral;   • SKIN CANCER EXCISION         FAM HX:  Family History   Problem Relation Age of Onset   • COPD Mother    • Diabetes Father    • Heart disease Father    • Hypertension Father    • Diabetes Sister    • Obesity Sister    • Cancer Brother        SOC HX:  Social History     Socioeconomic History   • Marital status:    Tobacco Use   • Smoking status: Never Smoker   • Smokeless tobacco: Never Used   Substance and Sexual Activity   • Alcohol use: No   • Drug use: No   • Sexual activity: Defer     Comment:        PHYSICAL EXAM  /86 (BP Location: Left arm, Patient Position: Lying)   Pulse 69   Temp 97.5 °F (36.4 °C) (Oral)   Resp 16   Ht 170.2 cm (67\")   Wt 99.1 kg (218 lb 8 oz)   SpO2 97%   BMI 34.22 kg/m²   Wt Readings from Last 3 Encounters:   10/10/21 99.1 kg (218 lb 8 oz)   09/24/21 97.1 kg (214 lb)   08/28/20 99.8 kg (220 lb)   ,body mass index is 34.22 kg/m².  Physical Exam  Vitals and nursing note reviewed.   Constitutional:       General: He is not in acute distress.     Appearance: Normal appearance. He is obese. He is ill-appearing. He is not toxic-appearing.      Comments: Pleasantly conversant, no acute distress   HENT:      Head: Normocephalic and atraumatic.      Mouth/Throat:      Mouth: Mucous membranes are moist.      Pharynx: Oropharynx is clear. No oropharyngeal exudate.   Eyes:      General: No scleral icterus.     Extraocular Movements: " Extraocular movements intact.      Conjunctiva/sclera: Conjunctivae normal.      Pupils: Pupils are equal, round, and reactive to light.   Cardiovascular:      Rate and Rhythm: Normal rate and regular rhythm.      Pulses: Normal pulses.      Heart sounds: Normal heart sounds.   Pulmonary:      Effort: Pulmonary effort is normal. No respiratory distress.      Breath sounds: Normal breath sounds.   Abdominal:      General: Abdomen is flat. Bowel sounds are normal. There is no distension.      Palpations: Abdomen is soft. There is no mass.      Tenderness: There is abdominal tenderness. There is no guarding or rebound.      Hernia: No hernia is present.      Comments: Laparoscopic incisions appreciated on abdomen.  2 LAWRENCE drains appreciated from right upper quadrant with scant bilious output appreciated.   Genitourinary:     Comments: Defer  Musculoskeletal:         General: Normal range of motion.      Cervical back: Normal range of motion and neck supple. No rigidity or tenderness.      Comments: Significant 4+ pitting edema to bilateral lower extremities.   Lymphadenopathy:      Cervical: No cervical adenopathy.   Skin:     General: Skin is warm and dry.      Capillary Refill: Capillary refill takes less than 2 seconds.      Coloration: Skin is not jaundiced.   Neurological:      General: No focal deficit present.      Mental Status: He is alert and oriented to person, place, and time.   Psychiatric:         Mood and Affect: Mood normal.         Behavior: Behavior normal.         Thought Content: Thought content normal.         Judgment: Judgment normal.         Results Review:   I reviewed the patient's new clinical results.  I reviewed the patient's new imaging results and agree with the interpretation.  I personally viewed and interpreted the patient's EKG/Telemetry data    Lab Results   Component Value Date    WBC 15.18 (H) 10/10/2021    HGB 9.3 (L) 10/10/2021    HGB 9.0 (L) 10/09/2021    HGB 8.1 (L) 10/09/2021     HCT 28.3 (L) 10/10/2021    MCV 87.1 10/10/2021     10/10/2021       Lab Results   Component Value Date    INR 1.23 (H) 10/10/2021    INR 1.43 (H) 10/09/2021    INR 2.20 (H) 10/08/2021       Lab Results   Component Value Date    GLUCOSE 124 (H) 10/10/2021    BUN 67 (H) 10/10/2021    CREATININE 2.90 (H) 10/10/2021    EGFRIFNONA 22 (L) 10/10/2021    BCR 23.1 10/10/2021     10/10/2021    K 4.1 10/10/2021    CO2 20.0 (L) 10/10/2021    CALCIUM 8.1 (L) 10/10/2021    ALBUMIN 2.50 (L) 10/10/2021    ALKPHOS 149 (H) 10/10/2021    BILITOT 1.4 (H) 10/10/2021    ALT 39 10/10/2021    AST 31 10/10/2021       Adult Transthoracic Echo Complete W/ Cont if Necessary Per Protocol    Result Date: 9/24/2021  · Left ventricular ejection fraction appears to be 26 - 30% · There is global hypokinesis. The posterior wall appears more focally hypokinetic · Moderately reduced right ventricular systolic function noted. · Mild dilation of the aortic root is present measuring 4.1 cm. · The left atrial cavity is moderately dilated · No significant valvular stenosis or regurgitation.      CT Abdomen Pelvis Without Contrast    Result Date: 10/7/2021  EXAMINATION: CT ABDOMEN PELVIS WO CONTRAST-  INDICATION: Eval source of fever, recent CCY, right flank cellulitis; L03.311-Cellulitis of abdominal wall; T81.41XA-Infection following a procedure, superficial incisional surgical site, initial encounter; D72.829-Elevated white blood cell count, unspecified; N30.00-Acute cystitis without hematuria; N17.9-Acute kidney failure, unspecified; I50.9-Heart failure, unspecified  TECHNIQUE: Axial noncontrast CT of the abdomen and pelvis with multiplanar reconstruction  The radiation dose reduction device was turned on for each scan per the ALARA (As Low as Reasonably Achievable) protocol.  COMPARISON: 10/4/2021  FINDINGS: Evaluation of the lung bases demonstrates increased small pleural effusion on the right with adjacent volume loss versus  consolidation. Several small pulmonary nodules are unchanged. The body wall soft tissues demonstrate some mild anasarca and superficial subcutaneous edema, greatest along the right flank. There is a new heterogeneous large perihepatic fluid collection concerning for acute hematoma, primarily hypodense with intermixed areas of increased density in addition to presumed hyperdense acute blood products more anteriorly. A component of superinfection is not entirely excluded. This hematoma measures approximately 16 x 6 cm. There is an unchanged ill-defined area of focal fluid along the gallbladder fossa adjacent to the cholecystectomy clips, again measuring around 2 cm, indeterminate for seroma versus abscess. The spleen, bilateral adrenal glands are homogeneous without evidence of suspicious focal lesion. There is unchanged marked atrophy of the pancreatic parenchyma. Redemonstrated atrophy of the left kidney with calcifications and cysts. Unremarkable right kidney. Small and large bowel loops are nondilated. The appendix is normal. There is no suspicious focal bowel wall thickening. There is a small amount of free fluid in the dependent pelvis, new from comparison. Numerous bladder diverticula are unchanged, with some questionable bladder wall thickening and areas of gas formation within the bladder diverticula.      New 16 x 6 cm hepatic fluid collection with intermixed heterogeneous and hyperdense contents compatible with large hematoma. This appears new from the October 4, 2021 comparison scan. There is a small amount of free fluid in the pelvis which is also new from comparison. The remainder of the hematoma and blood products appear primarily intraparenchymal and subcapsular.  New small right pleural effusion with some adjacent consolidation or atelectasis, with component of pneumonia not entirely excluded.  Redemonstrated bladder wall thickening with several diverticula including small locules of air within an  anterior diverticula. Finding most likely relates to any recent catheterization, however correlate with any clinical concern for cystitis. No additional acute findings in the abdomen and pelvis.  Finding of large hepatic hematoma discussed with Dr. Callahan by Corey Castillo via phone at 12:47 PM 10/7/2021   This report was finalized on 10/7/2021 12:48 PM by Corey Castillo.      CT Abdomen Pelvis Without Contrast    Result Date: 10/4/2021  CT Abdomen Pelvis WO INDICATION: Right side abdominal pain. Cellulitis. Recent cholecystectomy. TECHNIQUE: CT of the abdomen and pelvis without IV contrast. Coronal and sagittal reconstructions were obtained.  Radiation dose reduction techniques included automated exposure control or exposure modulation based on body size. Count of known CT and cardiac nuc med studies performed in previous 12 months: 2. COMPARISON: 9/23/2021 FINDINGS: 9 mm noncalcified nodules are noted in both lower lobes. These are stable from CT abdomen of 1/23/2018 and benign. There is a 9 mm nodule in the left upper lobe abutting the fissure. There is an additional nodule in the right lower lobe measuring 6 mm. The latter 2 nodules are not imaged on the older prior study and cannot be compared. There is chronic scarring in the lung bases, right greater than left. There are partially calcified lymph nodes in both rita and in the azygoesophageal recess compatible with old granulomatous disease. There is a soft tissue nodule in the left posterolateral chest wall, likely a sebaceous cyst measuring 2.7 x 1.1 cm. This is unchanged from the recent prior exam. There is extensive coronary artery disease, and there is atherosclerotic disease. No aortic aneurysm is seen. There has been interval cholecystectomy. There is a fluid collection in the gallbladder fossa that is not well characterized without contrast. It does contain a bubble of gas. It is nearly isodense to the fatty liver. It measures at least 2.5 cm in  thickness and 8.5 cm in length. This could reflect a seroma/hematoma or possibly a biloma. Abscess is also in the differential diagnosis. There is no evidence of biliary obstruction. There is marked fatty atrophy of the pancreas. There is severe atrophy of the left kidney with multiple left renal cysts. There are nonobstructing stones in both kidneys with one of the larger on the left side measuring 11 mm. No ureteral stones are seen on either side, and there is no hydronephrosis. The spleen and adrenal glands are normal. Urinary bladder has a trabeculated wall with multiple bladder wall diverticula. Patient may be status post prostatectomy or at least a TURP. Correlate with history. There is colonic diverticulosis without diverticulitis or acute colitis. The appendix is normal. No small bowel obstruction is seen. Stomach and duodenum are within normal limits. There is some fat stranding in the ventral abdominal wall to the right of midline, as well as in the right flank. This could reflect some bland edema or cellulitis. There are bilateral L5 pars defects with mild grade 1 L5-S1 spondylolisthesis.     1. Cholecystectomy with a fluid collection in the gallbladder fossa containing a bubble of air. Differential considerations include seroma/hematoma, biloma, or possibly a developing abscess. No biliary obstruction is seen. 2. Lung nodules in the visualized lung bases, not all of which have been previously evaluated. Therefore, chest CT follow-up in 6 months is recommended per Fleischner criteria. 3. No acute findings in the GI tract. There is colonic diverticulosis. 4. Left renal atrophy with bilateral nonobstructing renal stones. There are no ureteral stones, and there is no hydronephrosis. 5. Fat stranding in the ventral abdominal wall and right flank as above could reflect bland edema or cellulitis. 6. Additional findings as above. Recommend consideration for referral to [facility]. For  questions or to make appointment call [phone]. Signer Name: Caden Rodriguez MD  Signed: 10/4/2021 10:22 PM  Workstation Name: [email or web address]  [facility] Hepatobiliary Without CCK    Result Date: 10/5/2021  EXAMINATION: [place] HEPATOBILIARY WITHOUT CCK- 10/05/2021  INDICATION: Abdominal pain, upper, recurrent,?post cholecystectomy  TECHNIQUE: Nuclear medicine hepatobiliary scan with radiopharmaceutical 5.5 mCi mebrofenin and subsequent imaging of the liver and biliary tract as well as small bowel.  COMPARISON: NONE  FINDINGS: Prompt hepatocyte uptake with biliary activity within 5 minutes and small bowel activity noted within 15 minutes without obstructive pathology. No evidence for abnormal tracer activity to suggest leak.      No evidence for abnormal tracer activity to suggest leak with normal activity in the biliary system and small bowel.  D: 10/05/2021 E: 10/05/2021    This report was finalized on 10/5/2021 8:59 PM by Dr. Jevon Hartmann.      FL ERCP pancreatic and biliary ducts    Result Date: 9/28/2021  EXAMINATION: [place] ERCP PANCREATIC AND BILIARY DUCTS-09/28/2021:  INDICATION: ENDOSCOPIC RETROGRADE CHOLANGIOPANCREATOGRAPHY; A41.9-Sepsis, unspecified organism; R65.20-Severe sepsis without septic shock; K85.90-Acute pancreatitis without necrosis or infection, unspecified; U07.1-COVID-19; K83.09-Other cholangitis; K80.50-Calculus of bile duct without cholangitis or cholecystitis without obstruction; R93.2-Abnormal findings on diagnostic imaging of liver and biliary tract.  COMPARISON: NONE.  FINDINGS: Intraoperative fluoroscopy for improved localization and treatment planning with total fluoroscopic time usage 2 minutes and 9 seconds, and 3 images saved during ERCP.      Intraoperative fluoroscopy was utilized during ERCP. Please see procedure report for full details.  D:  09/28/2021 E:  09/28/2021    This report was finalized on 9/28/2021 5:44 PM by Dr. Jevon Hartmann.      XR Chest 1  View    Result Date: 10/9/2021  CHEST X-RAY, 10/9/2021  HISTORY:  66-year-old male hospital inpatient with shortness of air, cellulitis.  TECHNIQUE: AP portable chest x-ray. COMPARISON: *  Chest x-ray, 10/4/2021. FINDINGS: Moderate cardiomegaly. Mildly increased and indistinct interstitial markings are new since the previous study. Correlate for mild vascular congestion or volume overload. Median sternotomy. Low lung volumes. Right basilar atelectasis with elevation right hemidiaphragm. Central venous catheter in good position.     1. Potential mild vascular congestion or volume overload, new since 10/4/2021, correlate clinically. 2. Stable cardiomegaly. Median sternotomy. 3. Elevation right hemidiaphragm with scarring or atelectasis right lung base. Signer Name: Navneet Beaver MD  Signed: 10/9/2021 6:44 AM  Workstation Name: [email or web address]  [facility]     FL Cholangiogram Operative    Result Date: 9/27/2021  EXAMINATION: [place] CHOLANGIOGRAM OPERATIVE- 09/27/2021  INDICATION: IOC; A41.9-Sepsis, unspecified organism; R65.20-Severe sepsis without septic shock; K85.90-Acute pancreatitis without necrosis or infection, unspecified; U07.1-COVID-19  COMPARISON: NONE  TECHNIQUE:  Intraoperative fluoroscopy for improved localization and treatment planning.  FINDINGS:  Total fluoroscopic time usage 13 seconds and 4 images saved during intraoperative cholangiogram.      Intraoperative fluoroscopy utilized during intraoperative cholangiogram.  D:  09/27/2021 E:  09/27/2021   This report was finalized on 9/27/2021 4:34 PM by Dr. Jevon Hartmann.      COVID19   Date Value Ref Range Status   10/05/2021 Presumptive Negative Presumptive Negative - Ref. Range Final     Blood Culture   Date Value Ref Range Status   10/05/2021 No growth at 5 days  Final     Urine Culture   Date Value Ref Range Status   10/04/2021 Yeast isolated (A)  Final     ASSESSMENTS/PLANS  Patient Active Problem List   Diagnosis   • Snoring    • Diabetes mellitus (HCC)   • Hypertension   • Overweight   • Moderate obstructive sleep apnea   • Abdominal pain   • Nephrolithiasis   • History of sarcoidosis (occular)   • Recurrent COVID-19 virus infection (12/2020, 9/2021)   • RUDY (acute kidney injury) (HCC)   • Elevated LFTs   • Combined systolic and diastolic cardiac dysfunction (EF < 50% 2018)   • Abnormal CT scan, liver   • Post-operative infection   • Abdominal wall cellulitis   • Sepsis (HCC)   • Postoperative intra-abdominal abscess   • Elevated troponin   • CAD (coronary artery disease)   • CKD (chronic kidney disease), stage III (HCC)   • Acute kidney injury superimposed on chronic kidney disease (HCC)     1.  Postcholecystectomy bile leak  2.  Recent cholecystitis  3.  Recent laparoscopic cholecystectomy and ERCP procedure.    Jeremías Soto is a 66 y.o. male admitted to hospital with worsening right upper quadrant abdominal pain.  During most recent admission, 09/23/2021-09/29/2021, patient underwent successful laparoscopic cholecystectomy and ERCP for choledocholithiasis and acute cholecystitis.  After discharge, patient felt well for a few days and then progressively felt worse; imaging obtained and consistent with fluid collection in her upper quadrant- underwent Laparoscopic washout with drain placement on 10/08/2021 per Dr. Callahan- fluid in LAWRENCE drains bilious in nature and consistent with postoperative bile leak.    >>> Recommend ERCP with biliary Wallstent deployment tomorrow.  >>> N.p.o. at midnight  >>> Obtain informed consent for ERCP with Wallstent deployment  >>> Risk and benefits explained including incomplete cannulation, 10%; Perforation, 1/ 500; Bleeding, 1/500:  Infection; Pancreatitis, 5 to 10% mild to moderate; and 5% severe with 1/1000 risk of death.  >>> Order placed for intraprocedural fluoroscopy  >>> Patient is on adequate microbial coverage for planned procedure    I discussed the patient's findings and my  recommendations with patient, family and nursing staff.    Balbir Garcia, APRN  10/10/21  16:43 EDT

## 2021-10-10 NOTE — PROGRESS NOTES
Jeremías Soto  1955  [id number]    Date of Consult: 10/5/21  Requesting Provider: Vincent Crawford MD  Evaluating Physician: Diaz Moreno MD    Chief Complaint: abdominal pain and redness    Reason for Consultation: sepsis secondary to an abdominal wall abscess/cellulitis    History of present illness:    Jeremías Soto is a 66 y.o.  Yr old male with history of coronary artery disease, CK D stage III B, diabetes mellitus type 2, hyperlipidemia, hypertension, sarcoidosis, and history of CVA who I recently evaluated during an admission to [facility] last month for enterococcus bacteremia and Clostridium perfringens bacteremia due to a biliary source/gallbladder source (cholangitis and cholecystitis).  The patient underwent laparoscopic cholecystectomy on 9/27 and ERCP with sphincterotomy stone extraction on 9/28.  He additionally had aerococcus urinae from urine culture. Due to his baseline renal dysfunction he did have a Groshong catheter placed for IV antibiotics.  He clinically improved with improvement in his LFTs and sepsis. He was discharged on 9/29 with plans to continue Zosyn at least until 10/11/21. I have not yet seen him in outpatient follow-up but he was scheduled to follow-up with me tomorrow on 10/6.      He was doing well apparently until 2-3 days ago when he noticed redness developing over his right abdominal wall.  The erythema has gotten progressively worse with some swelling and warmth and tenderness.  As far as I know he did not contact our clinic regarding this erythema. He was not having any fevers or chills at home.  He presented to the ER early this morning for evaluation of this redness. Since arrival, he was noted to have a maximum temperature of 99.1°F. labs showed a CRP of 46.11, troponin elevated to 0.07, proBNP of 8353, INR of 2.72, White blood cell count of 24.54 (up from 13.29 just before discharge), hemoglobin of 12, lactic acid of 4.1-->2.9, creatinine of  2.84 (up from 2.15 just prior to discharge). COVID-19 PCR was negative. White blood cell count is now improved to 13.69 on antibiotics.creatinine is worse at 3.09 today.  LFTs remain elevated with an ALT of 94, an AST of 137, an alkaline phosphatase of 176, and a total bilirubin of 1.4. CT abdomen and pelvis was done yesterday and showed cholecystectomy with a fluid collection in the gallbladder fossa containing a bubble of air with differential considerations including a seroma/hematoma, biloma, or possible developing abscess.  No biliary obstruction was seen.  There was also fat stranding in the ventral abdominal wall and right flank that could reflect bland edema or cellulitis. The patient's antibiotics have been changed to vancomycin, cefepime, and Flagyl. Repeat blood cultures and urine culture are in progress. Surgery consult has been placed. Plan is for IR drainage procedure but having to wait on this due to his anticoagulation that he has been on. He underwent NM Hepatobiliary scan today with no evidence for abnormal tracer activity to suggest leak.  The infectious disease team has been consulted for recommendations.    Subjective:    10/6/21: the patient states that his abdominal pain has been improving although he was having some increased right-sided abdominal pain after I went in the room today as he has just eaten something. No fevers. Tolerating the abx well without ADRs.    10/7/21: The patient is doing worse today.  Still having some right-sided abdominal pain.  He was taken for repeat CT abdomen and pelvis today as his leukocytosis worsened And he did have a new 16 x 6 cm hepatic fluid collection with intermittent heterogenous and hyperdense contents compatible with a large hematoma.  He did have a new small right pleural effusion with some adjacent consolidation or atelectasis. He denies any nausea.  No fevers    10/8/21: The patient was very somnolent today although he just recently got back from  the OR.  Dr. Callahan took the patient to the OR today and did not see any acute bleeding but did see some old blood/hematoma.  No necrotic fascia noted.  2 LAWRENCE drains left in place, one of the liver and another in the gallbladder fossa.  No fevers overnight.  Leukocytosis is slightly better.  His wife is at bedside and states that he is just been very somnolent after his procedure and during week but no other acute issues.    10/9/21: the patient states that his abdominal pain is improving. No fevers. Tolerating abx well without ADRs. Did have worsening hypoxia overnight to 10L NC but now improved back to 2L NC. Diuresis per nephrology and cardiology.      Past Medical History:   Diagnosis Date   • Cancer (HCC)     skin   • Coronary artery disease    • Diabetes mellitus (HCC)    • Elevated cholesterol    • Heart attack (HCC) 2003   • Hypertension    • Sarcoid    • Sleep apnea     no cpap   • SOB (shortness of breath)    • Stroke (HCC)     Pt. states that he has had 2 mini strokes. No residual        Past Surgical History:   Procedure Laterality Date   • CARDIAC CATHETERIZATION  2003    cardiac stent x2 after s/p CABG   • CATARACT EXTRACTION, BILATERAL     • CHOLECYSTECTOMY WITH INTRAOPERATIVE CHOLANGIOGRAM N/A 9/27/2021    Procedure: CHOLECYSTECTOMY LAPAROSCOPIC INTRAOPERATIVE CHOLANGIOGRAM;  Surgeon: Gaudencio Wolfe MD;  Location: [facility] OR;  Service: General;  Laterality: N/A;   • COLONOSCOPY     • CORONARY ARTERY BYPASS GRAFT  2002   • ENDOSCOPY     • ERCP N/A 9/28/2021    Procedure: ENDOSCOPIC RETROGRADE CHOLANGIOPANCREATOGRAPHY;  Surgeon: Brunner, Mark I, MD;  Location: [facility] ENDOSCOPY;  Service: Gastroenterology;  Laterality: N/A;  sphincterotomy made, balloon sweep of bile duct done with 9-12 balloon.    • EXTRACORPOREAL SHOCKWAVE LITHOTRIPSY (ESWL), STENT INSERTION/REMOVAL Bilateral 8/21/2020    Procedure: EXTRACORPOREAL SHOCKWAVE LITHOTRIPSY BILATERAL WITH STENT PLACEMENT LEFT;  Surgeon: Ethan Barnes  Irving Juarez MD;  Location: [facility];  Service: Urology;  Laterality: Bilateral;   • SKIN CANCER EXCISION       Social history:  The patient is .  He lives in [facility].  No history of tobacco, alcohol, or illicit drug use.    Family history:  family history includes COPD in his mother; Cancer in his brother; Diabetes in his father and sister; Heart disease in his father; Hypertension in his father; Obesity in his sister.    No Known Allergies    Medication:  Current Facility-Administered Medications   Medication Dose Route Frequency Provider Last Rate Last Admin   • clindamycin (CLEOCIN) 900 mg in dextrose 5% 50 mL IVPB (premix)  900 mg Intravenous Q8H David Callahan MD 50 mL/hr at 10/09/21 2132 900 mg at 10/09/21 2132   • DAPTOmycin (CUBICIN) 500 mg/50 mL 0.9% sodium chloride IVPB  6 mg/kg (Adjusted) Intravenous Q48H David Callahan MD   500 mg at 10/09/21 2126   • dextrose (D50W) 25 g/ 50mL Intravenous Solution 25 g  25 g Intravenous Q15 Min PRN David Callahan MD       • dextrose (D5W) 5 % infusion  75 mL/hr Intravenous Continuous Corey Garcia MD 75 mL/hr at 10/09/21 2004 75 mL/hr at 10/09/21 2004   • dextrose (GLUTOSE) oral gel 15 g  15 g Oral Q15 Min PRN David Callahan MD       • docosanol (ABREVA) 10 % cream 1 application  1 application Topical 5x Daily David Callahan MD   1 application at 10/09/21 2126   • docusate sodium (COLACE) capsule 100 mg  100 mg Oral BID David Callahan MD   100 mg at 10/09/21 2058   • dorzolamide (TRUSOPT) 2 % 1 drop, timolol (TIMOPTIC) 0.5 % 1 drop for Cosopt 22.3-6.8 mg/mL   Both Eyes BID David Callahan MD   Given at 10/09/21 2126   • ferric gluconate (FERRLECIT)125 MG in sodium chloride 0.9 % 100 mL IVPB  125 mg Intravenous Daily Before Supper Corey Garcia MD   125 mg at 10/09/21 2048   • glucagon (human recombinant) (GLUCAGEN DIAGNOSTIC) injection 1 mg  1 mg Subcutaneous Q15 Min David Paul MD       • insulin  lispro (humaLOG) injection 0-7 Units  0-7 Units Subcutaneous 4x Daily With Meals & Nightly David Callahan MD   2 Units at 10/09/21 2057   • lactobacillus acidophilus (RISAQUAD) capsule 1 capsule  1 capsule Oral Daily David Callahan MD   1 capsule at 10/09/21 0839   • meropenem (MERREM) 1 g/100 mL 0.9% NS VTB (mbp)  1 g Intravenous Q12H David Callahan MD   1 g at 10/09/21 1650   • metoprolol tartrate (LOPRESSOR) half tablet 12.5 mg  12.5 mg Oral BID David Callahan MD   12.5 mg at 10/09/21 2058   • micafungin 100 mg/100 mL 0.9% NS IVPB (mbp)  100 mg Intravenous Q24H David Callahan MD   100 mg at 10/08/21 1728   • morphine injection 2 mg  2 mg Intravenous Q3H PRN David Callahan MD   2 mg at 10/09/21 1757   • oxyCODONE-acetaminophen (PERCOCET) 5-325 MG per tablet 2 tablet  2 tablet Oral Q4H PRN David Callahan MD       • oxyCODONE-acetaminophen (PERCOCET) 7.5-325 MG per tablet 1 tablet  1 tablet Oral Q4H PRN David Callahan MD   1 tablet at 10/06/21 2053   • pantoprazole (PROTONIX) EC tablet 40 mg  40 mg Oral Daily David Callahan MD   40 mg at 10/09/21 0840   • predniSONE (DELTASONE) tablet 10 mg  10 mg Oral Daily David Callahan MD   10 mg at 10/09/21 0839   • rOPINIRole (REQUIP) tablet 0.25 mg  0.25 mg Oral Nightly David Callahan MD   0.25 mg at 10/09/21 2058   • Sodium Chloride (PF) 0.9 % 10 mL  10 mL Intravenous PRN David Callahan MD       • sodium chloride 0.9 % flush 10 mL  10 mL Intravenous Q12H David Callahan MD   10 mL at 10/07/21 2111   • sodium chloride 0.9 % flush 10 mL  10 mL Intravenous PRN David Callahan MD       • tamsulosin (FLOMAX) 24 hr capsule 0.4 mg  0.4 mg Oral Daily David Callahan MD   0.4 mg at 10/09/21 0839         Antibiotics:  Anti-Infectives (From admission, onward)    Ordered     Dose/Rate Route Frequency Start Stop    10/09/21 2014  clindamycin (CLEOCIN) 900 mg in dextrose 5% 50 mL IVPB (premix)        Ordering Provider: [place]  "David COY MD    900 mg  50 mL/hr over 60 Minutes Intravenous Every 8 Hours 10/09/21 2200 10/13/21 2159    10/07/21 0852  meropenem (MERREM) 1 g/100 mL 0.9% NS VTB (mbp)        Ordering Provider: David Callahan MD    1 g  over 3 Hours Intravenous Every 12 Hours 10/07/21 1600 10/14/21 1559    10/07/21 0852  meropenem (MERREM) 1 g/100 mL 0.9% NS VTB (mbp)        Ordering Provider: Thierry Chinchilla, PharmD    1 g  over 30 Minutes Intravenous Once 10/07/21 0945 10/07/21 1338    10/06/21 1506  valACYclovir (VALTREX) tablet 500 mg        Ordering Provider: Don Draper DO    500 mg Oral Every 12 Hours Scheduled 10/06/21 2100 10/07/21 0854    10/05/21 1632  DAPTOmycin (CUBICIN) 500 mg/50 mL 0.9% sodium chloride IVPB        Ordering Provider: David Callahan MD    6 mg/kg × 80.5 kg (Adjusted)  over 30 Minutes Intravenous Every 48 Hours 10/05/21 2100 10/13/21 2059    10/05/21 1632  micafungin 100 mg/100 mL 0.9% NS IVPB (mbp)        Ordering Provider: David Callahan MD    100 mg  over 60 Minutes Intravenous Every 24 Hours 10/05/21 1800 10/11/21 2059    10/04/21 2101  vancomycin 1750 mg/500 mL 0.9% NS IVPB ([facility])        Ordering Provider: Toño Lainez MD    20 mg/kg × 81.9 kg (Adjusted)  250 mL/hr over 120 Minutes Intravenous Once 10/04/21 2103 10/05/21 0132    10/04/21 2101  cefepime (MAXIPIME) 2 g/100 mL 0.9% NS (mbp)        Ordering Provider: Toño Lainez MD    2 g  200 mL/hr over 30 Minutes Intravenous Once 10/04/21 2103 10/04/21 2250            Review of Systems    As above    Physical Exam:   Vital Signs   /86   Pulse 64   Temp 97.9 °F (36.6 °C) (Oral)   Resp 19   Ht 170.2 cm (67\")   Wt 98.9 kg (218 lb)   SpO2 97%   BMI 34.14 kg/m²     GENERAL: Lying in bed. More alert. Still frail but appears slightly less ill  HENT: Normocephalic, atraumatic.   Has some external oral blisters noted-unchanged  Eyes: No conjunctival injection. No icterus.  NECK: Supple without nuchal " rigidity. No mass.  HEART: RRR; No murmur, rubs, gallops.   LUNGS: few rales posteriorly. Non-labored breathing on 2L NC  ABDOMEN: abdominal binder in place status post surgery.  2 LAWRENCE drains noted over the right side of his abdomen with some serosanguineous drainage.  EXT:  No cyanosis, clubbing or edema. No cord.  :  Without Meyers catheter.  SKIN: no new rashes noted.  NEURO: alert and oriented.  PSYCHIATRIC: unable to assess  Groshong cath site is without erythema or drainage    Laboratory Data    Results from last 7 days   Lab Units 10/09/21  2142 10/09/21  0448 10/08/21  0617 10/07/21  0433 10/07/21  0433   WBC 10*3/mm3  --  14.44* 14.57*  --  17.25*   HEMOGLOBIN g/dL 9.0* 8.1* 9.3*   < > 11.3*   HEMATOCRIT % 27.0* 25.6* 30.0*   < > 36.5*   PLATELETS 10*3/mm3  --  207 142  --  161    < > = values in this interval not displayed.     Results from last 7 days   Lab Units 10/09/21  0448   SODIUM mmol/L 150*   POTASSIUM mmol/L 4.8   CHLORIDE mmol/L 114*   CO2 mmol/L 19.0*   BUN mg/dL 61*   CREATININE mg/dL 3.31*   GLUCOSE mg/dL 139*   CALCIUM mg/dL 8.0*     Results from last 7 days   Lab Units 10/09/21  0448   ALK PHOS U/L 154*   BILIRUBIN mg/dL 1.0   ALT (SGPT) U/L 45*   AST (SGOT) U/L 40         Results from last 7 days   Lab Units 10/04/21  2041   CRP mg/dL 46.11*       Estimated Creatinine Clearance: 23.9 mL/min (A) (by C-G formula based on SCr of 3.31 mg/dL (H)).       Microbiology:  Blood culture ×2: In process    Urine culture: In process    Radiology:  CT Abdomen Pelvis Without Contrast    Result Date: 10/7/2021  New 16 x 6 cm hepatic fluid collection with intermixed heterogeneous and hyperdense contents compatible with large hematoma. This appears new from the October 4, 2021 comparison scan. There is a small amount of free fluid in the pelvis which is also new from comparison. The remainder of the hematoma and blood products appear primarily intraparenchymal and subcapsular.  New small right pleural  effusion with some adjacent consolidation or atelectasis, with component of pneumonia not entirely excluded.  Redemonstrated bladder wall thickening with several diverticula including small locules of air within an anterior diverticula. Finding most likely relates to any recent catheterization, however correlate with any clinical concern for cystitis. No additional acute findings in the abdomen and pelvis.  Finding of large hepatic hematoma discussed with Dr. Callahan by Corey Castillo via phone at 12:47 PM 10/7/2021   This report was finalized on 10/7/2021 12:48 PM by Corey Castillo.      CT Abdomen Pelvis Without Contrast    Result Date: 10/4/2021  1. Cholecystectomy with a fluid collection in the gallbladder fossa containing a bubble of air. Differential considerations include seroma/hematoma, biloma, or possibly a developing abscess. No biliary obstruction is seen. 2. Lung nodules in the visualized lung bases, not all of which have been previously evaluated. Therefore, chest CT follow-up in 6 months is recommended per Fleischner criteria. 3. No acute findings in the GI tract. There is colonic diverticulosis. 4. Left renal atrophy with bilateral nonobstructing renal stones. There are no ureteral stones, and there is no hydronephrosis. 5. Fat stranding in the ventral abdominal wall and right flank as above could reflect bland edema or cellulitis. 6. Additional findings as above. Recommend consideration for referral to [facility]. For questions or to make appointment call [phone]. Signer Name: Caden Rodriguez MD  Signed: 10/4/2021 10:22 PM  Workstation Name: [id number] Hepatobiliary Without CCK    Result Date: 10/5/2021  No evidence for abnormal tracer activity to suggest leak with normal activity in the biliary system and small bowel.  D: 10/05/2021 E: 10/05/2021    This report was finalized on 10/5/2021 8:59 PM by Dr. Jevon Hartmann.      XR  Chest 1 View    Result Date: 10/9/2021  1. Potential mild vascular congestion or volume overload, new since 10/4/2021, correlate clinically. 2. Stable cardiomegaly. Median sternotomy. 3. Elevation right hemidiaphragm with scarring or atelectasis right lung base. Signer Name: Navneet Beaver MD  Signed: 10/9/2021 6:44 AM  Workstation Name: [email or web address]  [facility]    XR Chest 1 View    Result Date: 10/4/2021  There appears to be slightly increased density at the right lung base, and it is uncertain if this may represent developing pneumonia. Signer Name: Laquita Kelly MD  Signed: 10/4/2021 9:46 PM  Workstation Name: [id number]  [facility]     I independently read his chest xray from 10/9/21- mild vascular congestion    Impression:     Problems:  -Sepsis with leukocytosis with neutrophilia, lactic acidosis, elevated pro calcitonin level- due to intra-abdominal source/abdominal wall cellulitis. Leukocytosis is now improving some  -Intra-abdominal hematoma, now status post washout procedure on 10/8- yeast from culture so far  -Abdominal wall cellulitis- Appears slightly improved  -Possible developing intra-abdominal abscess vs. Seroma. No signs of biliary leak on NM hepatobiliary scan  -Recent cholangitis and cholecystitis status post recent cholecystectomy on 9/27 and ERCP on 9/28  -Recent enterococcus bacteremia  -Recent Clostridium perfringens bacteremia  -Recent pancreatitis  -Macrocytic anemia  -Elevated troponin level  -Elevated LFTs  -RUDY on CKD stage IIIB- due to sepsis vs dehydration  -Coronary artery disease status post CABG/stents  -systolic heart failure, LVEF was 26-30% on echo in 09/2021  -history of cardiac thrombus, on anticoagulation with eliquis  -Diabetes mellitus type 2 with hyperglycemia  -Essential hypertension  -Hyperlipidemia  -Sarcoidosis/chronic prednisone  -prolonged QTc interval  -elevated CPK level  -external oral blisters, possible herpes  labialis    PLAN: Thank you for asking us to see Jeremías Soto, I recommend the following:     -continue to follow cbc with diff, cmp, crp, and lactic acid. Rechecked CPK and was ok  -follow pending blood cultures-No growth to date  -continue meropenem and clindamycin for now.  May wean off clindamycin soon if he continues to improve.  -continue empiric Daptomycin  -continue empiric Micafungin (avoiding fluconazole in the setting of his prolonged QTc interval)  -status post washout procedure today by Dr. Callahan.  2 LAWRENCE drains left in place.  Surgical cultures are pending- yeast so far  -weekly Groshong cath dressing changes  -valtrex started by primary team for possible HSV    I discussed in length with the patient's wife at bedside today      Complex MDM. The patient has significant risk for further morbidity and mortality in the setting of his multiple complex medical issues.     Diaz Moreno MD  10/9/2021

## 2021-10-10 NOTE — PROGRESS NOTES
"   LOS: 5 days    Patient Care Team:  Vincent Crawford MD as PCP - General (Family Medicine)  Manjula Owens MD as Consulting Physician (Cardiology)    Reason For Visit:  F/U RUDY ON CKD  Subjective           Review of Systems:    Pulm: No soa   CV:  No CP      Objective     amiodarone, 200 mg, Oral, TID  clindamycin, 900 mg, Intravenous, Q8H  DAPTOmycin, 6 mg/kg (Adjusted), Intravenous, Q48H  docosanol, 1 application, Topical, 5x Daily  docusate sodium, 100 mg, Oral, BID  dorzolamide-timolol, , Both Eyes, BID  ferric gluconate, 125 mg, Intravenous, Daily Before Supper  insulin lispro, 0-7 Units, Subcutaneous, 4x Daily With Meals & Nightly  lactobacillus acidophilus, 1 capsule, Oral, Daily  meropenem, 1 g, Intravenous, Q12H  metoprolol tartrate, 12.5 mg, Oral, BID  micafungin (MYCAMINE) IV, 100 mg, Intravenous, Q24H  pantoprazole, 40 mg, Oral, Daily  predniSONE, 10 mg, Oral, Daily  rOPINIRole, 0.25 mg, Oral, Nightly  sodium chloride, 10 mL, Intravenous, Q12H  tamsulosin, 0.4 mg, Oral, Daily             Vital Signs:  Blood pressure 127/74, pulse 86, temperature 98.2 °F (36.8 °C), temperature source Oral, resp. rate 18, height 170.2 cm (67\"), weight 99.1 kg (218 lb 8 oz), SpO2 97 %.    Flowsheet Rows      First Filed Value   Admission Height 177.8 cm (70\") Documented at 10/04/2021 1842   Admission Weight 95.3 kg (210 lb) Documented at 10/04/2021 1842          10/09 0701 - 10/10 0700  In: 1578.5 [I.V.:968.5]  Out: 345 [Urine:50; Drains:295]    Physical Exam:    General Appearance: NAD, alert and cooperative, Ox3  Eyes: PER, conjunctivae and sclerae normal, no icterus  Lungs: respirations regular and unlabored, no crepitus, clear to auscultation  Heart/CV: regular rhythm & normal rate, no murmur, no gallop, no rub and 1-2+ edema  Abdomen: not distended, soft, non-tender, no masses,  bowel sounds present  Skin: No rash, Warm and dry    Radiology:            Labs:  Results from last 7 days   Lab Units " 10/10/21  0149 10/09/21  2142 10/09/21  0448 10/08/21  0617 10/08/21  0617   WBC 10*3/mm3 15.18*  --  14.44*  --  14.57*   HEMOGLOBIN g/dL 9.3* 9.0* 8.1*   < > 9.3*   HEMATOCRIT % 28.3* 27.0* 25.6*   < > 30.0*   PLATELETS 10*3/mm3 240  --  207  --  142    < > = values in this interval not displayed.     Results from last 7 days   Lab Units 10/10/21  0149 10/09/21  0448 10/08/21  0617 10/07/21  1217 10/07/21  0433 10/07/21  0433 10/05/21  0841 10/04/21  2041   SODIUM mmol/L 137 150* 137  --   --  142   < > 139   POTASSIUM mmol/L 4.1 4.8 4.1 5.3*   < > 5.3*   < > 4.7   CHLORIDE mmol/L 105 114* 102  --   --  107   < > 102   CO2 mmol/L 20.0* 19.0* 18.0*  --   --  20.0*   < > 18.0*   BUN mg/dL 67* 61* 49*  --   --  37*   < > 27*   CREATININE mg/dL 2.90* 3.31* 3.51*  --   --  2.91*   < > 2.84*   CALCIUM mg/dL 8.1* 8.0* 8.0*  --   --  8.6   < > 8.6   PHOSPHORUS mg/dL 4.9* 5.5*  --   --   --   --   --  3.6   MAGNESIUM mg/dL 2.4  --   --   --   --   --   --  2.0   ALBUMIN g/dL 2.50* 2.50* 2.30*  --   --  2.50*   < > 2.70*    < > = values in this interval not displayed.     Results from last 7 days   Lab Units 10/10/21  0149   GLUCOSE mg/dL 124*       Results from last 7 days   Lab Units 10/10/21  0149   ALK PHOS U/L 149*   BILIRUBIN mg/dL 1.4*   ALT (SGPT) U/L 39   AST (SGOT) U/L 31           Results from last 7 days   Lab Units 10/04/21  2151   COLOR UA  Yellow   CLARITY UA  Slightly Cloudy*   PH, URINE  6.0   SPECIFIC GRAVITY, URINE  1.020   GLUCOSE UA  Negative   KETONES UA  Trace*   BILIRUBIN UA  Negative   PROTEIN UA  30 mg/dL (1+)*   BLOOD UA  Trace*   LEUKOCYTES UA  Small (1+)*   NITRITE UA  Negative       Estimated Creatinine Clearance: 27.3 mL/min (A) (by C-G formula based on SCr of 2.9 mg/dL (H)).      Assessment       Sepsis (HCC)    Diabetes mellitus (HCC)    Hypertension    Moderate obstructive sleep apnea    History of sarcoidosis (occular)    Elevated LFTs    Combined systolic and diastolic cardiac dysfunction  (EF < 50% 2018)    Post-operative infection    Abdominal wall cellulitis    Postoperative intra-abdominal abscess    Elevated troponin    CAD (coronary artery disease)    CKD (chronic kidney disease), stage III (HCC)    Acute kidney injury superimposed on chronic kidney disease (HCC)            Impression: NONOLIGURIC RUDY ON CKD. GFR IMPROVING. HYPERNATREMIA RESOLVED. ANEMIA ON IV FE.            Recommendations: LAB AM.      Corey Garcia MD  10/10/21  11:30 EDT

## 2021-10-10 NOTE — PROGRESS NOTES
"Patient Name:  Jeremías Soto  YOB: 1955  [id number]    Surgery Progress Note    Date of visit: 10/10/2021    Subjective   Subjective: NAEON, VSS, afebrile. Requiring 2L NC. BMs x3. JPs with 295cc SS.          Objective     Objective    /88 (BP Location: Left arm, Patient Position: Lying)   Pulse 64   Temp 98.2 °F (36.8 °C) (Oral)   Resp 16   Ht 170.2 cm (67\")   Wt 99.1 kg (218 lb 8 oz)   SpO2 97%   BMI 34.22 kg/m²     Intake/Output Summary (Last 24 hours) at 10/10/2021 0715  Last data filed at 10/10/2021 0714  Gross per 24 hour   Intake 1628.5 ml   Output 345 ml   Net 1283.5 ml       CV:  Rhythm  regular and rate regular   L:  Clear to auscultation bilaterally   Abd:  Bowel sounds positive, soft, obese, lap incision dressings c/d/i, mild strike through. JPs x2 with SS, RUQ 260cc and Rt Lateral drain with 35cc. Right lateral wall dressing with moderate strike through.  Ext:  No cyanosis, clubbing, edema    Labs that are back at this time have been reviewed.        Assessment/Plan     Assessment/ Plan:  Patient is POD2 from laparoscopic wash-out. Intra-op Cx prelim with + yeast, awaiting speciation. Continue with JPs to bulb suction, strip drains every 6 hrs. Continue with dressing changes BID. Encouraged pulmonary toilet.     Problem List Items Addressed This Visit        Genitourinary and Reproductive     RUDY (acute kidney injury) (HCC)       Other    Post-operative infection    Abdominal wall cellulitis    Relevant Orders    Body Fluid Culture - Body Fluid, Abdomen, Right (Completed)      Other Visit Diagnoses     Cellulitis of abdominal wall    -  Primary    Leukocytosis, unspecified type        Acute cystitis without hematuria        Acute congestive heart failure, unspecified heart failure type (HCC)               Active Hospital Problems    Diagnosis  POA   • **Sepsis (HCC) [A41.9]  Yes   • Post-operative infection [T81.40XA]  Yes   • Abdominal wall cellulitis [L03.311]  Yes "   • Postoperative intra-abdominal abscess [T81.43XA]  Yes   • Elevated troponin [R77.8]  Yes   • CAD (coronary artery disease) [I25.10]  Yes   • CKD (chronic kidney disease), stage III (HCC) [N18.30]  Yes   • Acute kidney injury superimposed on chronic kidney disease (HCC) [N17.9, N18.9]  Yes   • Elevated LFTs [R79.89]  Yes   • Combined systolic and diastolic cardiac dysfunction (EF < 50% 2018) [I51.89]  Yes   • History of sarcoidosis (occular) [Z86.2]  Yes   • Moderate obstructive sleep apnea [G47.33]  Yes   • Diabetes mellitus (HCC) [E11.9]  Yes   • Hypertension [I10]  Yes      Resolved Hospital Problems   No resolved problems to display.            Erasmo Valle MD  10/10/2021  07:15 EDT      I have personally performed the key portions of the history and physical exam, and I have edited the above note to better reflect my complete history and physical exam.    Feeling better, tolerating PO, having BM's.    CV:  Rhythm  regular and rate regular   L:  Mild ronchi to auscultation bilaterally   Abd:  Bowel sounds positive , soft, less tender. Dressings c/d/i. LAWRENCE #1 is serous, butJP #2 (under the liver) is pure bile  Ext:  No cyanosis, clubbing, edema    Labs: Labs reviewed . HGB 9.3      Assessment/ Plan:    I suspect that he has a bile leak, likely from his previous CCY.  I have discussed the case with Dr. Brunner of GI, and they will plan for ERCP tomorrow. Otherwise, continue current management.    Problem List Items Addressed This Visit        Genitourinary and Reproductive     RUDY (acute kidney injury) (HCC)       Other    Post-operative infection    Abdominal wall cellulitis    Relevant Orders    Body Fluid Culture - Body Fluid, Abdomen, Right (Completed)      Other Visit Diagnoses     Cellulitis of abdominal wall    -  Primary    Leukocytosis, unspecified type        Acute cystitis without hematuria        Acute congestive heart failure, unspecified heart failure type (HCC)                  David COY  MD [place]  14:20 EDT

## 2021-10-10 NOTE — PROGRESS NOTES
Jeremías Soto  1955  [id number]    Date of Consult: 10/5/21  Requesting Provider: Vincent Crawford MD  Evaluating Physician: Diaz Moreno MD    Chief Complaint: abdominal pain and redness    Reason for Consultation: sepsis secondary to an abdominal wall abscess/cellulitis    History of present illness:    Jeremías Soto is a 66 y.o.  Yr old male with history of coronary artery disease, CK D stage III B, diabetes mellitus type 2, hyperlipidemia, hypertension, sarcoidosis, and history of CVA who I recently evaluated during an admission to [facility] last month for enterococcus bacteremia and Clostridium perfringens bacteremia due to a biliary source/gallbladder source (cholangitis and cholecystitis).  The patient underwent laparoscopic cholecystectomy on 9/27 and ERCP with sphincterotomy stone extraction on 9/28.  He additionally had aerococcus urinae from urine culture. Due to his baseline renal dysfunction he did have a Groshong catheter placed for IV antibiotics.  He clinically improved with improvement in his LFTs and sepsis. He was discharged on 9/29 with plans to continue Zosyn at least until 10/11/21. I have not yet seen him in outpatient follow-up but he was scheduled to follow-up with me tomorrow on 10/6.      He was doing well apparently until 2-3 days ago when he noticed redness developing over his right abdominal wall.  The erythema has gotten progressively worse with some swelling and warmth and tenderness.  As far as I know he did not contact our clinic regarding this erythema. He was not having any fevers or chills at home.  He presented to the ER early this morning for evaluation of this redness. Since arrival, he was noted to have a maximum temperature of 99.1°F. labs showed a CRP of 46.11, troponin elevated to 0.07, proBNP of 8353, INR of 2.72, White blood cell count of 24.54 (up from 13.29 just before discharge), hemoglobin of 12, lactic acid of 4.1-->2.9, creatinine of  2.84 (up from 2.15 just prior to discharge). COVID-19 PCR was negative. White blood cell count is now improved to 13.69 on antibiotics.creatinine is worse at 3.09 today.  LFTs remain elevated with an ALT of 94, an AST of 137, an alkaline phosphatase of 176, and a total bilirubin of 1.4. CT abdomen and pelvis was done yesterday and showed cholecystectomy with a fluid collection in the gallbladder fossa containing a bubble of air with differential considerations including a seroma/hematoma, biloma, or possible developing abscess.  No biliary obstruction was seen.  There was also fat stranding in the ventral abdominal wall and right flank that could reflect bland edema or cellulitis. The patient's antibiotics have been changed to vancomycin, cefepime, and Flagyl. Repeat blood cultures and urine culture are in progress. Surgery consult has been placed. Plan is for IR drainage procedure but having to wait on this due to his anticoagulation that he has been on. He underwent NM Hepatobiliary scan today with no evidence for abnormal tracer activity to suggest leak.  The infectious disease team has been consulted for recommendations.    Subjective:    10/6/21: the patient states that his abdominal pain has been improving although he was having some increased right-sided abdominal pain after I went in the room today as he has just eaten something. No fevers. Tolerating the abx well without ADRs.    10/7/21: The patient is doing worse today.  Still having some right-sided abdominal pain.  He was taken for repeat CT abdomen and pelvis today as his leukocytosis worsened And he did have a new 16 x 6 cm hepatic fluid collection with intermittent heterogenous and hyperdense contents compatible with a large hematoma.  He did have a new small right pleural effusion with some adjacent consolidation or atelectasis. He denies any nausea.  No fevers    10/8/21: The patient was very somnolent today although he just recently got back from  the OR.  Dr. Callahan took the patient to the OR today and did not see any acute bleeding but did see some old blood/hematoma.  No necrotic fascia noted.  2 LAWRENCE drains left in place, one of the liver and another in the gallbladder fossa.  No fevers overnight.  Leukocytosis is slightly better.  His wife is at bedside and states that he is just been very somnolent after his procedure and during week but no other acute issues.    10/9/21: the patient states that his abdominal pain is improving. No fevers. Tolerating abx well without ADRs. Did have worsening hypoxia overnight to 10L NC but now improved back to 2L NC. Diuresis per nephrology and cardiology.    10/10/21: The patient has serous drainage from his LAWRENCE drain #1 but his LAWRENCE drain #2 has her bile and Dr. Callahan suspects that the patient has a bile leak from his prior cholecystectomy procedure.  Dr. Sylvester with GI has been consulted for ERCP tomorrow. The patient is somnolent today.  His wife remains at bedside.  She states that he has had a fairly good today although he is somnolent.  No fevers.  No history available from the patient    Past Medical History:   Diagnosis Date   • Cancer (HCC)     skin   • Coronary artery disease    • Diabetes mellitus (HCC)    • Elevated cholesterol    • Heart attack (HCC) 2003   • Hypertension    • Sarcoid    • Sleep apnea     no cpap   • SOB (shortness of breath)    • Stroke (HCC)     Pt. states that he has had 2 mini strokes. No residual        Past Surgical History:   Procedure Laterality Date   • CARDIAC CATHETERIZATION  2003    cardiac stent x2 after s/p CABG   • CATARACT EXTRACTION, BILATERAL     • CHOLECYSTECTOMY WITH INTRAOPERATIVE CHOLANGIOGRAM N/A 9/27/2021    Procedure: CHOLECYSTECTOMY LAPAROSCOPIC INTRAOPERATIVE CHOLANGIOGRAM;  Surgeon: Gaduencio Wolfe MD;  Location: [facility];  Service: General;  Laterality: N/A;   • COLONOSCOPY     • CORONARY ARTERY BYPASS GRAFT  2002   • ENDOSCOPY     • ERCP N/A 9/28/2021     Procedure: ENDOSCOPIC RETROGRADE CHOLANGIOPANCREATOGRAPHY;  Surgeon: Brunner, Mark I, MD;  Location: [facility] ENDOSCOPY;  Service: Gastroenterology;  Laterality: N/A;  sphincterotomy made, balloon sweep of bile duct done with 9-12 balloon.    • EXTRACORPOREAL SHOCKWAVE LITHOTRIPSY (ESWL), STENT INSERTION/REMOVAL Bilateral 8/21/2020    Procedure: EXTRACORPOREAL SHOCKWAVE LITHOTRIPSY BILATERAL WITH STENT PLACEMENT LEFT;  Surgeon: Ethan Barnes Jr., MD;  Location: [facility] OR;  Service: Urology;  Laterality: Bilateral;   • SKIN CANCER EXCISION       Social history:  The patient is .  He lives in [facility].  No history of tobacco, alcohol, or illicit drug use.    Family history:  family history includes COPD in his mother; Cancer in his brother; Diabetes in his father and sister; Heart disease in his father; Hypertension in his father; Obesity in his sister.    No Known Allergies    Medication:  Current Facility-Administered Medications   Medication Dose Route Frequency Provider Last Rate Last Admin   • amiodarone (PACERONE) tablet 200 mg  200 mg Oral TID Mark Lynch MD       • DAPTOmycin (CUBICIN) 500 mg/50 mL 0.9% sodium chloride IVPB  6 mg/kg (Adjusted) Intravenous Q48H David Callahan MD   500 mg at 10/09/21 2126   • dextrose (D50W) 25 g/ 50mL Intravenous Solution 25 g  25 g Intravenous Q15 Min PRN David Callahan MD       • dextrose (GLUTOSE) oral gel 15 g  15 g Oral Q15 Min PRN David Callahan MD       • docosanol (ABREVA) 10 % cream 1 application  1 application Topical 5x Daily David Callahan MD   1 application at 10/10/21 0908   • docusate sodium (COLACE) capsule 100 mg  100 mg Oral BID David Callahan MD   100 mg at 10/09/21 2058   • dorzolamide (TRUSOPT) 2 % 1 drop, timolol (TIMOPTIC) 0.5 % 1 drop for Cosopt 22.3-6.8 mg/mL   Both Eyes BID David Callahan MD   Given at 10/10/21 0908   • ferric gluconate (FERRLECIT)125 MG in sodium chloride 0.9 % 100 mL IVPB  125 mg  Intravenous Daily Before Supper Corey Garcia MD   125 mg at 10/09/21 2048   • glucagon (human recombinant) (GLUCAGEN DIAGNOSTIC) injection 1 mg  1 mg Subcutaneous Q15 Min PRN David Callahan MD       • insulin lispro (humaLOG) injection 0-7 Units  0-7 Units Subcutaneous 4x Daily With Meals & Nightly David Callahan MD   2 Units at 10/09/21 2057   • lactobacillus acidophilus (RISAQUAD) capsule 1 capsule  1 capsule Oral Daily David Callahan MD   1 capsule at 10/10/21 0908   • meropenem (MERREM) 1 g/100 mL 0.9% NS VTB (mbp)  1 g Intravenous Q12H David Callahan MD   1 g at 10/10/21 0400   • metoprolol tartrate (LOPRESSOR) half tablet 12.5 mg  12.5 mg Oral BID David Callahan MD   12.5 mg at 10/10/21 0908   • micafungin 100 mg/100 mL 0.9% NS IVPB (mbp)  100 mg Intravenous Q24H Diaz Moreno MD   100 mg at 10/09/21 2307   • morphine injection 2 mg  2 mg Intravenous Q3H PRN David Callahan MD   2 mg at 10/10/21 0540   • oxyCODONE-acetaminophen (PERCOCET) 5-325 MG per tablet 2 tablet  2 tablet Oral Q4H PRN David Callahan MD       • oxyCODONE-acetaminophen (PERCOCET) 7.5-325 MG per tablet 1 tablet  1 tablet Oral Q4H PRN David Callahan MD   1 tablet at 10/06/21 2053   • pantoprazole (PROTONIX) EC tablet 40 mg  40 mg Oral Daily David Callahan MD   40 mg at 10/10/21 0908   • predniSONE (DELTASONE) tablet 10 mg  10 mg Oral Daily David Callahan MD   10 mg at 10/10/21 0908   • rOPINIRole (REQUIP) tablet 0.25 mg  0.25 mg Oral Nightly David Callahan MD   0.25 mg at 10/09/21 2058   • Sodium Chloride (PF) 0.9 % 10 mL  10 mL Intravenous PRN David Callahan MD       • sodium chloride 0.9 % flush 10 mL  10 mL Intravenous Q12H David Callahan MD   10 mL at 10/07/21 2111   • sodium chloride 0.9 % flush 10 mL  10 mL Intravenous PRN David Callahan MD       • tamsulosin (FLOMAX) 24 hr capsule 0.4 mg  0.4 mg Oral Daily David Callahan MD   0.4 mg at 10/10/21 0908  "        Antibiotics:  Anti-Infectives (From admission, onward)    Ordered     Dose/Rate Route Frequency Start Stop    10/07/21 0852  meropenem (MERREM) 1 g/100 mL 0.9% NS VTB (mbp)        Ordering Provider: David Callahan MD    1 g  over 3 Hours Intravenous Every 12 Hours 10/07/21 1600 10/14/21 1559    10/07/21 0852  meropenem (MERREM) 1 g/100 mL 0.9% NS VTB (mbp)        Ordering Provider: Thierry Chinchilla PharmD    1 g  over 30 Minutes Intravenous Once 10/07/21 0945 10/07/21 1338    10/06/21 1506  valACYclovir (VALTREX) tablet 500 mg        Ordering Provider: Don Draper DO    500 mg Oral Every 12 Hours Scheduled 10/06/21 2100 10/07/21 0854    10/05/21 1632  DAPTOmycin (CUBICIN) 500 mg/50 mL 0.9% sodium chloride IVPB        Ordering Provider: David Callahan MD    6 mg/kg × 80.5 kg (Adjusted)  over 30 Minutes Intravenous Every 48 Hours 10/05/21 2100 10/13/21 2059    10/05/21 1632  micafungin 100 mg/100 mL 0.9% NS IVPB (mbp)        Ordering Provider: Diaz Moreno MD    100 mg  over 60 Minutes Intravenous Every 24 Hours 10/05/21 1800 10/17/21 1223    10/04/21 2101  vancomycin 1750 mg/500 mL 0.9% NS IVPB ([facility])        Ordering Provider: Toño Lainez MD    20 mg/kg × 81.9 kg (Adjusted)  250 mL/hr over 120 Minutes Intravenous Once 10/04/21 2103 10/05/21 0132    10/04/21 2101  cefepime (MAXIPIME) 2 g/100 mL 0.9% NS (mbp)        Ordering Provider: Toño Lainez MD    2 g  200 mL/hr over 30 Minutes Intravenous Once 10/04/21 2103 10/04/21 2250            Review of Systems    As above    Physical Exam:   Vital Signs   /86 (BP Location: Left arm, Patient Position: Lying)   Pulse 69   Temp 97.5 °F (36.4 °C) (Oral)   Resp 16   Ht 170.2 cm (67\")   Wt 99.1 kg (218 lb 8 oz)   SpO2 97%   BMI 34.22 kg/m²     GENERAL: Lying in bed.  Frail and somnolent  HENT: Normocephalic, atraumatic.   Has some external oral blisters noted-unchanged  Eyes: No conjunctival injection. No " icterus.  NECK: Supple without nuchal rigidity. No mass.  HEART: RRR; No murmur, rubs, gallops.   LUNGS: clear to auscultation anteriorly.  Nonlabored breathing  ABDOMEN: abdominal binder in place status post surgery.  LAWRENCE drain #1 has serous drainage but LAWRENCE drain #2 has bilious drainage  EXT:  No cyanosis, clubbing or edema. No cord.  :  Without Meyers catheter.  SKIN: no new rashes noted. No jaundice  NEURO: somnolent. I let him sleep today  PSYCHIATRIC: unable to assess  Groshong cath site is without erythema or drainage    Laboratory Data    Results from last 7 days   Lab Units 10/10/21  0149 10/09/21  2142 10/09/21  0448 10/08/21  0617 10/08/21  0617   WBC 10*3/mm3 15.18*  --  14.44*  --  14.57*   HEMOGLOBIN g/dL 9.3* 9.0* 8.1*   < > 9.3*   HEMATOCRIT % 28.3* 27.0* 25.6*   < > 30.0*   PLATELETS 10*3/mm3 240  --  207  --  142    < > = values in this interval not displayed.     Results from last 7 days   Lab Units 10/10/21  0149   SODIUM mmol/L 137   POTASSIUM mmol/L 4.1   CHLORIDE mmol/L 105   CO2 mmol/L 20.0*   BUN mg/dL 67*   CREATININE mg/dL 2.90*   GLUCOSE mg/dL 124*   CALCIUM mg/dL 8.1*     Results from last 7 days   Lab Units 10/10/21  0149   ALK PHOS U/L 149*   BILIRUBIN mg/dL 1.4*   ALT (SGPT) U/L 39   AST (SGOT) U/L 31         Results from last 7 days   Lab Units 10/04/21  2041   CRP mg/dL 46.11*       Estimated Creatinine Clearance: 27.3 mL/min (A) (by C-G formula based on SCr of 2.9 mg/dL (H)).       Microbiology:  Blood culture ×2: In process    Urine culture: In process    Radiology:  CT Abdomen Pelvis Without Contrast    Result Date: 10/7/2021  New 16 x 6 cm hepatic fluid collection with intermixed heterogeneous and hyperdense contents compatible with large hematoma. This appears new from the October 4, 2021 comparison scan. There is a small amount of free fluid in the pelvis which is also new from comparison. The remainder of the hematoma and blood products appear primarily intraparenchymal and  subcapsular.  New small right pleural effusion with some adjacent consolidation or atelectasis, with component of pneumonia not entirely excluded.  Redemonstrated bladder wall thickening with several diverticula including small locules of air within an anterior diverticula. Finding most likely relates to any recent catheterization, however correlate with any clinical concern for cystitis. No additional acute findings in the abdomen and pelvis.  Finding of large hepatic hematoma discussed with Dr. Callahan by Corey Castillo via phone at 12:47 PM 10/7/2021   This report was finalized on 10/7/2021 12:48 PM by Corey Castillo.      CT Abdomen Pelvis Without Contrast    Result Date: 10/4/2021  1. Cholecystectomy with a fluid collection in the gallbladder fossa containing a bubble of air. Differential considerations include seroma/hematoma, biloma, or possibly a developing abscess. No biliary obstruction is seen. 2. Lung nodules in the visualized lung bases, not all of which have been previously evaluated. Therefore, chest CT follow-up in 6 months is recommended per Fleischner criteria. 3. No acute findings in the GI tract. There is colonic diverticulosis. 4. Left renal atrophy with bilateral nonobstructing renal stones. There are no ureteral stones, and there is no hydronephrosis. 5. Fat stranding in the ventral abdominal wall and right flank as above could reflect bland edema or cellulitis. 6. Additional findings as above. Recommend consideration for referral to [facility]. For questions or to make appointment call [phone]. Signer Name: Caden Rodriguez MD  Signed: 10/4/2021 10:22 PM  Workstation Name: [email or web address]  [facility] Hepatobiliary Without CCK    Result Date: 10/5/2021  No evidence for abnormal tracer activity to suggest leak with normal activity in the biliary system and small bowel.  D: 10/05/2021 E: 10/05/2021    This report was finalized on 10/5/2021  8:59 PM by Dr. Jevon Hartmann.      XR Chest 1 View    Result Date: 10/9/2021  1. Potential mild vascular congestion or volume overload, new since 10/4/2021, correlate clinically. 2. Stable cardiomegaly. Median sternotomy. 3. Elevation right hemidiaphragm with scarring or atelectasis right lung base. Signer Name: Navneet Beaver MD  Signed: 10/9/2021 6:44 AM  Workstation Name: [id number]  [facility]    XR Chest 1 View    Result Date: 10/4/2021  There appears to be slightly increased density at the right lung base, and it is uncertain if this may represent developing pneumonia. Signer Name: Laquita Kelly MD  Signed: 10/4/2021 9:46 PM  Workstation Name: [id number]  [facility]         Impression:     Problems:  -Sepsis with leukocytosis with neutrophilia, lactic acidosis, elevated pro calcitonin level- due to intra-abdominal source/abdominal wall cellulitis. Leukocytosis is slightly worse today.  -Suspected bile leak- has bilious drainage from one of his LAWRENCE drains.  Likely after recent cholecystectomy procedure.  Plan is for ERCP 10/11. - New  -Intra-abdominal hematoma, now status post washout procedure on 10/8- yeast from culture so far  -Abdominal wall cellulitis- Appears slightly improved  -Possible developing intra-abdominal abscess vs. Seroma. No signs of biliary leak on NM hepatobiliary scan  -Recent cholangitis and cholecystitis status post recent cholecystectomy on 9/27 and ERCP on 9/28  -Recent enterococcus bacteremia  -Recent Clostridium perfringens bacteremia  -Recent pancreatitis  -Macrocytic anemia  -Elevated troponin level  -Elevated LFTs  -RUDY on CKD stage IIIB- due to sepsis vs dehydration  -Coronary artery disease status post CABG/stents  -systolic heart failure, LVEF was 26-30% on echo in 09/2021  -history of cardiac thrombus, on anticoagulation with eliquis  -Diabetes mellitus type 2 with hyperglycemia  -Essential  hypertension  -Hyperlipidemia  -Sarcoidosis/chronic prednisone  -prolonged QTc interval  -elevated CPK level  -external oral blisters, possible herpes labialis    PLAN: Thank you for asking us to see Jeremías Soto, I recommend the following:     -continue to follow cbc with diff, cmp, crp  -stop clindamycin  -continue meropenem  -continue empiric Daptomycin  -continue empiric Micafungin (avoiding fluconazole in the setting of his prolonged QTc interval)  -status post washout procedure today by Dr. Callahan.  2 LAWRENCE drains left in place.  Surgical cultures are pending- yeast so far  -plan is for ERCP on 10/11 due to concerns for bile leak  -weekly Groshong cath dressing changes  -valtrex started by primary team for possible HSV    I discussed in length with the patient's wife at bedside today      Complex MDM. The patient has significant risk for further morbidity and mortality in the setting of his multiple complex medical issues.     Diaz Moreno MD  10/10/2021

## 2021-10-10 NOTE — PROGRESS NOTES
[facility] Medicine Services  PROGRESS NOTE    Patient Name: Jeremías Soto  : 1955  MRN: [id number]    Date of Admission: 10/4/2021  Primary Care Physician: Vincent Crawford MD    Subjective   Subjective     CC:  Abdominal pain    HPI:  Patient is doing well this morning.  Denies any shortness of breath.  Denies any abdominal pain.  Having bowel movements.    ROS:  General: denies fevers or chills  CV: denies chest pain  Resp: denies shortness of breath  Abd: denies abd pain, nausea        Objective   Objective     Vital Signs:   Temp:  [97.6 °F (36.4 °C)-98.2 °F (36.8 °C)] 98.2 °F (36.8 °C)  Heart Rate:  [64-78] 64  Resp:  [14-19] 16  BP: (134-149)/(77-88) 149/88  Flow (L/min):  [2] 2     Physical Exam:  Constitutional: Awake, alert, no acute distress, appears slightly improved from yesterday.  HENT: perioral scabbed lesions  Respiratory: Clear to auscultation bilaterally, respiratory effort normal   Cardiovascular: RRR, no murmurs, rubs, or gallops, palpable radial pulse  Gastrointestinal: Mildly tender with palpation, LAWRENCE drain in place  Musculoskeletal: No bilateral ankle edema  Psychiatric: Appropriate affect, cooperative  Neurologic: Speech clear, moving all extremity spontaneously    Results Reviewed:  LAB RESULTS:      Lab 10/10/21  0149 10/09/21  2142 10/09/21  0448 10/08/21  0617 10/07/21  0826 10/07/21  0433 10/06/21  0742 10/06/21  0742 10/06/21  0735 10/06/21  0735 10/05/21  0757 10/05/21  0757 10/04/21  2338 10/04/21  2250 10/04/21  2041 10/04/21  2041   WBC 15.18*  --  14.44* 14.57*  --  17.25*  --  10.81*   < > 10.93*   < > 13.69*  --   --    < > 24.54*   HEMOGLOBIN 9.3* 9.0* 8.1* 9.3*  --  11.3*   < > 10.3*   < > 10.1*   < > 10.6*  --   --    < > 12.0*   HEMATOCRIT 28.3* 27.0* 25.6* 30.0*  --  36.5*   < > 31.8*   < > 31.5*   < > 35.9*  --   --    < > 38.3   PLATELETS 240  --  207 142  --  161  --  168   < > 170   < > 130*  --   --    < > 196   NEUTROS ABS   --   --   --  12.45*  --  14.91*  --   --   --  9.51*  --  11.89*  --   --   --  22.51*   IMMATURE GRANS (ABS)  --   --   --  0.22*  --  0.31*  --   --   --  0.06*  --  0.10*  --   --   --  0.39*   LYMPHS ABS  --   --   --  0.54*  --  0.61*  --   --   --  0.55*  --  0.74  --   --   --  0.50*   MONOS ABS  --   --   --  1.28*  --  1.35*  --   --   --  0.72  --  0.86  --   --   --  1.08*   EOS ABS  --   --   --  0.04  --  0.02  --   --   --  0.07  --  0.05  --   --   --  0.00   MCV 87.1  --  91.8 94.9  --  96.1  --  92.4   < > 92.4   < > 100.6*  --   --    < > 92.7   CRP  --   --   --   --   --   --   --   --   --   --   --   --   --   --   --  46.11*   PROCALCITONIN  --   --   --   --   --   --   --   --   --   --   --   --   --  8.70*  --   --    LACTATE  --   --   --   --   --   --   --   --   --   --   --   --  2.9*  --   --  4.1*   PROTIME 15.1*  --  17.0* 23.5* 25.1*  --   --  23.7*  --   --    < > 34.2*  --   --    < > 27.7*   APTT  --   --  40.1* 46.6*  --   --   --  61.2*  --   --   --  61.5*  --   --   --  52.0*    < > = values in this interval not displayed.         Lab 10/10/21  0149 10/09/21  0448 10/08/21  0617 10/07/21  1217 10/07/21  0433 10/06/21  0742 10/06/21  0742 10/05/21  0841 10/05/21  0757 10/04/21  2041   SODIUM 137 150* 137  --  142  --  138   < >  --  139   POTASSIUM 4.1 4.8 4.1 5.3* 5.3*   < > 3.6   < >  --  4.7   CHLORIDE 105 114* 102  --  107  --  106   < >  --  102   CO2 20.0* 19.0* 18.0*  --  20.0*  --  19.0*   < >  --  18.0*   ANION GAP 12.0 17.0* 17.0*  --  15.0  --  13.0   < >  --  19.0*   BUN 67* 61* 49*  --  37*  --  37*   < >  --  27*   CREATININE 2.90* 3.31* 3.51*  --  2.91*  --  2.76*   < >  --  2.84*   GLUCOSE 124* 139* 134*  --  164*  --  72   < >  --  82   CALCIUM 8.1* 8.0* 8.0*  --  8.6  --  8.1*   < >  --  8.6   MAGNESIUM 2.4  --   --   --   --   --   --   --   --  2.0   PHOSPHORUS 4.9* 5.5*  --   --   --   --   --   --   --  3.6   HEMOGLOBIN A1C  --   --   --   --   --   --    --   --  7.30*  --     < > = values in this interval not displayed.         Lab 10/10/21  0149 10/09/21  0448 10/08/21  0617 10/07/21  0433 10/06/21  0742 10/05/21  0841 10/04/21  2041   TOTAL PROTEIN 5.7* 5.6* 5.8* 5.9* 5.4*   < > 6.1   ALBUMIN 2.50* 2.50* 2.30* 2.50* 2.20*   < > 2.70*   GLOBULIN 3.2 3.1 3.5 3.4 3.2   < > 3.4   ALT (SGPT) 39 45* 56* 73* 77*   < > 95*   AST (SGOT) 31 40 59* 80* 103*   < > 90*   BILIRUBIN 1.4* 1.0 1.4* 1.5* 1.1   < > 2.0*   ALK PHOS 149* 154* 182* 228* 165*   < > 206*   LIPASE  --   --   --   --   --   --  8*    < > = values in this interval not displayed.         Lab 10/10/21  0149 10/09/21  0448 10/08/21  0617 10/07/21  0826 10/06/21  0742 10/05/21  0841 10/05/21  0757 10/04/21  2250 10/04/21  2041   PROBNP  --   --   --   --   --   --   --   --  8,353.0*   TROPONIN T  --   --   --   --   --  0.048*  --  0.054* 0.071*   PROTIME 15.1* 17.0* 23.5* 25.1* 23.7*  --    < >  --  27.7*   INR 1.23* 1.43* 2.20* 2.39* 2.22*  --    < >  --  2.72*    < > = values in this interval not displayed.             Lab 10/09/21  1225 10/09/21  0448 10/05/21  0757 10/05/21  0757   IRON  --  10*  --   --    IRON SATURATION  --  6*  --   --    TIBC  --  177*  --   --    TRANSFERRIN  --  119*  --   --    FERRITIN  --  718.40*  --   --    ABO TYPING A  --    < > A   RH TYPING Positive  --    < > Positive   ANTIBODY SCREEN Negative  --   --  Negative    < > = values in this interval not displayed.         Brief Urine Lab Results  (Last result in the past 365 days)      Color   Clarity   Blood   Leuk Est   Nitrite   Protein   CREAT   Urine HCG        10/08/21 1823             186.2               Microbiology Results Abnormal     Procedure Component Value - Date/Time    Blood Culture - Blood, Arm, Left [[id number]]  (Normal) Collected: 10/04/21 2120    Lab Status: Final result Specimen: Blood from Arm, Left Updated: 10/09/21 2215     Blood Culture No growth at 5 days    Narrative:      The previously reported  component GRAM STAIN is no longer being reported. Previous result was <null> (Reference Range: [Reference Range]) on 10/8/2021 at 2215 EDT.    Blood Culture - Blood, Arm, Right [[id number]]  (Normal) Collected: 10/04/21 2143    Lab Status: Final result Specimen: Blood from Arm, Right Updated: 10/09/21 2215     Blood Culture No growth at 5 days    Narrative:      The previously reported component GRAM STAIN is no longer being reported. Previous result was <null> (Reference Range: [Reference Range]) on 10/8/2021 at 2215 EDT.    Blood Culture - Blood, Arm, Left [[id number]]  (Normal) Collected: 10/05/21 0734    Lab Status: Preliminary result Specimen: Blood from Arm, Left Updated: 10/09/21 0900     Blood Culture No growth at 4 days    Narrative:      The previously reported component GRAM STAIN is no longer being reported. Previous result was <null> (Reference Range: [Reference Range]) on 10/8/2021 at 0901 EDT.    Eosinophil Smear - Urine, Urine, Clean Catch [[id number]]  (Normal) Collected: 10/08/21 1823    Lab Status: Final result Specimen: Urine, Clean Catch Updated: 10/08/21 2003     Eosinophil Smear 0 % EOS/100 Cells     Narrative:      No eosinophil seen    COVID PRE-OP / PRE-PROCEDURE SCREENING ORDER (NO ISOLATION) - Swab, Nasopharynx [[id number]]  (Normal) Collected: 10/05/21 0015    Lab Status: Final result Specimen: Swab from Nasopharynx Updated: 10/05/21 0043    Narrative:      The following orders were created for panel order COVID PRE-OP / PRE-PROCEDURE SCREENING ORDER (NO ISOLATION) - Swab, Nasopharynx.  Procedure                               Abnormality         Status                     ---------                               -----------         ------                     COVID-19, ABBOTT IN-HOUS...[[id number]]  Normal              Final result                 Please view results for these tests on the individual orders.    COVID-19, ABBOTT IN-HOUSE,NASAL Swab (NO TRANSPORT MEDIA) 2 HR TAT - Swab,  Nasopharynx [[id number]]  (Normal) Collected: 10/05/21 0015    Lab Status: Final result Specimen: Swab from Nasopharynx Updated: 10/05/21 0043     COVID19 Presumptive Negative    Narrative:      Fact sheet for providers: https://www.fda.gov/media/212510/download     Fact sheet for patients: https://www.fda.gov/media/439719/download    Test performed by PCR.  If inconsistent with clinical signs and symptoms patient should be tested with different authorized molecular test.          XR Chest 1 View    Result Date: 10/9/2021  CHEST X-RAY, 10/9/2021  HISTORY:  66-year-old male hospital inpatient with shortness of air, cellulitis.  TECHNIQUE: AP portable chest x-ray. COMPARISON: *  Chest x-ray, 10/4/2021. FINDINGS: Moderate cardiomegaly. Mildly increased and indistinct interstitial markings are new since the previous study. Correlate for mild vascular congestion or volume overload. Median sternotomy. Low lung volumes. Right basilar atelectasis with elevation right hemidiaphragm. Central venous catheter in good position.     Impression: 1. Potential mild vascular congestion or volume overload, new since 10/4/2021, correlate clinically. 2. Stable cardiomegaly. Median sternotomy. 3. Elevation right hemidiaphragm with scarring or atelectasis right lung base. Signer Name: Navneet Beaver MD  Signed: 10/9/2021 6:44 AM  Workstation Name: [facility]  [facility]      Results for orders placed during the hospital encounter of 09/23/21    Adult Transthoracic Echo Complete W/ Cont if Necessary Per Protocol    Interpretation Summary  · Left ventricular ejection fraction appears to be 26 - 30%  · There is global hypokinesis. The posterior wall appears more focally hypokinetic  · Moderately reduced right ventricular systolic function noted.  · Mild dilation of the aortic root is present measuring 4.1 cm.  · The left atrial cavity is moderately dilated  · No significant valvular stenosis or  regurgitation.      I have reviewed the medications:  Scheduled Meds:clindamycin, 900 mg, Intravenous, Q8H  DAPTOmycin, 6 mg/kg (Adjusted), Intravenous, Q48H  docosanol, 1 application, Topical, 5x Daily  docusate sodium, 100 mg, Oral, BID  dorzolamide-timolol, , Both Eyes, BID  ferric gluconate, 125 mg, Intravenous, Daily Before Supper  insulin lispro, 0-7 Units, Subcutaneous, 4x Daily With Meals & Nightly  lactobacillus acidophilus, 1 capsule, Oral, Daily  meropenem, 1 g, Intravenous, Q12H  metoprolol tartrate, 12.5 mg, Oral, BID  micafungin (MYCAMINE) IV, 100 mg, Intravenous, Q24H  pantoprazole, 40 mg, Oral, Daily  predniSONE, 10 mg, Oral, Daily  rOPINIRole, 0.25 mg, Oral, Nightly  sodium chloride, 10 mL, Intravenous, Q12H  tamsulosin, 0.4 mg, Oral, Daily      Continuous Infusions:dextrose, 75 mL/hr, Last Rate: 75 mL/hr (10/10/21 0359)      PRN Meds:.dextrose  •  dextrose  •  glucagon (human recombinant)  •  Morphine  •  oxyCODONE-acetaminophen  •  oxyCODONE-acetaminophen  •  Sodium Chloride (PF)  •  sodium chloride    Assessment/Plan   Assessment & Plan     Active Hospital Problems    Diagnosis  POA   • **Sepsis (HCC) [A41.9]  Yes   • Post-operative infection [T81.40XA]  Yes   • Abdominal wall cellulitis [L03.311]  Yes   • Postoperative intra-abdominal abscess [T81.43XA]  Yes   • Elevated troponin [R77.8]  Yes   • CAD (coronary artery disease) [I25.10]  Yes   • CKD (chronic kidney disease), stage III (HCC) [N18.30]  Yes   • Acute kidney injury superimposed on chronic kidney disease (HCC) [N17.9, N18.9]  Yes   • Elevated LFTs [R79.89]  Yes   • Combined systolic and diastolic cardiac dysfunction (EF < 50% 2018) [I51.89]  Yes   • History of sarcoidosis (occular) [Z86.2]  Yes   • Moderate obstructive sleep apnea [G47.33]  Yes   • Diabetes mellitus (HCC) [E11.9]  Yes   • Hypertension [I10]  Yes      Resolved Hospital Problems   No resolved problems to display.        Brief Hospital Course to date:  Jeremías WEEMS  Charles is a 66 y.o. male with systolic CHF (46-50%), chronic multiorgan disease, admitted 9/23-9/29 w/ enterococcal/clostridial bacteremia, cholangitis, questionable pyelonephritis-s/p lap farooq 9/27 and ERCP 9/28 discharged home on IV Zosyn with a planned completion date 10/11 who returned with several days worsening erythema of the abdomen and imaging in the ED concerning for fluid collection in the gallbladder fossa now on empiric antibiotics with some initial improvement then new development of abdominal pain     Sepsis, abdominal source  Abnormal abdominal fluid collection  Recent cholecystitis  Recent enterococcal/clostridial bacteremia  -2.5 cm x 8.5 cm fluid collection on initial CT, unclear etiology given recent surgery  -S/p lap farooq 9/27, ERCP 9/28  -Repeat CT abdomen pelvis on 10/7/2021 with new 16 x 6 hepatic fluid collection and hematoma.  -Underwent laparoscopic washout and drain placement of intra-abdominal hematoma and incision and drainage of right flank cellulitis on 10/8/2021 per Dr. Callahan.  -Continue probiotic  -Dr. Callahan following.  Discussed with him today.  Recommends transfusion.  - fluid culture pending.   -ID following.  Continue meropenem, Clinda, Dapto, micafungin, Valtrex  -CBC and CMP in am.     RUDY on CKD 3, improving  Hyperkalemia, resolved  Metabolic acidosis  -Baseline Cr 1.9-2.3; EGFR 30  -Creatinine improving  -Nephrology consulted.  Likely secondary to ATN.    Hypoxia  Shortness of breath  -Chest x-ray on 10/9/2021 shows vascular congestion new since 10/4/2021.  -Diuresis per nephrology given his worsening creatinine.    Postoperative anemia  -Hemoglobin down today  -s/p transfusion on 10/9, with improvement in anemia  -IV iron per nephrology    Hypernatremia, resolved  -discontinue D5W    LV thrombus  -Per cardiology note 10/6, records from the [facility] demonstrate prescription of Eliquis 7/19 for a large LV thrombus found on MRI  -Cardiology following.  Check in  echocardiogram for LVEF with Lumason to evaluate for LV thrombus on Monday.  -Anticipate need for resumption of anticoagulation once possible/severe bleeding rule out  -Eliquis on hold since 10/5    Elevated INR, improving  - Trended down but still remains elevated    Perioral lesions  -Empiric Valtrex, renally dosed     DM type 2, A1c 7.3%, without long-term use of insulin  -Accu-Cheks with SSI     CAD with prior CABG/stent  Chronic systolic/diastolic CHF  Mild troponin elevation  Hx multiple TIA  -Echo 9/24/2021 EF 26-30%  -Continue Lopressor  -Chronically on Eliquis, currently on hold  -Lisinopril on hold for RUDY     Elevated LFTs, resolved  -Recent admission cholangitis, holding statin as noted  - CMP in am     Hypertension  Hyperlipidemia  -Statin on hold while on daptomycin     Ocular sarcoidosis  Bilateral lung nodules  -Repeat dedicated chest CT 6 months outpatient regarding nodules  -Continue chronic oral prednisone 10 mg     Obesity, BMI 35.16 kg/M2  EVA  Hx COVID-19 December 2020    DVT prophylaxis:  Mechanical DVT prophylaxis orders are present.     AM-PAC 6 Clicks Score (PT): 13 (10/08/21 1446)    Disposition: Multiple acute ongoing issues, anticipate need for rehab at discharge    CODE STATUS:   Code Status and Medical Interventions:   Ordered at: 10/05/21 0134     Code Status:    CPR     Medical Interventions (Level of Support Prior to Arrest):    Full     I have prepared this progress note with copied portions of the prior day's progress note of my own authorship to preserve accuracy and maintain consistency of documentation. I have reviewed these portions and edited them for correctness. I verify that the above documentation accurately and truly represents the evaluation and management performed on today's date.     Gayle Butler MD  10/10/21

## 2021-10-10 NOTE — PROGRESS NOTES
[facility] at [facility]  Progress Note       LOS: 5 days   Patient Care Team:  Vincent Crawford MD as PCP - General (Family Medicine)  Manjula Owens MD as Consulting Physician (Cardiology)    Chief Complaint:  Abd wall cellulitis, CAD, CHF, and Hx of LV thrombus     Subjective  Breathing seems to be somewhat better this morning. No CP. Had 25 beat run of NSVT  At midnight.  Shortness of breath improved.  Confusion less.      Interval History: POD 3 status post laparoscopic washout and drain placement.  Incision and drainage of right flank cellulitis.  Transfusion x 1Unit  yesterday - NSVT overnight       Review of Systems:   Pertinent positives in HPI, all others reviewed and negative.      Objective       Current Facility-Administered Medications:   •  clindamycin (CLEOCIN) 900 mg in dextrose 5% 50 mL IVPB (premix), 900 mg, Intravenous, Q8H, David Callahan MD, Last Rate: 50 mL/hr at 10/09/21 2132, 900 mg at 10/09/21 2132  •  DAPTOmycin (CUBICIN) 500 mg/50 mL 0.9% sodium chloride IVPB, 6 mg/kg (Adjusted), Intravenous, Q48H, David Callahan MD, 500 mg at 10/09/21 2126  •  dextrose (D50W) 25 g/ 50mL Intravenous Solution 25 g, 25 g, Intravenous, Q15 Min PRN, David Callahan MD  •  dextrose (GLUTOSE) oral gel 15 g, 15 g, Oral, Q15 Min PRN, David Callahan MD  •  docosanol (ABREVA) 10 % cream 1 application, 1 application, Topical, 5x Daily, David Callahan MD, 1 application at 10/09/21 2126  •  docusate sodium (COLACE) capsule 100 mg, 100 mg, Oral, BID, David Callahan MD, 100 mg at 10/09/21 2058  •  dorzolamide (TRUSOPT) 2 % 1 drop, timolol (TIMOPTIC) 0.5 % 1 drop for Cosopt 22.3-6.8 mg/mL, , Both Eyes, BID, David Callahan MD, Given at 10/09/21 2126  •  ferric gluconate (FERRLECIT)125 MG in sodium chloride 0.9 % 100 mL IVPB, 125 mg, Intravenous, Daily Before Supper, Corey Garcia MD, 125 mg at 10/09/21 2048  •  glucagon (human recombinant) (GLUCAGEN  DIAGNOSTIC) injection 1 mg, 1 mg, Subcutaneous, Q15 Min PRN, David Callahan MD  •  insulin lispro (humaLOG) injection 0-7 Units, 0-7 Units, Subcutaneous, 4x Daily With Meals & Nightly, David Callahan MD, 2 Units at 10/09/21 2057  •  lactobacillus acidophilus (RISAQUAD) capsule 1 capsule, 1 capsule, Oral, Daily, David Callahan MD, 1 capsule at 10/09/21 0839  •  meropenem (MERREM) 1 g/100 mL 0.9% NS VTB (mbp), 1 g, Intravenous, Q12H, David Callahan MD, 1 g at 10/10/21 0400  •  metoprolol tartrate (LOPRESSOR) half tablet 12.5 mg, 12.5 mg, Oral, BID, David Callahan MD, 12.5 mg at 10/09/21 2058  •  micafungin 100 mg/100 mL 0.9% NS IVPB (mbp), 100 mg, Intravenous, Q24H, David Callahan MD, 100 mg at 10/09/21 2307  •  morphine injection 2 mg, 2 mg, Intravenous, Q3H PRN, David Callahan MD, 2 mg at 10/10/21 0540  •  oxyCODONE-acetaminophen (PERCOCET) 5-325 MG per tablet 2 tablet, 2 tablet, Oral, Q4H PRN, David Callahan MD  •  oxyCODONE-acetaminophen (PERCOCET) 7.5-325 MG per tablet 1 tablet, 1 tablet, Oral, Q4H PRN, David Callahan MD, 1 tablet at 10/06/21 2053  •  pantoprazole (PROTONIX) EC tablet 40 mg, 40 mg, Oral, Daily, David Callahan MD, 40 mg at 10/09/21 0840  •  predniSONE (DELTASONE) tablet 10 mg, 10 mg, Oral, Daily, David Callahan MD, 10 mg at 10/09/21 0839  •  rOPINIRole (REQUIP) tablet 0.25 mg, 0.25 mg, Oral, Nightly, David Callahan MD, 0.25 mg at 10/09/21 2058  •  Sodium Chloride (PF) 0.9 % 10 mL, 10 mL, Intravenous, PRN, David Callahan MD  •  sodium chloride 0.9 % flush 10 mL, 10 mL, Intravenous, Q12H, David Callahan MD, 10 mL at 10/07/21 2111  •  sodium chloride 0.9 % flush 10 mL, 10 mL, Intravenous, PRN, David Clalahan MD  •  tamsulosin (FLOMAX) 24 hr capsule 0.4 mg, 0.4 mg, Oral, Daily, David Callahan MD, 0.4 mg at 10/09/21 0839    Vital Sign Min/Max for last 24 hours  Temp  Min: 97.6 °F (36.4 °C)  Max: 98.2 °F (36.8 °C)   BP  Min: 134/78  Max:  "149/88   Pulse  Min: 64  Max: 78   Resp  Min: 14  Max: 19   SpO2  Min: 95 %  Max: 97 %   Flow (L/min)  Min: 2  Max: 2   Weight  Min: 99.1 kg (218 lb 8 oz)  Max: 99.1 kg (218 lb 8 oz)     Flowsheet Rows      First Filed Value   Admission Height 177.8 cm (70\") Documented at 10/04/2021 1842   Admission Weight 95.3 kg (210 lb) Documented at 10/04/2021 1842            Intake/Output Summary (Last 24 hours) at 10/10/2021 0644  Last data filed at 10/10/2021 0400  Gross per 24 hour   Intake 1778.5 ml   Output 465 ml   Net 1313.5 ml       Physical Exam:     General Appearance:    Alert, cooperative, in no acute distress   Lungs:    Breath sounds diminished right greater than left base respiratory effort fair    Heart:   Regular rhythm normal S1-S2.  There is an S4.   Chest Wall:    No abnormalities observed   Abdomen:     Normal bowel sounds, no acute tenderness,  2 drains in place with abdominal incisions packed and dressed    Extremities:   Moves all extremities well, +1-2 edema in Upper and lower extremities. + skin tear of left arm, no change.   Pulses:   Pulses palpable and equal bilaterally   Skin:   No bleeding, + bruising         Results Review:   Results from last 7 days   Lab Units 10/10/21  0149 10/09/21  2142 10/09/21  0448 10/08/21  0617 10/08/21  0617   WBC 10*3/mm3 15.18*  --  14.44*  --  14.57*   HEMOGLOBIN g/dL 9.3* 9.0* 8.1*   < > 9.3*   HEMATOCRIT % 28.3* 27.0* 25.6*   < > 30.0*   PLATELETS 10*3/mm3 240  --  207  --  142    < > = values in this interval not displayed.     Results from last 7 days   Lab Units 10/10/21  0149 10/09/21  0448 10/09/21  0448 10/08/21  0617 10/08/21  0617   SODIUM mmol/L 137  --  150*  --  137   POTASSIUM mmol/L 4.1  --  4.8  --  4.1   CHLORIDE mmol/L 105   < > 114*   < > 102   CO2 mmol/L 20.0*   < > 19.0*   < > 18.0*   BUN mg/dL 67*  --  61*  --  49*   CREATININE mg/dL 2.90*  --  3.31*  --  3.51*   GLUCOSE mg/dL 124*   < > 139*   < > 134*    < > = values in this interval not " displayed.      Results from last 7 days   Lab Units 10/05/21  0757   HEMOGLOBIN A1C % 7.30*             Results from last 7 days   Lab Units 10/04/21  2041   PROBNP pg/mL 8,353.0*     Results from last 7 days   Lab Units 10/10/21  0149 10/09/21  0448 10/09/21  0448 10/08/21  0617 10/08/21  0617   PROTIME Seconds 15.1*  --  17.0*  --  23.5*   INR  1.23*   < > 1.43*   < > 2.20*   APTT seconds  --   --  40.1*   < > 46.6*    < > = values in this interval not displayed.     Results from last 7 days   Lab Units 10/09/21  0448 10/07/21  0433 10/06/21  0742 10/05/21  0841 10/04/21  2250 10/04/21  2041   CK TOTAL U/L 41 182 673*  --   --   --    TROPONIN T ng/mL  --   --   --  0.048* 0.054* 0.071*       Intake/Output Summary (Last 24 hours) at 10/10/2021 0644  Last data filed at 10/10/2021 0400  Gross per 24 hour   Intake 1778.5 ml   Output 465 ml   Net 1313.5 ml       I personally viewed and interpreted the patient's EKG/Telemetry data      Telemetry:NSR 65-90 beats per minute.  No atrial fibrillation, 25 beat run NSVT at 12:16 135 bpm     Ejection Fraction  No results found for: EF    Echo EF Estimated  Lab Results   Component Value Date    ECHOEFEST 50 08/26/2020         Present on Admission:  • Post-operative infection  • Abdominal wall cellulitis  • Sepsis (HCC)  • Postoperative intra-abdominal abscess  • Elevated LFTs  • Diabetes mellitus (HCC)  • Combined systolic and diastolic cardiac dysfunction (EF < 50% 2018)  • History of sarcoidosis (occular)  • Hypertension  • Moderate obstructive sleep apnea  • Elevated troponin  • CAD (coronary artery disease)  • CKD (chronic kidney disease), stage III (HCC)  • Acute kidney injury superimposed on chronic kidney disease (HCC)    Assessment/Plan      1)NSVT- 25 beat run at 1216 at 135 bpm asymptomatic  -9/24/21 Echo EF 26-30%  -labs checked K 4.1 Mag 2.4. , Cr slightly improved 2.9   -On low dose metoprolol tartrate 12.5mg BID   We will start amiodarone for now.  Will need  LifeVest at discharge.    2)CAD/ischemic cardiomyopathy/congestive heart failure  -Prior CABG/stents  -Chronic/diastolic heart failure  -Mild troponin elevation on admission - likely chronic, currently chest pain-free with no anginal equivalents  -History of multiple TIAs  -Echocardiogram 9/24/2021 LVEF 26-30%   -Continue Lopressor, home lisinopril on hold due to acute kidney injury      3) History of LV thrombus per records from [facility] as documented by Dr Owens patient has previously been anticoagulated with Eliquis currently on hold given recent surgical intervention.  Per Dr. Owens note  pursue echocardiogram with [facility] Monday for reassessment of LV thrombus.       4) Sepsis/abdominal wall cellulitis  - 10/8/21  laparoscopic washout and drain placement.  Incision and drainage of right flank cellulitis  -Surgical management per Dr. Callahan  -Infectious disease managing antibiotics  - Per primary/ surgical teams pt got 1 Unit RBC yesterday     5)RUDY on CKD  10/8 Cr was 3.51- 10/9 3.3  2.9 today  Dr. Garcia  Nephrology follow.    6) Anemia - s/p transfusion  Yesterday H&H improvement from 8.1/25 to 9.3/28.3       Electronically signed by ASHER Hurd, 10/10/21, 7:15 AM EDT.      I, Mark Lynch MD, personally performed the services face to face as described and documented by the above named individual. I have made any necessary edits and it is both accurate and complete 10/10/2021  11:21 EDT

## 2021-10-11 PROBLEM — K83.9 BILE LEAK: Status: ACTIVE | Noted: 2021-01-01

## 2021-10-11 NOTE — PROGRESS NOTES
"   LOS: 6 days    Patient Care Team:  Vincent Crawford MD as PCP - General (Family Medicine)  Manjula Owens MD as Consulting Physician (Cardiology)    Reason For Visit:  F/U RUDY ON CKD.  Subjective           Review of Systems:    Pulm: No soa   CV:  No CP      Objective     amiodarone, 200 mg, Oral, TID  DAPTOmycin, 6 mg/kg (Adjusted), Intravenous, Q48H  docosanol, 1 application, Topical, 5x Daily  docusate sodium, 100 mg, Oral, BID  dorzolamide-timolol, , Both Eyes, BID  ferric gluconate, 125 mg, Intravenous, Daily Before Supper  insulin lispro, 0-7 Units, Subcutaneous, 4x Daily With Meals & Nightly  insulin lispro, 0-7 Units, Subcutaneous, 4x Daily With Meals & Nightly  lactobacillus acidophilus, 1 capsule, Oral, Daily  meropenem, 1 g, Intravenous, Q12H  metoprolol tartrate, 12.5 mg, Oral, BID  micafungin (MYCAMINE) IV, 100 mg, Intravenous, Q24H  pantoprazole, 40 mg, Oral, Daily  predniSONE, 10 mg, Oral, Daily  rOPINIRole, 0.25 mg, Oral, Nightly  sodium chloride, 10 mL, Intravenous, Q12H  tamsulosin, 0.4 mg, Oral, Daily      sodium chloride, 9 mL/hr, Last Rate: Stopped (10/11/21 1244)          Vital Signs:  Blood pressure (!) 172/103, pulse 80, temperature 97.4 °F (36.3 °C), temperature source Tympanic, resp. rate 20, height 170.2 cm (67\"), weight 100 kg (220 lb 6.4 oz), SpO2 94 %.    Flowsheet Rows      First Filed Value   Admission Height 177.8 cm (70\") Documented at 10/04/2021 1842   Admission Weight 95.3 kg (210 lb) Documented at 10/04/2021 1842          10/10 0701 - 10/11 0700  In: 670 [P.O.:420]  Out: 1225 [Urine:725; Drains:500]    Physical Exam:    General Appearance: NAD.  Eyes: PER, conjunctivae and sclerae normal, no icterus  Lungs: respirations regular and unlabored, no crepitus, clear to auscultation  Heart/CV: regular rhythm & normal rate, no murmur, no gallop, no rub and + edema  Abdomen: not distended, soft, non-tender, no masses,  bowel sounds present  Skin: No rash, Warm and " dry    Radiology:            Labs:  Results from last 7 days   Lab Units 10/11/21  0704 10/10/21  0149 10/09/21  2142 10/09/21  0448 10/09/21  0448   WBC 10*3/mm3 9.39 15.18*  --   --  14.44*   HEMOGLOBIN g/dL 10.1* 9.3* 9.0*   < > 8.1*   HEMATOCRIT % 31.8* 28.3* 27.0*   < > 25.6*   PLATELETS 10*3/mm3 311 240  --   --  207    < > = values in this interval not displayed.     Results from last 7 days   Lab Units 10/11/21  0704 10/10/21  0149 10/09/21  0448 10/08/21  0617 10/05/21  0841 10/04/21  2041   SODIUM mmol/L 145 137 150* 137   < > 139   POTASSIUM mmol/L 4.2 4.1 4.8 4.1   < > 4.7   CHLORIDE mmol/L 113* 105 114* 102   < > 102   CO2 mmol/L 17.0* 20.0* 19.0* 18.0*   < > 18.0*   BUN mg/dL 58* 67* 61* 49*   < > 27*   CREATININE mg/dL 2.16* 2.90* 3.31* 3.51*   < > 2.84*   CALCIUM mg/dL 8.2* 8.1* 8.0* 8.0*   < > 8.6   PHOSPHORUS mg/dL 3.3 4.9* 5.5*  --   --  3.6   MAGNESIUM mg/dL  --  2.4  --   --   --  2.0   ALBUMIN g/dL 2.20* 2.50* 2.50* 2.30*   < > 2.70*    < > = values in this interval not displayed.     Results from last 7 days   Lab Units 10/11/21  0704   GLUCOSE mg/dL 61*       Results from last 7 days   Lab Units 10/11/21  0704   ALK PHOS U/L 166*   BILIRUBIN mg/dL 1.0   ALT (SGPT) U/L 35   AST (SGOT) U/L 37           Results from last 7 days   Lab Units 10/04/21  2151   COLOR UA  Yellow   CLARITY UA  Slightly Cloudy*   PH, URINE  6.0   SPECIFIC GRAVITY, URINE  1.020   GLUCOSE UA  Negative   KETONES UA  Trace*   BILIRUBIN UA  Negative   PROTEIN UA  30 mg/dL (1+)*   BLOOD UA  Trace*   LEUKOCYTES UA  Small (1+)*   NITRITE UA  Negative       Estimated Creatinine Clearance: 36.9 mL/min (A) (by C-G formula based on SCr of 2.16 mg/dL (H)).      Assessment       Sepsis (HCC)    Diabetes mellitus (HCC)    Hypertension    Moderate obstructive sleep apnea    History of sarcoidosis (occular)    Elevated LFTs    Combined systolic and diastolic cardiac dysfunction (EF < 50% 2018)    Post-operative infection    Abdominal  wall cellulitis    Postoperative intra-abdominal abscess    Elevated troponin    CAD (coronary artery disease)    CKD (chronic kidney disease), stage III (HCC)    Acute kidney injury superimposed on chronic kidney disease (HCC)    Bile leak            Impression: RUDY ON CKD. GFR IMPROVING BACK TOWARD BASELINE. ANEMIA.            Recommendations: LAB AM.       Corey Garcia MD  10/11/21  12:50 EDT

## 2021-10-11 NOTE — PROGRESS NOTES
Clinical Nutrition   Reason For Visit: Follow-up protocol    Patient Name: Jeremías Soto  YOB: 1955  MRN: [id number]  Date of Encounter: 10/11/21 13:44 EDT  Admission date: 10/4/2021      Nutrition Assessment     Admission Problem List:  Sepsis  Elevated LFTs  Abdominal wall cellulitis  Postoperative intra-abdominal hematoma  Intermittent nausea  RUDY on CKD stage 3  Perioral lesions  Skin tear on arm  Presence of LV thrombus  Hypernatremia  SOB  Possible post-op bile leak      Applicable PMH:  HTN, HLD  CHF  CAD s/p CABG and stents  T2DM  CKD stage 3  Sarcoidosis (occular)  Covid-19 (12/2020)  S/p CCY (9/27/21)      Applicable medical tests/procedures since admission:  (10/5) s/p ultrasound guided abscess drain  (10/8) s/p washout and drain placement, I&D of right flank cellulitis  (10/11) s/p ERCP - no bile leak, wall stent placed       Reported/Observed/Food/Nutrition Related History   10/11: Patient off of floor at time of visit, wife at bedside. Wife reports hasn't been taking in a whole lot due to both decreased appetite as well as full liquid diet past 2 days. Has taken sips of one ONS drink - doesn't care for them. Hopeful that patient's PO intake will improve with advanced to regular foods/diet after ERCP. Wife concerned patient will not eat much due to being afraid of getting sick with PO intake.    10/7: Wife reaffirms rpt.     10/5:  Patient and wife present during visit. Patient's wife provides majority of information. Reports patient wasn't eating very well during most of previous [facility] admission (9/23-9/29), but did eat well 9/29-9/30. On 10/1 patient began having poor appetite/intermittent nausea and poor PO intake (</=25% of usual). Reports patient weighed 218 lbs on (9/23) but unsure what patient weighs at this time. RD requested standing scale weight from RN. NPO at this time. Agrees to try clear liquid ONS drinks (no orange flavor). Denies food allergies and difficulty  chewing/swallowing.    Anthropometrics   Height: 67 in  Weight: 220 lbs (bed scale weight 10/11 per nsg doc)  BMI: 34.5  BMI classification: Obese Class I: 30-34.9kg/m2   IBW: 148 lbs    UBW: 218 lbs (9/23/21) per wife  Per EMR (GUSTAVO) - 219 lbs (6/17/21), 226 lbs (2/18/21), 219 lbs (12/10/20)    Weight change: RD waiting for standing weight to be obtained prior to evaluating recent weight changes.    Labs reviewed   Labs reviewed: Yes    Medications reviewed   Medications reviewed: Yes  Pertinent: antibiotic, probiotic, insulin, protonix, steroid, iron, colace    Needs Assessment 10/7   Weight used: 97.6 Kg wt on 10/7  IBW: 67.3 Kg    Estimated Energy needs: ~ 1755 Kcal/da based on 18 Kcal/Kg       Estimated Protein needs: ~ 81 g/da based on 1.2g/Kg IBW  Pt w CKD       Estimated Fluid needs: ~1755 ml fluid daily      Current Nutrition Prescription   PO: Diet Regular; Consistent Carbohydrate   Oral Nutrition Supplement: Boost Breeze/Ensure Clear 2x daily    Average PO intake: 33% x 3 meals (full liquids) yesterday (10/10)    Nutrition Diagnosis     10/5, 10/7, 10/11  Problem Inadequate oral intake   Etiology Decreased appetite, altered GI function s/p CCY, nausea   Signs/Symptoms PO intake: 33% x 3 meals yesterday (full liquids); poor PO intake since admission (10/4)   Status: ongoing     Intervention   Intervention: Follow treatment progress, Care plan reviewed, Interview for preferences, Encourage intake, Supplement offered/refused    -Discontinued ONS drinks as patient does not like these.    Goal:   General: Nutrition support treatment  PO: Increase intake    Monitoring/Evaluation:   Monitoring/Evaluation: Per protocol, I&O, PO intake, Pertinent labs, Weight, GI status, Symptoms    Opal Car RD  Time Spent: 20 min

## 2021-10-11 NOTE — ANESTHESIA PROCEDURE NOTES
Airway  Urgency: elective    Date/Time: 10/11/2021 11:52 AM  Airway not difficult    General Information and Staff    Patient location during procedure: OR  CRNA: Sia Darden CRNA    Indications and Patient Condition  Indications for airway management: airway protection    Preoxygenated: yes  MILS maintained throughout  Mask difficulty assessment: 2 - vent by mask + OA or adjuvant +/- NMBA    Final Airway Details  Final airway type: endotracheal airway      Successful airway: ETT  Cuffed: yes   Successful intubation technique: direct laryngoscopy  Endotracheal tube insertion site: oral  Blade: Tripathi  Blade size: 2  ETT size (mm): 7.5  Cormack-Lehane Classification: grade I - full view of glottis  Placement verified by: chest auscultation and capnometry   Cuff volume (mL): 6  Measured from: lips  ETT/EBT  to lips (cm): 21  Number of attempts at approach: 1  Assessment: lips, teeth, and gum same as pre-op and atraumatic intubation    Additional Comments  Pt to ENDO 2.  Pt supine on hospital bed, bilateral arms to the side. ASA monitors placed. Pre-O2 with 100% Oxygen. SIVI. Atraumatic intubation. +ETCO2, +BBS. Pt positioned prone onto fluoro compatible bed after OETT secured.

## 2021-10-11 NOTE — PROGRESS NOTES
[facility]  PROGRESS NOTE    Patient Name: Jeremías Soto  : 1955  MRN: [id number]    Date of Admission: 10/4/2021  Primary Care Physician: Vincent Crawford MD    Subjective   Subjective     CC:  Abdominal pain    HPI:  Patient has no complaints this morning.  Denies any abdominal pain.  Denies any shortness of breath.  LAWRENCE drains in place.  N.p.o. for ERCP today.    ROS:  General: denies fevers or chills  CV: denies chest pain  Resp: denies shortness of breath  Abd: denies abd pain, nausea        Objective   Objective     Vital Signs:   Temp:  [97.4 °F (36.3 °C)-98.2 °F (36.8 °C)] 98.2 °F (36.8 °C)  Heart Rate:  [67-86] 73  Resp:  [16-18] 16  BP: (127-169)/(74-96) 169/94  Flow (L/min):  [2] 2     Physical Exam:  Constitutional: Awake, alert, no acute distress, appears slightly improved from yesterday.  HENT: perioral scabbed lesions  Respiratory: Clear to auscultation bilaterally, respiratory effort normal   Cardiovascular: RRR, no murmurs, rubs, or gallops, palpable radial pulse  Gastrointestinal: Mildly tender with palpation, LAWRENCE drains in place  Musculoskeletal: No bilateral ankle edema  Psychiatric: Appropriate affect, cooperative  Neurologic: Speech clear, moving all extremity spontaneously    Results Reviewed:  LAB RESULTS:      Lab 10/10/21  0149 10/09/21  2142 10/09/21  0448 10/08/21  0617 10/07/21  0826 10/07/21  0433 10/06/21  0742 10/06/21  0742 10/06/21  0735 10/06/21  0735 10/05/21  0757 10/05/21  0757 10/04/21  2338 10/04/21  2250 10/04/21  2041 10/04/21  2041   WBC 15.18*  --  14.44* 14.57*  --  17.25*  --  10.81*   < > 10.93*   < > 13.69*  --   --    < > 24.54*   HEMOGLOBIN 9.3* 9.0* 8.1* 9.3*  --  11.3*   < > 10.3*   < > 10.1*   < > 10.6*  --   --    < > 12.0*   HEMATOCRIT 28.3* 27.0* 25.6* 30.0*  --  36.5*   < > 31.8*   < > 31.5*   < > 35.9*  --   --    < > 38.3   PLATELETS 240  --  207 142  --  161  --  168   < > 170   < > 130*  --   --     < > 196   NEUTROS ABS  --   --   --  12.45*  --  14.91*  --   --   --  9.51*  --  11.89*  --   --   --  22.51*   IMMATURE GRANS (ABS)  --   --   --  0.22*  --  0.31*  --   --   --  0.06*  --  0.10*  --   --   --  0.39*   LYMPHS ABS  --   --   --  0.54*  --  0.61*  --   --   --  0.55*  --  0.74  --   --   --  0.50*   MONOS ABS  --   --   --  1.28*  --  1.35*  --   --   --  0.72  --  0.86  --   --   --  1.08*   EOS ABS  --   --   --  0.04  --  0.02  --   --   --  0.07  --  0.05  --   --   --  0.00   MCV 87.1  --  91.8 94.9  --  96.1  --  92.4   < > 92.4   < > 100.6*  --   --    < > 92.7   CRP  --   --   --   --   --   --   --   --   --   --   --   --   --   --   --  46.11*   PROCALCITONIN  --   --   --   --   --   --   --   --   --   --   --   --   --  8.70*  --   --    LACTATE  --   --   --   --   --   --   --   --   --   --   --   --  2.9*  --   --  4.1*   PROTIME 15.1*  --  17.0* 23.5* 25.1*  --   --  23.7*  --   --    < > 34.2*  --   --    < > 27.7*   APTT  --   --  40.1* 46.6*  --   --   --  61.2*  --   --   --  61.5*  --   --   --  52.0*    < > = values in this interval not displayed.         Lab 10/10/21  0149 10/09/21  0448 10/08/21  0617 10/07/21  1217 10/07/21  0433 10/06/21  0742 10/06/21  0742 10/05/21  0841 10/05/21  0757 10/04/21  2041   SODIUM 137 150* 137  --  142  --  138   < >  --  139   POTASSIUM 4.1 4.8 4.1 5.3* 5.3*   < > 3.6   < >  --  4.7   CHLORIDE 105 114* 102  --  107  --  106   < >  --  102   CO2 20.0* 19.0* 18.0*  --  20.0*  --  19.0*   < >  --  18.0*   ANION GAP 12.0 17.0* 17.0*  --  15.0  --  13.0   < >  --  19.0*   BUN 67* 61* 49*  --  37*  --  37*   < >  --  27*   CREATININE 2.90* 3.31* 3.51*  --  2.91*  --  2.76*   < >  --  2.84*   GLUCOSE 124* 139* 134*  --  164*  --  72   < >  --  82   CALCIUM 8.1* 8.0* 8.0*  --  8.6  --  8.1*   < >  --  8.6   MAGNESIUM 2.4  --   --   --   --   --   --   --   --  2.0   PHOSPHORUS 4.9* 5.5*  --   --   --   --   --   --   --  3.6   HEMOGLOBIN A1C  --    --   --   --   --   --   --   --  7.30*  --     < > = values in this interval not displayed.         Lab 10/10/21  0149 10/09/21  0448 10/08/21  0617 10/07/21  0433 10/06/21  0742 10/05/21  0841 10/04/21  2041   TOTAL PROTEIN 5.7* 5.6* 5.8* 5.9* 5.4*   < > 6.1   ALBUMIN 2.50* 2.50* 2.30* 2.50* 2.20*   < > 2.70*   GLOBULIN 3.2 3.1 3.5 3.4 3.2   < > 3.4   ALT (SGPT) 39 45* 56* 73* 77*   < > 95*   AST (SGOT) 31 40 59* 80* 103*   < > 90*   BILIRUBIN 1.4* 1.0 1.4* 1.5* 1.1   < > 2.0*   ALK PHOS 149* 154* 182* 228* 165*   < > 206*   LIPASE  --   --   --   --   --   --  8*    < > = values in this interval not displayed.         Lab 10/10/21  0149 10/09/21  0448 10/08/21  0617 10/07/21  0826 10/06/21  0742 10/05/21  0841 10/05/21  0757 10/04/21  2250 10/04/21  2041   PROBNP  --   --   --   --   --   --   --   --  8,353.0*   TROPONIN T  --   --   --   --   --  0.048*  --  0.054* 0.071*   PROTIME 15.1* 17.0* 23.5* 25.1* 23.7*  --    < >  --  27.7*   INR 1.23* 1.43* 2.20* 2.39* 2.22*  --    < >  --  2.72*    < > = values in this interval not displayed.             Lab 10/09/21  1225 10/09/21  0448 10/05/21  0757 10/05/21  0757   IRON  --  10*  --   --    IRON SATURATION  --  6*  --   --    TIBC  --  177*  --   --    TRANSFERRIN  --  119*  --   --    FERRITIN  --  718.40*  --   --    ABO TYPING A  --    < > A   RH TYPING Positive  --    < > Positive   ANTIBODY SCREEN Negative  --   --  Negative    < > = values in this interval not displayed.         Brief Urine Lab Results  (Last result in the past 365 days)      Color   Clarity   Blood   Leuk Est   Nitrite   Protein   CREAT   Urine HCG        10/08/21 1823             186.2               Microbiology Results Abnormal     Procedure Component Value - Date/Time    Blood Culture - Blood, Arm, Left [[id number]]  (Normal) Collected: 10/05/21 0734    Lab Status: Final result Specimen: Blood from Arm, Left Updated: 10/10/21 0901     Blood Culture No growth at 5 days    Narrative:       The previously reported component GRAM STAIN is no longer being reported. Previous result was <null> (Reference Range: [Reference Range]) on 10/8/2021 at 0901 EDT.    Blood Culture - Blood, Arm, Left [[id number]]  (Normal) Collected: 10/04/21 2120    Lab Status: Final result Specimen: Blood from Arm, Left Updated: 10/09/21 2215     Blood Culture No growth at 5 days    Narrative:      The previously reported component GRAM STAIN is no longer being reported. Previous result was <null> (Reference Range: [Reference Range]) on 10/8/2021 at 2215 EDT.    Blood Culture - Blood, Arm, Right [[id number]]  (Normal) Collected: 10/04/21 2143    Lab Status: Final result Specimen: Blood from Arm, Right Updated: 10/09/21 2215     Blood Culture No growth at 5 days    Narrative:      The previously reported component GRAM STAIN is no longer being reported. Previous result was <null> (Reference Range: [Reference Range]) on 10/8/2021 at 2215 EDT.    Eosinophil Smear - Urine, Urine, Clean Catch [[id number]]  (Normal) Collected: 10/08/21 1823    Lab Status: Final result Specimen: Urine, Clean Catch Updated: 10/08/21 2003     Eosinophil Smear 0 % EOS/100 Cells     Narrative:      No eosinophil seen    COVID PRE-OP / PRE-PROCEDURE SCREENING ORDER (NO ISOLATION) - Swab, Nasopharynx [[id number]]  (Normal) Collected: 10/05/21 0015    Lab Status: Final result Specimen: Swab from Nasopharynx Updated: 10/05/21 0043    Narrative:      The following orders were created for panel order COVID PRE-OP / PRE-PROCEDURE SCREENING ORDER (NO ISOLATION) - Swab, Nasopharynx.  Procedure                               Abnormality         Status                     ---------                               -----------         ------                     COVID-19, ABBOTT IN-HOUS...[[id number]]  Normal              Final result                 Please view results for these tests on the individual orders.    COVID-19, ABBOTT IN-HOUSE,NASAL Swab (NO TRANSPORT MEDIA) 2 HR TAT  - Swab, Nasopharynx [[id number]]  (Normal) Collected: 10/05/21 0015    Lab Status: Final result Specimen: Swab from Nasopharynx Updated: 10/05/21 0043     COVID19 Presumptive Negative    Narrative:      Fact sheet for providers: https://www.fda.gov/media/581015/download     Fact sheet for patients: https://www.fda.gov/media/599646/download    Test performed by PCR.  If inconsistent with clinical signs and symptoms patient should be tested with different authorized molecular test.          No radiology results from the last 24 hrs    Results for orders placed during the hospital encounter of 09/23/21    Adult Transthoracic Echo Complete W/ Cont if Necessary Per Protocol    Interpretation Summary  · Left ventricular ejection fraction appears to be 26 - 30%  · There is global hypokinesis. The posterior wall appears more focally hypokinetic  · Moderately reduced right ventricular systolic function noted.  · Mild dilation of the aortic root is present measuring 4.1 cm.  · The left atrial cavity is moderately dilated  · No significant valvular stenosis or regurgitation.      I have reviewed the medications:  Scheduled Meds:amiodarone, 200 mg, Oral, TID  DAPTOmycin, 6 mg/kg (Adjusted), Intravenous, Q48H  docosanol, 1 application, Topical, 5x Daily  docusate sodium, 100 mg, Oral, BID  dorzolamide-timolol, , Both Eyes, BID  ferric gluconate, 125 mg, Intravenous, Daily Before Supper  insulin lispro, 0-7 Units, Subcutaneous, 4x Daily With Meals & Nightly  lactobacillus acidophilus, 1 capsule, Oral, Daily  meropenem, 1 g, Intravenous, Q12H  metoprolol tartrate, 12.5 mg, Oral, BID  micafungin (MYCAMINE) IV, 100 mg, Intravenous, Q24H  pantoprazole, 40 mg, Oral, Daily  predniSONE, 10 mg, Oral, Daily  rOPINIRole, 0.25 mg, Oral, Nightly  sodium chloride, 10 mL, Intravenous, Q12H  tamsulosin, 0.4 mg, Oral, Daily      Continuous Infusions:   PRN Meds:.dextrose  •  dextrose  •  glucagon (human recombinant)  •  Morphine  •   oxyCODONE-acetaminophen  •  oxyCODONE-acetaminophen  •  Sodium Chloride (PF)  •  sodium chloride    Assessment/Plan   Assessment & Plan     Active Hospital Problems    Diagnosis  POA   • **Sepsis (HCC) [A41.9]  Yes   • Post-operative infection [T81.40XA]  Yes   • Abdominal wall cellulitis [L03.311]  Yes   • Postoperative intra-abdominal abscess [T81.43XA]  Yes   • Elevated troponin [R77.8]  Yes   • CAD (coronary artery disease) [I25.10]  Yes   • CKD (chronic kidney disease), stage III (HCC) [N18.30]  Yes   • Acute kidney injury superimposed on chronic kidney disease (HCC) [N17.9, N18.9]  Yes   • Elevated LFTs [R79.89]  Yes   • Combined systolic and diastolic cardiac dysfunction (EF < 50% 2018) [I51.89]  Yes   • History of sarcoidosis (occular) [Z86.2]  Yes   • Moderate obstructive sleep apnea [G47.33]  Yes   • Diabetes mellitus (HCC) [E11.9]  Yes   • Hypertension [I10]  Yes      Resolved Hospital Problems   No resolved problems to display.        Brief Hospital Course to date:  Jeremías Soto is a 66 y.o. male with systolic CHF (46-50%), chronic multiorgan disease, admitted 9/23-9/29 w/ enterococcal/clostridial bacteremia, cholangitis, questionable pyelonephritis-s/p lap farooq 9/27 and ERCP 9/28 discharged home on IV Zosyn with a planned completion date 10/11 who returned with several days worsening erythema of the abdomen and imaging in the ED concerning for fluid collection in the gallbladder fossa now on empiric antibiotics with some initial improvement then new development of abdominal pain     Sepsis, abdominal source  Abnormal abdominal fluid collection  Recent cholecystitis  Recent enterococcal/clostridial bacteremia  -2.5 cm x 8.5 cm fluid collection on initial CT, unclear etiology given recent surgery  -S/p lap farooq 9/27, ERCP 9/28  -Repeat CT abdomen pelvis on 10/7/2021 with new 16 x 6 hepatic fluid collection and hematoma.  -Underwent laparoscopic washout and drain placement of intra-abdominal  hematoma and incision and drainage of right flank cellulitis on 10/8/2021 per Dr. Callahan.  -Dr. Callahan following.  Suspects bile leak.  Plan for ERCP today  -Continue probiotic  - fluid culture growing yeast.  -ID following.  Continue meropenem, Clinda, Dapto, micafungin, Valtrex  -CBC and CMP in am.     RUDY on CKD 3, improving  Hyperkalemia, resolved  Metabolic acidosis  -Baseline Cr 1.9-2.3; EGFR 30.  Creatinine trending down  -Nephrology consulted.  Likely secondary to ATN.  -BMP in the a.m.    Acute blood loss anemia  -Likely postsurgical  -s/p transfusion on 10/9/2021    Hypoxia, resolved  -Chest x-ray on 10/9/2021 shows vascular congestion new since 10/4/2021.  -Diuresis per nephrology given his worsening creatinine.    Postoperative anemia  -s/p transfusion on 10/9, with improvement in anemia  -IV iron per nephrology    Hypernatremia, resolved  -Resolved with D5W    LV thrombus  -Per cardiology note 10/6, records from the [facility] [place] demonstrate prescription of Eliquis 7/19 for a large LV thrombus found on MRI  -Cardiology following.  Check in echocardiogram for LVEF with Lumason to evaluate for LV thrombus on today  -Anticipate need for resumption of anticoagulation once possible/severe bleeding rule out  -Eliquis on hold since 10/5    Elevated INR, improving  - Trended down but still remains elevated    Perioral lesions  -Empiric Valtrex, renally dosed     DM type 2, A1c 7.3%, without long-term use of insulin  -Accu-Cheks with SSI     CAD with prior CABG/stent  Chronic systolic/diastolic CHF  Mild troponin elevation  Hx multiple TIA  -Echo 9/24/2021 EF 26-30%  -Continue Lopressor  -Chronically on Eliquis, currently on hold  -Lisinopril on hold for RUDY     Elevated LFTs, resolved  -Recent admission cholangitis, holding statin as noted     Hypertension  Hyperlipidemia  -Statin on hold while on daptomycin     Ocular sarcoidosis  Bilateral lung nodules  -Repeat dedicated chest CT 6 months outpatient regarding  nodules  -Continue chronic oral prednisone 10 mg     Obesity, BMI 35.16 kg/M2  EVA  Hx COVID-19 December 2020    DVT prophylaxis:  Mechanical DVT prophylaxis orders are present.     AM-PAC 6 Clicks Score (PT): 17 (10/10/21 1949)    Disposition: Multiple acute ongoing issues, anticipate need for rehab at discharge    CODE STATUS:   Code Status and Medical Interventions:   Ordered at: 10/05/21 0134     Code Status:    CPR     Medical Interventions (Level of Support Prior to Arrest):    Full     I have prepared this progress note with copied portions of the prior day's progress note of my own authorship to preserve accuracy and maintain consistency of documentation. I have reviewed these portions and edited them for correctness. I verify that the above documentation accurately and truly represents the evaluation and management performed on today's date.     Gayle Butler MD  10/11/21

## 2021-10-11 NOTE — BRIEF OP NOTE
ENDOSCOPIC RETROGRADE CHOLANGIOPANCREATOGRAPHY  Progress Note    Jeremías WEEMS Charles  10/11/2021    No overt bile leak seen.  Balloon sweeps of bile duct yielded nothing.  A 10 mm x 80 mm covered Wallstent placed in the common bile duct to the common hepatic duct, and across the cystic duct takeoff.  Clear yellow bile flowed through the stent.    >> EGD in approximately 1 month to remove Wallstent.    Mark I. Brunner, MD     Date: 10/11/2021  Time: 12:38 EDT

## 2021-10-11 NOTE — PROGRESS NOTES
Jeremías Soto  1955  [id number]    Date of Consult: 10/5/21  Requesting Provider: Vincent Crawford MD  Evaluating Physician: Diaz Moreno MD    Chief Complaint: abdominal pain and redness    Reason for Consultation: sepsis secondary to an abdominal wall abscess/cellulitis    History of present illness:    Jeremías Soto is a 66 y.o.  Yr old male with history of coronary artery disease, CK D stage III B, diabetes mellitus type 2, hyperlipidemia, hypertension, sarcoidosis, and history of CVA who I recently evaluated during an admission to [facility] last month for enterococcus bacteremia and Clostridium perfringens bacteremia due to a biliary source/gallbladder source (cholangitis and cholecystitis).  The patient underwent laparoscopic cholecystectomy on 9/27 and ERCP with sphincterotomy stone extraction on 9/28.  He additionally had aerococcus urinae from urine culture. Due to his baseline renal dysfunction he did have a Groshong catheter placed for IV antibiotics.  He clinically improved with improvement in his LFTs and sepsis. He was discharged on 9/29 with plans to continue Zosyn at least until 10/11/21. I have not yet seen him in outpatient follow-up but he was scheduled to follow-up with me tomorrow on 10/6.      He was doing well apparently until 2-3 days ago when he noticed redness developing over his right abdominal wall.  The erythema has gotten progressively worse with some swelling and warmth and tenderness.  As far as I know he did not contact our clinic regarding this erythema. He was not having any fevers or chills at home.  He presented to the ER early this morning for evaluation of this redness. Since arrival, he was noted to have a maximum temperature of 99.1°F. labs showed a CRP of 46.11, troponin elevated to 0.07, proBNP of 8353, INR of 2.72, White blood cell count of 24.54 (up from 13.29 just before discharge), hemoglobin of 12, lactic acid of 4.1-->2.9, creatinine of  2.84 (up from 2.15 just prior to discharge). COVID-19 PCR was negative. White blood cell count is now improved to 13.69 on antibiotics.creatinine is worse at 3.09 today.  LFTs remain elevated with an ALT of 94, an AST of 137, an alkaline phosphatase of 176, and a total bilirubin of 1.4. CT abdomen and pelvis was done yesterday and showed cholecystectomy with a fluid collection in the gallbladder fossa containing a bubble of air with differential considerations including a seroma/hematoma, biloma, or possible developing abscess.  No biliary obstruction was seen.  There was also fat stranding in the ventral abdominal wall and right flank that could reflect bland edema or cellulitis. The patient's antibiotics have been changed to vancomycin, cefepime, and Flagyl. Repeat blood cultures and urine culture are in progress. Surgery consult has been placed. Plan is for IR drainage procedure but having to wait on this due to his anticoagulation that he has been on. He underwent NM Hepatobiliary scan today with no evidence for abnormal tracer activity to suggest leak.  The infectious disease team has been consulted for recommendations.    Subjective:    10/6/21: the patient states that his abdominal pain has been improving although he was having some increased right-sided abdominal pain after I went in the room today as he has just eaten something. No fevers. Tolerating the abx well without ADRs.    10/7/21: The patient is doing worse today.  Still having some right-sided abdominal pain.  He was taken for repeat CT abdomen and pelvis today as his leukocytosis worsened And he did have a new 16 x 6 cm hepatic fluid collection with intermittent heterogenous and hyperdense contents compatible with a large hematoma.  He did have a new small right pleural effusion with some adjacent consolidation or atelectasis. He denies any nausea.  No fevers    10/8/21: The patient was very somnolent today although he just recently got back from  the OR.  Dr. Callahan took the patient to the OR today and did not see any acute bleeding but did see some old blood/hematoma.  No necrotic fascia noted.  2 LAWRENCE drains left in place, one of the liver and another in the gallbladder fossa.  No fevers overnight.  Leukocytosis is slightly better.  His wife is at bedside and states that he is just been very somnolent after his procedure and during week but no other acute issues.    10/9/21: the patient states that his abdominal pain is improving. No fevers. Tolerating abx well without ADRs. Did have worsening hypoxia overnight to 10L NC but now improved back to 2L NC. Diuresis per nephrology and cardiology.    10/10/21: The patient has serous drainage from his LAWRENCE drain #1 but his LAWRENCE drain #2 has her bile and Dr. Callahan suspects that the patient has a bile leak from his prior cholecystectomy procedure.  Dr. Sylvester with GI has been consulted for ERCP tomorrow. The patient is somnolent today.  His wife remains at bedside.  She states that he has had a fairly good today although he is somnolent.  No fevers.  No history available from the patient    10/11/21: LAWRENCE drains in place.  Taken for ERCP today by Dr. Brunner with no overt bile leak seen and balloon sweep showed no stones.  A stent was placed in the CBD to the common hepatic duct and across the cystic duct with plans to remove in 1 month.  Patient is sitting up in bed with food in front of him, but he is not hungry.  He denies f/c, sob, v/d, rashes. He has some nausea.  Had NSVT on Sunday.  He was started on amiodarone and may go home with LifeVest. He remains afebrile. Creatinine improved. Has some diarrhea.     Past Medical History:   Diagnosis Date   • Cancer (HCC)     skin   • Coronary artery disease     stent   • Diabetes mellitus (HCC)    • Elevated cholesterol    • Heart attack (HCC) 2003   • Hypertension    • Sarcoid    • Sleep apnea     no cpap   • SOB (shortness of breath)    • Stroke (HCC)     Pt. states that he  has had 2 mini strokes. No residual        Past Surgical History:   Procedure Laterality Date   • CARDIAC CATHETERIZATION  2003    cardiac stent x2 after s/p CABG   • CATARACT EXTRACTION, BILATERAL     • CHOLECYSTECTOMY N/A 10/8/2021    Procedure: LAPAROSCOPIC WASHOUT;  Surgeon: David Callahan MD;  Location: [facility] OR;  Service: General;  Laterality: N/A;   • CHOLECYSTECTOMY WITH INTRAOPERATIVE CHOLANGIOGRAM N/A 9/27/2021    Procedure: CHOLECYSTECTOMY LAPAROSCOPIC INTRAOPERATIVE CHOLANGIOGRAM;  Surgeon: Gaudencio Wolfe MD;  Location: [facility] OR;  Service: General;  Laterality: N/A;   • COLONOSCOPY     • CORONARY ARTERY BYPASS GRAFT  2002   • ENDOSCOPY     • ERCP N/A 9/28/2021    Procedure: ENDOSCOPIC RETROGRADE CHOLANGIOPANCREATOGRAPHY;  Surgeon: Brunner, Mark I, MD;  Location: [facility] ENDOSCOPY;  Service: Gastroenterology;  Laterality: N/A;  sphincterotomy made, balloon sweep of bile duct done with 9-12 balloon.    • EXTRACORPOREAL SHOCKWAVE LITHOTRIPSY (ESWL), STENT INSERTION/REMOVAL Bilateral 8/21/2020    Procedure: EXTRACORPOREAL SHOCKWAVE LITHOTRIPSY BILATERAL WITH STENT PLACEMENT LEFT;  Surgeon: Ethan Barnes Jr., MD;  Location: [facility] OR;  Service: Urology;  Laterality: Bilateral;   • SKIN CANCER EXCISION       Social history:  The patient is .  He lives in [facility] [place] [facility].  No history of tobacco, alcohol, or illicit drug use.    Family history:  family history includes COPD in his mother; Cancer in his brother; Diabetes in his father and sister; Heart disease in his father; Hypertension in his father; Obesity in his sister.    No Known Allergies    Medication:  Current Facility-Administered Medications   Medication Dose Route Frequency Provider Last Rate Last Admin   • amiodarone (PACERONE) tablet 200 mg  200 mg Oral TID Brunner, Mark I, MD   200 mg at 10/11/21 1710   • DAPTOmycin (CUBICIN) 500 mg/50 mL 0.9% sodium chloride IVPB  6 mg/kg (Adjusted) Intravenous Q48H Diaz Moreno  MD       • dextrose (D50W) 25 g/ 50mL Intravenous Solution 25 g  25 g Intravenous Q15 Min PRN Brunner, Mark I, MD   25 g at 10/11/21 0742   • dextrose (GLUTOSE) oral gel 15 g  15 g Oral Q15 Min PRN Brunner, Mark I, MD       • docosanol (ABREVA) 10 % cream 1 application  1 application Topical 5x Daily Brunner, Mark I, MD   1 application at 10/11/21 0742   • dorzolamide (TRUSOPT) 2 % 1 drop, timolol (TIMOPTIC) 0.5 % 1 drop for Cosopt 22.3-6.8 mg/mL   Both Eyes BID Brunner, Mark I, MD   Given at 10/11/21 0742   • ferric gluconate (FERRLECIT)125 MG in sodium chloride 0.9 % 100 mL IVPB  125 mg Intravenous Daily Before Supper Brunner, Mark I, MD   125 mg at 10/11/21 1711   • glucagon (human recombinant) (GLUCAGEN DIAGNOSTIC) injection 1 mg  1 mg Subcutaneous Q15 Min PRN Brunner, Mark I, MD       • insulin lispro (humaLOG) injection 0-7 Units  0-7 Units Subcutaneous 4x Daily With Meals & Nightly Brunner, Mark I, MD   2 Units at 10/09/21 2057   • lactobacillus acidophilus (RISAQUAD) capsule 1 capsule  1 capsule Oral Daily Brunner, Mark I, MD   1 capsule at 10/10/21 0908   • meropenem (MERREM) 1 g/100 mL 0.9% NS VTB (mbp)  1 g Intravenous Q12H Brunner, Mark I, MD   1 g at 10/11/21 1710   • metoprolol tartrate (LOPRESSOR) half tablet 12.5 mg  12.5 mg Oral BID Brunner, Mark I, MD   12.5 mg at 10/11/21 0912   • micafungin 100 mg/100 mL 0.9% NS IVPB (mbp)  100 mg Intravenous Q24H Brunner, Mark I, MD   100 mg at 10/10/21 2011   • morphine injection 2 mg  2 mg Intravenous Q3H PRN Brunner, Mark I, MD   2 mg at 10/10/21 0540   • oxyCODONE-acetaminophen (PERCOCET) 7.5-325 MG per tablet 1 tablet  1 tablet Oral Q4H PRN Brunner, Mark I, MD   1 tablet at 10/06/21 2053   • pantoprazole (PROTONIX) EC tablet 40 mg  40 mg Oral Daily Brunner, Mark I, MD   40 mg at 10/11/21 0809   • predniSONE (DELTASONE) tablet 10 mg  10 mg Oral Daily Brunner, Mark I, MD   10 mg at 10/10/21 0908   • rOPINIRole (REQUIP) tablet 0.25 mg  0.25 mg Oral Nightly  Brunner, Mark I, MD   0.25 mg at 10/10/21 2008   • Sodium Chloride (PF) 0.9 % 10 mL  10 mL Intravenous PRN Brunner, Mark I, MD       • sodium chloride 0.9 % flush 10 mL  10 mL Intravenous Q12H Brunner, Mark I, MD   10 mL at 10/10/21 2011   • sodium chloride 0.9 % flush 10 mL  10 mL Intravenous PRN Brunner, Mark I, MD       • sodium chloride 0.9 % infusion  9 mL/hr Intravenous Continuous Brunner, Mark I, MD   Stopped at 10/11/21 1244   • tamsulosin (FLOMAX) 24 hr capsule 0.4 mg  0.4 mg Oral Daily Brunner, Mark I, MD   0.4 mg at 10/10/21 0908         Antibiotics:  Anti-Infectives (From admission, onward)    Ordered     Dose/Rate Route Frequency Start Stop    10/11/21 1338  DAPTOmycin (CUBICIN) 500 mg/50 mL 0.9% sodium chloride IVPB        Ordering Provider: Diaz Moreno MD    6 mg/kg × 80.5 kg (Adjusted)  over 30 Minutes Intravenous Every 48 Hours 10/11/21 2100 10/18/21 1217    10/07/21 0852  meropenem (MERREM) 1 g/100 mL 0.9% NS VTB (mbp)        Ordering Provider: Brunner, Mark I, MD    1 g  over 3 Hours Intravenous Every 12 Hours 10/07/21 1600 10/14/21 1559    10/07/21 0852  meropenem (MERREM) 1 g/100 mL 0.9% NS VTB (mbp)        Ordering Provider: Thierry Chinchilla, PharmD    1 g  over 30 Minutes Intravenous Once 10/07/21 0945 10/07/21 1338    10/06/21 1506  valACYclovir (VALTREX) tablet 500 mg        Ordering Provider: Don Draper DO    500 mg Oral Every 12 Hours Scheduled 10/06/21 2100 10/07/21 0854    10/05/21 1632  micafungin 100 mg/100 mL 0.9% NS IVPB (mbp)        Ordering Provider: Brunner, Mark I, MD    100 mg  over 60 Minutes Intravenous Every 24 Hours 10/05/21 1800 10/17/21 1223    10/04/21 2101  vancomycin 1750 mg/500 mL 0.9% NS IVPB ([facility])        Ordering Provider: Toño Lainez MD    20 mg/kg × 81.9 kg (Adjusted)  250 mL/hr over 120 Minutes Intravenous Once 10/04/21 2103 10/05/21 0132    10/04/21 2101  cefepime (MAXIPIME) 2 g/100 mL 0.9% NS (mbp)        Ordering Provider: Fatou  "Toño RANKIN MD    2 g  200 mL/hr over 30 Minutes Intravenous Once 10/04/21 2103 10/04/21 2250            Review of Systems    As above    Physical Exam:   Vital Signs   /95   Pulse 71   Temp 98.2 °F (36.8 °C) (Oral)   Resp 16   Ht 170.2 cm (67\")   Wt 100 kg (220 lb 6.4 oz)   SpO2 93%   BMI 34.52 kg/m²     GENERAL: Sitting up in bed. Ill appearing.   HENT: Normocephalic, atraumatic.   Has some external oral blisters noted-unchanged  Eyes: No conjunctival injection. No icterus.  NECK: Supple without nuchal rigidity. No mass.  HEART: RRR; No murmur, rubs, gallops.   LUNGS: clear to auscultation anteriorly.  Nonlabored breathing  ABDOMEN: LAWRENCE drain #1 has serous drainage but LAWRENCE drain #2 has serous drainage  EXT:  No cyanosis, clubbing or edema. No cord.  :  Without Meyers catheter.  SKIN: no new rashes noted. No jaundice  NEURO: Awake and alert.  PSYCHIATRIC: Cooperative.  Groshong cath site is without erythema or drainage    Laboratory Data    Results from last 7 days   Lab Units 10/11/21  0704 10/10/21  0149 10/09/21  2142 10/09/21  0448 10/09/21  0448   WBC 10*3/mm3 9.39 15.18*  --   --  14.44*   HEMOGLOBIN g/dL 10.1* 9.3* 9.0*   < > 8.1*   HEMATOCRIT % 31.8* 28.3* 27.0*   < > 25.6*   PLATELETS 10*3/mm3 311 240  --   --  207    < > = values in this interval not displayed.     Results from last 7 days   Lab Units 10/11/21  0704   SODIUM mmol/L 145   POTASSIUM mmol/L 4.2   CHLORIDE mmol/L 113*   CO2 mmol/L 17.0*   BUN mg/dL 58*   CREATININE mg/dL 2.16*   GLUCOSE mg/dL 61*   CALCIUM mg/dL 8.2*     Results from last 7 days   Lab Units 10/11/21  0704   ALK PHOS U/L 166*   BILIRUBIN mg/dL 1.0   ALT (SGPT) U/L 35   AST (SGOT) U/L 37         Results from last 7 days   Lab Units 10/04/21  2041   CRP mg/dL 46.11*       Estimated Creatinine Clearance: 36.9 mL/min (A) (by C-G formula based on SCr of 2.16 mg/dL (H)).       Microbiology:  Microbiology Results (last 10 days)     Procedure Component Value - Date/Time "    Eosinophil Smear - Urine, Urine, Clean Catch [[id number]]  (Normal) Collected: 10/08/21 1823    Lab Status: Final result Specimen: Urine, Clean Catch Updated: 10/08/21 2003     Eosinophil Smear 0 % EOS/100 Cells     Narrative:      No eosinophil seen    Body Fluid Culture - Body Fluid, Abdomen, Right [[id number]]  (Abnormal)  (Susceptibility) Collected: 10/08/21 0912    Lab Status: Preliminary result Specimen: Body Fluid from Abdomen, Right Updated: 10/11/21 1040     Body Fluid Culture Scant growth (1+) Candida tropicalis     Gram Stain Few (2+) WBCs seen      No organisms seen    Susceptibility      Candida tropicalis     MELINDA (Preliminary)     Fluconazole Susceptible     Micafungin Susceptible                  Linear View                   Blood Culture - Blood, Arm, Left [[id number]]  (Normal) Collected: 10/05/21 0734    Lab Status: Final result Specimen: Blood from Arm, Left Updated: 10/10/21 0901     Blood Culture No growth at 5 days    Narrative:      The previously reported component GRAM STAIN is no longer being reported. Previous result was <null> (Reference Range: [Reference Range]) on 10/8/2021 at 0901 EDT.    COVID PRE-OP / PRE-PROCEDURE SCREENING ORDER (NO ISOLATION) - Swab, Nasopharynx [[id number]]  (Normal) Collected: 10/05/21 0015    Lab Status: Final result Specimen: Swab from Nasopharynx Updated: 10/05/21 0043    Narrative:      The following orders were created for panel order COVID PRE-OP / PRE-PROCEDURE SCREENING ORDER (NO ISOLATION) - Swab, Nasopharynx.  Procedure                               Abnormality         Status                     ---------                               -----------         ------                     COVID-19, ABBOTT IN-HOUS...[[id number]]  Normal              Final result                 Please view results for these tests on the individual orders.    COVID-19, ABBOTT IN-HOUSE,NASAL Swab (NO TRANSPORT MEDIA) 2 HR TAT - Swab, Nasopharynx [[id number]]  (Normal) Collected:  10/05/21 0015    Lab Status: Final result Specimen: Swab from Nasopharynx Updated: 10/05/21 0043     COVID19 Presumptive Negative    Narrative:      Fact sheet for providers: https://www.fda.gov/media/125699/download     Fact sheet for patients: https://www.fda.gov/media/531665/download    Test performed by PCR.  If inconsistent with clinical signs and symptoms patient should be tested with different authorized molecular test.    Urine Culture - Urine, Urine, Clean Catch [[id number]]  (Abnormal) Collected: 10/04/21 2151    Lab Status: Final result Specimen: Urine, Clean Catch Updated: 10/05/21 2312     Urine Culture Yeast isolated    Narrative:      No further work up will be performed.    Blood Culture - Blood, Arm, Right [[id number]]  (Normal) Collected: 10/04/21 2143    Lab Status: Final result Specimen: Blood from Arm, Right Updated: 10/09/21 2215     Blood Culture No growth at 5 days    Narrative:      The previously reported component GRAM STAIN is no longer being reported. Previous result was <null> (Reference Range: [Reference Range]) on 10/8/2021 at 2215 EDT.    Blood Culture - Blood, Arm, Left [[id number]]  (Normal) Collected: 10/04/21 2120    Lab Status: Final result Specimen: Blood from Arm, Left Updated: 10/09/21 2215     Blood Culture No growth at 5 days    Narrative:      The previously reported component GRAM STAIN is no longer being reported. Previous result was <null> (Reference Range: [Reference Range]) on 10/8/2021 at 2215 EDT.            Radiology:  CT Abdomen Pelvis Without Contrast    Result Date: 10/7/2021  New 16 x 6 cm hepatic fluid collection with intermixed heterogeneous and hyperdense contents compatible with large hematoma. This appears new from the October 4, 2021 comparison scan. There is a small amount of free fluid in the pelvis which is also new from comparison. The remainder of the hematoma and blood products appear primarily intraparenchymal and subcapsular.  New small right pleural  effusion with some adjacent consolidation or atelectasis, with component of pneumonia not entirely excluded.  Redemonstrated bladder wall thickening with several diverticula including small locules of air within an anterior diverticula. Finding most likely relates to any recent catheterization, however correlate with any clinical concern for cystitis. No additional acute findings in the abdomen and pelvis.  Finding of large hepatic hematoma discussed with Dr. Callahan by Corey Castillo via phone at 12:47 PM 10/7/2021   This report was finalized on 10/7/2021 12:48 PM by Corey Castillo.      CT Abdomen Pelvis Without Contrast    Result Date: 10/4/2021  1. Cholecystectomy with a fluid collection in the gallbladder fossa containing a bubble of air. Differential considerations include seroma/hematoma, biloma, or possibly a developing abscess. No biliary obstruction is seen. 2. Lung nodules in the visualized lung bases, not all of which have been previously evaluated. Therefore, chest CT follow-up in 6 months is recommended per Fleischner criteria. 3. No acute findings in the GI tract. There is colonic diverticulosis. 4. Left renal atrophy with bilateral nonobstructing renal stones. There are no ureteral stones, and there is no hydronephrosis. 5. Fat stranding in the ventral abdominal wall and right flank as above could reflect bland edema or cellulitis. 6. Additional findings as above. Recommend consideration for referral to [facility]. For questions or to make appointment call [phone]. Signer Name: Caden Rodriguez MD  Signed: 10/4/2021 10:22 PM  Workstation Name: [facility]  [facility] Hepatobiliary Without CCK    Result Date: 10/5/2021  No evidence for abnormal tracer activity to suggest leak with normal activity in the biliary system and small bowel.  D: 10/05/2021 E: 10/05/2021    This report was finalized on 10/5/2021 8:59 PM by Dr. Jevon Hartmann.      XR  Chest 1 View    Result Date: 10/9/2021  1. Potential mild vascular congestion or volume overload, new since 10/4/2021, correlate clinically. 2. Stable cardiomegaly. Median sternotomy. 3. Elevation right hemidiaphragm with scarring or atelectasis right lung base. Signer Name: Navneet Beaver MD  Signed: 10/9/2021 6:44 AM  Workstation Name: [id number]  [facility]    XR Chest 1 View    Result Date: 10/4/2021  There appears to be slightly increased density at the right lung base, and it is uncertain if this may represent developing pneumonia. Signer Name: Laquita Kelly MD  Signed: 10/4/2021 9:46 PM  Workstation Name: [id number]  [facility]         Impression:     Problems:  -Sepsis with leukocytosis with neutrophilia, lactic acidosis, elevated pro calcitonin level- due to intra-abdominal source/abdominal wall cellulitis. Leukocytosis is slightly worse today.  - S/p  ERCP 10/11 with stent and no obvious signs of bile leak.  -Intra-abdominal hematoma, now status post washout procedure on 10/8- Candida tropicalis from culture so far  -Abdominal wall cellulitis- Appears slightly improved  -Possible developing intra-abdominal abscess vs. Seroma. No signs of biliary leak on NM hepatobiliary scan  -Recent cholangitis and cholecystitis status post recent cholecystectomy on 9/27 and ERCP on 9/28  -Recent enterococcus bacteremia  -Recent Clostridium perfringens bacteremia  -Recent pancreatitis  -Macrocytic anemia  -Elevated troponin level  -Elevated LFTs  -RUDY on CKD stage IIIB- due to sepsis vs dehydration  -Coronary artery disease status post CABG/stents  -systolic heart failure, LVEF was 26-30% on echo in 09/2021  -history of cardiac thrombus, on anticoagulation with eliquis  -Diabetes mellitus type 2 with hyperglycemia  -Essential hypertension  -Hyperlipidemia  -Sarcoidosis/chronic prednisone  -prolonged QTc interval  -elevated CPK level  -external oral blisters, possible  herpes labialis  - Diarrhea, new.  Stool softener stopped today.  Will watch closely.     PLAN:    -continue to follow cbc with diff, cmp, crp  -s/p clindamycin  -continue meropenem  -continue empiric Daptomycin  -continue empiric Micafungin (avoiding fluconazole in the setting of his prolonged QTc interval)  -status post washout procedure  by Dr. Callahan 10/8.  2 LAWRENCE drains left in place.  Surgical cultures are pending- Candida tropicalis  -s/p ERCP on 10/11 with no evidence of bile leak, stent placed.  -weekly Groshong cath dressing changes  -s/p Valtrex    Complex MDM. The patient has significant risk for further morbidity and mortality in the setting of his multiple complex medical issues.     Diaz Moreno MD saw and examined patient, verified hx and PE, read all radiographic studies, reviewed labs and micro data, and formulated dx, plan for treatment and all medical decision making.      HONEY DowC for MD Ottoniel Henry PA  10/11/2021

## 2021-10-11 NOTE — PROGRESS NOTES
" [facility] - Progress Note    Jeremías Soto  1955  [id number]    10/11/21, 08:52 EDT      Chief Complaint: Following for CHF, CAD, NSVT    Subjective:   Sitting up eating dinner now. It has been a long day. He had an ERCP and stent placement. No bile leak found.     Nonsustained VT noted over the weekend. Electrophysiology rounded on him and recommended amiodarone and LifeVest at discharge.    Currently in sinus rhythm with occasional PVC. No further NSVT.      Review of Systems:  Pertinent positives are listed above and in physical exam.  All others have been reviewed and are negative.    amiodarone, 200 mg, Oral, TID  DAPTOmycin, 6 mg/kg (Adjusted), Intravenous, Q48H  docosanol, 1 application, Topical, 5x Daily  docusate sodium, 100 mg, Oral, BID  dorzolamide-timolol, , Both Eyes, BID  ferric gluconate, 125 mg, Intravenous, Daily Before Supper  insulin lispro, 0-7 Units, Subcutaneous, 4x Daily With Meals & Nightly  lactobacillus acidophilus, 1 capsule, Oral, Daily  meropenem, 1 g, Intravenous, Q12H  metoprolol tartrate, 12.5 mg, Oral, BID  micafungin (MYCAMINE) IV, 100 mg, Intravenous, Q24H  pantoprazole, 40 mg, Oral, Daily  predniSONE, 10 mg, Oral, Daily  rOPINIRole, 0.25 mg, Oral, Nightly  sodium chloride, 10 mL, Intravenous, Q12H  tamsulosin, 0.4 mg, Oral, Daily        Objective:  Vitals:   height is 170.2 cm (67\") and weight is 100 kg (220 lb 6.4 oz). His oral temperature is 98.2 °F (36.8 °C). His blood pressure is 169/94 and his pulse is 78. His respiration is 16 and oxygen saturation is 94%.       Intake/Output Summary (Last 24 hours) at 10/11/2021 0852  Last data filed at 10/11/2021 0754  Gross per 24 hour   Intake 470 ml   Output 1330 ml   Net -860 ml       Physical Exam:  General: Alert and oriented   Cardiovascular: Heart has a nondisplaced focal PMI. Regular rate and rhythm without murmur, gallop or rub.  Lungs: Slightly decreased bases bilaterally. Decreased right " base  Extremities: Show 1+ edema.  Skin: warm and dry.  Neurologic: nonfocal             Results from last 7 days   Lab Units 10/11/21  0704   WBC 10*3/mm3 9.39   HEMOGLOBIN g/dL 10.1*   HEMATOCRIT % 31.8*   PLATELETS 10*3/mm3 311     Results from last 7 days   Lab Units 10/11/21  0704   SODIUM mmol/L 145   POTASSIUM mmol/L 4.2   CHLORIDE mmol/L 113*   CO2 mmol/L 17.0*   BUN mg/dL 58*   CREATININE mg/dL 2.16*   CALCIUM mg/dL 8.2*   BILIRUBIN mg/dL 1.0   ALK PHOS U/L 166*   ALT (SGPT) U/L 35   AST (SGOT) U/L 37   GLUCOSE mg/dL 61*     Results from last 7 days   Lab Units 10/10/21  0149 10/09/21  0448 10/08/21  0617   INR  1.23* 1.43* 2.20*             Results from last 7 days   Lab Units 10/04/21  2041   PROBNP pg/mL 8,353.0*       Tele:  NSR, occasional PVC    Assessment and Plan:  1)  NSVT  - 25 beat run at 1216 Sunday at 135 bpm, asymptomatic  -  9/24/21 Echo EF 26-30%  -  On low dose metoprolol tartrate 12.5mg BID   -  Initiated oral Amiodarone 10/10.  Check EKGs. Will need LifeVest at discharge.     2)  CAD/ischemic cardiomyopathy/congestive heart failure  -  Prior CABG/stents  -  Mild troponin elevation on admission - likely chronic, currently chest pain-free with no anginal equivalents  -  Echocardiogram 9/24/2021 LVEF 26-30%.  Again, will need LifeVest at discharge.  -  Continue Lopressor, home lisinopril on hold due to acute kidney injury        3) History of LV thrombus  - per records from [facility] as documented by Dr Owens patient has previously been anticoagulated with Eliquis currently on hold given recent surgical intervention.    -  Per prior notes, will pursue echocardiogram with [facility] today for reassessment of LV thrombus.         4) Sepsis/abdominal wall cellulitis  - 10/8/21  laparoscopic washout and drain placement.  Incision and drainage of right flank cellulitis.  Surgical management per Dr. Callahan  -  Infectious disease managing antibiotics  -  Anemia/transfusion per  primary/surgical teams.  -  Bile leak.  Going for ERCP and biliary Wallstent today       5)RUDY on CKD    -   Nephrology following           I discussed the patient's findings and my recommendations with the patient, any present family members, and the nursing staff.  Manjula Owens MD saw and examined patient, verified hx and PE, read all radiographic studies, reviewed labs and micro data, and formulated dx, plan for treatment and all medical decision making.      Lesley Jean, KG  10/11/21, 08:52 EDT    The patient was seen and examined by me. Evaluation as above. Awaiting echo with [facility] for LVEF and evaluation of previous apical thrombus noted in 2018.    I have seen and examined the patient, case was discussed with the physician extender, reviewed the above note, necessary changes were made and I agree with the final note.  Manjula Owens MD, FACC

## 2021-10-11 NOTE — ANESTHESIA PREPROCEDURE EVALUATION
Anesthesia Evaluation     Patient summary reviewed and Nursing notes reviewed   NPO Solid Status: > 8 hours  NPO Liquid Status: > 8 hours           Airway   Mallampati: II  TM distance: >3 FB  Neck ROM: full  Possible difficult intubation  Dental    (+) edentulous, upper dentures and lower dentures    Pulmonary    (+) shortness of breath, sleep apnea,   (-) recent URI, not a smoker    ROS comment: Sarcoidosis  sats RA 94%   Cardiovascular     ECG reviewed  Patient on routine beta blocker    (+) hypertension, past MI  >12 months, CAD, CABG >6 Months, hyperlipidemia,   (-) dysrhythmias, angina    ROS comment: ECG Unusual P axis, possible ectopic atrial rhythm with frequent PVC's (new)   IMI old Anteroseptal infarct     ECHO EF >26%  Global HK hypokinesis. Michael posterior wall·   Moderately reduced RV systolic function noted.  LA moderately dilated  No valve disease         Neuro/Psych  (+) TIA, CVA (no residual ),     (-) seizures  GI/Hepatic/Renal/Endo    (+)   liver disease history of elevated LFT, renal disease (creat>2 ) CRI and ARF, diabetes mellitus type 2,   (-) no thyroid disorder    ROS Comment: Bile leak post op     Musculoskeletal     Abdominal    Substance History      OB/GYN          Other   chronic steroid use    history of cancer    ROS/Med Hx Other: eliquis  HCT 31   Sp lap farooq last week now with dilated bile duct   No current nausea       Phys Exam Other: Tripathi 2 grade 1 view stylet and cricoid               Anesthesia Plan    ASA 4     general   (Aim for MAP >65 ( Hx stroke and MI/CABG ))  intravenous induction     Anesthetic plan, all risks, benefits, and alternatives have been provided, discussed and informed consent has been obtained with: patient.    Plan discussed with CRNA.

## 2021-10-11 NOTE — PROGRESS NOTES
"Patient Name:  Jeremías Soto  YOB: 1955  [id number]    Surgery Progress Note    Date of visit: 10/11/2021    Subjective   Subjective: NAEON. Weaned of oxygen to room air. Denies fevers, chills, chest pain. Intermittent episodes of nausea controlled with PRNs.          Objective     Objective    /94 (BP Location: Left arm, Patient Position: Lying)   Pulse 73   Temp 98.2 °F (36.8 °C) (Oral)   Resp 16   Ht 170.2 cm (67\")   Wt 100 kg (220 lb 6.4 oz)   SpO2 94%   BMI 34.52 kg/m²     Intake/Output Summary (Last 24 hours) at 10/11/2021 0730  Last data filed at 10/11/2021 0449  Gross per 24 hour   Intake 620 ml   Output 1225 ml   Net -605 ml       CV:  Rhythm regular and rate regular  L:  Clear to auscultation bilaterally  Abd:  Bowel sounds positive, soft, appropriately tender, JP1 with mitul/bilious drainage, RUQ drain with SS output. Rt lateral abd wall dressing with some strike through  Ext:  No cyanosis, clubbing; + pitting edema    Labs that are back at this time have been reviewed.        Assessment/Plan     Assessment/ Plan:  Patient is POD3 from laparoscopic wash-out now with concerns for bile leak given dark/bilious LAWRENCE drainage. GI following and planing for ERCP with possible stent placement today. C/w daily dressing changes and ongoing management.   Problem List Items Addressed This Visit        Genitourinary and Reproductive     RUDY (acute kidney injury) (HCC)       Other    Post-operative infection    Abdominal wall cellulitis    Relevant Orders    Body Fluid Culture - Body Fluid, Abdomen, Right (Completed)    Postoperative intra-abdominal abscess    Relevant Orders    Case Request (Completed)      Other Visit Diagnoses     Cellulitis of abdominal wall    -  Primary    Leukocytosis, unspecified type        Acute cystitis without hematuria        Acute congestive heart failure, unspecified heart failure type (HCC)        Bile leak        Relevant Orders    Case Request " (Completed)           Active Hospital Problems    Diagnosis  POA   • **Sepsis (HCC) [A41.9]  Yes   • Post-operative infection [T81.40XA]  Yes   • Abdominal wall cellulitis [L03.311]  Yes   • Postoperative intra-abdominal abscess [T81.43XA]  Yes   • Elevated troponin [R77.8]  Yes   • CAD (coronary artery disease) [I25.10]  Yes   • CKD (chronic kidney disease), stage III (HCC) [N18.30]  Yes   • Acute kidney injury superimposed on chronic kidney disease (HCC) [N17.9, N18.9]  Yes   • Elevated LFTs [R79.89]  Yes   • Combined systolic and diastolic cardiac dysfunction (EF < 50% 2018) [I51.89]  Yes   • History of sarcoidosis (occular) [Z86.2]  Yes   • Moderate obstructive sleep apnea [G47.33]  Yes   • Diabetes mellitus (HCC) [E11.9]  Yes   • Hypertension [I10]  Yes      Resolved Hospital Problems   No resolved problems to display.            Erasmo Valle MD  10/11/2021  07:30 EDT      I have personally performed the key portions of the history and physical exam, and I have edited the above note to better reflect my complete history and physical exam.    Stable overnight, but increasing 3rd spacing of fluid.    CV:  Rhythm  regular and rate regular   L:  Rhonchi to auscultation bilaterally   Abd:  Bowel sounds positive , soft, nontender. LAWRENCE #2 still with bilious output.  Ext:  No cyanosis, clubbing, edema    Labs: Labs reviewed       Assessment/ Plan:    Stable. For ERCP today. Worsening volume overload seen, complicated by his CKD and overall poor health.    Problem List Items Addressed This Visit        Genitourinary and Reproductive     RUDY (acute kidney injury) (HCC)       Other    Post-operative infection    Abdominal wall cellulitis    Relevant Orders    Body Fluid Culture - Body Fluid, Abdomen, Right (Completed)    Postoperative intra-abdominal abscess    Relevant Orders    Case Request (Completed)      Other Visit Diagnoses     Cellulitis of abdominal wall    -  Primary    Leukocytosis, unspecified type         Acute cystitis without hematuria        Acute congestive heart failure, unspecified heart failure type (HCC)        Bile leak        Relevant Orders    Case Request (Completed)              David Callahan MD  08:35 EDT

## 2021-10-11 NOTE — CASE MANAGEMENT/SOCIAL WORK
Continued Stay Note  [facility]     Patient Name: Jeremías Soto  MRN: [id number]  Today's Date: 10/11/2021    Admit Date: 10/4/2021     Discharge Plan     Row Name 10/11/21 1518       Plan    Plan discharge plan    Plan Comments Pt off the floor for  An ERCP. CM spoke with pt's spouse in room regarding discharge plan. Spouse plans to talk with spouse(pt) regarding HH versus inpatient rehab at discharge. . CM will cont to follow.    Final Discharge Disposition Code 30 - still a patient               Discharge Codes    No documentation.               Expected Discharge Date and Time     Expected Discharge Date Expected Discharge Time    Oct 14, 2021             Corina Shi RN

## 2021-10-12 NOTE — PROGRESS NOTES
[facility] Medicine Services  PROGRESS NOTE    Patient Name: Jeremías Soto  : 1955  MRN: [id number]    Date of Admission: 10/4/2021  Primary Care Physician: Vincent Crawford MD    Subjective   Subjective     CC:  Abdominal pain    HPI:  Patient denies any abdominal pain.  He is unsure if she has had any more diarrhea.  LAWRENCE drains remain in place.  Had a little bit of burning sensation in his abdomen but no nausea.    ROS:  General: denies fevers or chills  CV: denies chest pain  Resp: denies shortness of breath  Abd: As per HPI        Objective   Objective     Vital Signs:   Temp:  [97.4 °F (36.3 °C)-98.5 °F (36.9 °C)] 98.2 °F (36.8 °C)  Heart Rate:  [68-94] 73  Resp:  [16-20] 18  BP: (130-179)/() 168/92  Flow (L/min):  [2-3.5] 2     Physical Exam:  Constitutional: Awake, alert, no acute distress, resting in bed  HENT: perioral scabbed lesions  Respiratory: Clear to auscultation bilaterally, respiratory effort normal   Cardiovascular: RRR, no murmurs, rubs, or gallops, palpable radial pulse  Gastrointestinal: Mildly tender with palpation, LAWRENCE drains in place with serosanguineous drainage  Musculoskeletal: 2+ lower extremity edema  Psychiatric: Appropriate affect, cooperative  Neurologic: Speech clear, moving all extremity spontaneously  Exam unchanged from 10/11/2021    Results Reviewed:  LAB RESULTS:      Lab 10/11/21  0704 10/10/21  0149 10/09/21  2142 10/09/21  0448 10/08/21  0617 10/07/21  0826 10/07/21  0433 10/07/21  0433 10/06/21  0742 10/06/21  0742 10/06/21  0735 10/06/21  0735 10/05/21  0757 10/05/21  0757   WBC 9.39 15.18*  --  14.44* 14.57*  --   --  17.25*   < > 10.81*   < > 10.93*   < > 13.69*   HEMOGLOBIN 10.1* 9.3* 9.0* 8.1* 9.3*  --    < > 11.3*   < > 10.3*   < > 10.1*   < > 10.6*   HEMATOCRIT 31.8* 28.3* 27.0* 25.6* 30.0*  --    < > 36.5*   < > 31.8*   < > 31.5*   < > 35.9*   PLATELETS 311 240  --  207 142  --   --  161   < > 168   < > 170   < > 130*    NEUTROS ABS 7.23*  --   --   --  12.45*  --   --  14.91*  --   --   --  9.51*  --  11.89*   IMMATURE GRANS (ABS)  --   --   --   --  0.22*  --   --  0.31*  --   --   --  0.06*  --  0.10*   LYMPHS ABS  --   --   --   --  0.54*  --   --  0.61*  --   --   --  0.55*  --  0.74   MONOS ABS  --   --   --   --  1.28*  --   --  1.35*  --   --   --  0.72  --  0.86   EOS ABS 0.00  --   --   --  0.04  --   --  0.02  --   --   --  0.07  --  0.05   MCV 89.1 87.1  --  91.8 94.9  --   --  96.1   < > 92.4   < > 92.4   < > 100.6*   PROTIME  --  15.1*  --  17.0* 23.5* 25.1*  --   --   --  23.7*  --   --    < > 34.2*   APTT  --   --   --  40.1* 46.6*  --   --   --   --  61.2*  --   --   --  61.5*    < > = values in this interval not displayed.         Lab 10/11/21  0704 10/10/21  0149 10/09/21  0448 10/08/21  0617 10/07/21  1217 10/07/21  0433 10/07/21  0433 10/05/21  0841 10/05/21  0757   SODIUM 145 137 150* 137  --   --  142   < >  --    POTASSIUM 4.2 4.1 4.8 4.1 5.3*   < > 5.3*   < >  --    CHLORIDE 113* 105 114* 102  --   --  107   < >  --    CO2 17.0* 20.0* 19.0* 18.0*  --   --  20.0*   < >  --    ANION GAP 15.0 12.0 17.0* 17.0*  --   --  15.0   < >  --    BUN 58* 67* 61* 49*  --   --  37*   < >  --    CREATININE 2.16* 2.90* 3.31* 3.51*  --   --  2.91*   < >  --    GLUCOSE 61* 124* 139* 134*  --   --  164*   < >  --    CALCIUM 8.2* 8.1* 8.0* 8.0*  --   --  8.6   < >  --    MAGNESIUM  --  2.4  --   --   --   --   --   --   --    PHOSPHORUS 3.3 4.9* 5.5*  --   --   --   --   --   --    HEMOGLOBIN A1C  --   --   --   --   --   --   --   --  7.30*    < > = values in this interval not displayed.         Lab 10/11/21  0704 10/10/21  0149 10/09/21  0448 10/08/21  0617 10/07/21  0433   TOTAL PROTEIN 5.7* 5.7* 5.6* 5.8* 5.9*   ALBUMIN 2.20* 2.50* 2.50* 2.30* 2.50*   GLOBULIN 3.5 3.2 3.1 3.5 3.4   ALT (SGPT) 35 39 45* 56* 73*   AST (SGOT) 37 31 40 59* 80*   BILIRUBIN 1.0 1.4* 1.0 1.4* 1.5*   ALK PHOS 166* 149* 154* 182* 228*         Lab  10/10/21  0149 10/09/21  0448 10/08/21  0617 10/07/21  0826 10/06/21  0742 10/05/21  0841 10/05/21  0757   TROPONIN T  --   --   --   --   --  0.048*  --    PROTIME 15.1* 17.0* 23.5* 25.1* 23.7*  --    < >   INR 1.23* 1.43* 2.20* 2.39* 2.22*  --    < >    < > = values in this interval not displayed.             Lab 10/09/21  1225 10/09/21  0448 10/05/21  0757 10/05/21  0757   IRON  --  10*  --   --    IRON SATURATION  --  6*  --   --    TIBC  --  177*  --   --    TRANSFERRIN  --  119*  --   --    FERRITIN  --  718.40*  --   --    ABO TYPING A  --    < > A   RH TYPING Positive  --    < > Positive   ANTIBODY SCREEN Negative  --   --  Negative    < > = values in this interval not displayed.         Brief Urine Lab Results  (Last result in the past 365 days)      Color   Clarity   Blood   Leuk Est   Nitrite   Protein   CREAT   Urine HCG        10/08/21 1823             186.2               Microbiology Results Abnormal     Procedure Component Value - Date/Time    Blood Culture - Blood, Arm, Left [[id number]]  (Normal) Collected: 10/05/21 0734    Lab Status: Final result Specimen: Blood from Arm, Left Updated: 10/10/21 0901     Blood Culture No growth at 5 days    Narrative:      The previously reported component GRAM STAIN is no longer being reported. Previous result was <null> (Reference Range: [Reference Range]) on 10/8/2021 at 0901 EDT.    Blood Culture - Blood, Arm, Left [[id number]]  (Normal) Collected: 10/04/21 2120    Lab Status: Final result Specimen: Blood from Arm, Left Updated: 10/09/21 2215     Blood Culture No growth at 5 days    Narrative:      The previously reported component GRAM STAIN is no longer being reported. Previous result was <null> (Reference Range: [Reference Range]) on 10/8/2021 at 2215 EDT.    Blood Culture - Blood, Arm, Right [[id number]]  (Normal) Collected: 10/04/21 2143    Lab Status: Final result Specimen: Blood from Arm, Right Updated: 10/09/21 2214     Blood Culture No growth at 5 days     Narrative:      The previously reported component GRAM STAIN is no longer being reported. Previous result was <null> (Reference Range: [Reference Range]) on 10/8/2021 at 2215 EDT.    Eosinophil Smear - Urine, Urine, Clean Catch [[id number]]  (Normal) Collected: 10/08/21 1823    Lab Status: Final result Specimen: Urine, Clean Catch Updated: 10/08/21 2003     Eosinophil Smear 0 % EOS/100 Cells     Narrative:      No eosinophil seen    COVID PRE-OP / PRE-PROCEDURE SCREENING ORDER (NO ISOLATION) - Swab, Nasopharynx [[id number]]  (Normal) Collected: 10/05/21 0015    Lab Status: Final result Specimen: Swab from Nasopharynx Updated: 10/05/21 0043    Narrative:      The following orders were created for panel order COVID PRE-OP / PRE-PROCEDURE SCREENING ORDER (NO ISOLATION) - Swab, Nasopharynx.  Procedure                               Abnormality         Status                     ---------                               -----------         ------                     COVID-19, ABBOTT IN-HOUS...[[id number]]  Normal              Final result                 Please view results for these tests on the individual orders.    COVID-19, ABBOTT IN-HOUSE,NASAL Swab (NO TRANSPORT MEDIA) 2 HR TAT - Swab, Nasopharynx [[id number]]  (Normal) Collected: 10/05/21 0015    Lab Status: Final result Specimen: Swab from Nasopharynx Updated: 10/05/21 0043     COVID19 Presumptive Negative    Narrative:      Fact sheet for providers: https://www.fda.gov/media/269164/download     Fact sheet for patients: https://www.fda.gov/media/383788/download    Test performed by PCR.  If inconsistent with clinical signs and symptoms patient should be tested with different authorized molecular test.          No radiology results from the last 24 hrs    Results for orders placed during the hospital encounter of 09/23/21    Adult Transthoracic Echo Complete W/ Cont if Necessary Per Protocol    Interpretation Summary  · Left ventricular ejection fraction appears to be 26  - 30%  · There is global hypokinesis. The posterior wall appears more focally hypokinetic  · Moderately reduced right ventricular systolic function noted.  · Mild dilation of the aortic root is present measuring 4.1 cm.  · The left atrial cavity is moderately dilated  · No significant valvular stenosis or regurgitation.      I have reviewed the medications:  Scheduled Meds:amiodarone, 200 mg, Oral, TID  DAPTOmycin, 6 mg/kg (Adjusted), Intravenous, Q48H  docosanol, 1 application, Topical, 5x Daily  dorzolamide-timolol, , Both Eyes, BID  ferric gluconate, 125 mg, Intravenous, Daily Before Supper  insulin lispro, 0-7 Units, Subcutaneous, 4x Daily With Meals & Nightly  lactobacillus acidophilus, 1 capsule, Oral, Daily  meropenem, 1 g, Intravenous, Q12H  metoprolol tartrate, 12.5 mg, Oral, BID  micafungin (MYCAMINE) IV, 100 mg, Intravenous, Q24H  pantoprazole, 40 mg, Oral, Daily  predniSONE, 10 mg, Oral, Daily  rOPINIRole, 0.25 mg, Oral, Nightly  sodium chloride, 10 mL, Intravenous, Q12H  tamsulosin, 0.4 mg, Oral, Daily      Continuous Infusions:sodium chloride, 9 mL/hr, Last Rate: Stopped (10/11/21 1244)      PRN Meds:.dextrose  •  dextrose  •  glucagon (human recombinant)  •  Morphine  •  oxyCODONE-acetaminophen  •  Sodium Chloride (PF)  •  sodium chloride    Assessment/Plan   Assessment & Plan     Active Hospital Problems    Diagnosis  POA   • **Sepsis (HCC) [A41.9]  Yes   • Post-operative infection [T81.40XA]  Yes   • Abdominal wall cellulitis [L03.311]  Yes   • Postoperative intra-abdominal abscess [T81.43XA]  Yes   • Elevated troponin [R77.8]  Yes   • CAD (coronary artery disease) [I25.10]  Yes   • CKD (chronic kidney disease), stage III (HCC) [N18.30]  Yes   • Acute kidney injury superimposed on chronic kidney disease (HCC) [N17.9, N18.9]  Yes   • Bile leak [K83.9]  Unknown   • Elevated LFTs [R79.89]  Yes   • Combined systolic and diastolic cardiac dysfunction (EF < 50% 2018) [I51.89]  Yes   • History of  sarcoidosis (occular) [Z86.2]  Yes   • Moderate obstructive sleep apnea [G47.33]  Yes   • Diabetes mellitus (HCC) [E11.9]  Yes   • Hypertension [I10]  Yes      Resolved Hospital Problems   No resolved problems to display.        Brief Hospital Course to date:  Jeremías Soto is a 66 y.o. male with systolic CHF (46-50%), chronic multiorgan disease, admitted 9/23-9/29 w/ enterococcal/clostridial bacteremia, cholangitis, questionable pyelonephritis-s/p lap farooq 9/27 and ERCP 9/28 discharged home on IV Zosyn with a planned completion date 10/11 who returned with several days worsening erythema of the abdomen and imaging in the ED concerning for fluid collection in the gallbladder fossa now on empiric antibiotics with some initial improvement then new development of abdominal pain     Sepsis, abdominal source  Abnormal abdominal fluid collection  Recent cholecystitis  Recent enterococcal/clostridial bacteremia  -2.5 cm x 8.5 cm fluid collection on initial CT, unclear etiology given recent surgery  -S/p lap farooq 9/27, ERCP 9/28  -Repeat CT abdomen pelvis on 10/7/2021 with new 16 x 6 hepatic fluid collection and hematoma.  -Underwent laparoscopic washout and drain placement of intra-abdominal hematoma and incision and drainage of right flank cellulitis on 10/8/2021 per Dr. Callahan.  -ERCP performed on 10/11/2021, no overt bile leak noted, EGD in 1 month to remove Wallstent  -Dr. Callahan following.    -Continue probiotic  - fluid culture growing yeast.  -ID following.  Continue meropenem, Clinda, Dapto, micafungin, Valtrex  -CBC and CMP in am.     RUDY on CKD 3, improving  Hyperkalemia, resolved  Metabolic acidosis  -Baseline Cr 1.9-2.3; EGFR 30.  Creatinine trending down  -Nephrology consulted.  Likely secondary to ATN.  -BMP in the a.m.    Acute blood loss anemia  -Likely postsurgical  -s/p transfusion on 10/9/2021    Hypoxia, resolved  -Chest x-ray on 10/9/2021 shows vascular congestion new since 10/4/2021.  -Diuresis  per nephrology given his worsening creatinine.    Systolic heart failure  Nonsustained V. Tach  -5 beat run on Sunday, asymptomatic  -Echo on 9/24/2021 showed EF of 26 to 30%  -Lisinopril on hold due to RUDY  -Continue metoprolol  -Continue oral amiodarone  -LifeVest at discharge    Postoperative anemia  -s/p transfusion on 10/9, with improvement in anemia  -IV iron per nephrology    Hypernatremia, resolved  -Resolved with D5W    LV thrombus  -Per cardiology note 10/6, records from the [facility] demonstrate prescription of Eliquis 7/19 for a large LV thrombus found on MRI  -Cardiology following.  Check in echocardiogram for LVEF with Lumason to evaluate for LV thrombus on today   -Anticipate need for resumption of anticoagulation once possible/severe bleeding rule out  -Eliquis on hold since 10/5    Elevated INR, improving  - Trended down but still remains elevated    Perioral lesions  -Empiric Valtrex, renally dosed     DM type 2, A1c 7.3%, without long-term use of insulin  -Accu-Cheks with SSI     CAD with prior CABG/stent  Chronic systolic/diastolic CHF  Mild troponin elevation  Hx multiple TIA  -Echo 9/24/2021 EF 26-30%  -Continue Lopressor  -Chronically on Eliquis, currently on hold  -Lisinopril on hold for RUDY     Elevated LFTs, resolved  -Recent admission cholangitis, holding statin as noted     Hypertension  Hyperlipidemia  -Statin on hold while on daptomycin     Ocular sarcoidosis  Bilateral lung nodules  -Repeat dedicated chest CT 6 months outpatient regarding nodules  -Continue chronic oral prednisone 10 mg     Obesity, BMI 35.16 kg/M2  EVA  Hx COVID-19 December 2020    DVT prophylaxis:  Mechanical DVT prophylaxis orders are present.     AM-PAC 6 Clicks Score (PT): 17 (10/11/21 1952)    Disposition: Multiple acute ongoing issues, anticipate need for rehab at discharge    CODE STATUS:   Code Status and Medical Interventions:   Ordered at: 10/05/21 0134     Code Status:    CPR     Medical Interventions  (Level of Support Prior to Arrest):    Full     I have prepared this progress note with copied portions of the prior day's progress note of my own authorship to preserve accuracy and maintain consistency of documentation. I have reviewed these portions and edited them for correctness. I verify that the above documentation accurately and truly represents the evaluation and management performed on today's date.     Gayle Butler MD  10/12/21

## 2021-10-12 NOTE — PROGRESS NOTES
"   LOS: 7 days    Patient Care Team:  Vincent Crawford MD as PCP - General (Family Medicine)  Manjula Owens MD as Consulting Physician (Cardiology)    Reason For Visit:  F/U RUDY ON CKD  Subjective           Review of Systems:    Pulm: No soa   CV:  No CP      Objective     GI cocktail, , Oral, Once  amiodarone, 200 mg, Oral, TID  atorvastatin, 40 mg, Oral, Nightly  docosanol, 1 application, Topical, 5x Daily  dorzolamide-timolol, , Both Eyes, BID  ferric gluconate, 125 mg, Intravenous, Daily Before Supper  insulin lispro, 0-7 Units, Subcutaneous, 4x Daily With Meals & Nightly  lactobacillus acidophilus, 1 capsule, Oral, Daily  metoprolol tartrate, 12.5 mg, Oral, BID  micafungin (MYCAMINE) IV, 100 mg, Intravenous, Q24H  pantoprazole, 40 mg, Oral, Daily  piperacillin-tazobactam, 3.375 g, Intravenous, Once  piperacillin-tazobactam, 3.375 g, Intravenous, Q8H  predniSONE, 10 mg, Oral, Daily  rOPINIRole, 0.25 mg, Oral, Nightly  sodium chloride, 10 mL, Intravenous, Q12H  tamsulosin, 0.4 mg, Oral, Daily      sodium chloride, 9 mL/hr, Last Rate: Stopped (10/11/21 1244)          Vital Signs:  Blood pressure (!) 171/104, pulse 88, temperature 98.1 °F (36.7 °C), temperature source Oral, resp. rate 18, height 170.2 cm (67.01\"), weight 104 kg (229 lb 4.5 oz), SpO2 94 %.    Flowsheet Rows      First Filed Value   Admission Height 177.8 cm (70\") Documented at 10/04/2021 1842   Admission Weight 95.3 kg (210 lb) Documented at 10/04/2021 1842          10/11 0701 - 10/12 0700  In: 1560 [P.O.:960; I.V.:400]  Out: 275 [Drains:275]    Physical Exam:    General Appearance: NAD, alert and cooperative, Ox3  Eyes: PER, conjunctivae and sclerae normal, no icterus  Lungs: respirations regular and unlabored, no crepitus, clear to auscultation  Heart/CV: regular rhythm & normal rate, no murmur, no gallop, no rub and 1+ edema  Abdomen: not distended, soft, non-tender, no masses,  bowel sounds present  Skin: No rash, Warm and " dry    Radiology:            Labs:  Results from last 7 days   Lab Units 10/12/21  0702 10/11/21  0704 10/10/21  0149   WBC 10*3/mm3 11.04* 9.39 15.18*   HEMOGLOBIN g/dL 11.1* 10.1* 9.3*   HEMATOCRIT % 33.5* 31.8* 28.3*   PLATELETS 10*3/mm3 269 311 240     Results from last 7 days   Lab Units 10/12/21  0701 10/11/21  0704 10/10/21  0149 10/09/21  0448   SODIUM mmol/L 139 145 137 150*   POTASSIUM mmol/L 4.3 4.2 4.1 4.8   CHLORIDE mmol/L 107 113* 105 114*   CO2 mmol/L 19.0* 17.0* 20.0* 19.0*   BUN mg/dL 50* 58* 67* 61*   CREATININE mg/dL 1.80* 2.16* 2.90* 3.31*   CALCIUM mg/dL 8.6 8.2* 8.1* 8.0*   PHOSPHORUS mg/dL 2.7 3.3 4.9* 5.5*   MAGNESIUM mg/dL  --   --  2.4  --    ALBUMIN g/dL 2.60* 2.20* 2.50* 2.50*     Results from last 7 days   Lab Units 10/12/21  0701   GLUCOSE mg/dL 181*       Results from last 7 days   Lab Units 10/12/21  0701   ALK PHOS U/L 193*   BILIRUBIN mg/dL 1.0   ALT (SGPT) U/L 38   AST (SGOT) U/L 43*                 Estimated Creatinine Clearance: 45.2 mL/min (A) (by C-G formula based on SCr of 1.8 mg/dL (H)).      Assessment       Sepsis (HCC)    Diabetes mellitus (HCC)    Hypertension    Moderate obstructive sleep apnea    History of sarcoidosis (occular)    Elevated LFTs    Combined systolic and diastolic cardiac dysfunction (EF < 50% 2018)    Post-operative infection    Abdominal wall cellulitis    Postoperative intra-abdominal abscess    Elevated troponin    CAD (coronary artery disease)    CKD (chronic kidney disease), stage III (HCC)    Acute kidney injury superimposed on chronic kidney disease (HCC)    Bile leak            Impression: RUDY ON CKD. GFR IMPROVED BACK TO BASELINE. ANEMIA IS BETTER.            Recommendations: PRESENT CARE. NAL F/U AFTER DISCHARGE.      Corey Garcia MD  10/12/21  12:19 EDT

## 2021-10-12 NOTE — PROGRESS NOTES
"Patient Name:  Jeremías Soto  YOB: 1955  [id number]    Surgery Progress Note    Date of visit: 10/12/2021    Subjective   Pt with some mild epigastric pain this morning. No nausea/vomiting. No fevers/chills. +flatus/BM.         Objective       /92   Pulse 73   Temp 98.2 °F (36.8 °C) (Oral)   Resp 18   Ht 170.2 cm (67\")   Wt 104 kg (229 lb 9.6 oz)   SpO2 94%   BMI 35.96 kg/m²     Intake/Output Summary (Last 24 hours) at 10/12/2021 0709  Last data filed at 10/12/2021 0500  Gross per 24 hour   Intake 1560 ml   Output 275 ml   Net 1285 ml       CV:  Rhythm regular and rate regular  L:  Clear to auscultation bilaterally  Abd:  Bowel sounds positive, soft, nontender, nondistended, JP1 serosanguineous, LAWRENCE 2 serous with bile tinge   Ext:  No cyanosis, clubbing, edema    Recent labs and imaging that are back at this time have been reviewed.   AM labs pending       Assessment/Plan     Pt s/p lap farooq with subsequent lap washout of hematoma and I and D of right incision, followed by ERCP with stent placement for suspected bile leak  Clinically stable  Await lab results  OOB, IS, DVT prlx  Pain control        Gaudencio Wolfe MD  10/12/2021  07:09 EDT      "

## 2021-10-12 NOTE — PROGRESS NOTES
"GI Daily Progress Note  Subjective:    Chief Complaint: Follow-up bile leak.    Patient has vague dyspepsia today, symptoms improved with GI cocktail.  His appetite is poor today.  Patient had a bowel movement this morning.    Objective:    BP (!) 171/104   Pulse 88   Temp 98.1 °F (36.7 °C) (Oral)   Resp 18   Ht 170.2 cm (67.01\")   Wt 104 kg (229 lb 4.5 oz)   SpO2 94%   BMI 35.90 kg/m²     Physical Exam  Constitutional:       General: He is not in acute distress.     Appearance: He is obese. He is ill-appearing. He is not toxic-appearing.   HENT:      Head: Normocephalic.      Nose: Nose normal. No congestion.      Mouth/Throat:      Mouth: Mucous membranes are moist.      Pharynx: No oropharyngeal exudate.   Eyes:      General: No scleral icterus.     Conjunctiva/sclera: Conjunctivae normal.   Cardiovascular:      Rate and Rhythm: Normal rate.      Pulses: Normal pulses.   Pulmonary:      Effort: Pulmonary effort is normal. No respiratory distress.      Breath sounds: Normal breath sounds. No stridor. No wheezing or rales.   Abdominal:      General: Bowel sounds are normal. There is distension.      Palpations: Abdomen is soft.      Tenderness: There is no abdominal tenderness. There is no guarding.      Comments: LAWRENCE drains have minimal serosanguineous accumulation.   Genitourinary:     Comments: Deferred  Musculoskeletal:      Cervical back: Normal range of motion.      Right lower leg: Edema present.      Left lower leg: Edema present.   Skin:     General: Skin is warm and dry.      Capillary Refill: Capillary refill takes less than 2 seconds.      Coloration: Skin is not jaundiced or pale.      Findings: No erythema.      Comments: Ecchymosis/skin tear on upper extremities.   Neurological:      General: No focal deficit present.      Mental Status: He is alert and oriented to person, place, and time.   Psychiatric:         Mood and Affect: Mood normal.         Behavior: Behavior normal.         Lab  Lab " Results   Component Value Date    WBC 11.04 (H) 10/12/2021    HGB 11.1 (L) 10/12/2021    HGB 10.1 (L) 10/11/2021    HGB 9.3 (L) 10/10/2021    MCV 86.3 10/12/2021     10/12/2021    INR 1.23 (H) 10/10/2021    INR 1.43 (H) 10/09/2021    INR 2.20 (H) 10/08/2021    INR 2.39 (H) 10/07/2021    INR 2.22 (H) 10/06/2021       Lab Results   Component Value Date    GLUCOSE 181 (H) 10/12/2021    BUN 50 (H) 10/12/2021    CREATININE 1.80 (H) 10/12/2021    EGFRIFNONA 38 (L) 10/12/2021    BCR 27.8 (H) 10/12/2021     10/12/2021    K 4.3 10/12/2021    CO2 19.0 (L) 10/12/2021    CALCIUM 8.6 10/12/2021    ALBUMIN 2.60 (L) 10/12/2021    ALKPHOS 193 (H) 10/12/2021    BILITOT 1.0 10/12/2021    ALT 38 10/12/2021    AST 43 (H) 10/12/2021       Assessment:    1.  Late small bile leak following cholecystectomy for acute necrotizing cholecystitis.  Postop day #1 biliary Wallstent placement.  2.  Dyspepsia.  3.  Anorexia.  4.  Peptic duodenitis incidentally noted on ERCP, likely secondary to occasional NSAID use.    Plan:    >> Continue PPI.  >> Really needs to avoid NSAIDs, due to PUD and kidney disease.  >> Plan removal of biliary stent approximately 1 month.  >> Given vague upper abdominal discomfort, will obtain KUB to check for biliary stent migration.    Mark I. Brunner, MD  10/12/21  13:41 EDT

## 2021-10-12 NOTE — NURSING NOTE
Woc reconsulted for nonhealing left arm skin tear.  Nurse concerns of worsening plus addition of brown drainage.  Wound cleansed with normal saline and subcutaneous tissue revealed.  Partial reapproximation but underneath this approximated skin is a giant clot/eschar.  Surrounding area is very ecchymotic patient has frail thin skin.  After cleansing with normal saline Woc applied honey to the eschar area covered with multilayer Xeroform dry 4 x 4 and wrapped with Kerlix, secured with Spandage.  Patient educated on the purpose for the dressing and how to do the dressing change.  Nursing informed the dressing changes.  We will do this every other day.  Woc to reevaluate to see if therapy needs to change after that and evaluate progress of the wound.  Patient on JANEY bed Woc will follow.

## 2021-10-12 NOTE — PROGRESS NOTES
"Patient Name:  Jeremías Soto  YOB: 1955  [id number]    Surgery Progress Note    Date of visit: 10/12/2021    Subjective   Subjective: Feels OK. ERCP yesterday.         Objective     Objective:     /92   Pulse 73   Temp 98.2 °F (36.8 °C) (Oral)   Resp 18   Ht 170.2 cm (67\")   Wt 104 kg (229 lb 9.6 oz)   SpO2 94%   BMI 35.96 kg/m²     Intake/Output Summary (Last 24 hours) at 10/12/2021 0709  Last data filed at 10/12/2021 0500  Gross per 24 hour   Intake 1560 ml   Output 275 ml   Net 1285 ml       CV:  Rhythm  regular and rate regular   L:  Mild rhonchi to auscultation bilaterally   Abd:  Bowel sounds positive , soft, nontender/ Dressings c/d/i. LAWRENCE 1 serous, LAWRENCE 2 bilious, but decreased volume  Ext:  No cyanosis, clubbing, edema    Recent labs that are back at this time have been reviewed. ERCP reviewed.       Assessment/Plan     Assessment/ Plan:    Problem List Items Addressed This Visit        Gastrointestinal Abdominal     Bile leak- Stable after washout and ERCP. Continue LAWRENCE drains. Continue medical management of other issues. No further surgical interventions planned. OK to resume anticoagulation from my standpoint.      Relevant Orders    Case Request (Completed)    ERCP       Genitourinary and Reproductive     RUDY (acute kidney injury) (HCC)       Other    Post-operative infection    Abdominal wall cellulitis    Relevant Orders    Body Fluid Culture - Body Fluid, Abdomen, Right (Completed)    Postoperative intra-abdominal abscess    Relevant Orders    Case Request (Completed)    ERCP      Other Visit Diagnoses     Cellulitis of abdominal wall    -  Primary    Leukocytosis, unspecified type        Acute cystitis without hematuria        Acute congestive heart failure, unspecified heart failure type (HCC)               Active Hospital Problems    Diagnosis  POA   • **Sepsis (HCC) [A41.9]  Yes   • Post-operative infection [T81.40XA]  Yes   • Abdominal wall cellulitis [L03.311]  " Yes   • Postoperative intra-abdominal abscess [T81.43XA]  Yes   • Elevated troponin [R77.8]  Yes   • CAD (coronary artery disease) [I25.10]  Yes   • CKD (chronic kidney disease), stage III (HCC) [N18.30]  Yes   • Acute kidney injury superimposed on chronic kidney disease (HCC) [N17.9, N18.9]  Yes   • Bile leak [K83.9]  Unknown   • Elevated LFTs [R79.89]  Yes   • Combined systolic and diastolic cardiac dysfunction (EF < 50% 2018) [I51.89]  Yes   • History of sarcoidosis (occular) [Z86.2]  Yes   • Moderate obstructive sleep apnea [G47.33]  Yes   • Diabetes mellitus (HCC) [E11.9]  Yes   • Hypertension [I10]  Yes      Resolved Hospital Problems   No resolved problems to display.              David Callahan MD  10/12/2021  07:09 EDT

## 2021-10-12 NOTE — PROGRESS NOTES
"Patient Name:  Jeremías Soto  YOB: 1955  [id number]    Surgery Progress Note    Date of visit: 10/12/2021    Subjective   Subjective: NAEON. VSS, afebrile, on room air. Tolerating diet, has had increased PO and states feeling better. Denies abdominal pain, nausea/emesis/diarrhea, no chest pain.          Objective     Objective    /92   Pulse 73   Temp 98.2 °F (36.8 °C) (Oral)   Resp 18   Ht 170.2 cm (67\")   Wt 104 kg (229 lb 9.6 oz)   SpO2 94%   BMI 35.96 kg/m²     Intake/Output Summary (Last 24 hours) at 10/12/2021 0652  Last data filed at 10/12/2021 0500  Gross per 24 hour   Intake 1560 ml   Output 275 ml   Net 1285 ml       CV:  Rhythm regular and rate regular   L:  Clear to auscultation bilaterally  Abd:  Bowel sounds positive, soft, port site dressings with minimal strike through, abdominal wall dressing c/d/i; RUQ LAWRENCE with dark/bilious like output, Rt JP1 with SS output  Ext:  No cyanosis, clubbing, edema    Labs that are back at this time have been reviewed.        Assessment/Plan     Assessment/ Plan:  Patient is s/p laparoscopic wash-out, drain placement and abdominal wall incision and drainage on 10/8. Cultures growing candida tropicalis. He underwent ERCP + stent placement on 10/11 given dark LAWRENCE drainage and no bile leak was identified. LAWRENCE output decreasing this AM. Continue with JPs to bulb suction. Continue with daily dressing changes and current management.    Problem List Items Addressed This Visit        Gastrointestinal Abdominal     Bile leak    Relevant Orders    Case Request (Completed)    ERCP       Genitourinary and Reproductive     RUDY (acute kidney injury) (HCC)       Other    Post-operative infection    Abdominal wall cellulitis    Relevant Orders    Body Fluid Culture - Body Fluid, Abdomen, Right (Completed)    Postoperative intra-abdominal abscess    Relevant Orders    Case Request (Completed)    ERCP      Other Visit Diagnoses     Cellulitis of abdominal " wall    -  Primary    Leukocytosis, unspecified type        Acute cystitis without hematuria        Acute congestive heart failure, unspecified heart failure type ([facility])               Active Hospital Problems    Diagnosis  POA   • **Sepsis (HCC) [A41.9]  Yes   • Post-operative infection [T81.40XA]  Yes   • Abdominal wall cellulitis [L03.311]  Yes   • Postoperative intra-abdominal abscess [T81.43XA]  Yes   • Elevated troponin [R77.8]  Yes   • CAD (coronary artery disease) [I25.10]  Yes   • CKD (chronic kidney disease), stage III ([facility]) [N18.30]  Yes   • Acute kidney injury superimposed on chronic kidney disease (HCC) [N17.9, N18.9]  Yes   • Bile leak [K83.9]  Unknown   • Elevated LFTs [R79.89]  Yes   • Combined systolic and diastolic cardiac dysfunction (EF < 50% 2018) [I51.89]  Yes   • History of sarcoidosis (occular) [Z86.2]  Yes   • Moderate obstructive sleep apnea [G47.33]  Yes   • Diabetes mellitus ([facility]) [E11.9]  Yes   • Hypertension [I10]  Yes      Resolved Hospital Problems   No resolved problems to display.            Erasmo Valle MD  10/12/2021  06:52 EDT

## 2021-10-12 NOTE — THERAPY TREATMENT NOTE
Patient Name: Jeremías Soto  : 1955    MRN: [id number]                              Today's Date: 10/12/2021       Admit Date: 10/4/2021    Visit Dx:     ICD-10-CM ICD-9-CM   1. Cellulitis of abdominal wall  L03.311 682.2   2. Infection of superficial incisional surgical site after procedure, initial encounter  T81.41XA 998.59   3. Leukocytosis, unspecified type  D72.829 288.60   4. Acute cystitis without hematuria  N30.00 595.0   5. RUDY (acute kidney injury) (HCC)  N17.9 584.9   6. Acute congestive heart failure, unspecified heart failure type (HCC)  I50.9 428.0   7. Abdominal wall cellulitis  L03.311 682.2   8. Postoperative intra-abdominal abscess  T81.43XA 998.59     567.22   9. Bile leak  K83.9 576.9     Patient Active Problem List   Diagnosis   • Snoring   • Diabetes mellitus (HCC)   • Hypertension   • Overweight   • Moderate obstructive sleep apnea   • Abdominal pain   • Nephrolithiasis   • History of sarcoidosis (occular)   • Recurrent COVID-19 virus infection (2020, 2021)   • RUDY (acute kidney injury) (HCC)   • Elevated LFTs   • Combined systolic and diastolic cardiac dysfunction (EF < 50% )   • Abnormal CT scan, liver   • Post-operative infection   • Abdominal wall cellulitis   • Sepsis (HCC)   • Postoperative intra-abdominal abscess   • Elevated troponin   • CAD (coronary artery disease)   • CKD (chronic kidney disease), stage III (HCC)   • Acute kidney injury superimposed on chronic kidney disease (HCC)   • Bile leak     Past Medical History:   Diagnosis Date   • Cancer (HCC)     skin   • Coronary artery disease     stent   • Diabetes mellitus (HCC)    • Elevated cholesterol    • Heart attack (HCC)    • Hypertension    • Sarcoid    • Sleep apnea     no cpap   • SOB (shortness of breath)    • Stroke (HCC)     Pt. states that he has had 2 mini strokes. No residual      Past Surgical History:   Procedure Laterality Date   • CARDIAC CATHETERIZATION      cardiac stent x2 after  s/p CABG   • CATARACT EXTRACTION, BILATERAL     • CHOLECYSTECTOMY N/A 10/8/2021    Procedure: LAPAROSCOPIC WASHOUT;  Surgeon: David Callahan MD;  Location: [facility] OR;  Service: General;  Laterality: N/A;   • CHOLECYSTECTOMY WITH INTRAOPERATIVE CHOLANGIOGRAM N/A 9/27/2021    Procedure: CHOLECYSTECTOMY LAPAROSCOPIC INTRAOPERATIVE CHOLANGIOGRAM;  Surgeon: Gaudencio Wolfe MD;  Location: [facility] OR;  Service: General;  Laterality: N/A;   • COLONOSCOPY     • CORONARY ARTERY BYPASS GRAFT  2002   • ENDOSCOPY     • ERCP N/A 9/28/2021    Procedure: ENDOSCOPIC RETROGRADE CHOLANGIOPANCREATOGRAPHY;  Surgeon: Brunner, Mark I, MD;  Location: [facility] ENDOSCOPY;  Service: Gastroenterology;  Laterality: N/A;  sphincterotomy made, balloon sweep of bile duct done with 9-12 balloon.    • ERCP N/A 10/11/2021    Procedure: ENDOSCOPIC RETROGRADE CHOLANGIOPANCREATOGRAPHY with biliary wall stent;  Surgeon: Brunner, Mark I, MD;  Location: [facility] ENDOSCOPY;  Service: Gastroenterology;  Laterality: N/A;  BALLOON SWEEP 1229  10X8 STENT PLACED IN CBD    • EXTRACORPOREAL SHOCKWAVE LITHOTRIPSY (ESWL), STENT INSERTION/REMOVAL Bilateral 8/21/2020    Procedure: EXTRACORPOREAL SHOCKWAVE LITHOTRIPSY BILATERAL WITH STENT PLACEMENT LEFT;  Surgeon: Ethan Barnes Jr., MD;  Location: [facility] OR;  Service: Urology;  Laterality: Bilateral;   • SKIN CANCER EXCISION        General Information     Row Name 10/12/21 1100          Physical Therapy Time and Intention    Document Type therapy note (daily note)  -LM     Mode of Treatment individual therapy; physical therapy  -LM     Row Name 10/12/21 1100          General Information    Patient Profile Reviewed yes  -LM     Existing Precautions/Restrictions fall; other (see comments)  Abd Incision; 2 LAWRENCE Drains; Abdominal Binder  -LM     Row Name 10/12/21 1100          Cognition    Orientation Status (Cognition) oriented x 3  -LM     Row Name 10/12/21 1100          Safety Issues, Functional Mobility     Safety Issues Affecting Function (Mobility) insight into deficits/self-awareness; safety precaution awareness; safety precautions follow-through/compliance; sequencing abilities  -LM     Impairments Affecting Function (Mobility) balance; endurance/activity tolerance; pain; range of motion (ROM); strength  -LM           User Key  (r) = Recorded By, (t) = Taken By, (c) = Cosigned By    Initials Name Provider Type    LM Sienna Silveira PT Physical Therapist               Mobility     Row Name 10/12/21 1100          Bed Mobility    Bed Mobility supine-sit  -LM     Supine-Sit [place] (Bed Mobility) standby assist  -LM     Assistive Device (Bed Mobility) bed rails; head of bed elevated  -LM     Comment (Bed Mobility) Increased time/effort needed d/t pain.  -LM     Row Name 10/12/21 1100          Transfers    Comment (Transfers) Stood x 2 from EOB.  Vc's for hand placement.  -LM     Row Name 10/12/21 1100          Sit-Stand Transfer    Sit-Stand [place] (Transfers) minimum assist (75% patient effort); 1 person assist; verbal cues  -LM     Assistive Device (Sit-Stand Transfers) walker, front-wheeled  -LM     Row Name 10/12/21 1100          Gait/Stairs (Locomotion)    [place] Level (Gait) minimum assist (75% patient effort); 1 person assist; verbal cues  -LM     Assistive Device (Gait) walker, front-wheeled  -LM     Distance in Feet (Gait) 40 + 6  -LM     Deviations/Abnormal Patterns (Gait) harshad decreased; gait speed decreased  -LM     Bilateral Gait Deviations forward flexed posture  -LM     Comment (Gait/Stairs) No episodes of knee buckling noted today, but did become shaky the last 20 feet of gait.  Vc's for upright posture and to stay inside walker.  -LM           User Key  (r) = Recorded By, (t) = Taken By, (c) = Cosigned By    Initials Name Provider Type    Sienna Wilkinson PT Physical Therapist               Obj/Interventions     Row Name 10/12/21 1100          Motor Skills    Therapeutic Exercise  hip; knee; ankle  -LM     Row Name 10/12/21 1100          Hip (Therapeutic Exercise)    Hip (Therapeutic Exercise) AROM (active range of motion); AAROM (active assistive range of motion); isometric exercises  -LM     Hip AROM (Therapeutic Exercise) bilateral; aBduction; aDduction; sitting; 10 repetitions  -LM     Hip AAROM (Therapeutic Exercise) bilateral; sitting; other (see comments)  SLR - 8 reps  -LM     Hip Isometrics (Therapeutic Exercise) bilateral; gluteal sets; sitting; 10 repetitions  -LM     Row Name 10/12/21 1100          Knee (Therapeutic Exercise)    Knee (Therapeutic Exercise) AROM (active range of motion); isometric exercises  -LM     Knee AROM (Therapeutic Exercise) bilateral; heel slides; sitting; 10 repetitions  -LM     Knee Isometrics (Therapeutic Exercise) bilateral; quad sets; sitting; 10 repetitions  -LM     Row Name 10/12/21 1100          Ankle (Therapeutic Exercise)    Ankle (Therapeutic Exercise) AROM (active range of motion)  -LM     Ankle AROM (Therapeutic Exercise) bilateral; dorsiflexion; plantarflexion; sitting; 10 repetitions  -LM     Row Name 10/12/21 1100          Balance    Static Sitting Balance WFL; unsupported; sitting, edge of bed  -LM     Static Standing Balance mild impairment; supported  -LM     Dynamic Standing Balance mild impairment; supported  -LM           User Key  (r) = Recorded By, (t) = Taken By, (c) = Cosigned By    Initials Name Provider Type    LM Sienna Silveira PT Physical Therapist               Goals/Plan    No documentation.                Clinical Impression     Row Name 10/12/21 1100          Pain    Additional Documentation Pain Scale: Numbers Pre/Post-Treatment (Group)  -LM     Row Name 10/12/21 1100          Pain Scale: Numbers Pre/Post-Treatment    Pretreatment Pain Rating 5/10  -LM     Posttreatment Pain Rating 4/10  -LM     Pain Location - Orientation incisional  -LM     Pain Location abdomen  -LM     Pain Intervention(s) Repositioned;  Ambulation/increased activity  -LM     Row Name 10/12/21 1100          Plan of Care Review    Plan of Care Reviewed With patient  -LM     Progress improving  -LM     Outcome Summary Pt progressing well towards skilled PT goals.  Pt transferred supine-->sit with SBA, stood with MinAx1, and ambulated 40' + 6' using rw with MinAx1.  No knee buckling noted today, but pt shaky the last 20 feet of gait reporting fatigue.  BLE ther ex completed without complaint.  Continue with skilled PT to improve mobility and safety.  -LM     Row Name 10/12/21 1100          Vital Signs    Pre Systolic BP Rehab 165  -LM     Pre Treatment Diastolic   -LM     Post Systolic BP Rehab 159  -LM     Post Treatment Diastolic   -LM     Pretreatment Heart Rate (beats/min) 88  -LM     Posttreatment Heart Rate (beats/min) 98  -LM     Pre SpO2 (%) 94  -LM     O2 Delivery Pre Treatment room air  -LM     Post SpO2 (%) 93  -LM     O2 Delivery Post Treatment room air  -LM     Pre Patient Position Supine  -LM     Post Patient Position Sitting  -LM     Row Name 10/12/21 1100          Positioning and Restraints    Pre-Treatment Position in bed  -LM     Post Treatment Position chair  -LM     In Chair reclined; call light within reach; encouraged to call for assist; exit alarm on; waffle cushion; with family/caregiver; notified nsg; legs elevated  -LM           User Key  (r) = Recorded By, (t) = Taken By, (c) = Cosigned By    Initials Name Provider Type    Sienna Wilkinson, PT Physical Therapist               Outcome Measures     Row Name 10/12/21 1100          How much help from another person do you currently need...    Turning from your back to your side while in flat bed without using bedrails? 3  -LM     Moving from lying on back to sitting on the side of a flat bed without bedrails? 3  -LM     Moving to and from a bed to a chair (including a wheelchair)? 3  -LM     Standing up from a chair using your arms (e.g., wheelchair, bedside chair)? 3   -LM     Climbing 3-5 steps with a railing? 2  -LM     To walk in hospital room? 3  -LM     AM-PAC 6 Clicks Score (PT) 17  -LM     Row Name 10/12/21 1100          Functional Assessment    Outcome Measure Options AM-PAC 6 Clicks Basic Mobility (PT)  -           User Key  (r) = Recorded By, (t) = Taken By, (c) = Cosigned By    Initials Name Provider Type    LM Sienna Silveira, PT Physical Therapist                             Physical Therapy Education                 Title: PT OT SLP Therapies (In Progress)     Topic: Physical Therapy (In Progress)     Point: Mobility training (Done)     Learning Progress Summary           Patient Acceptance, E, VU,NR by  at 10/8/2021 1537                   Point: Home exercise program (Not Started)     Learner Progress:  Not documented in this visit.          Point: Precautions (Done)     Learning Progress Summary           Patient Acceptance, E, VU,NR by  at 10/8/2021 1537                               User Key     Initials Effective Dates Name Provider Type Discipline     06/16/21 -  Sienna Silveira, PT Physical Therapist PT              PT Recommendation and Plan  Planned Therapy Interventions (PT): balance training, bed mobility training, gait training, motor coordination training, neuromuscular re-education, home exercise program, patient/family education, postural re-education, ROM (range of motion), stair training, strengthening, stretching, transfer training  Plan of Care Reviewed With: patient  Progress: improving  Outcome Summary: Pt progressing well towards skilled PT goals.  Pt transferred supine-->sit with SBA, stood with MinAx1, and ambulated 40' + 6' using rw with MinAx1.  No knee buckling noted today, but pt shaky the last 20 feet of gait reporting fatigue.  BLE ther ex completed without complaint.  Continue with skilled PT to improve mobility and safety.     Time Calculation:    PT Charges     Row Name 10/12/21 1100             Time Calculation    Start Time  1100  -LM      PT Received On 10/12/21  -LM      PT Goal Re-Cert Due Date 10/18/21  -LM              Timed Charges    39569 - PT Therapeutic Exercise Minutes 15  -LM      50826 - Gait Training Minutes  15  -LM      27150 - PT Therapeutic Activity Minutes 15  -LM              Total Minutes    Timed Charges Total Minutes 45  -LM       Total Minutes 45  -LM            User Key  (r) = Recorded By, (t) = Taken By, (c) = Cosigned By    Initials Name Provider Type     Sienna Silveira, PT Physical Therapist              Therapy Charges for Today     Code Description Service Date Service Provider Modifiers Qty    71211684391 HC GAIT TRAINING EA 15 MIN 10/12/2021 Sienna Silveira, PT GP 1    64604777695 HC PT THERAPEUTIC ACT EA 15 MIN 10/12/2021 Sienna Silveira, PT GP 1    22397505172 HC PT THER PROC EA 15 MIN 10/12/2021 Sienna Silveira, PT GP 1          PT G-Codes  Outcome Measure Options: AM-PAC 6 Clicks Basic Mobility (PT)  AM-PAC 6 Clicks Score (PT): 17    Sienna Silveira PT  10/12/2021

## 2021-10-12 NOTE — PLAN OF CARE
Goal Outcome Evaluation:  Plan of Care Reviewed With: patient        Progress: improving  Outcome Summary: Pt progressing well towards skilled PT goals.  Pt transferred supine-->sit with SBA, stood with MinAx1, and ambulated 40' + 6' using rw with MinAx1.  No knee buckling noted today, but pt shaky the last 20 feet of gait reporting fatigue.  BLE ther ex completed without complaint.  Continue with skilled PT to improve mobility and safety.

## 2021-10-12 NOTE — PROGRESS NOTES
" [facility] - Progress Note    Jeremías Soto  1955  [id number]    10/12/21, 08:52 EDT      Chief Complaint: Following for CHF, CAD, NSVT    Subjective:   Nause o/w no co.  Edema sl better  Not much appetite      Review of Systems:  Pertinent positives are listed above and in physical exam.  All others have been reviewed and are negative.    amiodarone, 200 mg, Oral, TID  DAPTOmycin, 6 mg/kg (Adjusted), Intravenous, Q48H  docosanol, 1 application, Topical, 5x Daily  dorzolamide-timolol, , Both Eyes, BID  ferric gluconate, 125 mg, Intravenous, Daily Before Supper  insulin lispro, 0-7 Units, Subcutaneous, 4x Daily With Meals & Nightly  lactobacillus acidophilus, 1 capsule, Oral, Daily  meropenem, 1 g, Intravenous, Q12H  metoprolol tartrate, 12.5 mg, Oral, BID  micafungin (MYCAMINE) IV, 100 mg, Intravenous, Q24H  pantoprazole, 40 mg, Oral, Daily  predniSONE, 10 mg, Oral, Daily  rOPINIRole, 0.25 mg, Oral, Nightly  sodium chloride, 10 mL, Intravenous, Q12H  tamsulosin, 0.4 mg, Oral, Daily        Objective:  Vitals:   height is 170.2 cm (67\") and weight is 104 kg (229 lb 9.6 oz). His oral temperature is 98.2 °F (36.8 °C). His blood pressure is 168/92 and his pulse is 73. His respiration is 18 and oxygen saturation is 94%.       Intake/Output Summary (Last 24 hours) at 10/12/2021 0833  Last data filed at 10/12/2021 0500  Gross per 24 hour   Intake 1560 ml   Output 170 ml   Net 1390 ml       Physical Exam:  General: Alert and oriented   Cardiovascular: Heart has a nondisplaced focal PMI. Regular rate and rhythm without murmur, gallop or rub.  Lungs: Clear without rales or wheezes. Sl decreased bases   Extremities: Show trace to 1+ edema.  Skin: warm and dry.  Neurologic: nonfocal               Results from last 7 days   Lab Units 10/12/21  0702   WBC 10*3/mm3 11.04*   HEMOGLOBIN g/dL 11.1*   HEMATOCRIT % 33.5*   PLATELETS 10*3/mm3 269     Results from last 7 days   Lab Units 10/12/21  0701 "   SODIUM mmol/L 139   POTASSIUM mmol/L 4.3   CHLORIDE mmol/L 107   CO2 mmol/L 19.0*   BUN mg/dL 50*   CREATININE mg/dL 1.80*   CALCIUM mg/dL 8.6   BILIRUBIN mg/dL 1.0   ALK PHOS U/L 193*   ALT (SGPT) U/L 38   AST (SGOT) U/L 43*   GLUCOSE mg/dL 181*     Results from last 7 days   Lab Units 10/10/21  0149 10/09/21  0448 10/08/21  0617   INR  1.23* 1.43* 2.20*                   Tele:  NSR    Assessment and Plan:  1)  NSVT  - 25 beat run at 1216 Sunday at 135 bpm, asymptomatic  -  9/24/21 Echo EF 26-30%  -  On low dose metoprolol tartrate 12.5mg BID   -  Initiated oral Amiodarone 10/10.  EKG reviewed, NSR with acceptable QTc.  -  Will need LifeVest at discharge.     2)  CAD/ischemic cardiomyopathy/congestive heart failure  -  Prior CABG/stents  -  Mild troponin elevation on admission - likely chronic, currently chest pain-free with no anginal equivalents  -  Echocardiogram 9/24/2021 LVEF 26-30%.  Again, will need LifeVest at discharge.  -  Continue Lopressor, home lisinopril on hold due to acute kidney injury        3) History of LV thrombus  - per records from [facility] as documented by Dr Owens patient has previously been anticoagulated with Eliquis currently on hold given recent surgical intervention.    -  Per prior notes, will pursue echocardiogram with [facility] for reassessment of LV thrombus. Surgery has given the ok to resume anti coagulation         4) Sepsis/abdominal wall cellulitis  - 10/8/21  laparoscopic washout and drain placement.  Incision and drainage of right flank cellulitis.  Surgical management per Dr. Callahan  -  Infectious disease managing antibiotics  -  Anemia/transfusion per primary/surgical teams.  -  Bile leak.  S/P ERCP and biliary Wallstent Monday 10/11.   Surgery has given ok to resume AC       5) RUDY on CKD    -   Nephrology following           I discussed the patient's findings and my recommendations with the patient, any present family members, and the nursing staff.   Manjula Owens MD saw and examined patient, verified hx and PE, read all radiographic studies, reviewed labs and micro data, and formulated dx, plan for treatment and all medical decision making.      Lesley CANNONLeonora Jean, PA-C  10/12/21, 08:52 EDT    No thrombus by TTE.  Apical DK.  EF 30%.  No further NSVT.  BP up today. Cr 1.8.  Cont amio for NSVT.  Increase metoprolol    I have seen and examined the patient, case was discussed with the physician extender, reviewed the above note, necessary changes were made and I agree with the final note.  Manjula Owens MD, FACC

## 2021-10-13 NOTE — PROGRESS NOTES
Jeremías Soto  1955  [id number]    Date of Consult: 10/5/21  Requesting Provider: Vincent Crawford MD  Evaluating Physician: Diaz Moreno MD    Chief Complaint: abdominal pain and redness    Reason for Consultation: sepsis secondary to an abdominal wall abscess/cellulitis    History of present illness:    Jeremías Soto is a 66 y.o.  Yr old male with history of coronary artery disease, CK D stage III B, diabetes mellitus type 2, hyperlipidemia, hypertension, sarcoidosis, and history of CVA who I recently evaluated during an admission to [facility] last month for enterococcus bacteremia and Clostridium perfringens bacteremia due to a biliary source/gallbladder source (cholangitis and cholecystitis).  The patient underwent laparoscopic cholecystectomy on 9/27 and ERCP with sphincterotomy stone extraction on 9/28.  He additionally had aerococcus urinae from urine culture. Due to his baseline renal dysfunction he did have a Groshong catheter placed for IV antibiotics.  He clinically improved with improvement in his LFTs and sepsis. He was discharged on 9/29 with plans to continue Zosyn at least until 10/11/21. I have not yet seen him in outpatient follow-up but he was scheduled to follow-up with me tomorrow on 10/6.      He was doing well apparently until 2-3 days ago when he noticed redness developing over his right abdominal wall.  The erythema has gotten progressively worse with some swelling and warmth and tenderness.  As far as I know he did not contact our clinic regarding this erythema. He was not having any fevers or chills at home.  He presented to the ER early this morning for evaluation of this redness. Since arrival, he was noted to have a maximum temperature of 99.1°F. labs showed a CRP of 46.11, troponin elevated to 0.07, proBNP of 8353, INR of 2.72, White blood cell count of 24.54 (up from 13.29 just before discharge), hemoglobin of 12, lactic acid of 4.1-->2.9, creatinine of  2.84 (up from 2.15 just prior to discharge). COVID-19 PCR was negative. White blood cell count is now improved to 13.69 on antibiotics.creatinine is worse at 3.09 today.  LFTs remain elevated with an ALT of 94, an AST of 137, an alkaline phosphatase of 176, and a total bilirubin of 1.4. CT abdomen and pelvis was done yesterday and showed cholecystectomy with a fluid collection in the gallbladder fossa containing a bubble of air with differential considerations including a seroma/hematoma, biloma, or possible developing abscess.  No biliary obstruction was seen.  There was also fat stranding in the ventral abdominal wall and right flank that could reflect bland edema or cellulitis. The patient's antibiotics have been changed to vancomycin, cefepime, and Flagyl. Repeat blood cultures and urine culture are in progress. Surgery consult has been placed. Plan is for IR drainage procedure but having to wait on this due to his anticoagulation that he has been on. He underwent NM Hepatobiliary scan today with no evidence for abnormal tracer activity to suggest leak.  The infectious disease team has been consulted for recommendations.    Subjective:    10/6/21: the patient states that his abdominal pain has been improving although he was having some increased right-sided abdominal pain after I went in the room today as he has just eaten something. No fevers. Tolerating the abx well without ADRs.    10/7/21: The patient is doing worse today.  Still having some right-sided abdominal pain.  He was taken for repeat CT abdomen and pelvis today as his leukocytosis worsened And he did have a new 16 x 6 cm hepatic fluid collection with intermittent heterogenous and hyperdense contents compatible with a large hematoma.  He did have a new small right pleural effusion with some adjacent consolidation or atelectasis. He denies any nausea.  No fevers    10/8/21: The patient was very somnolent today although he just recently got back from  the OR.  Dr. Callahan took the patient to the OR today and did not see any acute bleeding but did see some old blood/hematoma.  No necrotic fascia noted.  2 LAWRENCE drains left in place, one of the liver and another in the gallbladder fossa.  No fevers overnight.  Leukocytosis is slightly better.  His wife is at bedside and states that he is just been very somnolent after his procedure and during week but no other acute issues.    10/9/21: the patient states that his abdominal pain is improving. No fevers. Tolerating abx well without ADRs. Did have worsening hypoxia overnight to 10L NC but now improved back to 2L NC. Diuresis per nephrology and cardiology.    10/10/21: The patient has serous drainage from his LAWRENCE drain #1 but his LAWRENCE drain #2 has her bile and Dr. Callahan suspects that the patient has a bile leak from his prior cholecystectomy procedure.  Dr. Sylvester with GI has been consulted for ERCP tomorrow. The patient is somnolent today.  His wife remains at bedside.  She states that he has had a fairly good today although he is somnolent.  No fevers.  No history available from the patient    10/11/21: LAWRENCE drains in place.  Taken for ERCP today by Dr. Brunner with no overt bile leak seen and balloon sweep showed no stones.  A stent was placed in the CBD to the common hepatic duct and across the cystic duct with plans to remove in 1 month.  Patient is sitting up in bed with food in front of him, but he is not hungry.  He denies f/c, sob, v/d, rashes. He has some nausea.  Had NSVT on Sunday.  He was started on amiodarone and may go home with LifeVest. He remains afebrile. Creatinine improved. Has some diarrhea.     10/12/21: The patient remains afebrile.  Abdominal pain is improving.  LAWRENCE drain still remain in place.  Now growing Candida albicans and Candida tropicalis from his body fluid cultures.  Diarrhea has improved    Past Medical History:   Diagnosis Date   • Cancer (HCC)     skin   • Coronary artery disease     stent    • Diabetes mellitus (HCC)    • Elevated cholesterol    • Heart attack (HCC) 2003   • Hypertension    • Sarcoid    • Sleep apnea     no cpap   • SOB (shortness of breath)    • Stroke (HCC)     Pt. states that he has had 2 mini strokes. No residual        Past Surgical History:   Procedure Laterality Date   • CARDIAC CATHETERIZATION  2003    cardiac stent x2 after s/p CABG   • CATARACT EXTRACTION, BILATERAL     • CHOLECYSTECTOMY N/A 10/8/2021    Procedure: LAPAROSCOPIC WASHOUT;  Surgeon: David Callahan MD;  Location: [facility] OR;  Service: General;  Laterality: N/A;   • CHOLECYSTECTOMY WITH INTRAOPERATIVE CHOLANGIOGRAM N/A 9/27/2021    Procedure: CHOLECYSTECTOMY LAPAROSCOPIC INTRAOPERATIVE CHOLANGIOGRAM;  Surgeon: Gaudencio Wolfe MD;  Location:  [facility] OR;  Service: General;  Laterality: N/A;   • COLONOSCOPY     • CORONARY ARTERY BYPASS GRAFT  2002   • ENDOSCOPY     • ERCP N/A 9/28/2021    Procedure: ENDOSCOPIC RETROGRADE CHOLANGIOPANCREATOGRAPHY;  Surgeon: Brunner, Mark I, MD;  Location: [facility] ENDOSCOPY;  Service: Gastroenterology;  Laterality: N/A;  sphincterotomy made, balloon sweep of bile duct done with 9-12 balloon.    • ERCP N/A 10/11/2021    Procedure: ENDOSCOPIC RETROGRADE CHOLANGIOPANCREATOGRAPHY with biliary wall stent;  Surgeon: Brunner, Mark I, MD;  Location: [facility] ENDOSCOPY;  Service: Gastroenterology;  Laterality: N/A;  BALLOON SWEEP 1229  10X8 STENT PLACED IN CBD    • EXTRACORPOREAL SHOCKWAVE LITHOTRIPSY (ESWL), STENT INSERTION/REMOVAL Bilateral 8/21/2020    Procedure: EXTRACORPOREAL SHOCKWAVE LITHOTRIPSY BILATERAL WITH STENT PLACEMENT LEFT;  Surgeon: Ethan Barnes Jr., MD;  Location:  [facility] OR;  Service: Urology;  Laterality: Bilateral;   • SKIN CANCER EXCISION       Social history:  The patient is .  He lives in [place] [place] [facility].  No history of tobacco, alcohol, or illicit drug use.    Family history:  family history includes COPD in his mother; Cancer in his brother; Diabetes  in his father and sister; Heart disease in his father; Hypertension in his father; Obesity in his sister.    No Known Allergies    Medication:  Current Facility-Administered Medications   Medication Dose Route Frequency Provider Last Rate Last Admin   • amiodarone (PACERONE) tablet 200 mg  200 mg Oral TID Brunner, Mark I, MD   200 mg at 10/12/21 2234   • atorvastatin (LIPITOR) tablet 40 mg  40 mg Oral Nightly Gayle Butler MD   40 mg at 10/12/21 2236   • dextrose (D50W) 25 g/ 50mL Intravenous Solution 25 g  25 g Intravenous Q15 Min PRN Brunner, Mark I, MD   25 g at 10/11/21 0742   • dextrose (GLUTOSE) oral gel 15 g  15 g Oral Q15 Min PRN Brunner, Mark I, MD       • docosanol (ABREVA) 10 % cream 1 application  1 application Topical 5x Daily Brunner, Mark I, MD   1 application at 10/12/21 2237   • dorzolamide (TRUSOPT) 2 % 1 drop, timolol (TIMOPTIC) 0.5 % 1 drop for Cosopt 22.3-6.8 mg/mL   Both Eyes BID Brunner, Mark I, MD   Given at 10/12/21 2236   • ferric gluconate (FERRLECIT)125 MG in sodium chloride 0.9 % 100 mL IVPB  125 mg Intravenous Daily Before Supper Brunner, Mark I, MD   125 mg at 10/12/21 1747   • glucagon (human recombinant) (GLUCAGEN DIAGNOSTIC) injection 1 mg  1 mg Subcutaneous Q15 Min PRN Brunner, Mark I, MD       • insulin lispro (humaLOG) injection 0-7 Units  0-7 Units Subcutaneous 4x Daily With Meals & Nightly Brunner, Mark I, MD   4 Units at 10/12/21 2233   • lactobacillus acidophilus (RISAQUAD) capsule 1 capsule  1 capsule Oral Daily Brunner, Mark I, MD   1 capsule at 10/12/21 0941   • metoprolol tartrate (LOPRESSOR) tablet 25 mg  25 mg Oral BID Manjula Owens MD   25 mg at 10/12/21 2236   • micafungin 100 mg/100 mL 0.9% NS IVPB (mbp)  100 mg Intravenous Q24H Brunner, Mark I, MD   100 mg at 10/12/21 2238   • morphine injection 2 mg  2 mg Intravenous Q3H PRN Brunner, Mark I, MD   2 mg at 10/10/21 0540   • pantoprazole (PROTONIX) EC tablet 40 mg  40 mg Oral Daily Brunner, Mark I, MD    40 mg at 10/12/21 0941   • piperacillin-tazobactam (ZOSYN) 3.375 g in iso-osmotic dextrose 50 ml (premix)  3.375 g Intravenous Q8H Diaz Moreno MD   3.375 g at 10/12/21 1747   • predniSONE (DELTASONE) tablet 10 mg  10 mg Oral Daily Brunner, Mark I, MD   10 mg at 10/12/21 0941   • rOPINIRole (REQUIP) tablet 0.25 mg  0.25 mg Oral Nightly Brunner, Mark I, MD   0.25 mg at 10/12/21 2235   • Sodium Chloride (PF) 0.9 % 10 mL  10 mL Intravenous PRN Brunner, Mark I, MD       • sodium chloride 0.9 % flush 10 mL  10 mL Intravenous Q12H Brunner, Mark I, MD   10 mL at 10/11/21 2035   • sodium chloride 0.9 % flush 10 mL  10 mL Intravenous PRN Brunner, Mark I, MD       • sodium chloride 0.9 % infusion  9 mL/hr Intravenous Continuous Brunner, Mark I, MD   Stopped at 10/11/21 1244   • tamsulosin (FLOMAX) 24 hr capsule 0.4 mg  0.4 mg Oral Daily Brunner, Mark I, MD   0.4 mg at 10/12/21 0941         Antibiotics:  Anti-Infectives (From admission, onward)    Ordered     Dose/Rate Route Frequency Start Stop    10/12/21 0955  piperacillin-tazobactam (ZOSYN) 3.375 g in iso-osmotic dextrose 50 ml (premix)        Ordering Provider: Diaz Moreno MD    3.375 g  over 4 Hours Intravenous Every 8 Hours 10/12/21 1600 10/19/21 1559    10/12/21 0955  piperacillin-tazobactam (ZOSYN) 3.375 g in iso-osmotic dextrose 50 ml (premix)        Ordering Provider: Diaz Moreno MD    3.375 g  over 30 Minutes Intravenous Once 10/12/21 1045 10/12/21 1459    10/07/21 0852  meropenem (MERREM) 1 g/100 mL 0.9% NS VTB (mbp)        Ordering Provider: Thierry Chinchilla, PharmD    1 g  over 30 Minutes Intravenous Once 10/07/21 0945 10/07/21 1338    10/06/21 1506  valACYclovir (VALTREX) tablet 500 mg        Ordering Provider: Don Draper DO    500 mg Oral Every 12 Hours Scheduled 10/06/21 2100 10/07/21 0854    10/05/21 1632  micafungin 100 mg/100 mL 0.9% NS IVPB (mbp)        Ordering Provider: Brunner, Mark I, MD    100 mg  over 60 Minutes  "Intravenous Every 24 Hours 10/05/21 1800 10/17/21 1223    10/04/21 2101  vancomycin 1750 mg/500 mL 0.9% NS IVPB ([facility])        Ordering Provider: Toño Lainez MD    20 mg/kg × 81.9 kg (Adjusted)  250 mL/hr over 120 Minutes Intravenous Once 10/04/21 2103 10/05/21 0132    10/04/21 2101  cefepime (MAXIPIME) 2 g/100 mL 0.9% NS (mbp)        Ordering Provider: Toño Lainez MD    2 g  200 mL/hr over 30 Minutes Intravenous Once 10/04/21 2103 10/04/21 2250            Review of Systems    As above    Physical Exam:   Vital Signs   /98   Pulse 91   Temp 98.1 °F (36.7 °C) (Oral)   Resp 18   Ht 170.2 cm (67.01\")   Wt 104 kg (229 lb 4.5 oz)   SpO2 94%   BMI 35.90 kg/m²     GENERAL: sitting in a bedside chair.  Slightly less ill appearing.  No acute distress.  HENT: Normocephalic, atraumatic.   Has some external oral blisters noted-unchanged  Eyes: No conjunctival injection. No icterus.  NECK: Supple without nuchal rigidity. No mass.  HEART: RRR; No murmur, rubs, gallops.   LUNGS: clear to auscultation anteriorly.  Nonlabored breathing  ABDOMEN: abdominal binder in place.  Serous drainage from LAWRENCE drain #1.  Very little darker drainage from LAWRENCE #2.  Abdominal tenderness has improved.  :  Without Meyers catheter.  SKIN: no new rashes noted. No jaundice  NEURO: Awake and alert. Oriented ×3.  PSYCHIATRIC: Cooperative.  Groshong cath site is without erythema or drainage    Laboratory Data    Results from last 7 days   Lab Units 10/12/21  0702 10/11/21  0704 10/10/21  0149   WBC 10*3/mm3 11.04* 9.39 15.18*   HEMOGLOBIN g/dL 11.1* 10.1* 9.3*   HEMATOCRIT % 33.5* 31.8* 28.3*   PLATELETS 10*3/mm3 269 311 240     Results from last 7 days   Lab Units 10/12/21  0701   SODIUM mmol/L 139   POTASSIUM mmol/L 4.3   CHLORIDE mmol/L 107   CO2 mmol/L 19.0*   BUN mg/dL 50*   CREATININE mg/dL 1.80*   GLUCOSE mg/dL 181*   CALCIUM mg/dL 8.6     Results from last 7 days   Lab Units 10/12/21  0701   ALK PHOS U/L 193* "   BILIRUBIN mg/dL 1.0   ALT (SGPT) U/L 38   AST (SGOT) U/L 43*               Estimated Creatinine Clearance: 45.2 mL/min (A) (by C-G formula based on SCr of 1.8 mg/dL (H)).       Microbiology:  Microbiology Results (last 10 days)     Procedure Component Value - Date/Time    Eosinophil Smear - Urine, Urine, Clean Catch [[id number]]  (Normal) Collected: 10/08/21 1823    Lab Status: Final result Specimen: Urine, Clean Catch Updated: 10/08/21 2003     Eosinophil Smear 0 % EOS/100 Cells     Narrative:      No eosinophil seen    Body Fluid Culture - Body Fluid, Abdomen, Right [[id number]]  (Abnormal)  (Susceptibility) Collected: 10/08/21 0912    Lab Status: Final result Specimen: Body Fluid from Abdomen, Right Updated: 10/12/21 0845     Body Fluid Culture Light growth (2+) Candida tropicalis      Light growth (2+) Candida albicans     Gram Stain Few (2+) WBCs seen      No organisms seen    Susceptibility      Candida tropicalis     MELINDA     Fluconazole Susceptible     Micafungin Susceptible                  Linear View               Susceptibility      Candida albicans     MELINDA     Fluconazole Susceptible     Micafungin Susceptible                  Linear View                   Blood Culture - Blood, Arm, Left [[id number]]  (Normal) Collected: 10/05/21 0734    Lab Status: Final result Specimen: Blood from Arm, Left Updated: 10/10/21 0901     Blood Culture No growth at 5 days    Narrative:      The previously reported component GRAM STAIN is no longer being reported. Previous result was <null> (Reference Range: [Reference Range]) on 10/8/2021 at 0901 EDT.    COVID PRE-OP / PRE-PROCEDURE SCREENING ORDER (NO ISOLATION) - Swab, Nasopharynx [[id number]]  (Normal) Collected: 10/05/21 0015    Lab Status: Final result Specimen: Swab from Nasopharynx Updated: 10/05/21 0043    Narrative:      The following orders were created for panel order COVID PRE-OP / PRE-PROCEDURE SCREENING ORDER (NO ISOLATION) - Swab, Nasopharynx.  Procedure                                Abnormality         Status                     ---------                               -----------         ------                     COVID-19, ABBOTT IN-HOUS...[[id number]]  Normal              Final result                 Please view results for these tests on the individual orders.    COVID-19, ABBOTT IN-HOUSE,NASAL Swab (NO TRANSPORT MEDIA) 2 HR TAT - Swab, Nasopharynx [[id number]]  (Normal) Collected: 10/05/21 0015    Lab Status: Final result Specimen: Swab from Nasopharynx Updated: 10/05/21 0043     COVID19 Presumptive Negative    Narrative:      Fact sheet for providers: https://www.fda.gov/media/219769/download     Fact sheet for patients: https://www.fda.gov/media/480623/download    Test performed by PCR.  If inconsistent with clinical signs and symptoms patient should be tested with different authorized molecular test.    Urine Culture - Urine, Urine, Clean Catch [[id number]]  (Abnormal) Collected: 10/04/21 2151    Lab Status: Final result Specimen: Urine, Clean Catch Updated: 10/05/21 2312     Urine Culture Yeast isolated    Narrative:      No further work up will be performed.    Blood Culture - Blood, Arm, Right [[id number]]  (Normal) Collected: 10/04/21 2143    Lab Status: Final result Specimen: Blood from Arm, Right Updated: 10/09/21 2215     Blood Culture No growth at 5 days    Narrative:      The previously reported component GRAM STAIN is no longer being reported. Previous result was <null> (Reference Range: [Reference Range]) on 10/8/2021 at 2215 EDT.    Blood Culture - Blood, Arm, Left [[id number]]  (Normal) Collected: 10/04/21 2120    Lab Status: Final result Specimen: Blood from Arm, Left Updated: 10/09/21 2215     Blood Culture No growth at 5 days    Narrative:      The previously reported component GRAM STAIN is no longer being reported. Previous result was <null> (Reference Range: [Reference Range]) on 10/8/2021 at 2215 EDT.            Radiology:  CT Abdomen Pelvis Without  Contrast    Result Date: 10/7/2021  New 16 x 6 cm hepatic fluid collection with intermixed heterogeneous and hyperdense contents compatible with large hematoma. This appears new from the October 4, 2021 comparison scan. There is a small amount of free fluid in the pelvis which is also new from comparison. The remainder of the hematoma and blood products appear primarily intraparenchymal and subcapsular.  New small right pleural effusion with some adjacent consolidation or atelectasis, with component of pneumonia not entirely excluded.  Redemonstrated bladder wall thickening with several diverticula including small locules of air within an anterior diverticula. Finding most likely relates to any recent catheterization, however correlate with any clinical concern for cystitis. No additional acute findings in the abdomen and pelvis.  Finding of large hepatic hematoma discussed with Dr. Callahan by Corey Castillo via phone at 12:47 PM 10/7/2021   This report was finalized on 10/7/2021 12:48 PM by Corey Castillo.      NM Hepatobiliary Without CCK    Result Date: 10/5/2021  No evidence for abnormal tracer activity to suggest leak with normal activity in the biliary system and small bowel.  D: 10/05/2021 E: 10/05/2021    This report was finalized on 10/5/2021 8:59 PM by Dr. Jevon Hartmann.      XR Chest 1 View    Result Date: 10/9/2021  1. Potential mild vascular congestion or volume overload, new since 10/4/2021, correlate clinically. 2. Stable cardiomegaly. Median sternotomy. 3. Elevation right hemidiaphragm with scarring or atelectasis right lung base. Signer Name: Navneet Beaver MD  Signed: 10/9/2021 6:44 AM  Workstation Name: [email or web address]-  [facility] ercp biliary duct only    Result Date: 10/12/2021  Fluoroscopy provided during ERCP.  D: 10/12/2021 E: 10/12/2021   This report was finalized on 10/12/2021 8:40 PM by Dr. Sean Kaminski MD.           Impression:     Problems:  -Sepsis with  leukocytosis with neutrophilia, lactic acidosis, elevated pro calcitonin level- due to intra-abdominal source/abdominal wall cellulitis.   - S/p  ERCP 10/11 with stent and no obvious signs of bile leak.  -Intra-abdominal hematoma, now status post washout procedure on 10/8- Candida tropicalis and Candida albicans from culture  -Abdominal wall cellulitis- Appears slightly improved  -Possible developing intra-abdominal abscess vs. Seroma. No signs of biliary leak on NM hepatobiliary scan but stent was placed  -Recent cholangitis and cholecystitis status post recent cholecystectomy on 9/27 and ERCP on 9/28  -Recent enterococcus bacteremia  -Recent Clostridium perfringens bacteremia  -Recent pancreatitis  -Macrocytic anemia  -Elevated troponin level  -Elevated LFTs  -RUDY on CKD stage IIIB- due to sepsis vs dehydration  -Coronary artery disease status post CABG/stents  -systolic heart failure, LVEF was 26-30% on echo in 09/2021  -history of cardiac thrombus, on anticoagulation with eliquis  -Diabetes mellitus type 2 with hyperglycemia  -Essential hypertension  -Hyperlipidemia  -Sarcoidosis/chronic prednisone  -prolonged QTc interval  -elevated CPK level  -external oral blisters, possible herpes labialis  - Diarrhea, new.  Stool softener stopped today.  Will watch closely.     PLAN:    -continue to follow cbc with diff, cmp, crp  -stop meropenem and daptomycin  -Start Zosyn 3.375 g IV every 8 hours  -continue empiric Micafungin (avoiding fluconazole in the setting of his prolonged QTc interval)  -status post washout procedure  by Dr. Callahan 10/8.  2 LAWRENCE drains left in place.  Surgical cultures are pending- Candida tropicalis  -s/p ERCP on 10/11 with no evidence of bile leak, stent placed.  -weekly Groshong cath dressing changes  -s/p Valtrex    Tentative antibiotic plans:    UM/LUC:  1.  Zosyn 3.375 g IV every 8 hours  2.  Micafungin 100 mg IV every 24 hours  3.  Plan to continue the above antimicrobials at least until  10/25/21  4.  Weekly CBC with differential, CMP, ESR, and CRP- labs need to be faxed to [facility] at [phone]  5.  Weekly Groshong catheter dressing changes  6.  Needs to follow-up with me on 10/25/21  7.  Please fax a copy of my orders to [facility] at [phone] and call [phone] with final discharge plans.    Complex MDM. The patient has significant risk for further morbidity and mortality in the setting of his multiple complex medical issues.       Diaz Moreno MD  10/12/2021

## 2021-10-13 NOTE — PROGRESS NOTES
"    [facility] Medicine Services  PROGRESS NOTE    Patient Name: Jeremías Soto  : 1955  MRN: [id number]    Date of Admission: 10/4/2021  Primary Care Physician: Vincent Crawford MD    Subjective   Subjective     CC:  Abdominal pain    HPI:  Feels \"yucky.\"  C/o nausea and epigastric pain.     ROS:  Gen- No fevers, chills  CV- No chest pain, palpitations  Resp- No cough, dyspnea  GI- +nausea, +epigastric pain            Objective   Objective     Vital Signs:   Temp:  [98.1 °F (36.7 °C)] 98.1 °F (36.7 °C)  Heart Rate:  [82-94] 91  Resp:  [18] 18  BP: (155-172)/() 169/94     Physical Exam:  Constitutional: Awake, alert, no acute distress, sitting up in bed, wife at bedside  HENT: perioral scabbed lesions  Respiratory: Clear to auscultation bilaterally, respiratory effort normal   Cardiovascular: RRR, no murmurs, rubs, or gallops, palpable radial pulse  Gastrointestinal: Mildly tender with palpation, 2 LAWRENCE drains on right side with serosanguineous drainage, also small open RLQ area incision/draining clear fluid  Musculoskeletal: 2+ lower extremity edema  Psychiatric: Appropriate affect, cooperative  Neurologic: Speech clear, moving all extremity spontaneously  Exam unchanged from 10/11/2021    Results Reviewed:  LAB RESULTS:      Lab 10/12/21  0702 10/11/21  0704 10/10/21  0149 10/09/21  2142 10/09/21  0448 10/08/21  0617 10/08/21  0617 10/07/21  0826 10/07/21  0433 10/07/21  0433   WBC 11.04* 9.39 15.18*  --  14.44*  --  14.57*  --    < > 17.25*   HEMOGLOBIN 11.1* 10.1* 9.3* 9.0* 8.1*   < > 9.3*  --    < > 11.3*   HEMATOCRIT 33.5* 31.8* 28.3* 27.0* 25.6*   < > 30.0*  --    < > 36.5*   PLATELETS 269 311 240  --  207  --  142  --    < > 161   NEUTROS ABS  --  7.23*  --   --   --   --  12.45*  --   --  14.91*   IMMATURE GRANS (ABS)  --   --   --   --   --   --  0.22*  --   --  0.31*   LYMPHS ABS  --   --   --   --   --   --  0.54*  --   --  0.61*   MONOS ABS  --   --   --   --   " --   --  1.28*  --   --  1.35*   EOS ABS  --  0.00  --   --   --   --  0.04  --   --  0.02   MCV 86.3 89.1 87.1  --  91.8  --  94.9  --    < > 96.1   PROTIME  --   --  15.1*  --  17.0*  --  23.5* 25.1*  --   --    APTT  --   --   --   --  40.1*  --  46.6*  --   --   --     < > = values in this interval not displayed.         Lab 10/12/21  0701 10/11/21  0704 10/10/21  0149 10/09/21  0448 10/08/21  0617   SODIUM 139 145 137 150* 137   POTASSIUM 4.3 4.2 4.1 4.8 4.1   CHLORIDE 107 113* 105 114* 102   CO2 19.0* 17.0* 20.0* 19.0* 18.0*   ANION GAP 13.0 15.0 12.0 17.0* 17.0*   BUN 50* 58* 67* 61* 49*   CREATININE 1.80* 2.16* 2.90* 3.31* 3.51*   GLUCOSE 181* 61* 124* 139* 134*   CALCIUM 8.6 8.2* 8.1* 8.0* 8.0*   MAGNESIUM  --   --  2.4  --   --    PHOSPHORUS 2.7 3.3 4.9* 5.5*  --          Lab 10/12/21  0701 10/11/21  0704 10/10/21  0149 10/09/21  0448 10/08/21  0617   TOTAL PROTEIN 6.1 5.7* 5.7* 5.6* 5.8*   ALBUMIN 2.60* 2.20* 2.50* 2.50* 2.30*   GLOBULIN 3.5 3.5 3.2 3.1 3.5   ALT (SGPT) 38 35 39 45* 56*   AST (SGOT) 43* 37 31 40 59*   BILIRUBIN 1.0 1.0 1.4* 1.0 1.4*   ALK PHOS 193* 166* 149* 154* 182*         Lab 10/10/21  0149 10/09/21  0448 10/08/21  0617 10/07/21  0826   PROTIME 15.1* 17.0* 23.5* 25.1*   INR 1.23* 1.43* 2.20* 2.39*             Lab 10/09/21  1225 10/09/21  0448   IRON  --  10*   IRON SATURATION  --  6*   TIBC  --  177*   TRANSFERRIN  --  119*   FERRITIN  --  718.40*   ABO TYPING A  --    RH TYPING Positive  --    ANTIBODY SCREEN Negative  --          Brief Urine Lab Results  (Last result in the past 365 days)      Color   Clarity   Blood   Leuk Est   Nitrite   Protein   CREAT   Urine HCG        10/08/21 1823             186.2               Microbiology Results Abnormal     Procedure Component Value - Date/Time    Blood Culture - Blood, Arm, Left [[id number]]  (Normal) Collected: 10/05/21 0734    Lab Status: Final result Specimen: Blood from Arm, Left Updated: 10/10/21 0901     Blood Culture No growth at  5 days    Narrative:      The previously reported component GRAM STAIN is no longer being reported. Previous result was <null> (Reference Range: [Reference Range]) on 10/8/2021 at 0901 EDT.    Blood Culture - Blood, Arm, Left [[id number]]  (Normal) Collected: 10/04/21 2120    Lab Status: Final result Specimen: Blood from Arm, Left Updated: 10/09/21 2215     Blood Culture No growth at 5 days    Narrative:      The previously reported component GRAM STAIN is no longer being reported. Previous result was <null> (Reference Range: [Reference Range]) on 10/8/2021 at 2215 EDT.    Blood Culture - Blood, Arm, Right [[id number]]  (Normal) Collected: 10/04/21 2143    Lab Status: Final result Specimen: Blood from Arm, Right Updated: 10/09/21 2215     Blood Culture No growth at 5 days    Narrative:      The previously reported component GRAM STAIN is no longer being reported. Previous result was <null> (Reference Range: [Reference Range]) on 10/8/2021 at 2215 EDT.    Eosinophil Smear - Urine, Urine, Clean Catch [[id number]]  (Normal) Collected: 10/08/21 1823    Lab Status: Final result Specimen: Urine, Clean Catch Updated: 10/08/21 2003     Eosinophil Smear 0 % EOS/100 Cells     Narrative:      No eosinophil seen    COVID PRE-OP / PRE-PROCEDURE SCREENING ORDER (NO ISOLATION) - Swab, Nasopharynx [[id number]]  (Normal) Collected: 10/05/21 0015    Lab Status: Final result Specimen: Swab from Nasopharynx Updated: 10/05/21 0043    Narrative:      The following orders were created for panel order COVID PRE-OP / PRE-PROCEDURE SCREENING ORDER (NO ISOLATION) - Swab, Nasopharynx.  Procedure                               Abnormality         Status                     ---------                               -----------         ------                     COVID-19, ABBOTT IN-HOUS...[[id number]]  Normal              Final result                 Please view results for these tests on the individual orders.    COVID-19, ABBOTT IN-HOUSE,NASAL Swab (NO  TRANSPORT MEDIA) 2 HR TAT - Swab, Nasopharynx [[id number]]  (Normal) Collected: 10/05/21 0015    Lab Status: Final result Specimen: Swab from Nasopharynx Updated: 10/05/21 0043     COVID19 Presumptive Negative    Narrative:      Fact sheet for providers: https://www.fda.gov/media/387209/download     Fact sheet for patients: https://www.fda.gov/media/221533/download    Test performed by PCR.  If inconsistent with clinical signs and symptoms patient should be tested with different authorized molecular test.          Adult Transthoracic Echo Complete W/ Cont if Necessary Per Protocol    Result Date: 10/12/2021  · Estimated left ventricular EF = 30% Left ventricular systolic function is moderately to severely decreased. · Left ventricular wall thickness is consistent with septal asymmetric hypertrophy. · Mild mitral valve regurgitation is present · Trace tricuspid valve regurgitation is present. · No left ventricular apical thrombus is seen.      FL ercp biliary duct only    Result Date: 10/12/2021  EXAMINATION: FL ERCP BILIARY DUCT ONLY- 10/12/2021  INDICATION: ERCP; L03.311-Cellulitis of abdominal wall; T81.41XA-Infection following a procedure, superficial incisional surgical site, initial encounter; D72.829-Elevated white blood cell count, unspecified; N30.00-Acute cystitis without hematuria; N17.9-Acute kidney failure, unspecified; I50.9-Heart failure, unspecified; L03.311-Cellulitis of abdominal wall; T81.43XA-Infection following a procedure, organ  COMPARISON: NONE  FINDINGS: Dictation is to record 2 minutes and 44 seconds of fluoroscopy time. A total of 4 intraprocedural  images obtained show a couple of LAWRENCE drains in the right upper quadrant, endoscope and side cannula placement with contrast opacification of the common duct, apparent balloon sweep and subsequent Wallstent placement. Please see the procedure report for full details.      Impression: Fluoroscopy provided during ERCP.  D: 10/12/2021 E: 10/12/2021    This report was finalized on 10/12/2021 8:40 PM by Dr. Sean Kaminski MD.        Results for orders placed during the hospital encounter of 10/04/21    Adult Transthoracic Echo Complete W/ Cont if Necessary Per Protocol    Interpretation Summary  · Estimated left ventricular EF = 30% Left ventricular systolic function is moderately to severely decreased.  · Left ventricular wall thickness is consistent with septal asymmetric hypertrophy.  · Mild mitral valve regurgitation is present  · Trace tricuspid valve regurgitation is present.  · No left ventricular apical thrombus is seen.      I have reviewed the medications:  Scheduled Meds:amiodarone, 200 mg, Oral, TID  atorvastatin, 40 mg, Oral, Nightly  docosanol, 1 application, Topical, 5x Daily  dorzolamide-timolol, , Both Eyes, BID  ferric gluconate, 125 mg, Intravenous, Daily Before Supper  insulin lispro, 0-7 Units, Subcutaneous, 4x Daily With Meals & Nightly  lactobacillus acidophilus, 1 capsule, Oral, Daily  metoprolol tartrate, 25 mg, Oral, BID  micafungin (MYCAMINE) IV, 100 mg, Intravenous, Q24H  pantoprazole, 40 mg, Oral, Daily  piperacillin-tazobactam, 3.375 g, Intravenous, Q8H  predniSONE, 10 mg, Oral, Daily  rOPINIRole, 0.25 mg, Oral, Nightly  sodium chloride, 10 mL, Intravenous, Q12H  tamsulosin, 0.4 mg, Oral, Daily      Continuous Infusions:sodium chloride, 9 mL/hr, Last Rate: Stopped (10/11/21 1244)      PRN Meds:.dextrose  •  dextrose  •  glucagon (human recombinant)  •  Morphine  •  Sodium Chloride (PF)  •  sodium chloride    Assessment/Plan   Assessment & Plan     Active Hospital Problems    Diagnosis  POA   • **Sepsis (HCC) [A41.9]  Yes   • Post-operative infection [T81.40XA]  Yes   • Abdominal wall cellulitis [L03.311]  Yes   • Postoperative intra-abdominal abscess [T81.43XA]  Yes   • Elevated troponin [R77.8]  Yes   • CAD (coronary artery disease) [I25.10]  Yes   • CKD (chronic kidney disease), stage III (HCC) [N18.30]  Yes   • Acute kidney injury  superimposed on chronic kidney disease (HCC) [N17.9, N18.9]  Yes   • Bile leak [K83.9]  Unknown   • Elevated LFTs [R79.89]  Yes   • Combined systolic and diastolic cardiac dysfunction (EF < 50% 2018) [I51.89]  Yes   • History of sarcoidosis (occular) [Z86.2]  Yes   • Moderate obstructive sleep apnea [G47.33]  Yes   • Diabetes mellitus (HCC) [E11.9]  Yes   • Hypertension [I10]  Yes      Resolved Hospital Problems   No resolved problems to display.        Brief Hospital Course to date:  Jeremías Soto is a 66 y.o. male with systolic CHF (46-50%), chronic multiorgan disease, admitted 9/23-9/29 w/ enterococcal/clostridial bacteremia, cholangitis, questionable pyelonephritis-s/p lap farooq 9/27 and ERCP 9/28 discharged home on IV Zosyn with a planned completion date 10/11 who returned with several days worsening erythema of the abdomen and imaging in the ED concerning for fluid collection in the gallbladder fossa now on empiric antibiotics with some initial improvement then new development of abdominal pain     Sepsis, abdominal source  Abnormal abdominal fluid collection  Recent cholecystitis  Recent enterococcal/clostridial bacteremia  -2.5 cm x 8.5 cm fluid collection on initial CT, unclear etiology given recent surgery  -S/p lap farooq 9/27, ERCP 9/28  -Repeat CT abdomen pelvis on 10/7/2021 with new 16 x 6 hepatic fluid collection and hematoma.  -Underwent laparoscopic washout and drain placement of intra-abdominal hematoma and incision and drainage of right flank cellulitis on 10/8/2021 per Dr. Callahan.  -ERCP performed on 10/11/2021, no overt bile leak noted, EGD in 1 month to remove Wallstent  -Dr. Callahan following.    -Continue probiotic  -ID following.  Bodily fluid culture growing candida tropicalis and candida albicans.  Continue meropenem, Clinda, Dapto, micafungin, Valtrex  -CBC and CMP in am.     Peptic Duodenitis  --incidentally noted on ERCP, ?likely 2nd to occasional NSAID use.  AVOID NSAIDS    RUDY on  CKD 3, improving  Hyperkalemia, resolved  Metabolic acidosis  -Baseline Cr 1.9-2.3; EGFR 30.  Creatinine trending down  -Nephrology consulted.  Likely secondary to ATN.  Improved/back to baseline      Acute blood loss anemia  -Likely postsurgical  -s/p transfusion on 10/9/2021    Hypoxia, resolved  -Chest x-ray on 10/9/2021 shows vascular congestion new since 10/4/2021.  -Diuresis per nephrology, plans metolazone and albumin today    Systolic heart failure  Nonsustained V. Tach  -5 beat run on Sunday, asymptomatic  -Echo on 9/24/2021 showed EF of 26 to 30%  -Lisinopril on hold due to RUDY  -Continue metoprolol  -Continue oral amiodarone  -LifeVest at discharge  -cards following    Postoperative anemia  -s/p transfusion on 10/9, with improvement in anemia  -IV iron per nephrology    Hypernatremia, resolved  -Resolved with D5W    LV thrombus  -Per cardiology note 10/6, records from the [facility] demonstrate prescription of Eliquis 7/19 for a large LV thrombus found on MRI  -Cardiology following.  Echo showed EF 30%, LV wall thickness c/w septal asymmetric hypertrophy, mild MR, trace TR, no LV apical thrombus seen  -ok to resume anticoagulation per surgery, hold for now and will await cards recs since echo read as not thrombus seen  -Eliquis on hold since 10/5    Elevated INR, improving  - Trended down but still remains elevated    Perioral lesions  -Empiric Valtrex, renally dosed     DM type 2, A1c 7.3%, without long-term use of insulin  -Accu-Cheks with SSI     CAD with prior CABG/stent  Chronic systolic/diastolic CHF  Mild troponin elevation  Hx multiple TIA  -Echo 9/24/2021 EF 26-30%  -Continue Lopressor  -Chronically on Eliquis, currently on hold  -Lisinopril on hold for RUDY     Elevated LFTs, resolved  -Recent admission cholangitis, holding statin as noted     Hypertension  Hyperlipidemia  -Statin on hold while on daptomycin     Ocular sarcoidosis  Bilateral lung nodules  -Repeat dedicated chest CT 6 months  outpatient regarding nodules  -Continue chronic oral prednisone 10 mg     Obesity, BMI 35.16 kg/M2  EVA  Hx COVID-19 December 2020    DVT prophylaxis:  Mechanical DVT prophylaxis orders are present.     AM-PAC 6 Clicks Score (PT): 17 (10/12/21 2000)    Disposition: Multiple acute ongoing issues, anticipate need for rehab at discharge    CODE STATUS:   Code Status and Medical Interventions:   Ordered at: 10/05/21 0134     Code Status:    CPR     Medical Interventions (Level of Support Prior to Arrest):    Full     I have prepared this progress note with copied portions of the prior day's progress note of my own authorship to preserve accuracy and maintain consistency of documentation. I have reviewed these portions and edited them for correctness. I verify that the above documentation accurately and truly represents the evaluation and management performed on today's date.     Justus Milan MD  10/13/21

## 2021-10-13 NOTE — CASE MANAGEMENT/SOCIAL WORK
Continued Stay Note  [facility]     Patient Name: Jeremías Soto  MRN: [id number]  Today's Date: 10/13/2021    Admit Date: 10/4/2021     Discharge Plan     Row Name 10/13/21 1604       Plan    Plan discharge plan    Plan Comments CM spoke with pt's spouse in room regarding discharge plan. Pt sleeping. Spouse states she wants to see how pt does over next couple days before making decision on discharge plan. [facility] following and pt will need outpatient IV antibiotics at discharge, Referrals made to [facility]. CM will cont to follow.    Final Discharge Disposition Code 30 - still a patient               Discharge Codes    No documentation.               Expected Discharge Date and Time     Expected Discharge Date Expected Discharge Time    Oct 17, 2021             Corina Shi RN

## 2021-10-13 NOTE — PROGRESS NOTES
Jeremías Soto  1955  [id number]    Date of Consult: 10/5/21  Requesting Provider: Vincent Crawford MD  Evaluating Physician: Diaz Moreno MD    Chief Complaint: abdominal pain and redness    Reason for Consultation: sepsis secondary to an abdominal wall abscess/cellulitis    History of present illness:    Jeremías Soto is a 66 y.o.  Yr old male with history of coronary artery disease, CK D stage III B, diabetes mellitus type 2, hyperlipidemia, hypertension, sarcoidosis, and history of CVA who I recently evaluated during an admission to [facility] last month for enterococcus bacteremia and Clostridium perfringens bacteremia due to a biliary source/gallbladder source (cholangitis and cholecystitis).  The patient underwent laparoscopic cholecystectomy on 9/27 and ERCP with sphincterotomy stone extraction on 9/28.  He additionally had aerococcus urinae from urine culture. Due to his baseline renal dysfunction he did have a Groshong catheter placed for IV antibiotics.  He clinically improved with improvement in his LFTs and sepsis. He was discharged on 9/29 with plans to continue Zosyn at least until 10/11/21. I have not yet seen him in outpatient follow-up but he was scheduled to follow-up with me tomorrow on 10/6.      He was doing well apparently until 2-3 days ago when he noticed redness developing over his right abdominal wall.  The erythema has gotten progressively worse with some swelling and warmth and tenderness.  As far as I know he did not contact our clinic regarding this erythema. He was not having any fevers or chills at home.  He presented to the ER early this morning for evaluation of this redness. Since arrival, he was noted to have a maximum temperature of 99.1°F. labs showed a CRP of 46.11, troponin elevated to 0.07, proBNP of 8353, INR of 2.72, White blood cell count of 24.54 (up from 13.29 just before discharge), hemoglobin of 12, lactic acid of 4.1-->2.9, creatinine of  2.84 (up from 2.15 just prior to discharge). COVID-19 PCR was negative. White blood cell count is now improved to 13.69 on antibiotics.creatinine is worse at 3.09 today.  LFTs remain elevated with an ALT of 94, an AST of 137, an alkaline phosphatase of 176, and a total bilirubin of 1.4. CT abdomen and pelvis was done yesterday and showed cholecystectomy with a fluid collection in the gallbladder fossa containing a bubble of air with differential considerations including a seroma/hematoma, biloma, or possible developing abscess.  No biliary obstruction was seen.  There was also fat stranding in the ventral abdominal wall and right flank that could reflect bland edema or cellulitis. The patient's antibiotics have been changed to vancomycin, cefepime, and Flagyl. Repeat blood cultures and urine culture are in progress. Surgery consult has been placed. Plan is for IR drainage procedure but having to wait on this due to his anticoagulation that he has been on. He underwent NM Hepatobiliary scan today with no evidence for abnormal tracer activity to suggest leak.  The infectious disease team has been consulted for recommendations.    Subjective:    10/6/21: the patient states that his abdominal pain has been improving although he was having some increased right-sided abdominal pain after I went in the room today as he has just eaten something. No fevers. Tolerating the abx well without ADRs.    10/7/21: The patient is doing worse today.  Still having some right-sided abdominal pain.  He was taken for repeat CT abdomen and pelvis today as his leukocytosis worsened And he did have a new 16 x 6 cm hepatic fluid collection with intermittent heterogenous and hyperdense contents compatible with a large hematoma.  He did have a new small right pleural effusion with some adjacent consolidation or atelectasis. He denies any nausea.  No fevers    10/8/21: The patient was very somnolent today although he just recently got back from  the OR.  Dr. Callahan took the patient to the OR today and did not see any acute bleeding but did see some old blood/hematoma.  No necrotic fascia noted.  2 LAWRENCE drains left in place, one of the liver and another in the gallbladder fossa.  No fevers overnight.  Leukocytosis is slightly better.  His wife is at bedside and states that he is just been very somnolent after his procedure and during week but no other acute issues.    10/9/21: the patient states that his abdominal pain is improving. No fevers. Tolerating abx well without ADRs. Did have worsening hypoxia overnight to 10L NC but now improved back to 2L NC. Diuresis per nephrology and cardiology.    10/10/21: The patient has serous drainage from his LAWRENCE drain #1 but his LAWRENCE drain #2 has her bile and Dr. Callahan suspects that the patient has a bile leak from his prior cholecystectomy procedure.  Dr. Sylvester with GI has been consulted for ERCP tomorrow. The patient is somnolent today.  His wife remains at bedside.  She states that he has had a fairly good today although he is somnolent.  No fevers.  No history available from the patient    10/11/21: LAWRENCE drains in place.  Taken for ERCP today by Dr. Brunner with no overt bile leak seen and balloon sweep showed no stones.  A stent was placed in the CBD to the common hepatic duct and across the cystic duct with plans to remove in 1 month.  Patient is sitting up in bed with food in front of him, but he is not hungry.  He denies f/c, sob, v/d, rashes. He has some nausea.  Had NSVT on Sunday.  He was started on amiodarone and may go home with LifeVest. He remains afebrile. Creatinine improved. Has some diarrhea.     10/12/21: The patient remains afebrile.  Abdominal pain is improving.  LWARENCE drain still remain in place.  Now growing Candida albicans and Candida tropicalis from his body fluid cultures.  Diarrhea has improved    10/13/21: the patient is nauseated today. No worsening abdominal pain. LAWRENCE drains remain in place. No  fevers.     Past Medical History:   Diagnosis Date   • Cancer (HCC)     skin   • Coronary artery disease     stent   • Diabetes mellitus (HCC)    • Elevated cholesterol    • Heart attack (HCC) 2003   • Hypertension    • Sarcoid    • Sleep apnea     no cpap   • SOB (shortness of breath)    • Stroke (HCC)     Pt. states that he has had 2 mini strokes. No residual        Past Surgical History:   Procedure Laterality Date   • CARDIAC CATHETERIZATION  2003    cardiac stent x2 after s/p CABG   • CATARACT EXTRACTION, BILATERAL     • CHOLECYSTECTOMY N/A 10/8/2021    Procedure: LAPAROSCOPIC WASHOUT;  Surgeon: David Callahan MD;  Location:  [facility];  Service: General;  Laterality: N/A;   • CHOLECYSTECTOMY WITH INTRAOPERATIVE CHOLANGIOGRAM N/A 9/27/2021    Procedure: CHOLECYSTECTOMY LAPAROSCOPIC INTRAOPERATIVE CHOLANGIOGRAM;  Surgeon: Gaudencio Wolfe MD;  Location:  [facility];  Service: General;  Laterality: N/A;   • COLONOSCOPY     • CORONARY ARTERY BYPASS GRAFT  2002   • ENDOSCOPY     • ERCP N/A 9/28/2021    Procedure: ENDOSCOPIC RETROGRADE CHOLANGIOPANCREATOGRAPHY;  Surgeon: Brunner, Mark I, MD;  Location:  [facility];  Service: Gastroenterology;  Laterality: N/A;  sphincterotomy made, balloon sweep of bile duct done with 9-12 balloon.    • ERCP N/A 10/11/2021    Procedure: ENDOSCOPIC RETROGRADE CHOLANGIOPANCREATOGRAPHY with biliary wall stent;  Surgeon: Brunner, Mark I, MD;  Location:  [facility];  Service: Gastroenterology;  Laterality: N/A;  BALLOON SWEEP 1229  10X8 STENT PLACED IN CBD    • EXTRACORPOREAL SHOCKWAVE LITHOTRIPSY (ESWL), STENT INSERTION/REMOVAL Bilateral 8/21/2020    Procedure: EXTRACORPOREAL SHOCKWAVE LITHOTRIPSY BILATERAL WITH STENT PLACEMENT LEFT;  Surgeon: Ethan Barnes Jr., MD;  Location:  [facility];  Service: Urology;  Laterality: Bilateral;   • SKIN CANCER EXCISION       Social history:  The patient is .  He lives in [facility].  No history of tobacco, alcohol,  or illicit drug use.    Family history:  family history includes COPD in his mother; Cancer in his brother; Diabetes in his father and sister; Heart disease in his father; Hypertension in his father; Obesity in his sister.    No Known Allergies    Medication:  Current Facility-Administered Medications   Medication Dose Route Frequency Provider Last Rate Last Admin   • amiodarone (PACERONE) tablet 200 mg  200 mg Oral TID Brunner, Mark I, MD   200 mg at 10/13/21 0956   • atorvastatin (LIPITOR) tablet 40 mg  40 mg Oral Nightly Gayle Butler MD   40 mg at 10/12/21 2236   • dextrose (D50W) 25 g/ 50mL Intravenous Solution 25 g  25 g Intravenous Q15 Min PRN Brunner, Mark I, MD   25 g at 10/11/21 0742   • dextrose (GLUTOSE) oral gel 15 g  15 g Oral Q15 Min PRN Brunner, Mark I, MD       • docosanol (ABREVA) 10 % cream 1 application  1 application Topical 5x Daily Brunner, Mark I, MD   1 application at 10/13/21 1236   • dorzolamide (TRUSOPT) 2 % 1 drop, timolol (TIMOPTIC) 0.5 % 1 drop for Cosopt 22.3-6.8 mg/mL   Both Eyes BID Brunner, Mark I, MD   Given at 10/13/21 0804   • ferric gluconate (FERRLECIT)125 MG in sodium chloride 0.9 % 100 mL IVPB  125 mg Intravenous Daily Before Supper Brunner, Mark I, MD   125 mg at 10/12/21 1747   • glucagon (human recombinant) (GLUCAGEN DIAGNOSTIC) injection 1 mg  1 mg Subcutaneous Q15 Min PRN Brunner, Mark I, MD       • insulin lispro (humaLOG) injection 0-7 Units  0-7 Units Subcutaneous 4x Daily With Meals & Nightly Brunner, Mark I, MD   4 Units at 10/12/21 2233   • lactobacillus acidophilus (RISAQUAD) capsule 1 capsule  1 capsule Oral Daily Brunner, Mark I, MD   1 capsule at 10/13/21 0800   • metoprolol tartrate (LOPRESSOR) tablet 25 mg  25 mg Oral Q6H Manjula Owens MD       • micafungin 100 mg/100 mL 0.9% NS IVPB (mbp)  100 mg Intravenous Q24H Brunner, Mark I, MD   100 mg at 10/12/21 2238   • morphine injection 2 mg  2 mg Intravenous Q3H PRN Brunner, Mark I, MD   2 mg at  10/13/21 0606   • ondansetron (ZOFRAN) injection 4 mg  4 mg Intravenous Q6H PRN Justus Milan MD       • pantoprazole (PROTONIX) EC tablet 40 mg  40 mg Oral Daily Brunner, Mark I, MD   40 mg at 10/13/21 0800   • piperacillin-tazobactam (ZOSYN) 3.375 g in iso-osmotic dextrose 50 ml (premix)  3.375 g Intravenous Q8H Diaz Moreno MD   3.375 g at 10/13/21 1011   • predniSONE (DELTASONE) tablet 10 mg  10 mg Oral Daily Brunner, Mark I, MD   10 mg at 10/13/21 1012   • rOPINIRole (REQUIP) tablet 0.25 mg  0.25 mg Oral Nightly Brunner, Mark I, MD   0.25 mg at 10/12/21 2235   • Sodium Chloride (PF) 0.9 % 10 mL  10 mL Intravenous PRN Brunner, Mark I, MD       • sodium chloride 0.9 % flush 10 mL  10 mL Intravenous Q12H Brunner, Mark I, MD   10 mL at 10/13/21 0807   • sodium chloride 0.9 % flush 10 mL  10 mL Intravenous PRN Brunner, Mark I, MD       • sodium chloride 0.9 % infusion  9 mL/hr Intravenous Continuous Brunner, Mark I, MD   Stopped at 10/11/21 1244   • tamsulosin (FLOMAX) 24 hr capsule 0.4 mg  0.4 mg Oral Daily Brunner, Mark I, MD   0.4 mg at 10/13/21 0955         Antibiotics:  Anti-Infectives (From admission, onward)    Ordered     Dose/Rate Route Frequency Start Stop    10/12/21 0955  piperacillin-tazobactam (ZOSYN) 3.375 g in iso-osmotic dextrose 50 ml (premix)        Ordering Provider: Diaz Moreno MD    3.375 g  over 4 Hours Intravenous Every 8 Hours 10/12/21 1600 10/19/21 1559    10/12/21 0955  piperacillin-tazobactam (ZOSYN) 3.375 g in iso-osmotic dextrose 50 ml (premix)        Ordering Provider: Diaz Moreno MD    3.375 g  over 30 Minutes Intravenous Once 10/12/21 1045 10/12/21 1459    10/07/21 0852  meropenem (MERREM) 1 g/100 mL 0.9% NS VTB (mbp)        Ordering Provider: Thierry Chinchilla, PharmD    1 g  over 30 Minutes Intravenous Once 10/07/21 0945 10/07/21 1338    10/06/21 1506  valACYclovir (VALTREX) tablet 500 mg        Ordering Provider: Don Draper DO    500 mg Oral  "Every 12 Hours Scheduled 10/06/21 2100 10/07/21 0854    10/05/21 1632  micafungin 100 mg/100 mL 0.9% NS IVPB (mbp)        Ordering Provider: Brunner, Mark I, MD    100 mg  over 60 Minutes Intravenous Every 24 Hours 10/05/21 1800 10/17/21 1223    10/04/21 2101  vancomycin 1750 mg/500 mL 0.9% NS IVPB ([facility])        Ordering Provider: Toño Lainez MD    20 mg/kg × 81.9 kg (Adjusted)  250 mL/hr over 120 Minutes Intravenous Once 10/04/21 2103 10/05/21 0132    10/04/21 2101  cefepime (MAXIPIME) 2 g/100 mL 0.9% NS (mbp)        Ordering Provider: Toño Lainez MD    2 g  200 mL/hr over 30 Minutes Intravenous Once 10/04/21 2103 10/04/21 2250            Review of Systems    As above    Physical Exam:   Vital Signs   /85   Pulse 94   Temp 98.4 °F (36.9 °C) (Oral)   Resp 18   Ht 170.2 cm (67.01\")   Wt 104 kg (229 lb 4.5 oz)   SpO2 94%   BMI 35.90 kg/m²     GENERAL: frail appearing. Sitting up in bedside chair  HENT: Normocephalic, atraumatic.   Has some external oral blisters noted-unchanged  Eyes: No conjunctival injection. No icterus.  NECK: Supple without nuchal rigidity. No mass.  HEART: RRR; No murmur, rubs, gallops.   LUNGS: clear to auscultation anteriorly.  Nonlabored breathing  ABDOMEN: erythema over right abdominal wall has improved.  Serous drainage from both LAWRENCE drains in right abdomen.  Abdominal tenderness has improved.  :  Without Meyers catheter.  SKIN: no new rashes noted. No jaundice  NEURO: Awake and alert. Oriented ×3.  PSYCHIATRIC: Cooperative.  Groshong cath site is without erythema or drainage    Laboratory Data    Results from last 7 days   Lab Units 10/12/21  0702 10/11/21  0704 10/10/21  0149   WBC 10*3/mm3 11.04* 9.39 15.18*   HEMOGLOBIN g/dL 11.1* 10.1* 9.3*   HEMATOCRIT % 33.5* 31.8* 28.3*   PLATELETS 10*3/mm3 269 311 240     Results from last 7 days   Lab Units 10/13/21  0906   SODIUM mmol/L 137   POTASSIUM mmol/L 4.1   CHLORIDE mmol/L 104   CO2 mmol/L 19.0*   BUN " mg/dL 39*   CREATININE mg/dL 1.64*   GLUCOSE mg/dL 172*   CALCIUM mg/dL 8.5*     Results from last 7 days   Lab Units 10/12/21  0701   ALK PHOS U/L 193*   BILIRUBIN mg/dL 1.0   ALT (SGPT) U/L 38   AST (SGOT) U/L 43*               Estimated Creatinine Clearance: 49.6 mL/min (A) (by C-G formula based on SCr of 1.64 mg/dL (H)).       Microbiology:  Microbiology Results (last 10 days)     Procedure Component Value - Date/Time    Eosinophil Smear - Urine, Urine, Clean Catch [[id number]]  (Normal) Collected: 10/08/21 1823    Lab Status: Final result Specimen: Urine, Clean Catch Updated: 10/08/21 2003     Eosinophil Smear 0 % EOS/100 Cells     Narrative:      No eosinophil seen    Body Fluid Culture - Body Fluid, Abdomen, Right [[id number]]  (Abnormal)  (Susceptibility) Collected: 10/08/21 0912    Lab Status: Final result Specimen: Body Fluid from Abdomen, Right Updated: 10/12/21 0845     Body Fluid Culture Light growth (2+) Candida tropicalis      Light growth (2+) Candida albicans     Gram Stain Few (2+) WBCs seen      No organisms seen    Susceptibility      Candida tropicalis     MELINDA     Fluconazole Susceptible     Micafungin Susceptible                  Linear View               Susceptibility      Candida albicans     MELINDA     Fluconazole Susceptible     Micafungin Susceptible                  Linear View                   Blood Culture - Blood, Arm, Left [[id number]]  (Normal) Collected: 10/05/21 0734    Lab Status: Final result Specimen: Blood from Arm, Left Updated: 10/10/21 0901     Blood Culture No growth at 5 days    Narrative:      The previously reported component GRAM STAIN is no longer being reported. Previous result was <null> (Reference Range: [Reference Range]) on 10/8/2021 at 0901 EDT.    COVID PRE-OP / PRE-PROCEDURE SCREENING ORDER (NO ISOLATION) - Swab, Nasopharynx [[id number]]  (Normal) Collected: 10/05/21 0015    Lab Status: Final result Specimen: Swab from Nasopharynx Updated: 10/05/21 0043     Narrative:      The following orders were created for panel order COVID PRE-OP / PRE-PROCEDURE SCREENING ORDER (NO ISOLATION) - Swab, Nasopharynx.  Procedure                               Abnormality         Status                     ---------                               -----------         ------                     COVID-19, ABBOTT IN-HOUS...[[id number]]  Normal              Final result                 Please view results for these tests on the individual orders.    COVID-19, ABBOTT IN-HOUSE,NASAL Swab (NO TRANSPORT MEDIA) 2 HR TAT - Swab, Nasopharynx [[id number]]  (Normal) Collected: 10/05/21 0015    Lab Status: Final result Specimen: Swab from Nasopharynx Updated: 10/05/21 0043     COVID19 Presumptive Negative    Narrative:      Fact sheet for providers: https://www.fda.gov/media/542968/download     Fact sheet for patients: https://www.fda.gov/media/416444/download    Test performed by PCR.  If inconsistent with clinical signs and symptoms patient should be tested with different authorized molecular test.    Urine Culture - Urine, Urine, Clean Catch [[id number]]  (Abnormal) Collected: 10/04/21 2151    Lab Status: Final result Specimen: Urine, Clean Catch Updated: 10/05/21 2312     Urine Culture Yeast isolated    Narrative:      No further work up will be performed.    Blood Culture - Blood, Arm, Right [[id number]]  (Normal) Collected: 10/04/21 2143    Lab Status: Final result Specimen: Blood from Arm, Right Updated: 10/09/21 2215     Blood Culture No growth at 5 days    Narrative:      The previously reported component GRAM STAIN is no longer being reported. Previous result was <null> (Reference Range: [Reference Range]) on 10/8/2021 at 2215 EDT.    Blood Culture - Blood, Arm, Left [[id number]]  (Normal) Collected: 10/04/21 2120    Lab Status: Final result Specimen: Blood from Arm, Left Updated: 10/09/21 2215     Blood Culture No growth at 5 days    Narrative:      The previously reported component GRAM STAIN is  no longer being reported. Previous result was <null> (Reference Range: [Reference Range]) on 10/8/2021 at 2215 EDT.            Radiology:  CT Abdomen Pelvis Without Contrast    Result Date: 10/7/2021  New 16 x 6 cm hepatic fluid collection with intermixed heterogeneous and hyperdense contents compatible with large hematoma. This appears new from the October 4, 2021 comparison scan. There is a small amount of free fluid in the pelvis which is also new from comparison. The remainder of the hematoma and blood products appear primarily intraparenchymal and subcapsular.  New small right pleural effusion with some adjacent consolidation or atelectasis, with component of pneumonia not entirely excluded.  Redemonstrated bladder wall thickening with several diverticula including small locules of air within an anterior diverticula. Finding most likely relates to any recent catheterization, however correlate with any clinical concern for cystitis. No additional acute findings in the abdomen and pelvis.  Finding of large hepatic hematoma discussed with Dr. Callahan by Corey Castillo via phone at 12:47 PM 10/7/2021   This report was finalized on 10/7/2021 12:48 PM by Corey Castillo.      XR Chest 1 View    Result Date: 10/9/2021  1. Potential mild vascular congestion or volume overload, new since 10/4/2021, correlate clinically. 2. Stable cardiomegaly. Median sternotomy. 3. Elevation right hemidiaphragm with scarring or atelectasis right lung base. Signer Name: Navneet Beaver MD  Signed: 10/9/2021 6:44 AM  Workstation Name: [email or web address]  [facility]    XR Abdomen KUB    Result Date: 10/13/2021  Biliary stent projects and grossly unchanged position in the right upper quadrant. Adjacent flat LAWRENCE drain is noted.  This report was finalized on 10/13/2021 12:56 PM by Corey Castillo.      FL ercp biliary duct only    Result Date: 10/12/2021  Fluoroscopy provided during ERCP.  D: 10/12/2021 E: 10/12/2021    This report was finalized on 10/12/2021 8:40 PM by Dr. Sean Kaminski MD.           Impression:     Problems:  -Sepsis with leukocytosis with neutrophilia, lactic acidosis, elevated pro calcitonin level- due to intra-abdominal source/abdominal wall cellulitis.   - S/p  ERCP 10/11 with stent and no obvious signs of bile leak.  -Intra-abdominal hematoma, now status post washout procedure on 10/8- Candida tropicalis and Candida albicans from culture  -Abdominal wall cellulitis- Appears slightly improved  -Possible developing intra-abdominal abscess vs. Seroma. No signs of biliary leak on NM hepatobiliary scan but stent was placed  -Recent cholangitis and cholecystitis status post recent cholecystectomy on 9/27 and ERCP on 9/28  -Recent enterococcus bacteremia  -Recent Clostridium perfringens bacteremia  -Recent pancreatitis  -Macrocytic anemia  -Elevated troponin level  -Elevated LFTs  -RUDY on CKD stage IIIB- due to sepsis vs dehydration  -Coronary artery disease status post CABG/stents  -systolic heart failure, LVEF was 26-30% on echo in 09/2021  -history of cardiac thrombus, on anticoagulation with eliquis  -Diabetes mellitus type 2 with hyperglycemia  -Essential hypertension  -Hyperlipidemia  -Sarcoidosis/chronic prednisone  -prolonged QTc interval  -elevated CPK level  -external oral blisters, possible herpes labialis  - Diarrhea- improved  - nausea- New    PLAN:    -continue to follow cbc with diff, cmp, crp  -stop meropenem and daptomycin  -Start Zosyn 3.375 g IV every 8 hours  -continue empiric Micafungin (avoiding fluconazole in the setting of his prolonged QTc interval)  -status post washout procedure  by Dr. Callahan 10/8.  2 LAWRENCE drains left in place.  Surgical cultures - Candida tropicalis and Candida albicans  -s/p ERCP on 10/11 with no evidence of bile leak, stent placed.  -weekly Groshong cath dressing changes  -s/p Valtrex    Tentative antibiotic plans:    UM/LUC:  1.  Zosyn 3.375 g IV every 8 hours  2.   Micafungin 100 mg IV every 24 hours  3.  Plan to continue the above antimicrobials at least until 10/25/21  4.  Weekly CBC with differential, CMP, ESR, and CRP- labs need to be faxed to [facility] at [phone]  5.  Weekly Groshong catheter dressing changes  6.  Needs to follow-up with me on 10/25/21  7.  Please fax a copy of my orders to [facility] at [phone] and call [phone] with final discharge plans.    I discussed with the patient's wife at bedside today    Complex MDM. The patient has significant risk for further morbidity and mortality in the setting of his multiple complex medical issues.       Diaz Moreno MD  10/13/2021

## 2021-10-13 NOTE — PROGRESS NOTES
"Patient Name:  Jeremías Soto  YOB: 1955  [id number]    Surgery Progress Note    Date of visit: 10/13/2021    Subjective   Pt with continued mild epigastric pain. No nausea/vomiting. No fevers/chills. +BM. Poor appetite         Objective       /94   Pulse 91   Temp 98.1 °F (36.7 °C) (Oral)   Resp 18   Ht 170.2 cm (67.01\")   Wt 104 kg (229 lb 4.5 oz)   SpO2 96%   BMI 35.90 kg/m²     Intake/Output Summary (Last 24 hours) at 10/13/2021 0843  Last data filed at 10/13/2021 0241  Gross per 24 hour   Intake 445 ml   Output 135 ml   Net 310 ml       CV:  Rhythm regular and rate regular  L:  Clear to auscultation bilaterally  Abd:  Bowel sounds positive, soft, tender to palpation in epigastrium without rebound/guarding  Ext:  No cyanosis, clubbing, edema    Recent labs and imaging that are back at this time have been reviewed.   AM labs pending.       Assessment/Plan     Pt s/p lap farooq with subsequent lap washout of hematoma and I and D of right incision, followed by ERCP with stent placement for suspected bile leak  OK to restart anticoagulation from surgical standpoint  Await lab results  OOB, IS, DVT prlx  Pain control        Gaudencio Wolfe MD  10/13/2021  08:43 EDT      "

## 2021-10-13 NOTE — PROGRESS NOTES
Clinical Nutrition   Reason For Visit: Follow-up protocol    Patient Name: Jeremías Soto  YOB: 1955  MRN: [id number]  Date of Encounter: 10/13/21 08:54 EDT  Admission date: 10/4/2021      -RD recommends considering temporary, supplemental EN until patient able to tolerate/consume adequate amount of PO. Unlikely to drink ONS as he does not care for these and did not consume the previously ordered ONS.    Nutrition Assessment     Admission Problem List:  Sepsis  Elevated LFTs  Abdominal wall cellulitis  Postoperative intra-abdominal hematoma  Intermittent nausea  RUDY on CKD stage 3  Perioral lesions  Skin tear on arm  Presence of LV thrombus  Hypernatremia  SOB  Possible post-op bile leak  Peptic duodenitis  Dyspepsia  SVT      Applicable PMH:  HTN, HLD  CHF  CAD s/p CABG and stents  T2DM  CKD stage 3  Sarcoidosis (occular)  Covid-19 (12/2020)  S/p CCY (9/27/21)      Applicable medical tests/procedures since admission:  (10/5) s/p ultrasound guided abscess drain  (10/8) s/p washout and drain placement, I&D of right flank cellulitis  (10/11) s/p ERCP - no bile leak, wall stent placed, peptic duodenitis       Reported/Observed/Food/Nutrition Related History   10/13: RD notes ongoing abdominal pain. GI ordered KUB to rule out stent migration. Poor appetite/PO intake ongoing.    10/11: Patient off of floor at time of visit, wife at bedside. Wife reports hasn't been taking in a whole lot due to both decreased appetite as well as full liquid diet past 2 days. Has taken sips of one ONS drink - doesn't care for them. Hopeful that patient's PO intake will improve with advanced to regular foods/diet after ERCP. Wife concerned patient will not eat much due to being afraid of getting sick with PO intake.    10/7: Wife reaffirms rpt.     10/5:  Patient and wife present during visit. Patient's wife provides majority of information. Reports patient wasn't eating very well during most of previous [facility] admission  (9/23-9/29), but did eat well 9/29-9/30. On 10/1 patient began having poor appetite/intermittent nausea and poor PO intake (</=25% of usual). Reports patient weighed 218 lbs on (9/23) but unsure what patient weighs at this time. RD requested standing scale weight from RNLeonora QUARLES at this time. Agrees to try clear liquid ONS drinks (no orange flavor). Denies food allergies and difficulty chewing/swallowing.    Anthropometrics   Height: 67 in  Weight: 229 lbs (bed scale weight 10/12 per nsg doc)  BMI: 35.9  BMI classification: Obese Class II: 35-39.9kg/m2   IBW: 148 lbs    UBW: 218 lbs (9/23/21) per wife  Per EMR (MDOV) - 219 lbs (6/17/21), 226 lbs (2/18/21), 219 lbs (12/10/20)    Weight change: RD waiting for standing weight to be obtained prior to evaluating recent weight changes.    Labs reviewed   Labs reviewed: Yes    Medications reviewed   Medications reviewed: Yes  Pertinent: antibiotic, probiotic, insulin, protonix, steroid, iron    Needs Assessment 10/7   Weight used: 97.6 Kg wt on 10/7  IBW: 67.3 Kg    Estimated Energy needs: ~ 1755 Kcal/da based on 18 Kcal/Kg       Estimated Protein needs: ~ 81 g/da based on 1.2g/Kg IBW  Pt w CKD       Estimated Fluid needs: ~1755 ml fluid daily      Current Nutrition Prescription   PO: Diet Regular; Consistent Carbohydrate   Oral Nutrition Supplement: Premier Protein 3x daily (per MD 10/12)    Average PO intake: 10% x 4 meals    Nutrition Diagnosis     10/5, 10/7, 10/11, 10/13  Problem Inadequate oral intake   Etiology Decreased appetite, altered GI function s/p CCY, nausea   Signs/Symptoms PO intake: 10% x 4 meals (solid foods); poor PO intake since admission (10/4)   Status: ongoing     Intervention   Intervention: Follow treatment progress, Care plan reviewed, Encourage intake    -RD recommends considering temporary, supplemental EN until patient able to tolerate/consume adequate amount of PO.    Goal:   General: Nutrition support treatment  PO: Tolerate PO, Increase  intake    Monitoring/Evaluation:   Monitoring/Evaluation: Per protocol, I&O, PO intake, Supplement intake, Pertinent labs, Weight, GI status, Symptoms    Opal Car RD  Time Spent: 15 min

## 2021-10-13 NOTE — PROGRESS NOTES
" [facility] - Progress Note    Jeremías Soto  1955  [id number]    10/13/21, 08:52 EDT      Chief Complaint: Following for CHF, CAD, NSVT    Subjective:   Not feeling well this morning.  Having nausea and abdominal pain.  Does not feel like eating anything.96% on room air.  Blood pressure creeping up.  Heart rate remains elevated.      Review of Systems:  Pertinent positives are listed above and in physical exam.  All others have been reviewed and are negative.    amiodarone, 200 mg, Oral, TID  atorvastatin, 40 mg, Oral, Nightly  docosanol, 1 application, Topical, 5x Daily  dorzolamide-timolol, , Both Eyes, BID  ferric gluconate, 125 mg, Intravenous, Daily Before Supper  insulin lispro, 0-7 Units, Subcutaneous, 4x Daily With Meals & Nightly  lactobacillus acidophilus, 1 capsule, Oral, Daily  metoprolol tartrate, 25 mg, Oral, BID  micafungin (MYCAMINE) IV, 100 mg, Intravenous, Q24H  pantoprazole, 40 mg, Oral, Daily  piperacillin-tazobactam, 3.375 g, Intravenous, Q8H  predniSONE, 10 mg, Oral, Daily  rOPINIRole, 0.25 mg, Oral, Nightly  sodium chloride, 10 mL, Intravenous, Q12H  tamsulosin, 0.4 mg, Oral, Daily        Objective:  Vitals:   height is 170.2 cm (67.01\") and weight is 104 kg (229 lb 4.5 oz). His oral temperature is 98.1 °F (36.7 °C). His blood pressure is 169/94 and his pulse is 91. His respiration is 18 and oxygen saturation is 96%.       Intake/Output Summary (Last 24 hours) at 10/13/2021 0851  Last data filed at 10/13/2021 0241  Gross per 24 hour   Intake 445 ml   Output 135 ml   Net 310 ml       Physical Exam:    General: Alert and oriented   Cardiovascular: Heart has a nondisplaced focal PMI. Regular rate and rhythm without murmur, gallop or rub.  Lungs: Clear without rales or wheezes.  Slightly decreased bases  Extremities: Show 1+ edema.  Skin: warm and dry.  Neurologic: nonfocal         Results from last 7 days   Lab Units 10/12/21  0702   WBC 10*3/mm3 11.04* "   HEMOGLOBIN g/dL 11.1*   HEMATOCRIT % 33.5*   PLATELETS 10*3/mm3 269     Results from last 7 days   Lab Units 10/12/21  0701   SODIUM mmol/L 139   POTASSIUM mmol/L 4.3   CHLORIDE mmol/L 107   CO2 mmol/L 19.0*   BUN mg/dL 50*   CREATININE mg/dL 1.80*   CALCIUM mg/dL 8.6   BILIRUBIN mg/dL 1.0   ALK PHOS U/L 193*   ALT (SGPT) U/L 38   AST (SGOT) U/L 43*   GLUCOSE mg/dL 181*     Results from last 7 days   Lab Units 10/10/21  0149 10/09/21  0448 10/08/21  0617   INR  1.23* 1.43* 2.20*                 Echo:No thrombus by TTE.  Apical DK.  EF 30%  Tele:  NSR    Assessment and Plan:  1)  NSVT  - 25 beat run at 1216 Sunday at 135 bpm, asymptomatic  -  9/24/21 Echo EF 26-30%  -  On low dose metoprolol tartrate 12.5mg BID   -  Initiated oral Amiodarone 10/10.  EKG reviewed, NSR with acceptable QTc.  -  Will need LifeVest at discharge.     2)  CAD/ischemic cardiomyopathy/congestive heart failure  -  Prior CABG/stents  -  Mild troponin elevation on admission - likely chronic, currently chest pain-free with no anginal equivalents  -  Echocardiogram 9/24/2021 LVEF 26-30%.  Again, will need LifeVest at discharge.  -  Continue Lopressor, home lisinopril on hold due to acute kidney injury        3) History of LV thrombus  - per records from [facility] as documented by Dr Owens patient has previously been anticoagulated with Eliquis currently on hold given recent surgical intervention.    -  Per prior notes, will pursue echocardiogram with [facility] for reassessment of LV thrombus. Surgery has given the ok to resume anti coagulation         4) Sepsis/abdominal wall cellulitis  - 10/8/21  laparoscopic washout and drain placement.  Incision and drainage of right flank cellulitis.  Surgical management per Dr. Callahan  -  Infectious disease managing antibiotics  -  Anemia/transfusion per primary/surgical teams.  -  Bile leak.  S/P ERCP and biliary Wallstent Monday 10/11.   Surgery has given ok to resume AC       5) RUDY on CKD     -   Nephrology following     .  No further NSVT.  BP up today.  BMP is pending  Cont amio for NSVT.  Increase metoprolol to 25 mg every 6 hours    Manjula Owens MD-NUPUR  10/13/21, 08:52 EDT

## 2021-10-13 NOTE — PLAN OF CARE
Blood pressures run higher until given BP meds. Changed abdominal dessings at 0300. Room air. VSS. Multiple voids in bed. One bowel movement.    Problem: Fall Injury Risk  Goal: Absence of Fall and Fall-Related Injury  Outcome: Ongoing, Progressing  Intervention: Identify and Manage Contributors to Fall Injury Risk  Recent Flowsheet Documentation  Taken 10/12/2021 2000 by Carlos Maurice, RN  Medication Review/Management: medications reviewed  Intervention: Promote Injury-Free Environment  Recent Flowsheet Documentation  Taken 10/12/2021 2000 by Carlos Maurice, RN  Safety Promotion/Fall Prevention:   activity supervised   assistive device/personal items within reach   clutter free environment maintained   fall prevention program maintained   lighting adjusted   nonskid shoes/slippers when out of bed   room organization consistent   safety round/check completed     Problem: Diabetes Comorbidity  Goal: Blood Glucose Level Within Desired Range  Outcome: Ongoing, Progressing     Problem: Adult Inpatient Plan of Care  Goal: Plan of Care Review  Outcome: Ongoing, Progressing  Goal: Patient-Specific Goal (Individualized)  Outcome: Ongoing, Progressing  Goal: Absence of Hospital-Acquired Illness or Injury  Outcome: Ongoing, Progressing  Intervention: Identify and Manage Fall Risk  Recent Flowsheet Documentation  Taken 10/12/2021 2000 by Carlos Maurice, RN  Safety Promotion/Fall Prevention:   activity supervised   assistive device/personal items within reach   clutter free environment maintained   fall prevention program maintained   lighting adjusted   nonskid shoes/slippers when out of bed   room organization consistent   safety round/check completed  Intervention: Prevent Skin Injury  Recent Flowsheet Documentation  Taken 10/13/2021 0400 by Carlos Maurice, RN  Skin Protection: adhesive use limited  Taken 10/13/2021 0200 by Carlos Maurice, RN  Skin Protection: adhesive use limited  Taken  10/13/2021 0000 by Carlos Maurice, RN  Skin Protection: adhesive use limited  Taken 10/12/2021 2000 by Carlos Maurice, RN  Body Position: position changed independently  Skin Protection:   adhesive use limited   incontinence pads utilized  Goal: Optimal Comfort and Wellbeing  Outcome: Ongoing, Progressing  Intervention: Provide Person-Centered Care  Recent Flowsheet Documentation  Taken 10/12/2021 2000 by Carlos Maurice, RN  Trust Relationship/Rapport: care explained  Goal: Readiness for Transition of Care  Outcome: Ongoing, Progressing     Problem: Skin Injury Risk Increased  Goal: Skin Health and Integrity  Outcome: Ongoing, Progressing  Intervention: Optimize Skin Protection  Recent Flowsheet Documentation  Taken 10/13/2021 0400 by Carlos Maurice, RN  Pressure Reduction Techniques: frequent weight shift encouraged  Pressure Reduction Devices: specialty bed utilized  Skin Protection: adhesive use limited  Taken 10/13/2021 0200 by Carols Maurice, RN  Pressure Reduction Techniques: frequent weight shift encouraged  Pressure Reduction Devices: specialty bed utilized  Skin Protection: adhesive use limited  Taken 10/13/2021 0000 by Carlos Maurice, RN  Pressure Reduction Techniques: frequent weight shift encouraged  Pressure Reduction Devices: specialty bed utilized  Skin Protection: adhesive use limited  Taken 10/12/2021 2000 by Carlos Maurice, RN  Pressure Reduction Techniques: frequent weight shift encouraged  Head of Bed (HOB): HOB at 20-30 degrees  Skin Protection:   adhesive use limited   incontinence pads utilized   Goal Outcome Evaluation:

## 2021-10-13 NOTE — PROGRESS NOTES
"   LOS: 8 days    Patient Care Team:  Vincent Crawford MD as PCP - General (Family Medicine)  Manjula Owens MD as Consulting Physician (Cardiology)    Chief Complaint:  Abdominal pain    Subjective     Interval History:     No acute events overnight. No new complaints.     Review of Systems:   No CP or SOA , fever, chills, rigors, rash, N/V, Constipation.       Objective     Vital Sign Min/Max for last 24 hours  Temp  Min: 98.1 °F (36.7 °C)  Max: 98.9 °F (37.2 °C)   BP  Min: 155/95  Max: 172/98   Pulse  Min: 82  Max: 98   Resp  Min: 18  Max: 18   SpO2  Min: 94 %  Max: 96 %   No data recorded   Weight  Min: 104 kg (229 lb 4.5 oz)  Max: 104 kg (229 lb 4.5 oz)     Flowsheet Rows      First Filed Value   Admission Height 177.8 cm (70\") Documented at 10/04/2021 1842   Admission Weight 95.3 kg (210 lb) Documented at 10/04/2021 1842          No intake/output data recorded.  I/O last 3 completed shifts:  In: 1365 [P.O.:1015; IV Piggyback:350]  Out: 195 [Drains:195]    Physical Exam:    Gen: Alert, NAD   HENT: NC, AT, EOMI   NECK: Supple, no JVD, Trachea midline   LUNGS: CTA bilaterally, non labored respirtation   CVS: S1/S2 audible, RRR, no murmur   Abd: Soft, NT, ND, BS+   Ext: + pedal edema, no cyanosis   CNS: Alert, No focal deficit noted grossly  Psy: Cooperative  Skin: Warm, dry and intact      WBC WBC   Date Value Ref Range Status   10/12/2021 11.04 (H) 3.40 - 10.80 10*3/mm3 Final   10/11/2021 9.39 3.40 - 10.80 10*3/mm3 Final      HGB Hemoglobin   Date Value Ref Range Status   10/12/2021 11.1 (L) 13.0 - 17.7 g/dL Final   10/11/2021 10.1 (L) 13.0 - 17.7 g/dL Final      HCT Hematocrit   Date Value Ref Range Status   10/12/2021 33.5 (L) 37.5 - 51.0 % Final   10/11/2021 31.8 (L) 37.5 - 51.0 % Final      Platlets No results found for: LABPLAT   MCV MCV   Date Value Ref Range Status   10/12/2021 86.3 79.0 - 97.0 fL Final   10/11/2021 89.1 79.0 - 97.0 fL Final          Sodium Sodium   Date Value Ref Range Status "   10/13/2021 137 136 - 145 mmol/L Final   10/12/2021 139 136 - 145 mmol/L Final   10/11/2021 145 136 - 145 mmol/L Final      Potassium Potassium   Date Value Ref Range Status   10/13/2021 4.1 3.5 - 5.2 mmol/L Final   10/12/2021 4.3 3.5 - 5.2 mmol/L Final   10/11/2021 4.2 3.5 - 5.2 mmol/L Final     Comment:     Slight hemolysis detected by analyzer. Results may be affected.      Chloride Chloride   Date Value Ref Range Status   10/13/2021 104 98 - 107 mmol/L Final   10/12/2021 107 98 - 107 mmol/L Final   10/11/2021 113 (H) 98 - 107 mmol/L Final      CO2 CO2   Date Value Ref Range Status   10/13/2021 19.0 (L) 22.0 - 29.0 mmol/L Final   10/12/2021 19.0 (L) 22.0 - 29.0 mmol/L Final   10/11/2021 17.0 (L) 22.0 - 29.0 mmol/L Final      BUN BUN   Date Value Ref Range Status   10/13/2021 39 (H) 8 - 23 mg/dL Final   10/12/2021 50 (H) 8 - 23 mg/dL Final   10/11/2021 58 (H) 8 - 23 mg/dL Final      Creatinine Creatinine   Date Value Ref Range Status   10/13/2021 1.64 (H) 0.76 - 1.27 mg/dL Final   10/12/2021 1.80 (H) 0.76 - 1.27 mg/dL Final   10/11/2021 2.16 (H) 0.76 - 1.27 mg/dL Final      Calcium Calcium   Date Value Ref Range Status   10/13/2021 8.5 (L) 8.6 - 10.5 mg/dL Final   10/12/2021 8.6 8.6 - 10.5 mg/dL Final   10/11/2021 8.2 (L) 8.6 - 10.5 mg/dL Final      PO4 No results found for: CAPO4   Albumin Albumin   Date Value Ref Range Status   10/12/2021 2.60 (L) 3.50 - 5.20 g/dL Final   10/11/2021 2.20 (L) 3.50 - 5.20 g/dL Final      Magnesium No results found for: MG   Uric Acid No results found for: URICACID        Results Review:     I reviewed the patient's new clinical results.    amiodarone, 200 mg, Oral, TID  atorvastatin, 40 mg, Oral, Nightly  docosanol, 1 application, Topical, 5x Daily  dorzolamide-timolol, , Both Eyes, BID  ferric gluconate, 125 mg, Intravenous, Daily Before Supper  insulin lispro, 0-7 Units, Subcutaneous, 4x Daily With Meals & Nightly  lactobacillus acidophilus, 1 capsule, Oral, Daily  metoprolol  tartrate, 25 mg, Oral, Q6H  micafungin (MYCAMINE) IV, 100 mg, Intravenous, Q24H  pantoprazole, 40 mg, Oral, Daily  piperacillin-tazobactam, 3.375 g, Intravenous, Q8H  predniSONE, 10 mg, Oral, Daily  rOPINIRole, 0.25 mg, Oral, Nightly  sodium chloride, 10 mL, Intravenous, Q12H  tamsulosin, 0.4 mg, Oral, Daily      sodium chloride, 9 mL/hr, Last Rate: Stopped (10/11/21 1244)        Medication Review: yes    Assessment/Plan       Sepsis (HCC)    Diabetes mellitus (HCC)    Hypertension    Moderate obstructive sleep apnea    History of sarcoidosis (occular)    Elevated LFTs    Combined systolic and diastolic cardiac dysfunction (EF < 50% 2018)    Post-operative infection    Abdominal wall cellulitis    Postoperative intra-abdominal abscess    Elevated troponin    CAD (coronary artery disease)    CKD (chronic kidney disease), stage III (HCC)    Acute kidney injury superimposed on chronic kidney disease (HCC)    Bile leak      1- RUDY on CKD - pre-enal azotemia from sepsis/abdominal wall cellulitis - resolved.   2- CKD stage III - Baseline Scr 1.8- 2.2 - Nephrosclerosis and obstructive uropathy.   3- Hx of nephrolithiasis - requiring ureteral stent in past   4- Sepsis/abdominal wall cellulitis   5- Hx of CAD s/p CABG.   6- Metabolic acidosis    7- Peripheral edema     Plan:  - Continue with current.   - Avoid nephrotoxic agents.   - Renal diet.   - will give a dose of metolazone 2.5 mg   - albumin infusion X 1     Follow up with [facility] in 2-3 weeks after discharge with renal function panel and UA    Aleena Abarca MD  10/13/21  10:22 EDT

## 2021-10-13 NOTE — PROGRESS NOTES
"GI Daily Progress Note  Subjective:    Chief Complaint: Follow-up bile leak    Pt resting in bed in no acute distress.  Still reports some abdominal pain and intermittent nausea; limited PO intake today. Reports he is still having bowel movements and has been up out of bed today.     Objective:    /96 (BP Location: Left arm, Patient Position: Lying)   Pulse 87   Temp 98.8 °F (37.1 °C) (Oral)   Resp 16   Ht 170.2 cm (67.01\")   Wt 104 kg (229 lb 4.5 oz)   SpO2 94%   BMI 35.90 kg/m²     Physical Exam  Vitals and nursing note reviewed.   Constitutional:       General: He is not in acute distress.     Appearance: Normal appearance. He is obese. He is not ill-appearing or toxic-appearing.   HENT:      Head: Normocephalic and atraumatic.   Eyes:      Pupils: Pupils are equal, round, and reactive to light.   Cardiovascular:      Rate and Rhythm: Normal rate and regular rhythm.      Pulses: Normal pulses.      Heart sounds: Normal heart sounds.   Pulmonary:      Effort: Pulmonary effort is normal. No respiratory distress.      Breath sounds: Normal breath sounds.   Abdominal:      General: Abdomen is flat. Bowel sounds are normal. There is no distension.      Palpations: Abdomen is soft. There is no mass.      Tenderness: There is abdominal tenderness. There is no guarding or rebound.      Hernia: No hernia is present.      Comments: LAWRENCE drains with limited bilious output today; appears more serosanguineous.   Skin:     General: Skin is warm and dry.      Capillary Refill: Capillary refill takes less than 2 seconds.      Coloration: Skin is not jaundiced or pale.   Neurological:      General: No focal deficit present.      Mental Status: He is alert and oriented to person, place, and time.   Psychiatric:         Mood and Affect: Mood normal.         Behavior: Behavior normal.         Thought Content: Thought content normal.         Judgment: Judgment normal.         Lab  I have personally reviewed most recent " cardiac tracings, lab results and radiology images and interpretations and agree with findings.    Lab Results   Component Value Date    WBC 11.04 (H) 10/12/2021    HGB 11.1 (L) 10/12/2021    HGB 10.1 (L) 10/11/2021    HGB 9.3 (L) 10/10/2021    MCV 86.3 10/12/2021     10/12/2021    INR 1.23 (H) 10/10/2021    INR 1.43 (H) 10/09/2021    INR 2.20 (H) 10/08/2021    INR 2.39 (H) 10/07/2021    INR 2.22 (H) 10/06/2021       Lab Results   Component Value Date    GLUCOSE 172 (H) 10/13/2021    BUN 39 (H) 10/13/2021    CREATININE 1.64 (H) 10/13/2021    EGFRIFNONA 42 (L) 10/13/2021    BCR 23.8 10/13/2021     10/13/2021    K 4.1 10/13/2021    CO2 19.0 (L) 10/13/2021    CALCIUM 8.5 (L) 10/13/2021    ALBUMIN 2.60 (L) 10/12/2021    ALKPHOS 193 (H) 10/12/2021    BILITOT 1.0 10/12/2021    ALT 38 10/12/2021    AST 43 (H) 10/12/2021   Results for JAY NAYLOR (MRN [id number]) as of 10/13/2021 18:03   Ref. Range 10/8/2021 09:12   BODY FLUID CULTURE Unknown Rpt (A)     Assessment:      Sepsis (HCC)    Diabetes mellitus (HCC)    Hypertension    Moderate obstructive sleep apnea    History of sarcoidosis (occular)    Elevated LFTs    Combined systolic and diastolic cardiac dysfunction (EF < 50% 2018)    Post-operative infection    Abdominal wall cellulitis    Postoperative intra-abdominal abscess    Elevated troponin    CAD (coronary artery disease)    CKD (chronic kidney disease), stage III (HCC)    Acute kidney injury superimposed on chronic kidney disease (HCC)    Bile leak    1.  Late small bile leak following cholecystectomy for acute necrotizing cholecystitis.  Postop day #1 biliary Wallstent placement.  2.  Dyspepsia.  3.  Anorexia.  4.  Peptic duodenitis incidentally noted on ERCP, likely secondary to occasional NSAID use.    Plan:  Patient still with abdominal pain and intermittent nausea.  >>> Continue PPI  >>> Continue as needed antiemetics and pain control measures.  >>> Encourage liquid nutrition;  supplemental shakes with meals and at bedtime   -If poor intake persists, may require Keofeed    Balbir Garcia, ASHER  10/13/21  17:58 EDT

## 2021-10-14 NOTE — PROGRESS NOTES
"   LOS: 9 days    Patient Care Team:  Vincent Crawford MD as PCP - General (Family Medicine)  Manjula Owens MD as Consulting Physician (Cardiology)    Chief Complaint:  Abdominal pain    Subjective     Interval History:     No acute events overnight. No new complaints.     Review of Systems:   No CP or SOA , fever, chills, rigors, rash, N/V, Constipation.       Objective     Vital Sign Min/Max for last 24 hours  Temp  Min: 98 °F (36.7 °C)  Max: 98.9 °F (37.2 °C)   BP  Min: 139/85  Max: 166/91   Pulse  Min: 76  Max: 108   Resp  Min: 16  Max: 18   SpO2  Min: 94 %  Max: 95 %   No data recorded   No data recorded     Flowsheet Rows      First Filed Value   Admission Height 177.8 cm (70\") Documented at 10/04/2021 1842   Admission Weight 95.3 kg (210 lb) Documented at 10/04/2021 1842          No intake/output data recorded.  I/O last 3 completed shifts:  In: 520 [P.O.:220; IV Piggyback:300]  Out: 315 [Urine:100; Drains:215]    Physical Exam:    Gen: Alert, NAD   HENT: NC, AT, EOMI   NECK: Supple, no JVD, Trachea midline   LUNGS: CTA bilaterally, non labored respirtation   CVS: S1/S2 audible, RRR, no murmur   Abd: Soft, NT, ND, BS+   Ext: + pedal edema, no cyanosis   CNS: Alert, No focal deficit noted grossly  Psy: Cooperative  Skin: Warm, dry and intact      WBC WBC   Date Value Ref Range Status   10/14/2021 21.05 (H) 3.40 - 10.80 10*3/mm3 Final   10/12/2021 11.04 (H) 3.40 - 10.80 10*3/mm3 Final      HGB Hemoglobin   Date Value Ref Range Status   10/14/2021 9.9 (L) 13.0 - 17.7 g/dL Final   10/12/2021 11.1 (L) 13.0 - 17.7 g/dL Final      HCT Hematocrit   Date Value Ref Range Status   10/14/2021 31.1 (L) 37.5 - 51.0 % Final   10/12/2021 33.5 (L) 37.5 - 51.0 % Final      Platlets No results found for: LABPLAT   MCV MCV   Date Value Ref Range Status   10/14/2021 87.9 79.0 - 97.0 fL Final   10/12/2021 86.3 79.0 - 97.0 fL Final          Sodium Sodium   Date Value Ref Range Status   10/14/2021 135 (L) 136 - 145 " mmol/L Final   10/13/2021 137 136 - 145 mmol/L Final   10/12/2021 139 136 - 145 mmol/L Final      Potassium Potassium   Date Value Ref Range Status   10/14/2021 4.4 3.5 - 5.2 mmol/L Final   10/13/2021 4.1 3.5 - 5.2 mmol/L Final   10/12/2021 4.3 3.5 - 5.2 mmol/L Final      Chloride Chloride   Date Value Ref Range Status   10/14/2021 104 98 - 107 mmol/L Final   10/13/2021 104 98 - 107 mmol/L Final   10/12/2021 107 98 - 107 mmol/L Final      CO2 CO2   Date Value Ref Range Status   10/14/2021 20.0 (L) 22.0 - 29.0 mmol/L Final   10/13/2021 19.0 (L) 22.0 - 29.0 mmol/L Final   10/12/2021 19.0 (L) 22.0 - 29.0 mmol/L Final      BUN BUN   Date Value Ref Range Status   10/14/2021 34 (H) 8 - 23 mg/dL Final   10/13/2021 39 (H) 8 - 23 mg/dL Final   10/12/2021 50 (H) 8 - 23 mg/dL Final      Creatinine Creatinine   Date Value Ref Range Status   10/14/2021 1.54 (H) 0.76 - 1.27 mg/dL Final   10/13/2021 1.64 (H) 0.76 - 1.27 mg/dL Final   10/12/2021 1.80 (H) 0.76 - 1.27 mg/dL Final      Calcium Calcium   Date Value Ref Range Status   10/14/2021 8.2 (L) 8.6 - 10.5 mg/dL Final   10/13/2021 8.5 (L) 8.6 - 10.5 mg/dL Final   10/12/2021 8.6 8.6 - 10.5 mg/dL Final      PO4 No results found for: CAPO4   Albumin Albumin   Date Value Ref Range Status   10/14/2021 2.40 (L) 3.50 - 5.20 g/dL Final   10/12/2021 2.60 (L) 3.50 - 5.20 g/dL Final      Magnesium Magnesium   Date Value Ref Range Status   10/14/2021 1.9 1.6 - 2.4 mg/dL Final      Uric Acid No results found for: URICACID        Results Review:     I reviewed the patient's new clinical results.    amiodarone, 200 mg, Oral, TID  atorvastatin, 40 mg, Oral, Nightly  docosanol, 1 application, Topical, 5x Daily  dorzolamide-timolol, , Both Eyes, BID  ferric gluconate, 125 mg, Intravenous, Daily Before Supper  insulin lispro, 0-7 Units, Subcutaneous, 4x Daily With Meals & Nightly  lactobacillus acidophilus, 1 capsule, Oral, Daily  metoprolol tartrate, 25 mg, Oral, Q6H  micafungin (MYCAMINE) IV,  100 mg, Intravenous, Q24H  pantoprazole, 40 mg, Oral, Daily  piperacillin-tazobactam, 3.375 g, Intravenous, Q8H  predniSONE, 10 mg, Oral, Daily  rOPINIRole, 0.25 mg, Oral, Nightly  sodium chloride, 10 mL, Intravenous, Q12H  tamsulosin, 0.4 mg, Oral, Daily      sodium chloride, 9 mL/hr, Last Rate: Stopped (10/11/21 1244)        Medication Review: yes    Assessment/Plan       Sepsis (HCC)    Diabetes mellitus (HCC)    Hypertension    Moderate obstructive sleep apnea    History of sarcoidosis (occular)    Elevated LFTs    Combined systolic and diastolic cardiac dysfunction (EF < 50% 2018)    Post-operative infection    Abdominal wall cellulitis    Postoperative intra-abdominal abscess    Elevated troponin    CAD (coronary artery disease)    CKD (chronic kidney disease), stage III (HCC)    Acute kidney injury superimposed on chronic kidney disease (HCC)    Bile leak      1- RUDY on CKD - pre-enal azotemia from sepsis/abdominal wall cellulitis - resolved.   2- CKD stage III - Baseline Scr 1.8- 2.2 - Nephrosclerosis and obstructive uropathy.   3- Hx of nephrolithiasis - requiring ureteral stent in past   4- Sepsis/abdominal wall cellulitis   5- Hx of CAD s/p CABG.   6- Metabolic acidosis    7- Peripheral edema     Plan:  - Continue with current.   - Avoid nephrotoxic agents.   - Renal diet.       Follow up with [facility] in 2-3 weeks after discharge with renal function panel and UA    Aleena Abarca MD  10/14/21  09:21 EDT

## 2021-10-14 NOTE — THERAPY TREATMENT NOTE
Patient Name: Jeremías Soto  : 1955    MRN: [id number]                              Today's Date: 10/14/2021       Admit Date: 10/4/2021    Visit Dx:     ICD-10-CM ICD-9-CM   1. Cellulitis of abdominal wall  L03.311 682.2   2. Infection of superficial incisional surgical site after procedure, initial encounter  T81.41XA 998.59   3. Leukocytosis, unspecified type  D72.829 288.60   4. Acute cystitis without hematuria  N30.00 595.0   5. RUDY (acute kidney injury) (HCC)  N17.9 584.9   6. Acute congestive heart failure, unspecified heart failure type (HCC)  I50.9 428.0   7. Abdominal wall cellulitis  L03.311 682.2   8. Postoperative intra-abdominal abscess  T81.43XA 998.59     567.22   9. Bile leak  K83.9 576.9     Patient Active Problem List   Diagnosis   • Snoring   • Diabetes mellitus (HCC)   • Hypertension   • Overweight   • Moderate obstructive sleep apnea   • Abdominal pain   • Nephrolithiasis   • History of sarcoidosis (occular)   • Recurrent COVID-19 virus infection (2020, 2021)   • RUDY (acute kidney injury) (HCC)   • Elevated LFTs   • Combined systolic and diastolic cardiac dysfunction (EF < 50% )   • Abnormal CT scan, liver   • Post-operative infection   • Abdominal wall cellulitis   • Sepsis (HCC)   • Postoperative intra-abdominal abscess   • Elevated troponin   • CAD (coronary artery disease)   • CKD (chronic kidney disease), stage III (HCC)   • Acute kidney injury superimposed on chronic kidney disease (HCC)   • Bile leak     Past Medical History:   Diagnosis Date   • Cancer (HCC)     skin   • Coronary artery disease     stent   • Diabetes mellitus (HCC)    • Elevated cholesterol    • Heart attack (HCC)    • Hypertension    • Sarcoid    • Sleep apnea     no cpap   • SOB (shortness of breath)    • Stroke (HCC)     Pt. states that he has had 2 mini strokes. No residual      Past Surgical History:   Procedure Laterality Date   • CARDIAC CATHETERIZATION      cardiac stent x2 after  s/p CABG   • CATARACT EXTRACTION, BILATERAL     • CHOLECYSTECTOMY N/A 10/8/2021    Procedure: LAPAROSCOPIC WASHOUT;  Surgeon: David Callahan MD;  Location: [facility] OR;  Service: General;  Laterality: N/A;   • CHOLECYSTECTOMY WITH INTRAOPERATIVE CHOLANGIOGRAM N/A 9/27/2021    Procedure: CHOLECYSTECTOMY LAPAROSCOPIC INTRAOPERATIVE CHOLANGIOGRAM;  Surgeon: Gaudencio Wolfe MD;  Location:  [facility] OR;  Service: General;  Laterality: N/A;   • COLONOSCOPY     • CORONARY ARTERY BYPASS GRAFT  2002   • ENDOSCOPY     • ERCP N/A 9/28/2021    Procedure: ENDOSCOPIC RETROGRADE CHOLANGIOPANCREATOGRAPHY;  Surgeon: Brunner, Mark I, MD;  Location: [facility] ENDOSCOPY;  Service: Gastroenterology;  Laterality: N/A;  sphincterotomy made, balloon sweep of bile duct done with 9-12 balloon.    • ERCP N/A 10/11/2021    Procedure: ENDOSCOPIC RETROGRADE CHOLANGIOPANCREATOGRAPHY with biliary wall stent;  Surgeon: Brunner, Mark I, MD;  Location: [facility] ENDOSCOPY;  Service: Gastroenterology;  Laterality: N/A;  BALLOON SWEEP 1229  10X8 STENT PLACED IN CBD    • EXTRACORPOREAL SHOCKWAVE LITHOTRIPSY (ESWL), STENT INSERTION/REMOVAL Bilateral 8/21/2020    Procedure: EXTRACORPOREAL SHOCKWAVE LITHOTRIPSY BILATERAL WITH STENT PLACEMENT LEFT;  Surgeon: Ethan Barnes Jr., MD;  Location: [facility] OR;  Service: Urology;  Laterality: Bilateral;   • SKIN CANCER EXCISION        General Information     Row Name 10/14/21 1445          Physical Therapy Time and Intention    Document Type therapy note (daily note)  -DM     Mode of Treatment physical therapy  -DM     Row Name 10/14/21 1445          General Information    Existing Precautions/Restrictions fall; other (see comments)  10-11-21: ERCP; prev lap farooq & I/D washout; LAWRENCE X 2; Abdom gbinder at all times;Depends for mobil;h/o Covid 12/'20;  -DM           User Key  (r) = Recorded By, (t) = Taken By, (c) = Cosigned By    Initials Name Provider Type    DM Brenda Hancock, PT Physical Therapist                Mobility     Row Name 10/14/21 1445          Bed Mobility    Bed Mobility rolling left; rolling right; sidelying-sit; scooting/bridging  -DM     Rolling Left Doran (Bed Mobility) verbal cues; minimum assist (75% patient effort)  -DM     Rolling Right Doran (Bed Mobility) verbal cues; minimum assist (75% patient effort)  -DM     Scooting/Bridging Doran (Bed Mobility) verbal cues; moderate assist (50% patient effort)  -DM     Sidelying-Sit Doran (Bed Mobility) verbal cues; moderate assist (50% patient effort)  -DM     Assistive Device (Bed Mobility) draw sheet; head of bed elevated  -DM     Comment (Bed Mobility) issued post op splinting pillow, loaner R wx, Depends for mobil(incont urine onto gown,incont shield; both replaced); logrolled to place Depends; LAWRENCE's x 2 intact & abdom binder in place  -DM     Row Name 10/14/21 1445          Transfers    Comment (Transfers) 2 STS trials( cued for incis. splinting L hand, & R bedrail use )  -DM     Row Name 10/14/21 1445          Sit-Stand Transfer    Sit-Stand Doran (Transfers) verbal cues; minimum assist (75% patient effort)  -DM     Assistive Device (Sit-Stand Transfers) walker, front-wheeled  -DM     Row Name 10/14/21 1445          Gait/Stairs (Locomotion)    Doran Level (Gait) verbal cues; minimum assist (75% patient effort); 1 person to manage equipment  -DM     Assistive Device (Gait) walker, front-wheeled  -DM     Distance in Feet (Gait) 115  -DM     Deviations/Abnormal Patterns (Gait) bilateral deviations; harshad decreased; stride length decreased; weight shifting decreased  Decr step length  -DM     Bilateral Gait Deviations forward flexed posture; heel strike decreased  gazes at floor  -DM     Comment (Gait/Stairs) cues to incr step length & h.strike, stabilize knees on return trip, ext trunk/focus at exit sign,& PLB  -DM           User Key  (r) = Recorded By, (t) = Taken By, (c) = Cosigned By    Initials Name  Provider Type    DM Brenda Hancock, PT Physical Therapist               Obj/Interventions     Row Name 10/14/21 1445          Motor Skills    Therapeutic Exercise hip; knee; ankle  -DM     Row Name 10/14/21 1445          Hip (Therapeutic Exercise)    Hip (Therapeutic Exercise) AROM (active range of motion); AAROM (active assistive range of motion); isometric exercises  -DM     Hip AROM (Therapeutic Exercise) bilateral; external rotation; internal rotation; supine; 10 repetitions  -DM     Hip AAROM (Therapeutic Exercise) bilateral; aBduction; aDduction; supine; 10 repetitions  -DM     Hip Isometrics (Therapeutic Exercise) bilateral; gluteal sets; supine; 10 repetitions  -DM     Row Name 10/14/21 1445          Knee (Therapeutic Exercise)    Knee (Therapeutic Exercise) AROM (active range of motion); isometric exercises  -DM     Knee AROM (Therapeutic Exercise) bilateral; flexion; extension; SAQ (short arc quad); LAQ (long arc quad); heel slides; supine; sitting; 10 repetitions  -DM     Knee Isometrics (Therapeutic Exercise) bilateral; quad sets; supine; 10 repetitions  -DM     Row Name 10/14/21 1445          Ankle (Therapeutic Exercise)    Ankle (Therapeutic Exercise) AROM (active range of motion)  -DM     Ankle AROM (Therapeutic Exercise) bilateral; dorsiflexion; plantarflexion; supine; 10 repetitions; other (see comments)  AC in sup, toe taps sitting  -DM     Row Name 10/14/21 1445          Balance    Balance Assessment sitting static balance; sitting dynamic balance; standing static balance; standing dynamic balance  -DM     Static Sitting Balance WNL; unsupported; sitting, edge of bed  -DM     Dynamic Sitting Balance WFL; unsupported; sitting, edge of bed  -DM     Static Standing Balance WFL; supported; standing  -DM     Dynamic Standing Balance mild impairment; supported; standing  -DM     Balance Interventions sitting; standing; static; dynamic; weight shifting activity  -DM           User Key  (r) = Recorded  By, (t) = Taken By, (c) = Cosigned By    Initials Name Provider Type    Brenda Stockton, PT Physical Therapist               Goals/Plan    No documentation.                Clinical Impression     Row Name 10/14/21 1445          Pain    Additional Documentation Pain Scale: FACES Pre/Post-Treatment (Group)  -DM     Row Name 10/14/21 1445          Pain Scale: Numbers Pre/Post-Treatment    Pain Location - Orientation incisional  -DM     Pain Location abdomen  -DM     Pre/Posttreatment Pain Comment premed by nsg  -DM     Pain Intervention(s) Repositioned; Rest; Elevated  -DM     Row Name 10/14/21 1445          Pain Scale: FACES Pre/Post-Treatment    Pain: FACES Scale, Pretreatment 2-->hurts little bit  -DM     Posttreatment Pain Rating 4-->hurts little more  -DM     Row Name 10/14/21 1445          Plan of Care Review    Plan of Care Reviewed With patient; spouse  -DM     Progress improving  -DM     Outcome Summary Logrolling transf to sitting EOB (Wearing abdom binder & splinting w/ post op pillow) w/ mod A, STS w/ R wx & min 1 + 1A (2 trials), amb 115 ft w/ R wx & min 1 + 1 A to guide R wx, w/ 2 stand rests, + performed LE ther exer EOB & UIC. Noted HR to 108, desat to 94% on RA, & signif elev BP to 161/86.  -DM     Row Name 10/14/21 1445          Vital Signs    Pre Systolic BP Rehab 150  -DM     Pre Treatment Diastolic BP 85  -DM     Intra Systolic BP Rehab 168  -DM     Intra Treatment Diastolic BP 86  -DM     Post Systolic BP Rehab 148  -DM     Post Treatment Diastolic BP 86  -DM     Pretreatment Heart Rate (beats/min) 78  -DM     Intratreatment Heart Rate (beats/min) 108  -DM     Posttreatment Heart Rate (beats/min) 84  -DM     Pre SpO2 (%) 96  -DM     O2 Delivery Pre Treatment room air  -DM     Intra SpO2 (%) 94  -DM     O2 Delivery Intra Treatment room air  -DM     Post SpO2 (%) 95  -DM     O2 Delivery Post Treatment room air  -DM     Pre Patient Position Supine  -DM     Intra Patient Position Standing  -DM      Post Patient Position Sitting  -DM     Rest Breaks  2  -DM     Row Name 10/14/21 1445          Positioning and Restraints    Pre-Treatment Position in bed  -DM     Post Treatment Position chair  -DM     In Chair notified nsg; reclined; call light within reach; encouraged to call for assist; exit alarm on; with family/caregiver; waffle cushion; legs elevated  Depends opened; incont shield placed  -DM           User Key  (r) = Recorded By, (t) = Taken By, (c) = Cosigned By    Initials Name Provider Type    Brenda Stockton, PT Physical Therapist               Outcome Measures     Row Name 10/14/21 1445 10/14/21 0850       How much help from another person do you currently need...    Turning from your back to your side while in flat bed without using bedrails? 3  -DM 3  -MC    Moving from lying on back to sitting on the side of a flat bed without bedrails? 2  -DM 3  -MC    Moving to and from a bed to a chair (including a wheelchair)? 3  -DM 3  -MC    Standing up from a chair using your arms (e.g., wheelchair, bedside chair)? 3  -DM 2  -MC    Climbing 3-5 steps with a railing? 2  -DM 2  -MC    To walk in hospital room? 3  -DM 2  -MC    AM-PAC 6 Clicks Score (PT) 16  -DM 15  -MC          User Key  (r) = Recorded By, (t) = Taken By, (c) = Cosigned By    Initials Name Provider Type    Brenda Stockton, PT Physical Therapist    Leah Herrera RN Registered Nurse                             Physical Therapy Education                 Title: PT OT SLP Therapies (In Progress)     Topic: Physical Therapy (In Progress)     Point: Mobility training (In Progress)     Learning Progress Summary           Patient Eager, E,D, NR by DM at 10/14/2021 1607    Acceptance, E, VU,NR by LM at 10/8/2021 1537   Significant Other Eager, E,D, NR by DM at 10/14/2021 1607                   Point: Home exercise program (In Progress)     Learning Progress Summary           Patient Eager, E,D, NR by JUNE at 10/14/2021 1607   Significant  Other Eager, E,D, NR by DM at 10/14/2021 1607                   Point: Precautions (In Progress)     Learning Progress Summary           Patient Eager, E,D, NR by JUNE at 10/14/2021 1607    Acceptance, E, VU,NR by  at 10/8/2021 1537   Significant Other Eager, E,D, NR by DM at 10/14/2021 1607                               User Key     Initials Effective Dates Name Provider Type Discipline    DM 06/16/21 -  Brenda Hancock, PT Physical Therapist PT    ELA 06/16/21 -  Sienna Silveira PT Physical Therapist PT              PT Recommendation and Plan     Plan of Care Reviewed With: patient, spouse  Progress: improving  Outcome Summary: Logrolling transf to sitting EOB (Wearing abdom binder & splinting w/ post op pillow) w/ mod A, STS w/ R wx & min 1 + 1A (2 trials), amb 115 ft w/ R wx & min 1 + 1 A to guide R wx, w/ 2 stand rests, + performed LE ther exer EOB & UIC. Noted HR to 108, desat to 94% on RA, & signif elev BP to 161/86.     Time Calculation:    PT Charges     Row Name 10/14/21 1607             Time Calculation    Start Time 1445  -DM      PT Received On 10/14/21  -DM      PT Goal Re-Cert Due Date 10/18/21  -DM              Time Calculation- PT    Total Timed Code Minutes- PT 42 minute(s)  -DM              Timed Charges    09619 - PT Therapeutic Exercise Minutes 14  -DM      16062 - Gait Training Minutes  16  -DM      66220 - PT Therapeutic Activity Minutes 12  -DM              Total Minutes    Timed Charges Total Minutes 42  -DM       Total Minutes 42  -DM            User Key  (r) = Recorded By, (t) = Taken By, (c) = Cosigned By    Initials Name Provider Type    DM Brenda Hancock, PT Physical Therapist              Therapy Charges for Today     Code Description Service Date Service Provider Modifiers Qty    78317579894 HC PT THER PROC EA 15 MIN 10/14/2021 Brenda Hancock, PT GP 1    69354215810 HC GAIT TRAINING EA 15 MIN 10/14/2021 Brenda Hancock, PT GP 1    21212843729 HC PT THERAPEUTIC ACT EA 15 MIN  10/14/2021 Brenda Hancock, PT GP 1          PT G-Codes  Outcome Measure Options: AM-PAC 6 Clicks Basic Mobility (PT)  AM-PAC 6 Clicks Score (PT): 16    Brenda Hancock, PT  10/14/2021

## 2021-10-14 NOTE — PROGRESS NOTES
" [facility] - Progress Note    Jeremías Soto  1955  [id number]    10/14/21, 08:52 EDT      Chief Complaint: Following for CHF, CAD, NSVT    Subjective: No abdominal pain.  Still has some nausea.  Blood pressure and heart rate better this morning.  94% saturated on room air.  Creatinine continues to improve and is now down to 1.54.  White count up to 21,000 from 11,000.    Review of Systems:  Pertinent positives are listed above and in physical exam.  All others have been reviewed and are negative.    amiodarone, 200 mg, Oral, TID  atorvastatin, 40 mg, Oral, Nightly  docosanol, 1 application, Topical, 5x Daily  dorzolamide-timolol, , Both Eyes, BID  ferric gluconate, 125 mg, Intravenous, Daily Before Supper  insulin lispro, 0-7 Units, Subcutaneous, 4x Daily With Meals & Nightly  lactobacillus acidophilus, 1 capsule, Oral, Daily  metoprolol tartrate, 25 mg, Oral, Q6H  micafungin (MYCAMINE) IV, 100 mg, Intravenous, Q24H  pantoprazole, 40 mg, Oral, Daily  piperacillin-tazobactam, 3.375 g, Intravenous, Q8H  predniSONE, 10 mg, Oral, Daily  rOPINIRole, 0.25 mg, Oral, Nightly  sodium chloride, 10 mL, Intravenous, Q12H  tamsulosin, 0.4 mg, Oral, Daily        Objective:  Vitals:   height is 170.2 cm (67.01\") and weight is 104 kg (229 lb 4.5 oz). His oral temperature is 98 °F (36.7 °C). His blood pressure is 150/85 and his pulse is 81. His respiration is 16 and oxygen saturation is 94%.       Intake/Output Summary (Last 24 hours) at 10/14/2021 0910  Last data filed at 10/13/2021 2330  Gross per 24 hour   Intake 370 ml   Output 215 ml   Net 155 ml       Physical Exam:    General: Alert and oriented   Cardiovascular: Heart has a nondisplaced focal PMI. Regular rate and rhythm without murmur, gallop or rub.  Lungs: Clear without rales or wheezes.  Slightly decreased bases  Extremities: Show 1+ edema.  Skin: warm and dry.  Neurologic: nonfocal         Results from last 7 days   Lab Units " 10/14/21  0228   WBC 10*3/mm3 21.05*   HEMOGLOBIN g/dL 9.9*   HEMATOCRIT % 31.1*   PLATELETS 10*3/mm3 158     Results from last 7 days   Lab Units 10/14/21  0228   SODIUM mmol/L 135*   POTASSIUM mmol/L 4.4   CHLORIDE mmol/L 104   CO2 mmol/L 20.0*   BUN mg/dL 34*   CREATININE mg/dL 1.54*   CALCIUM mg/dL 8.2*   BILIRUBIN mg/dL 1.2   ALK PHOS U/L 174*   ALT (SGPT) U/L 35   AST (SGOT) U/L 37   GLUCOSE mg/dL 144*     Results from last 7 days   Lab Units 10/10/21  0149 10/09/21  0448 10/08/21  0617   INR  1.23* 1.43* 2.20*                 Echo:No thrombus by TTE.  Apical DK.  EF 30%  Tele:  NSR    Assessment and Plan:  1)  NSVT  - 25 beat run at 1216 Sunday at 135 bpm, asymptomatic  -  9/24/21 Echo EF 26-30%  -  On low dose metoprolol tartrate 12.5mg BID   -  Initiated oral Amiodarone 10/10.  EKG reviewed, NSR with acceptable QTc.  -  Will need LifeVest at discharge.     2)  CAD/ischemic cardiomyopathy/congestive heart failure  -  Prior CABG/stents  -  Mild troponin elevation on admission - likely chronic, currently chest pain-free with no anginal equivalents  -  Echocardiogram 9/24/2021 LVEF 26-30%.  Again, will need LifeVest at discharge.  -  Continue Lopressor, home lisinopril on hold due to acute kidney injury        3) History of LV thrombus  - per records from [facility] as documented by Dr Owens patient has previously been anticoagulated with Eliquis currently on hold given recent surgical intervention.    -  Per prior notes, will pursue echocardiogram with [facility] for reassessment of LV thrombus. Surgery has given the ok to resume anti coagulation         4) Sepsis/abdominal wall cellulitis  - 10/8/21  laparoscopic washout and drain placement.  Incision and drainage of right flank cellulitis.  Surgical management per Dr. Callahan  -  Infectious disease managing antibiotics  -  Anemia/transfusion per primary/surgical teams.  -  Bile leak.  S/P ERCP and biliary Wallstent Monday 10/11.   Surgery has given  ok to resume AC       5) RUDY on CKD    -   Nephrology following     No further NSVT  Cont amio for NSVT but reduce dose to twice daily.   Increase metoprolol to 50  mg every 8 hours.  May need to add terazosin.    Manjula Owens MD-C  10/14/21, 08:52 EDT

## 2021-10-14 NOTE — PROGRESS NOTES
"Patient Name:  Jeremías Soto  YOB: 1955  [id number]    Surgery Progress Note    Date of visit: 10/14/2021    Subjective   Patient with slightly increased appetite, improved epigastric pain, slowing of BM frequency. No incisional pain. No fevers/chills.          Objective       /85 (BP Location: Left arm, Patient Position: Lying)   Pulse 78   Temp 98 °F (36.7 °C) (Oral)   Resp 16   Ht 170.2 cm (67.01\")   Wt 104 kg (229 lb 4.5 oz)   SpO2 94%   BMI 35.90 kg/m²     Intake/Output Summary (Last 24 hours) at 10/14/2021 1200  Last data filed at 10/14/2021 1042  Gross per 24 hour   Intake 490 ml   Output 265 ml   Net 225 ml       CV:  Rhythm regular and rate regular  L:  Clear to auscultation bilaterally  Abd:  Bowel sounds positive, soft, inc c/d/i, JPs serosanguineous with slight bile tinge  Ext:  No cyanosis, clubbing, edema    Recent labs and imaging that are back at this time have been reviewed.   WBCs 11 -> 21.1  Hgb 11.1 -> 9.9  Cr 1.54  LFTs WNL        Assessment/Plan     Pt s/p lap farooq with subsequent lap washout of hematoma and I and D of right incision, followed by ERCP with stent placement for suspected bile leak  Check CT for any undrained abscesses given the increased WBC count and continued anorexia  OOB, IS, DVT prlx  Pain control        Gaudencio Wolfe MD  10/14/2021  12:00 EDT      "

## 2021-10-14 NOTE — PLAN OF CARE
Problem: Adult Inpatient Plan of Care  Goal: Plan of Care Review  Recent Flowsheet Documentation  Taken 10/14/2021 1445 by Brenda Hancock, PT  Progress: improving  Plan of Care Reviewed With:   patient   spouse  Outcome Summary: Logrolling transf to sitting EOB (Wearing abdom binder & splinting w/ post op pillow) w/ mod A, STS w/ R wx & min 1 + 1A (2 trials), amb 115 ft w/ R wx & min 1 + 1 A to guide R wx, w/ 2 stand rests, + performed LE ther exer EOB & UIC. Noted HR to 108, desat to 94% on RA, & signif elev BP to 161/86.   Goal Outcome Evaluation:  Plan of Care Reviewed With: patient, spouse        Progress: improving  Outcome Summary: Logrolling transf to sitting EOB (Wearing abdom binder & splinting w/ post op pillow) w/ mod A, STS w/ R wx & min 1 + 1A (2 trials), amb 115 ft w/ R wx & min 1 + 1 A to guide R wx, w/ 2 stand rests, + performed LE ther exer EOB & UIC. Noted HR to 108, desat to 94% on RA, & signif elev BP to 161/86.

## 2021-10-14 NOTE — PROGRESS NOTES
[facility] Medicine Services  PROGRESS NOTE    Patient Name: Jeremías Soto  : 1955  MRN: [id number]    Date of Admission: 10/4/2021  Primary Care Physician: Vincent Crawford MD    Subjective   Subjective     CC:  Abdominal pain    HPI  Feels a little better.  Still having some nausea but better.  Denies abdominal pain. States that BM's are normal.     ROS:  Gen- No fevers, chills  CV- No chest pain, palpitations  Resp- No cough, dyspnea  GI- +nausea, denies abdominal pain.           Objective   Objective     Vital Signs:   Temp:  [98.5 °F (36.9 °C)-98.9 °F (37.2 °C)] 98.7 °F (37.1 °C)  Heart Rate:  [] 83  Resp:  [16-18] 16  BP: (139-166)/(85-97) 153/92     Physical Exam:  Constitutional: Awake, alert, no acute distress, sitting up in bed, wife at bedside  HENT: perioral scabbed lesions  Respiratory: Clear to auscultation bilaterally, respiratory effort normal   Cardiovascular: RRR, no murmurs, rubs, or gallops, palpable radial pulse  Gastrointestinal: Mildly tender with palpation, 2 LAWRENCE drains on right side with serosanguineous drainage, also small open RLQ area incision today bandaged with abdominal binder  Musculoskeletal: 2+ lower extremity edema  Psychiatric: Appropriate affect, cooperative  Neurologic: Speech clear, moving all extremity spontaneously  Exam unchanged from 10/11/2021    Results Reviewed:  LAB RESULTS:      Lab 10/14/21  0228 10/12/21  0702 10/11/21  0704 10/10/21  0149 10/09/21  2142 10/09/21  0448 10/09/21  0448 10/08/21  0617 10/08/21  0617 10/07/21  0826   WBC 21.05* 11.04* 9.39 15.18*  --   --  14.44*   < > 14.57*  --    HEMOGLOBIN 9.9* 11.1* 10.1* 9.3* 9.0*   < > 8.1*   < > 9.3*  --    HEMATOCRIT 31.1* 33.5* 31.8* 28.3* 27.0*   < > 25.6*   < > 30.0*  --    PLATELETS 158 269 311 240  --   --  207   < > 142  --    NEUTROS ABS  --   --  7.23*  --   --   --   --   --  12.45*  --    IMMATURE GRANS (ABS)  --   --   --   --   --   --   --   --  0.22*   --    LYMPHS ABS  --   --   --   --   --   --   --   --  0.54*  --    MONOS ABS  --   --   --   --   --   --   --   --  1.28*  --    EOS ABS  --   --  0.00  --   --   --   --   --  0.04  --    MCV 87.9 86.3 89.1 87.1  --   --  91.8   < > 94.9  --    PROTIME  --   --   --  15.1*  --   --  17.0*  --  23.5* 25.1*   APTT  --   --   --   --   --   --  40.1*  --  46.6*  --     < > = values in this interval not displayed.         Lab 10/14/21  0228 10/13/21  0906 10/12/21  0701 10/11/21  0704 10/10/21  0149 10/09/21  0448 10/09/21  0448   SODIUM 135* 137 139 145 137   < > 150*   POTASSIUM 4.4 4.1 4.3 4.2 4.1   < > 4.8   CHLORIDE 104 104 107 113* 105   < > 114*   CO2 20.0* 19.0* 19.0* 17.0* 20.0*   < > 19.0*   ANION GAP 11.0 14.0 13.0 15.0 12.0   < > 17.0*   BUN 34* 39* 50* 58* 67*   < > 61*   CREATININE 1.54* 1.64* 1.80* 2.16* 2.90*   < > 3.31*   GLUCOSE 144* 172* 181* 61* 124*   < > 139*   CALCIUM 8.2* 8.5* 8.6 8.2* 8.1*   < > 8.0*   IONIZED CALCIUM 1.24  --   --   --   --   --   --    MAGNESIUM 1.9  --   --   --  2.4  --   --    PHOSPHORUS 2.4*  --  2.7 3.3 4.9*  --  5.5*    < > = values in this interval not displayed.         Lab 10/14/21  0228 10/12/21  0701 10/11/21  0704 10/10/21  0149 10/09/21  0448   TOTAL PROTEIN 5.6* 6.1 5.7* 5.7* 5.6*   ALBUMIN 2.40* 2.60* 2.20* 2.50* 2.50*   GLOBULIN 3.2 3.5 3.5 3.2 3.1   ALT (SGPT) 35 38 35 39 45*   AST (SGOT) 37 43* 37 31 40   BILIRUBIN 1.2 1.0 1.0 1.4* 1.0   ALK PHOS 174* 193* 166* 149* 154*         Lab 10/10/21  0149 10/09/21  0448 10/08/21  0617 10/07/21  0826   PROTIME 15.1* 17.0* 23.5* 25.1*   INR 1.23* 1.43* 2.20* 2.39*             Lab 10/09/21  1225 10/09/21  0448   IRON  --  10*   IRON SATURATION  --  6*   TIBC  --  177*   TRANSFERRIN  --  119*   FERRITIN  --  718.40*   ABO TYPING A  --    RH TYPING Positive  --    ANTIBODY SCREEN Negative  --          Brief Urine Lab Results  (Last result in the past 365 days)      Color   Clarity   Blood   Leuk Est   Nitrite    Protein   CREAT   Urine HCG        10/08/21 1823             186.2               Microbiology Results Abnormal     Procedure Component Value - Date/Time    Blood Culture - Blood, Arm, Left [[id number]]  (Normal) Collected: 10/05/21 0734    Lab Status: Final result Specimen: Blood from Arm, Left Updated: 10/10/21 0901     Blood Culture No growth at 5 days    Narrative:      The previously reported component GRAM STAIN is no longer being reported. Previous result was <null> (Reference Range: [Reference Range]) on 10/8/2021 at 0901 EDT.    Blood Culture - Blood, Arm, Left [[id number]]  (Normal) Collected: 10/04/21 2120    Lab Status: Final result Specimen: Blood from Arm, Left Updated: 10/09/21 2215     Blood Culture No growth at 5 days    Narrative:      The previously reported component GRAM STAIN is no longer being reported. Previous result was <null> (Reference Range: [Reference Range]) on 10/8/2021 at 2215 EDT.    Blood Culture - Blood, Arm, Right [[id number]]  (Normal) Collected: 10/04/21 2143    Lab Status: Final result Specimen: Blood from Arm, Right Updated: 10/09/21 2215     Blood Culture No growth at 5 days    Narrative:      The previously reported component GRAM STAIN is no longer being reported. Previous result was <null> (Reference Range: [Reference Range]) on 10/8/2021 at 2215 EDT.    Eosinophil Smear - Urine, Urine, Clean Catch [[id number]]  (Normal) Collected: 10/08/21 1823    Lab Status: Final result Specimen: Urine, Clean Catch Updated: 10/08/21 2003     Eosinophil Smear 0 % EOS/100 Cells     Narrative:      No eosinophil seen    COVID PRE-OP / PRE-PROCEDURE SCREENING ORDER (NO ISOLATION) - Swab, Nasopharynx [[id number]]  (Normal) Collected: 10/05/21 0015    Lab Status: Final result Specimen: Swab from Nasopharynx Updated: 10/05/21 0043    Narrative:      The following orders were created for panel order COVID PRE-OP / PRE-PROCEDURE SCREENING ORDER (NO ISOLATION) - Swab, Nasopharynx.  Procedure                                Abnormality         Status                     ---------                               -----------         ------                     COVID-19, ABBOTT IN-HOUS...[[id number]]  Normal              Final result                 Please view results for these tests on the individual orders.    COVID-19, ABBOTT IN-HOUSE,NASAL Swab (NO TRANSPORT MEDIA) 2 HR TAT - Swab, Nasopharynx [[id number]]  (Normal) Collected: 10/05/21 0015    Lab Status: Final result Specimen: Swab from Nasopharynx Updated: 10/05/21 0043     COVID19 Presumptive Negative    Narrative:      Fact sheet for providers: https://www.fda.gov/media/090221/download     Fact sheet for patients: https://www.fda.gov/media/004482/download    Test performed by PCR.  If inconsistent with clinical signs and symptoms patient should be tested with different authorized molecular test.          Adult Transthoracic Echo Complete W/ Cont if Necessary Per Protocol    Result Date: 10/12/2021  · Estimated left ventricular EF = 30% Left ventricular systolic function is moderately to severely decreased. · Left ventricular wall thickness is consistent with septal asymmetric hypertrophy. · Mild mitral valve regurgitation is present · Trace tricuspid valve regurgitation is present. · No left ventricular apical thrombus is seen.      XR Abdomen KUB    Result Date: 10/13/2021  EXAMINATION: XR ABDOMEN KUB-  INDICATION: R/o biliary stent migration; L03.311-Cellulitis of abdominal wall; T81.41XA-Infection following a procedure, superficial incisional surgical site, initial encounter; D72.829-Elevated white blood cell count, unspecified; N30.00-Acute cystitis without hematuria; N17.9-Acute kidney failure, unspecified; I50.9-Heart failure, unspecified; L03.311-Cellulitis of abdominal wall; T81.43XA-Infection follow  COMPARISON: 10/11/2021  FINDINGS: Biliary stent projects and grossly unchanged position in the right upper quadrant. Adjacent flat LAWRENCE drain is noted.  Abdominal bowel gas pattern is nonobstructive without pathologic distention of small bowel. No overt pneumoperitoneum.      Impression: Biliary stent projects and grossly unchanged position in the right upper quadrant. Adjacent flat LAWRENCE drain is noted.  This report was finalized on 10/13/2021 12:56 PM by Corey Castillo.        Results for orders placed during the hospital encounter of 10/04/21    Adult Transthoracic Echo Complete W/ Cont if Necessary Per Protocol    Interpretation Summary  · Estimated left ventricular EF = 30% Left ventricular systolic function is moderately to severely decreased.  · Left ventricular wall thickness is consistent with septal asymmetric hypertrophy.  · Mild mitral valve regurgitation is present  · Trace tricuspid valve regurgitation is present.  · No left ventricular apical thrombus is seen.      I have reviewed the medications:  Scheduled Meds:amiodarone, 200 mg, Oral, TID  atorvastatin, 40 mg, Oral, Nightly  docosanol, 1 application, Topical, 5x Daily  dorzolamide-timolol, , Both Eyes, BID  ferric gluconate, 125 mg, Intravenous, Daily Before Supper  insulin lispro, 0-7 Units, Subcutaneous, 4x Daily With Meals & Nightly  lactobacillus acidophilus, 1 capsule, Oral, Daily  metoprolol tartrate, 25 mg, Oral, Q6H  micafungin (MYCAMINE) IV, 100 mg, Intravenous, Q24H  pantoprazole, 40 mg, Oral, Daily  piperacillin-tazobactam, 3.375 g, Intravenous, Q8H  predniSONE, 10 mg, Oral, Daily  rOPINIRole, 0.25 mg, Oral, Nightly  sodium chloride, 10 mL, Intravenous, Q12H  tamsulosin, 0.4 mg, Oral, Daily      Continuous Infusions:sodium chloride, 9 mL/hr, Last Rate: Stopped (10/11/21 1244)      PRN Meds:.dextrose  •  dextrose  •  glucagon (human recombinant)  •  ondansetron  •  Sodium Chloride (PF)  •  sodium chloride    Assessment/Plan   Assessment & Plan     Active Hospital Problems    Diagnosis  POA   • **Sepsis (HCC) [A41.9]  Yes   • Post-operative infection [T81.40XA]  Yes   • Abdominal wall  cellulitis [L03.311]  Yes   • Postoperative intra-abdominal abscess [T81.43XA]  Yes   • Elevated troponin [R77.8]  Yes   • CAD (coronary artery disease) [I25.10]  Yes   • CKD (chronic kidney disease), stage III (HCC) [N18.30]  Yes   • Acute kidney injury superimposed on chronic kidney disease (HCC) [N17.9, N18.9]  Yes   • Bile leak [K83.9]  Unknown   • Elevated LFTs [R79.89]  Yes   • Combined systolic and diastolic cardiac dysfunction (EF < 50% 2018) [I51.89]  Yes   • History of sarcoidosis (occular) [Z86.2]  Yes   • Moderate obstructive sleep apnea [G47.33]  Yes   • Diabetes mellitus (HCC) [E11.9]  Yes   • Hypertension [I10]  Yes      Resolved Hospital Problems   No resolved problems to display.        Brief Hospital Course to date:  Jeremías Soto is a 66 y.o. male with systolic CHF (46-50%), chronic multiorgan disease, admitted 9/23-9/29 w/ enterococcal/clostridial bacteremia, cholangitis, questionable pyelonephritis-s/p lap farooq 9/27 and ERCP 9/28 discharged home on IV Zosyn with a planned completion date 10/11 who returned with several days worsening erythema of the abdomen and imaging in the ED concerning for fluid collection in the gallbladder fossa now on empiric antibiotics with some initial improvement then new development of abdominal pain     Sepsis, abdominal source  Abnormal abdominal fluid collection  Recent cholecystitis  Recent enterococcal/clostridial bacteremia  -2.5 cm x 8.5 cm fluid collection on initial CT, unclear etiology given recent surgery  -S/p lap farooq 9/27, ERCP 9/28  -Repeat CT abdomen pelvis on 10/7/2021 with new 16 x 6 hepatic fluid collection and hematoma.  -Underwent laparoscopic washout and drain placement of intra-abdominal hematoma and incision and drainage of right flank cellulitis on 10/8/2021 per Dr. Callahan.  -ERCP performed on 10/11/2021, late small bile leak s/p Wallstent placement, EGD in 1 month to remove Wallstent.   -Dr. Callahan following.    -Continue  probiotic  -ID following.  Bodily fluid culture growing candida tropicalis and candida albicans.  stopped merrem and dapto and changed to zosyn, micafungin at least until 10/25/21 per ID with weekly CBC with diff, CMP, ESR, CRP to be faxed to [facility] [phone], also needs weekly groshong cathetor dressing changes  -note worsening leukocytosis today (up to 21K from 11K)--repeat CT ordered today by surgery d/t this and anorexia to r/o abscess, results still pending.        Peptic Duodenitis  --incidentally noted on ERCP, ?likely 2nd to occasional NSAID use.  AVOID NSAIDS    RUDY on CKD 3, improving  Hyperkalemia, resolved  Metabolic acidosis  -Baseline Cr 1.9-2.3; EGFR 30.  Creatinine trending down  -Nephrology consulted.  Likely secondary to ATN.  Improved/back to baseline      Acute blood loss anemia  -Likely postsurgical  -s/p transfusion on 10/9/2021    Hypoxia, resolved  -Chest x-ray on 10/9/2021 shows vascular congestion new since 10/4/2021.  -Diuresis per nephrology, plans metolazone and albumin today    Systolic heart failure  Nonsustained V. Tach  -5 beat run on Sunday, asymptomatic  -Echo on 9/24/2021 showed EF of 26 to 30%  -Lisinopril on hold due to RUDY  -LifeVest at discharge  -cards following, recs continue amio but reduce dose to BID and increase metoprolol to 50mg q8h, may need to add terazosin    Postoperative anemia  -s/p transfusion on 10/9, with improvement in anemia  -IV iron per nephrology    Hypernatremia, resolved  -Resolved with D5W    LV thrombus  -Per cardiology note 10/6, records from the [facility] demonstrate prescription of Eliquis 7/19 for a large LV thrombus found on MRI  -Cardiology following.  Echo showed EF 30%, LV wall thickness c/w septal asymmetric hypertrophy, mild MR, trace TR, no LV apical thrombus seen  -ok to resume anticoagulation per surgery, hold for now and will await cards recs since echo read as not thrombus seen  -Eliquis on hold since 10/5    Elevated INR,  improving  - Trended down but still remains elevated    Perioral lesions  -Empiric Valtrex, renally dosed     DM type 2, A1c 7.3%, without long-term use of insulin  -Accu-Cheks with SSI     CAD with prior CABG/stent  Chronic systolic/diastolic CHF  Mild troponin elevation  Hx multiple TIA  -Echo 9/24/2021 EF 26-30%  -Continue Lopressor  -Chronically on Eliquis, currently on hold  -Lisinopril on hold for RUDY     Elevated LFTs, resolved  -Recent admission cholangitis, holding statin as noted     Hypertension  Hyperlipidemia  -Statin on hold while on daptomycin     Ocular sarcoidosis  Bilateral lung nodules  -Repeat dedicated chest CT 6 months outpatient regarding nodules  -Continue chronic oral prednisone 10 mg     Obesity, BMI 35.16 kg/M2  EVA  Hx COVID-19 December 2020    DVT prophylaxis:  Mechanical DVT prophylaxis orders are present.     AM-PAC 6 Clicks Score (PT): 15 (10/13/21 2000)    Disposition: Multiple acute ongoing issues, anticipate need for rehab at discharge    Discussed with wife at bedside    CODE STATUS:   Code Status and Medical Interventions:   Ordered at: 10/05/21 0134     Code Status:    CPR     Medical Interventions (Level of Support Prior to Arrest):    Full     I have prepared this progress note with copied portions of the prior day's progress note of my own authorship to preserve accuracy and maintain consistency of documentation. I have reviewed these portions and edited them for correctness. I verify that the above documentation accurately and truly represents the evaluation and management performed on today's date.     Justus Milan MD  10/14/21

## 2021-10-14 NOTE — PLAN OF CARE
Changed both dressings per orders. No pain. BP better. Gave morphine 2mg for sharp pain in xiphoid process. No further complaints. Incontinent. VSS  Problem: Fall Injury Risk  Goal: Absence of Fall and Fall-Related Injury  Outcome: Ongoing, Progressing  Intervention: Identify and Manage Contributors to Fall Injury Risk  Recent Flowsheet Documentation  Taken 10/13/2021 2000 by Carlos Maurice, RN  Medication Review/Management: medications reviewed  Intervention: Promote Injury-Free Environment  Recent Flowsheet Documentation  Taken 10/14/2021 0400 by Carlos Maurice, RN  Safety Promotion/Fall Prevention:   activity supervised   assistive device/personal items within reach   clutter free environment maintained   fall prevention program maintained   lighting adjusted   nonskid shoes/slippers when out of bed   room organization consistent  Taken 10/14/2021 0000 by Carlos Maurice, RN  Safety Promotion/Fall Prevention:   activity supervised   assistive device/personal items within reach   clutter free environment maintained   fall prevention program maintained   lighting adjusted   nonskid shoes/slippers when out of bed   room organization consistent   safety round/check completed  Taken 10/13/2021 2000 by Carlos Maurice, RN  Safety Promotion/Fall Prevention:   activity supervised   assistive device/personal items within reach   clutter free environment maintained   fall prevention program maintained   lighting adjusted   nonskid shoes/slippers when out of bed   room organization consistent     Problem: Diabetes Comorbidity  Goal: Blood Glucose Level Within Desired Range  Outcome: Ongoing, Progressing     Problem: Adult Inpatient Plan of Care  Goal: Plan of Care Review  Outcome: Ongoing, Progressing  Goal: Patient-Specific Goal (Individualized)  Outcome: Ongoing, Progressing  Goal: Absence of Hospital-Acquired Illness or Injury  Outcome: Ongoing, Progressing  Intervention: Identify and Manage Fall  Risk  Recent Flowsheet Documentation  Taken 10/14/2021 0400 by Carlos Maurice, RN  Safety Promotion/Fall Prevention:   activity supervised   assistive device/personal items within reach   clutter free environment maintained   fall prevention program maintained   lighting adjusted   nonskid shoes/slippers when out of bed   room organization consistent  Taken 10/14/2021 0000 by Carlos Maurice, RN  Safety Promotion/Fall Prevention:   activity supervised   assistive device/personal items within reach   clutter free environment maintained   fall prevention program maintained   lighting adjusted   nonskid shoes/slippers when out of bed   room organization consistent   safety round/check completed  Taken 10/13/2021 2000 by Carlos Maurice, RN  Safety Promotion/Fall Prevention:   activity supervised   assistive device/personal items within reach   clutter free environment maintained   fall prevention program maintained   lighting adjusted   nonskid shoes/slippers when out of bed   room organization consistent  Intervention: Prevent Skin Injury  Recent Flowsheet Documentation  Taken 10/13/2021 2000 by Carlos Maurice, RN  Body Position: position changed independently  Goal: Optimal Comfort and Wellbeing  Outcome: Ongoing, Progressing  Goal: Readiness for Transition of Care  Outcome: Ongoing, Progressing     Problem: Skin Injury Risk Increased  Goal: Skin Health and Integrity  Outcome: Ongoing, Progressing  Intervention: Optimize Skin Protection  Recent Flowsheet Documentation  Taken 10/13/2021 2000 by Carlos Maurice, RN  Head of Bed (HOB): HOB elevated   Goal Outcome Evaluation:

## 2021-10-14 NOTE — PROGRESS NOTES
Jeremías Soto  1955  [id number]    Date of Consult: 10/5/21  Requesting Provider: Vincent Crawford MD  Evaluating Physician: Diaz Moreno MD    Chief Complaint: abdominal pain and redness    Reason for Consultation: sepsis secondary to an abdominal wall abscess/cellulitis    History of present illness:    Jeremías Soto is a 66 y.o.  Yr old male with history of coronary artery disease, CK D stage III B, diabetes mellitus type 2, hyperlipidemia, hypertension, sarcoidosis, and history of CVA who I recently evaluated during an admission to [facility] last month for enterococcus bacteremia and Clostridium perfringens bacteremia due to a biliary source/gallbladder source (cholangitis and cholecystitis).  The patient underwent laparoscopic cholecystectomy on 9/27 and ERCP with sphincterotomy stone extraction on 9/28.  He additionally had aerococcus urinae from urine culture. Due to his baseline renal dysfunction he did have a Groshong catheter placed for IV antibiotics.  He clinically improved with improvement in his LFTs and sepsis. He was discharged on 9/29 with plans to continue Zosyn at least until 10/11/21. I have not yet seen him in outpatient follow-up but he was scheduled to follow-up with me tomorrow on 10/6.      He was doing well apparently until 2-3 days ago when he noticed redness developing over his right abdominal wall.  The erythema has gotten progressively worse with some swelling and warmth and tenderness.  As far as I know he did not contact our clinic regarding this erythema. He was not having any fevers or chills at home.  He presented to the ER early this morning for evaluation of this redness. Since arrival, he was noted to have a maximum temperature of 99.1°F. labs showed a CRP of 46.11, troponin elevated to 0.07, proBNP of 8353, INR of 2.72, White blood cell count of 24.54 (up from 13.29 just before discharge), hemoglobin of 12, lactic acid of 4.1-->2.9, creatinine of  2.84 (up from 2.15 just prior to discharge). COVID-19 PCR was negative. White blood cell count is now improved to 13.69 on antibiotics.creatinine is worse at 3.09 today.  LFTs remain elevated with an ALT of 94, an AST of 137, an alkaline phosphatase of 176, and a total bilirubin of 1.4. CT abdomen and pelvis was done yesterday and showed cholecystectomy with a fluid collection in the gallbladder fossa containing a bubble of air with differential considerations including a seroma/hematoma, biloma, or possible developing abscess.  No biliary obstruction was seen.  There was also fat stranding in the ventral abdominal wall and right flank that could reflect bland edema or cellulitis. The patient's antibiotics have been changed to vancomycin, cefepime, and Flagyl. Repeat blood cultures and urine culture are in progress. Surgery consult has been placed. Plan is for IR drainage procedure but having to wait on this due to his anticoagulation that he has been on. He underwent NM Hepatobiliary scan today with no evidence for abnormal tracer activity to suggest leak.  The infectious disease team has been consulted for recommendations.    Subjective:    10/6/21: the patient states that his abdominal pain has been improving although he was having some increased right-sided abdominal pain after I went in the room today as he has just eaten something. No fevers. Tolerating the abx well without ADRs.    10/7/21: The patient is doing worse today.  Still having some right-sided abdominal pain.  He was taken for repeat CT abdomen and pelvis today as his leukocytosis worsened And he did have a new 16 x 6 cm hepatic fluid collection with intermittent heterogenous and hyperdense contents compatible with a large hematoma.  He did have a new small right pleural effusion with some adjacent consolidation or atelectasis. He denies any nausea.  No fevers    10/8/21: The patient was very somnolent today although he just recently got back from  the OR.  Dr. Callahan took the patient to the OR today and did not see any acute bleeding but did see some old blood/hematoma.  No necrotic fascia noted.  2 LAWRENCE drains left in place, one of the liver and another in the gallbladder fossa.  No fevers overnight.  Leukocytosis is slightly better.  His wife is at bedside and states that he is just been very somnolent after his procedure and during week but no other acute issues.    10/9/21: the patient states that his abdominal pain is improving. No fevers. Tolerating abx well without ADRs. Did have worsening hypoxia overnight to 10L NC but now improved back to 2L NC. Diuresis per nephrology and cardiology.    10/10/21: The patient has serous drainage from his LAWRENCE drain #1 but his LAWRENCE drain #2 has her bile and Dr. Callahan suspects that the patient has a bile leak from his prior cholecystectomy procedure.  Dr. Sylvester with GI has been consulted for ERCP tomorrow. The patient is somnolent today.  His wife remains at bedside.  She states that he has had a fairly good today although he is somnolent.  No fevers.  No history available from the patient    10/11/21: LAWRENCE drains in place.  Taken for ERCP today by Dr. Brunner with no overt bile leak seen and balloon sweep showed no stones.  A stent was placed in the CBD to the common hepatic duct and across the cystic duct with plans to remove in 1 month.  Patient is sitting up in bed with food in front of him, but he is not hungry.  He denies f/c, sob, v/d, rashes. He has some nausea.  Had NSVT on Sunday.  He was started on amiodarone and may go home with LifeVest. He remains afebrile. Creatinine improved. Has some diarrhea.     10/12/21: The patient remains afebrile.  Abdominal pain is improving.  LAWRENCE drain still remain in place.  Now growing Candida albicans and Candida tropicalis from his body fluid cultures.  Diarrhea has improved    10/13/21: the patient is nauseated today. No worsening abdominal pain. LAWRENCE drains remain in place. No  fevers.     10/14/21: The patient does have a substantial worsening of his leukocytosis today from 11-->21.  He denies having any severe diarrhea although he did have a bowel movement this morning.  Abdominal pain has improved.  He still having some anorexia and mild nausea.  No fevers.  Repeat CT abdomen and pelvis was done today with the official report pending but are my discussion with Dr. Kaminski, the patient still has a fluid collection by the liver but LAWRENCE drains remain in place and there is no other signs of new infection/acute abnormality.    Past Medical History:   Diagnosis Date   • Cancer (HCC)     skin   • Coronary artery disease     stent   • Diabetes mellitus (HCC)    • Elevated cholesterol    • Heart attack (HCC) 2003   • Hypertension    • Sarcoid    • Sleep apnea     no cpap   • SOB (shortness of breath)    • Stroke (HCC)     Pt. states that he has had 2 mini strokes. No residual        Past Surgical History:   Procedure Laterality Date   • CARDIAC CATHETERIZATION  2003    cardiac stent x2 after s/p CABG   • CATARACT EXTRACTION, BILATERAL     • CHOLECYSTECTOMY N/A 10/8/2021    Procedure: LAPAROSCOPIC WASHOUT;  Surgeon: David Callahan MD;  Location: [facility] OR;  Service: General;  Laterality: N/A;   • CHOLECYSTECTOMY WITH INTRAOPERATIVE CHOLANGIOGRAM N/A 9/27/2021    Procedure: CHOLECYSTECTOMY LAPAROSCOPIC INTRAOPERATIVE CHOLANGIOGRAM;  Surgeon: Gaudencio Wolfe MD;  Location: [facility] OR;  Service: General;  Laterality: N/A;   • COLONOSCOPY     • CORONARY ARTERY BYPASS GRAFT  2002   • ENDOSCOPY     • ERCP N/A 9/28/2021    Procedure: ENDOSCOPIC RETROGRADE CHOLANGIOPANCREATOGRAPHY;  Surgeon: Brunner, Mark I, MD;  Location: [facility] ENDOSCOPY;  Service: Gastroenterology;  Laterality: N/A;  sphincterotomy made, balloon sweep of bile duct done with 9-12 balloon.    • ERCP N/A 10/11/2021    Procedure: ENDOSCOPIC RETROGRADE CHOLANGIOPANCREATOGRAPHY with biliary wall stent;  Surgeon: Brunner, Mark I, MD;   Location:  [facility];  Service: Gastroenterology;  Laterality: N/A;  BALLOON SWEEP 1229  10X8 STENT PLACED IN CBD    • EXTRACORPOREAL SHOCKWAVE LITHOTRIPSY (ESWL), STENT INSERTION/REMOVAL Bilateral 8/21/2020    Procedure: EXTRACORPOREAL SHOCKWAVE LITHOTRIPSY BILATERAL WITH STENT PLACEMENT LEFT;  Surgeon: Ethan Barnes Jr., MD;  Location:  [facility] OR;  Service: Urology;  Laterality: Bilateral;   • SKIN CANCER EXCISION       Social history:  The patient is .  He lives in [facility].  No history of tobacco, alcohol, or illicit drug use.    Family history:  family history includes COPD in his mother; Cancer in his brother; Diabetes in his father and sister; Heart disease in his father; Hypertension in his father; Obesity in his sister.    No Known Allergies    Medication:  Current Facility-Administered Medications   Medication Dose Route Frequency Provider Last Rate Last Admin   • albumin human 25 % IV SOLN 12.5 g  12.5 g Intravenous BID Aleena Abarca MD       • amiodarone (PACERONE) tablet 200 mg  200 mg Oral BID With Meals Manjula Owens MD       • aspirin EC tablet 81 mg  81 mg Oral Daily Manjula Owens MD       • atorvastatin (LIPITOR) tablet 40 mg  40 mg Oral Nightly Gayle Butler MD   40 mg at 10/13/21 2211   • dextrose (D50W) 25 g/ 50mL Intravenous Solution 25 g  25 g Intravenous Q15 Min PRN Brunner, Mark I, MD   25 g at 10/11/21 0742   • dextrose (GLUTOSE) oral gel 15 g  15 g Oral Q15 Min PRN Brunner, Mark I, MD       • docosanol (ABREVA) 10 % cream 1 application  1 application Topical 5x Daily Brunner, Mark I, MD   1 application at 10/14/21 1220   • dorzolamide (TRUSOPT) 2 % 1 drop, timolol (TIMOPTIC) 0.5 % 1 drop for Cosopt 22.3-6.8 mg/mL   Both Eyes BID Brunner, Mark I, MD   Given at 10/14/21 0848   • ferric gluconate (FERRLECIT)125 MG in sodium chloride 0.9 % 100 mL IVPB  125 mg Intravenous Daily Before Supper Brunner, Mark I, MD   125 mg at 10/13/21  2210   • glucagon (human recombinant) (GLUCAGEN DIAGNOSTIC) injection 1 mg  1 mg Subcutaneous Q15 Min PRN Brunner, Mark I, MD       • insulin lispro (humaLOG) injection 0-7 Units  0-7 Units Subcutaneous 4x Daily With Meals & Nightly Brunner, Mark I, MD   2 Units at 10/13/21 2210   • lactobacillus acidophilus (RISAQUAD) capsule 1 capsule  1 capsule Oral Daily Brunner, Mark I, MD   1 capsule at 10/14/21 0848   • magnesium sulfate 4 gram infusion - Mg less than or equal to 1mg/dL  4 g Intravenous PRN Justus Milan MD        Or   • magnesium sulfate 3 gram infusion (1gm x 3) - Mg 1.1 - 1.5 mg/dL  1 g Intravenous PRN Justus Milan MD        Or   • Magnesium Sulfate 2 gram infusion- Mg 1.6 - 1.9 mg/dL  2 g Intravenous PRN Justus Milan MD 25 mL/hr at 10/14/21 1219 2 g at 10/14/21 1219   • metoprolol tartrate (LOPRESSOR) tablet 25 mg  25 mg Oral Q6H Manjula Owens MD   25 mg at 10/14/21 1218   • micafungin 100 mg/100 mL 0.9% NS IVPB (mbp)  100 mg Intravenous Q24H Brunner, Mark I, MD   100 mg at 10/13/21 2330   • ondansetron (ZOFRAN) injection 4 mg  4 mg Intravenous Q6H PRN Justus Milan MD       • pantoprazole (PROTONIX) EC tablet 40 mg  40 mg Oral Daily Brunner, Mark I, MD   40 mg at 10/14/21 0848   • piperacillin-tazobactam (ZOSYN) 3.375 g in iso-osmotic dextrose 50 ml (premix)  3.375 g Intravenous Q8H Diaz Moreno MD   3.375 g at 10/14/21 0848   • potassium & sodium phosphates (PHOS-NAK) 280-160-250 MG packet - for Phosphorus less than 1.25 mg/dL  2 packet Oral Q6H PRN Justus Milan MD        Or   • potassium & sodium phosphates (PHOS-NAK) 280-160-250 MG packet - for Phosphorus 1.25 - 2.5 mg/dL  2 packet Oral Q6H PRN Justus Milan MD   2 packet at 10/14/21 1219   • predniSONE (DELTASONE) tablet 10 mg  10 mg Oral Daily Brunner, Mark I, MD   10 mg at 10/14/21 0848   • rOPINIRole (REQUIP) tablet 0.25 mg  0.25 mg Oral Nightly Brunner, Mark I, MD   0.25 mg at 10/13/21 2212   • Sodium  Chloride (PF) 0.9 % 10 mL  10 mL Intravenous PRN Brunner, Mark I, MD       • sodium chloride 0.9 % flush 10 mL  10 mL Intravenous Q12H Brunner, Mark I, MD   10 mL at 10/14/21 0848   • sodium chloride 0.9 % flush 10 mL  10 mL Intravenous PRN Brunner, Mark I, MD       • sodium chloride 0.9 % infusion  9 mL/hr Intravenous Continuous Brunner, Mark I, MD   Stopped at 10/11/21 1244   • tamsulosin (FLOMAX) 24 hr capsule 0.4 mg  0.4 mg Oral Daily Brunner, Mark I, MD   0.4 mg at 10/14/21 0848         Antibiotics:  Anti-Infectives (From admission, onward)    Ordered     Dose/Rate Route Frequency Start Stop    10/12/21 0955  piperacillin-tazobactam (ZOSYN) 3.375 g in iso-osmotic dextrose 50 ml (premix)        Ordering Provider: Diaz Moreno MD    3.375 g  over 4 Hours Intravenous Every 8 Hours 10/12/21 1600 10/19/21 1559    10/12/21 0955  piperacillin-tazobactam (ZOSYN) 3.375 g in iso-osmotic dextrose 50 ml (premix)        Ordering Provider: Diaz Moreno MD    3.375 g  over 30 Minutes Intravenous Once 10/12/21 1045 10/12/21 1459    10/07/21 0852  meropenem (MERREM) 1 g/100 mL 0.9% NS VTB (mbp)        Ordering Provider: Thierry Chinchilla, PharmD    1 g  over 30 Minutes Intravenous Once 10/07/21 0945 10/07/21 1338    10/06/21 1506  valACYclovir (VALTREX) tablet 500 mg        Ordering Provider: Don Draper DO    500 mg Oral Every 12 Hours Scheduled 10/06/21 2100 10/07/21 0854    10/05/21 1632  micafungin 100 mg/100 mL 0.9% NS IVPB (mbp)        Ordering Provider: Brunner, Mark I, MD    100 mg  over 60 Minutes Intravenous Every 24 Hours 10/05/21 1800 10/17/21 1223    10/04/21 2101  vancomycin 1750 mg/500 mL 0.9% NS IVPB ([facility])        Ordering Provider: Toño Lainez MD    20 mg/kg × 81.9 kg (Adjusted)  250 mL/hr over 120 Minutes Intravenous Once 10/04/21 2103 10/05/21 0132    10/04/21 2101  cefepime (MAXIPIME) 2 g/100 mL 0.9% NS (mbp)        Ordering Provider: Toño Lainez MD    2 g  200  "mL/hr over 30 Minutes Intravenous Once 10/04/21 2103 10/04/21 2250            Review of Systems    As above    Physical Exam:   Vital Signs   /86 (BP Location: Left arm, Patient Position: Lying)   Pulse 84   Temp 98 °F (36.7 °C) (Oral)   Resp 18   Ht 170.2 cm (67.01\")   Wt 104 kg (229 lb 4.5 oz)   SpO2 95%   BMI 35.90 kg/m²     GENERAL: frail appearing. Sitting up in bed.  No acute distress  HENT: Normocephalic, atraumatic.   Has some external oral blisters noted-unchanged  Eyes: No conjunctival injection. No icterus.  NECK: Supple without nuchal rigidity. No mass.  HEART: RRR; No murmur, rubs, gallops.   LUNGS: clear to auscultation anteriorly.  Nonlabored breathing  ABDOMEN: erythema over right abdominal wall has improved.  Serous drainage from both LAWRENCE drains in right abdomen-just emptied so very little drainage.  Abdominal tenderness has improved.  :  Without Meyers catheter.  SKIN: no new rashes noted. No jaundice  NEURO: Awake and alert. Oriented ×3.  PSYCHIATRIC: Cooperative. calm  Groshong cath site is without erythema or drainage    Laboratory Data    Results from last 7 days   Lab Units 10/14/21  0228 10/12/21  0702 10/11/21  0704   WBC 10*3/mm3 21.05* 11.04* 9.39   HEMOGLOBIN g/dL 9.9* 11.1* 10.1*   HEMATOCRIT % 31.1* 33.5* 31.8*   PLATELETS 10*3/mm3 158 269 311     Results from last 7 days   Lab Units 10/14/21  0228   SODIUM mmol/L 135*   POTASSIUM mmol/L 4.4   CHLORIDE mmol/L 104   CO2 mmol/L 20.0*   BUN mg/dL 34*   CREATININE mg/dL 1.54*   GLUCOSE mg/dL 144*   CALCIUM mg/dL 8.2*     Results from last 7 days   Lab Units 10/14/21  0228   ALK PHOS U/L 174*   BILIRUBIN mg/dL 1.2   ALT (SGPT) U/L 35   AST (SGOT) U/L 37               Estimated Creatinine Clearance: 52.8 mL/min (A) (by C-G formula based on SCr of 1.54 mg/dL (H)).       Microbiology:  Microbiology Results (last 10 days)     Procedure Component Value - Date/Time    Eosinophil Smear - Urine, Urine, Clean Catch [[id number]]  (Normal) " Collected: 10/08/21 1823    Lab Status: Final result Specimen: Urine, Clean Catch Updated: 10/08/21 2003     Eosinophil Smear 0 % EOS/100 Cells     Narrative:      No eosinophil seen    Body Fluid Culture - Body Fluid, Abdomen, Right [[id number]]  (Abnormal)  (Susceptibility) Collected: 10/08/21 0912    Lab Status: Final result Specimen: Body Fluid from Abdomen, Right Updated: 10/12/21 0845     Body Fluid Culture Light growth (2+) Candida tropicalis      Light growth (2+) Candida albicans     Gram Stain Few (2+) WBCs seen      No organisms seen    Susceptibility      Candida tropicalis     MELINDA     Fluconazole Susceptible     Micafungin Susceptible                  Linear View               Susceptibility      Candida albicans     MELINDA     Fluconazole Susceptible     Micafungin Susceptible                  Linear View                   Blood Culture - Blood, Arm, Left [[id number]]  (Normal) Collected: 10/05/21 0734    Lab Status: Final result Specimen: Blood from Arm, Left Updated: 10/10/21 0901     Blood Culture No growth at 5 days    Narrative:      The previously reported component GRAM STAIN is no longer being reported. Previous result was <null> (Reference Range: [Reference Range]) on 10/8/2021 at 0901 EDT.    COVID PRE-OP / PRE-PROCEDURE SCREENING ORDER (NO ISOLATION) - Swab, Nasopharynx [[id number]]  (Normal) Collected: 10/05/21 0015    Lab Status: Final result Specimen: Swab from Nasopharynx Updated: 10/05/21 0043    Narrative:      The following orders were created for panel order COVID PRE-OP / PRE-PROCEDURE SCREENING ORDER (NO ISOLATION) - Swab, Nasopharynx.  Procedure                               Abnormality         Status                     ---------                               -----------         ------                     COVID-19, ABBOTT IN-HOUS...[[id number]]  Normal              Final result                 Please view results for these tests on the individual orders.    COVID-19, ABBOTT  IN-HOUSE,NASAL Swab (NO TRANSPORT MEDIA) 2 HR TAT - Swab, Nasopharynx [[id number]]  (Normal) Collected: 10/05/21 0015    Lab Status: Final result Specimen: Swab from Nasopharynx Updated: 10/05/21 0043     COVID19 Presumptive Negative    Narrative:      Fact sheet for providers: https://www.fda.gov/media/459078/download     Fact sheet for patients: https://www.fda.gov/media/475451/download    Test performed by PCR.  If inconsistent with clinical signs and symptoms patient should be tested with different authorized molecular test.    Urine Culture - Urine, Urine, Clean Catch [[id number]]  (Abnormal) Collected: 10/04/21 2151    Lab Status: Final result Specimen: Urine, Clean Catch Updated: 10/05/21 2312     Urine Culture Yeast isolated    Narrative:      No further work up will be performed.    Blood Culture - Blood, Arm, Right [[id number]]  (Normal) Collected: 10/04/21 2143    Lab Status: Final result Specimen: Blood from Arm, Right Updated: 10/09/21 2215     Blood Culture No growth at 5 days    Narrative:      The previously reported component GRAM STAIN is no longer being reported. Previous result was <null> (Reference Range: [Reference Range]) on 10/8/2021 at 2215 EDT.    Blood Culture - Blood, Arm, Left [[id number]]  (Normal) Collected: 10/04/21 2120    Lab Status: Final result Specimen: Blood from Arm, Left Updated: 10/09/21 2215     Blood Culture No growth at 5 days    Narrative:      The previously reported component GRAM STAIN is no longer being reported. Previous result was <null> (Reference Range: [Reference Range]) on 10/8/2021 at 2215 EDT.            Radiology:  CT Abdomen Pelvis Without Contrast    Result Date: 10/7/2021  New 16 x 6 cm hepatic fluid collection with intermixed heterogeneous and hyperdense contents compatible with large hematoma. This appears new from the October 4, 2021 comparison scan. There is a small amount of free fluid in the pelvis which is also new from comparison. The remainder of the  hematoma and blood products appear primarily intraparenchymal and subcapsular.  New small right pleural effusion with some adjacent consolidation or atelectasis, with component of pneumonia not entirely excluded.  Redemonstrated bladder wall thickening with several diverticula including small locules of air within an anterior diverticula. Finding most likely relates to any recent catheterization, however correlate with any clinical concern for cystitis. No additional acute findings in the abdomen and pelvis.  Finding of large hepatic hematoma discussed with Dr. Callahan by Corey Castillo via phone at 12:47 PM 10/7/2021   This report was finalized on 10/7/2021 12:48 PM by Corey Castillo.      XR Chest 1 View    Result Date: 10/9/2021  1. Potential mild vascular congestion or volume overload, new since 10/4/2021, correlate clinically. 2. Stable cardiomegaly. Median sternotomy. 3. Elevation right hemidiaphragm with scarring or atelectasis right lung base. Signer Name: Navneet Beaver MD  Signed: 10/9/2021 6:44 AM  Workstation [facility]: [facility]  [facility]    XR Abdomen KUB    Result Date: 10/13/2021  Biliary stent projects and grossly unchanged position in the right upper quadrant. Adjacent flat LAWRENCE drain is noted.  This report was finalized on 10/13/2021 12:56 PM by Corey Castillo.      FL ercp biliary duct only    Result Date: 10/12/2021  Fluoroscopy provided during ERCP.  D: 10/12/2021 E: 10/12/2021   This report was finalized on 10/12/2021 8:40 PM by Dr. Sean Kaminski MD.           Impression:     Problems:  -Sepsis with leukocytosis with neutrophilia, lactic acidosis, elevated pro calcitonin level- due to intra-abdominal source/abdominal wall cellulitis. - leukocytosis is substantially worse on 10/14  - S/p  ERCP 10/11 with stent and no obvious signs of bile leak.  -Intra-abdominal hematoma, now status post washout procedure on 10/8- Candida tropicalis and Candida albicans from  culture  -Abdominal wall cellulitis- improved  -Possible developing intra-abdominal abscess vs. Seroma. No signs of biliary leak on NM hepatobiliary scan but stent was placed  -Recent cholangitis and cholecystitis status post recent cholecystectomy on 9/27 and ERCP on 9/28  -Recent enterococcus bacteremia  -Recent Clostridium perfringens bacteremia  -Recent pancreatitis  -Macrocytic anemia  -Elevated troponin level  -Elevated LFTs  -RUDY on CKD stage IIIB- due to sepsis vs dehydration  -Coronary artery disease status post CABG/stents  -systolic heart failure, LVEF was 26-30% on echo in 09/2021  -history of cardiac thrombus, on anticoagulation with eliquis  -Diabetes mellitus type 2 with hyperglycemia  -Essential hypertension  -Hyperlipidemia  -Sarcoidosis/chronic prednisone  -prolonged QTc interval  -elevated CPK level  -external oral blisters, possible herpes labialis  - Diarrhea- improved  - nausea- ongoing. Not eating much    PLAN:    -continue to follow cbc with diff, cmp, crp  -continue Zosyn 3.375 g IV every 8 hours- if white blood cell count continues to trend back up and we may have to broaden his antibiotics out.  He is not having any fevers.  No other signs of decline clinically or on repeat CT scan.  -continue empiric Micafungin (avoiding fluconazole in the setting of his prolonged QTc interval)  -status post washout procedure  by Dr. Callahan 10/8.  2 LAWRENCE drains left in place.  Surgical cultures - Candida tropicalis and Candida albicans  -s/p ERCP on 10/11 with no evidence of bile leak, stent placed.  -weekly Groshong cath dressing changes  -s/p Valtrex    Not yet ready for discharge given his worsening leukocytosis today and he has not been cleared by surgery.  LAWRENCE drains remain in place.  We will need to closely monitor him.    Tentative antibiotic plans:    UM/LUC:  1.  Zosyn 3.375 g IV every 8 hours  2.  Micafungin 100 mg IV every 24 hours  3.  Plan to continue the above antimicrobials at least until  10/25/21  4.  Weekly CBC with differential, CMP, ESR, and CRP- labs need to be faxed to [facility] at [phone]  5.  Weekly Groshong catheter dressing changes  6.  Needs to follow-up with me on 10/25/21  7.  Please fax a copy of my orders to [facility] at [phone] and call [phone] with final discharge plans.    I discussed with the patient's wife at bedside today    I discussed his CT scan findings with Dr. Kaminski today.  No new signs of acute infection or abnormality per Dr. Kaminski.    Complex MDM. The patient has significant risk for further morbidity and mortality in the setting of his multiple complex medical issues.       Diaz Moreno MD  10/14/2021

## 2021-10-14 NOTE — PROGRESS NOTES
"GI Daily Progress Note  Subjective:    Chief Complaint: Follow-up bile leak    The patient's appetite is slightly improved today.  He drank half of protein shake for lunch, and is currently drinking another half with ice cream.  He had a pancake for breakfast.  Patient's abdominal pain is improving.  Denies diarrhea or nausea.    Objective:    /86   Pulse 83   Temp 98 °F (36.7 °C) (Oral)   Resp 18   Ht 170.2 cm (67.01\")   Wt 104 kg (229 lb 4.5 oz)   SpO2 93%   BMI 35.90 kg/m²     Physical Exam  Vitals and nursing note reviewed.   Constitutional:       General: He is not in acute distress.     Appearance: He is obese. He is ill-appearing. He is not toxic-appearing or diaphoretic.      Comments: Appears chronically ill.   HENT:      Head: Normocephalic.      Nose: Nose normal.      Mouth/Throat:      Mouth: Mucous membranes are moist.      Pharynx: No oropharyngeal exudate.   Eyes:      General: No scleral icterus.     Pupils: Pupils are equal, round, and reactive to light.   Cardiovascular:      Rate and Rhythm: Normal rate.      Pulses: Normal pulses.   Pulmonary:      Effort: Pulmonary effort is normal. No respiratory distress.      Breath sounds: No stridor. No wheezing, rhonchi or rales.      Comments: Diminished breath sounds at bases.  Abdominal:      General: Bowel sounds are normal. There is no distension.      Palpations: Abdomen is soft.      Tenderness: There is no abdominal tenderness. There is no guarding.      Comments: Scant serosanguineous fluid in both LAWRENCE drains.   Genitourinary:     Comments: Deferred  Musculoskeletal:      Cervical back: Normal range of motion.      Comments: Trace ankle edema.   Skin:     General: Skin is warm and dry.      Capillary Refill: Capillary refill takes less than 2 seconds.      Coloration: Skin is not jaundiced or pale.      Findings: No erythema or rash.      Comments: Ecchymosis on upper extremities   Neurological:      General: No focal deficit present. "      Mental Status: He is alert and oriented to person, place, and time.   Psychiatric:         Mood and Affect: Mood normal.         Behavior: Behavior normal.         Lab  Lab Results   Component Value Date    WBC 21.05 (H) 10/14/2021    HGB 9.9 (L) 10/14/2021    HGB 11.1 (L) 10/12/2021    HGB 10.1 (L) 10/11/2021    MCV 87.9 10/14/2021     10/14/2021    INR 1.23 (H) 10/10/2021    INR 1.43 (H) 10/09/2021    INR 2.20 (H) 10/08/2021    INR 2.39 (H) 10/07/2021    INR 2.22 (H) 10/06/2021       Lab Results   Component Value Date    GLUCOSE 144 (H) 10/14/2021    BUN 34 (H) 10/14/2021    CREATININE 1.54 (H) 10/14/2021    EGFRIFNONA 45 (L) 10/14/2021    BCR 22.1 10/14/2021     (L) 10/14/2021    K 4.4 10/14/2021    CO2 20.0 (L) 10/14/2021    CALCIUM 8.2 (L) 10/14/2021    ALBUMIN 2.40 (L) 10/14/2021    ALKPHOS 174 (H) 10/14/2021    BILITOT 1.2 10/14/2021    ALT 35 10/14/2021    AST 37 10/14/2021       Assessment:    1.  Late small bile leak following cholecystectomy for acute necrotizing cholecystitis.  Postop day #3 biliary Wallstent placement.  LAWRENCE drain output is improved, and no longer appears bilious.  2.  Epigastric discomfort, improving.  CT scan films personally reviewed and show no acute abnormalities.  The biliary stent remains in good position.  Previous perihepatic fluid collections are largely resolved, with a small rim of fluid remaining at the right portion of liver dome.  LAWRENCE drains remain at the dome and hilum of the liver.  Right lower lobe atelectasis and small pleural effusion appear slightly improved.  3.  Anorexia, slightly improved.  P.o. intake remains poor.  4.  Peptic duodenitis incidentally noted on ERCP, with post-ulcer deformity. Admits to occasional NSAID use.    Plan:    >> Continue daily Protonix.  >> Plan removal of biliary stent in ~1 month.  >> Consider Keofeed for nutrition.  >> Reinforced and encouraged use of incentive spirometer.    Mark I. Brunner, MD  10/14/21  17:22  EDT

## 2021-10-15 NOTE — PROGRESS NOTES
" [facility] - Progress Note    Jeremías Soto  1955  [id number]    10/15/21, 08:52 EDT      Chief Complaint: Following for CHF, CAD, NSVT    Subjective: Nausea better this morning.  Able to eat a little bit more.  93% on room air.      Review of Systems:  Pertinent positives are listed above and in physical exam.  All others have been reviewed and are negative.    amiodarone, 200 mg, Oral, BID With Meals  aspirin, 81 mg, Oral, Daily  atorvastatin, 40 mg, Oral, Nightly  docosanol, 1 application, Topical, 5x Daily  dorzolamide-timolol, , Both Eyes, BID  ferric gluconate, 125 mg, Intravenous, Daily Before Supper  insulin lispro, 0-7 Units, Subcutaneous, 4x Daily With Meals & Nightly  lactobacillus acidophilus, 1 capsule, Oral, Daily  metoprolol tartrate, 25 mg, Oral, Q6H  micafungin (MYCAMINE) IV, 100 mg, Intravenous, Q24H  pantoprazole, 40 mg, Oral, Daily  piperacillin-tazobactam, 3.375 g, Intravenous, Q8H  predniSONE, 10 mg, Oral, Daily  rOPINIRole, 0.25 mg, Oral, Nightly  sodium chloride, 10 mL, Intravenous, Q12H  tamsulosin, 0.4 mg, Oral, Daily        Objective:  Vitals:   height is 170.2 cm (67.01\") and weight is 104 kg (229 lb 4.5 oz). His oral temperature is 98.1 °F (36.7 °C). His blood pressure is 147/83 and his pulse is 75. His respiration is 19 and oxygen saturation is 93%.       Intake/Output Summary (Last 24 hours) at 10/15/2021 0714  Last data filed at 10/15/2021 0658  Gross per 24 hour   Intake 692 ml   Output 100 ml   Net 592 ml       Physical Exam:    General: Alert and oriented   Cardiovascular: Heart has a nondisplaced focal PMI. Regular rate and rhythm without murmur, gallop or rub.  Lungs: Clear without rales or wheezes.  Slightly decreased bases  Extremities: Show 1+ edema.  May be slightly better  Skin: warm and dry.  Neurologic: nonfocal         Results from last 7 days   Lab Units 10/15/21  0502   WBC 10*3/mm3 14.28*   HEMOGLOBIN g/dL 9.1*   HEMATOCRIT % 27.5* "   PLATELETS 10*3/mm3 175     Results from last 7 days   Lab Units 10/15/21  0502   SODIUM mmol/L 135*   POTASSIUM mmol/L 4.0   CHLORIDE mmol/L 102   CO2 mmol/L 21.0*   BUN mg/dL 30*   CREATININE mg/dL 1.52*   CALCIUM mg/dL 8.6   BILIRUBIN mg/dL 0.9   ALK PHOS U/L 150*   ALT (SGPT) U/L 27   AST (SGOT) U/L 27   GLUCOSE mg/dL 109*     Results from last 7 days   Lab Units 10/10/21  0149 10/09/21  0448   INR  1.23* 1.43*                 Echo:No thrombus by TTE.  Apical DK.  EF 30%  Tele:  NSR    Assessment and Plan:  1)  NSVT  - 25 beat run at [address] 10/10/21 at 135 bpm, asymptomatic  -  9/24/21 Echo EF 26-30%  -  On low dose metoprolol tartrate 12.5mg BID   -  Initiated oral Amiodarone 10/10.  EKG reviewed, NSR with acceptable QTc.  -  Will need LifeVest at discharge.     2)  CAD/ischemic cardiomyopathy/congestive heart failure  -  Prior CABG/stents  -  Mild troponin elevation on admission - likely chronic, currently chest pain-free with no anginal equivalents  -  Echocardiogram 9/24/2021 LVEF 26-30%.  Again, will need LifeVest at discharge.  -  Continue Lopressor, home lisinopril on hold due to acute kidney injury        3) History of LV thrombus  - per records from [facility] as documented by Dr Owens patient has previously been anticoagulated with Eliquis currently on hold given recent surgical intervention.    -  Per prior notes, will pursue echocardiogram with [facility] for reassessment of LV thrombus. Surgery has given the ok to resume anti coagulation         4) Sepsis/abdominal wall cellulitis  - 10/8/21  laparoscopic washout and drain placement.  Incision and drainage of right flank cellulitis.  Surgical management per Dr. Callahan  -  Infectious disease managing antibiotics  -  Anemia/transfusion per primary/surgical teams.  - S/P ERCP and biliary Wallstent Monday 10/11.   Surgery has given ok to resume AC  -Leukocytosis improving as of 10/15/2021       5) RUDY on CKD    -   Nephrology  following.Creatinine now stable at 1.5.     No further NSVT  Cont amio for NSVT but reduce dose to twice daily.   Increase metoprolol to 50  mg every 8 hours.  May need to add terazosin.  Will need LifeVest prior to discharge.  I will order that.    JULIENNE PortilloC  10/15/21, 08:52 EDT

## 2021-10-15 NOTE — PROGRESS NOTES
Clinical Nutrition   Reason For Visit: Follow-up protocol    Patient Name: Jeremías Soto  YOB: 1955  MRN: [id number]  Date of Encounter: 10/15/21 11:33 EDT  Admission date: 10/4/2021      Nutrition Assessment     Admission Problem List:  Sepsis  Elevated LFTs  Abdominal wall cellulitis  Postoperative intra-abdominal hematoma  Intermittent nausea  RUDY on CKD stage 3  Perioral lesions  Skin tear on arm  Presence of LV thrombus  Hypernatremia  SOB  Possible post-op bile leak  Peptic duodenitis  Dyspepsia  SVT      Applicable PMH:  HTN, HLD  CHF  CAD s/p CABG and stents  T2DM  CKD stage 3  Sarcoidosis (occular)  Covid-19 (12/2020)  S/p CCY (9/27/21)      Applicable medical tests/procedures since admission:  (10/5) s/p ultrasound guided abscess drain  (10/8) s/p washout and drain placement, I&D of right flank cellulitis  (10/11) s/p ERCP - no bile leak, wall stent placed, peptic duodenitis      Reported/Observed/Food/Nutrition Related History   10/15: Patient and wife present during visit. Wife reports patient's appetite/PO intake slowly improving. Yesterday ate a bit at each meal and drank almost 2 full cartons of Premier Protein. This morning consumed 5 hashbrown rounds and 1.5 chicken minis. About to drink a Premier Protein drink.    10/13: RD notes ongoing abdominal pain. GI ordered KUB to rule out stent migration. Poor appetite/PO intake ongoing.    10/11: Patient off of floor at time of visit, wife at bedside. Wife reports hasn't been taking in a whole lot due to both decreased appetite as well as full liquid diet past 2 days. Has taken sips of one ONS drink - doesn't care for them. Hopeful that patient's PO intake will improve with advanced to regular foods/diet after ERCP. Wife concerned patient will not eat much due to being afraid of getting sick with PO intake.    10/7: Wife reaffirms rpt.     10/5:  Patient and wife present during visit. Patient's wife provides majority of information.  Reports patient wasn't eating very well during most of previous [facility] admission (9/23-9/29), but did eat well 9/29-9/30. On 10/1 patient began having poor appetite/intermittent nausea and poor PO intake (</=25% of usual). Reports patient weighed 218 lbs on (9/23) but unsure what patient weighs at this time. RD requested standing scale weight from RN. NPO at this time. Agrees to try clear liquid ONS drinks (no orange flavor). Denies food allergies and difficulty chewing/swallowing.    Anthropometrics   Height: 67 in  Weight: 229 lbs (bed scale weight 10/12 per nsg doc)  BMI: 35.9  BMI classification: Obese Class II: 35-39.9kg/m2   IBW: 148 lbs    UBW: 218 lbs (9/23/21) per wife  Per EMR (GUSTAVO) - 219 lbs (6/17/21), 226 lbs (2/18/21), 219 lbs (12/10/20)    Weight change: RD waiting for standing weight to be obtained prior to evaluating recent weight changes.    Labs reviewed   Labs reviewed: Yes    Medications reviewed   Medications reviewed: Yes  Pertinent: antibiotic, probiotic, insulin, protonix, steroid, iron    Needs Assessment 10/7   Weight used: 97.6 Kg wt on 10/7  IBW: 67.3 Kg    Estimated Energy needs: ~ 1755 Kcal/da based on 18 Kcal/Kg       Estimated Protein needs: ~ 81 g/da based on 1.2g/Kg IBW  Pt w CKD       Estimated Fluid needs: ~1755 ml fluid daily      Current Nutrition Prescription   PO: Diet Regular; Consistent Carbohydrate   Oral Nutrition Supplement: Premier Protein 3x daily (per MD 10/12)    Average PO intake: 25% x 3 meals yesterday + 2 Premier Protein drinks (10/14) per nsg doc    Nutrition Diagnosis     10/5, 10/7, 10/11, 10/13, 10/15  Problem Inadequate oral intake   Etiology Decreased appetite, altered GI function s/p CCY, nausea   Signs/Symptoms PO intake: 25% x 3 meals + 2 Premier Protein drinks (10/14) per nsg doc   Status: ongoing/improving    Intervention   Intervention: Follow treatment progress, Care plan reviewed, Encourage intake    -Continue vanilla Premier Protein 2x daily.    Goal:    General: Nutrition support treatment  PO: Increase intake, Continue positive trend    Monitoring/Evaluation:   Monitoring/Evaluation: Per protocol, I&O, PO intake, Supplement intake, Pertinent labs, Weight, GI status, Symptoms    Opal Car RD  Time Spent: 20 min

## 2021-10-15 NOTE — PROGRESS NOTES
"   LOS: 10 days    Patient Care Team:  Vincent Crawford MD as PCP - General (Family Medicine)  Manjula Owens MD as Consulting Physician (Cardiology)    Chief Complaint:  Abdominal pain    Subjective     Interval History:     No acute events overnight. No new complaints.     Review of Systems:   No CP or SOA , fever, chills, rigors, rash, N/V, Constipation.       Objective     Vital Sign Min/Max for last 24 hours  Temp  Min: 98.1 °F (36.7 °C)  Max: 98.2 °F (36.8 °C)   BP  Min: 145/76  Max: 161/86   Pulse  Min: 71  Max: 90   Resp  Min: 18  Max: 19   SpO2  Min: 91 %  Max: 96 %   No data recorded   No data recorded     Flowsheet Rows      First Filed Value   Admission Height 177.8 cm (70\") Documented at 10/04/2021 1842   Admission Weight 95.3 kg (210 lb) Documented at 10/04/2021 1842          No intake/output data recorded.  I/O last 3 completed shifts:  In: 842 [P.O.:592; IV Piggyback:250]  Out: 145 [Drains:145]    Physical Exam:    Gen: Alert, NAD   HENT: NC, AT, EOMI   NECK: Supple, no JVD, Trachea midline   LUNGS: CTA bilaterally, non labored respirtation   CVS: S1/S2 audible, RRR, no murmur   Abd: Soft, NT, ND, BS+   Ext: + pedal edema, no cyanosis   CNS: Alert, No focal deficit noted grossly  Psy: Cooperative  Skin: Warm, dry and intact      WBC WBC   Date Value Ref Range Status   10/15/2021 14.28 (H) 3.40 - 10.80 10*3/mm3 Final   10/14/2021 21.05 (H) 3.40 - 10.80 10*3/mm3 Final      HGB Hemoglobin   Date Value Ref Range Status   10/15/2021 9.1 (L) 13.0 - 17.7 g/dL Final   10/14/2021 9.9 (L) 13.0 - 17.7 g/dL Final      HCT Hematocrit   Date Value Ref Range Status   10/15/2021 27.5 (L) 37.5 - 51.0 % Final   10/14/2021 31.1 (L) 37.5 - 51.0 % Final      Platlets No results found for: LABPLAT   MCV MCV   Date Value Ref Range Status   10/15/2021 85.7 79.0 - 97.0 fL Final   10/14/2021 87.9 79.0 - 97.0 fL Final          Sodium Sodium   Date Value Ref Range Status   10/15/2021 135 (L) 136 - 145 mmol/L Final "   10/14/2021 135 (L) 136 - 145 mmol/L Final   10/13/2021 137 136 - 145 mmol/L Final      Potassium Potassium   Date Value Ref Range Status   10/15/2021 4.0 3.5 - 5.2 mmol/L Final   10/14/2021 4.4 3.5 - 5.2 mmol/L Final   10/13/2021 4.1 3.5 - 5.2 mmol/L Final      Chloride Chloride   Date Value Ref Range Status   10/15/2021 102 98 - 107 mmol/L Final   10/14/2021 104 98 - 107 mmol/L Final   10/13/2021 104 98 - 107 mmol/L Final      CO2 CO2   Date Value Ref Range Status   10/15/2021 21.0 (L) 22.0 - 29.0 mmol/L Final   10/14/2021 20.0 (L) 22.0 - 29.0 mmol/L Final   10/13/2021 19.0 (L) 22.0 - 29.0 mmol/L Final      BUN BUN   Date Value Ref Range Status   10/15/2021 30 (H) 8 - 23 mg/dL Final   10/14/2021 34 (H) 8 - 23 mg/dL Final   10/13/2021 39 (H) 8 - 23 mg/dL Final      Creatinine Creatinine   Date Value Ref Range Status   10/15/2021 1.52 (H) 0.76 - 1.27 mg/dL Final   10/14/2021 1.54 (H) 0.76 - 1.27 mg/dL Final   10/13/2021 1.64 (H) 0.76 - 1.27 mg/dL Final      Calcium Calcium   Date Value Ref Range Status   10/15/2021 8.6 8.6 - 10.5 mg/dL Final   10/14/2021 8.2 (L) 8.6 - 10.5 mg/dL Final   10/13/2021 8.5 (L) 8.6 - 10.5 mg/dL Final      PO4 No results found for: CAPO4   Albumin Albumin   Date Value Ref Range Status   10/15/2021 2.60 (L) 3.50 - 5.20 g/dL Final   10/14/2021 2.40 (L) 3.50 - 5.20 g/dL Final      Magnesium Magnesium   Date Value Ref Range Status   10/15/2021 2.1 1.6 - 2.4 mg/dL Final   10/14/2021 1.9 1.6 - 2.4 mg/dL Final      Uric Acid No results found for: URICACID        Results Review:     I reviewed the patient's new clinical results.    amiodarone, 200 mg, Oral, BID With Meals  aspirin, 81 mg, Oral, Daily  atorvastatin, 40 mg, Oral, Nightly  docosanol, 1 application, Topical, 5x Daily  dorzolamide-timolol, , Both Eyes, BID  ferric gluconate, 125 mg, Intravenous, Daily Before Supper  insulin lispro, 0-7 Units, Subcutaneous, 4x Daily With Meals & Nightly  lactobacillus acidophilus, 1 capsule, Oral,  Daily  metoprolol tartrate, 50 mg, Oral, Q8H  micafungin (MYCAMINE) IV, 100 mg, Intravenous, Q24H  pantoprazole, 40 mg, Oral, Daily  piperacillin-tazobactam, 3.375 g, Intravenous, Q8H  predniSONE, 10 mg, Oral, Daily  rOPINIRole, 0.25 mg, Oral, Nightly  sodium chloride, 10 mL, Intravenous, Q12H  tamsulosin, 0.4 mg, Oral, Daily      sodium chloride, 9 mL/hr, Last Rate: Stopped (10/11/21 1244)        Medication Review: yes    Assessment/Plan       Sepsis (HCC)    Diabetes mellitus (HCC)    Hypertension    Moderate obstructive sleep apnea    History of sarcoidosis (occular)    Elevated LFTs    Combined systolic and diastolic cardiac dysfunction (EF < 50% 2018)    Post-operative infection    Abdominal wall cellulitis    Postoperative intra-abdominal abscess    Elevated troponin    CAD (coronary artery disease)    CKD (chronic kidney disease), stage III (HCC)    Acute kidney injury superimposed on chronic kidney disease (HCC)    Bile leak      1- RUDY on CKD - pre-enal azotemia from sepsis/abdominal wall cellulitis - resolved.   2- CKD stage III - Baseline Scr 1.8- 2.2 - Nephrosclerosis and obstructive uropathy.   3- Hx of nephrolithiasis - requiring ureteral stent in past   4- Sepsis/abdominal wall cellulitis   5- Hx of CAD s/p CABG.   6- Metabolic acidosis    7- Peripheral edema - improving.     Plan:  - Albumin infusion and bumex   - Avoid nephrotoxic agents.   - Renal diet.       Follow up with NAL in 2-3 weeks after discharge with renal function panel and UA    Aleena Abarca MD  10/15/21  10:24 EDT

## 2021-10-15 NOTE — PROGRESS NOTES
"Patient Name:  Jeremías Soto  YOB: 1955  [id number]    Surgery Progress Note    Date of visit: 10/15/2021    Subjective   No acute events overnight. No residual pain. States appetite is good now. No fevers/chills. +flatus/BM         Objective       /83 (BP Location: Left arm, Patient Position: Lying)   Pulse 75   Temp 98.1 °F (36.7 °C) (Oral)   Resp 19   Ht 170.2 cm (67.01\")   Wt 104 kg (229 lb 4.5 oz)   SpO2 93%   BMI 35.90 kg/m²     Intake/Output Summary (Last 24 hours) at 10/15/2021 0854  Last data filed at 10/15/2021 0658  Gross per 24 hour   Intake 572 ml   Output 100 ml   Net 472 ml   JP1 50 cc  JP2 50 cc    CV:  Rhythm regular and rate regular  L:  Clear to auscultation bilaterally  Abd:  Bowel sounds positive, soft, nontender, nondistended, inc c/d/i, JPs serosanguineous with bile tinge  Ext:  No cyanosis, clubbing, edema    Recent labs and imaging that are back at this time have been reviewed.  WBCs 21.1 -> 14.3  Hgb 9.9 -> 9.1       Assessment/Plan     Pt s/p lap farooq with subsequent lap washout of hematoma and I and D of right incision, followed by ERCP with stent placement for suspected bile leak  Regular diet  OOB, IS, DVT prlx  Pain control  DC planning OK from surgical perspective      Gaudencio Wolfe MD  10/15/2021  08:54 EDT      "

## 2021-10-15 NOTE — PROGRESS NOTES
[facility] Medicine Services  PROGRESS NOTE    Patient Name: Jeremías Soto  : 1955  MRN: [id number]    Date of Admission: 10/4/2021  Primary Care Physician: Vincent Crawford MD    Subjective   Subjective     CC:  Abdominal pain    HPI  No nausea currently.  No abdominal pain.  Ate 2 pieces of chicken nuggets from chicken suleman a this am and 1 bread and 6 small potatoes.  Drank shakes yesterday per wife.  Trying to eat more.      ROS:  Gen- No fevers, chills  CV- No chest pain, palpitations  Resp- No cough, dyspnea  GI- +nausea, denies abdominal pain.           Objective   Objective     Vital Signs:   Temp:  [98 °F (36.7 °C)-98.2 °F (36.8 °C)] 98.1 °F (36.7 °C)  Heart Rate:  [71-90] 75  Resp:  [16-19] 19  BP: (147-161)/(79-92) 147/83     Physical Exam:  Constitutional: Awake, alert, no acute distress, sitting up in chair, looks much better, wife at bedside  HENT: perioral scabbed lesions  Respiratory: Clear to auscultation bilaterally, respiratory effort normal   Cardiovascular: RRR, no murmurs, rubs, or gallops, palpable radial pulse  Gastrointestinal: Mildly tender with palpation, 2 LAWRENCE drains on right side with serosanguineous drainage, also small open RLQ area incision today bandaged with abdominal binder  Musculoskeletal: trace lower extremity edema  Psychiatric: Appropriate affect, cooperative  Neurologic: Speech clear, moving all extremity spontaneously      Results Reviewed:  LAB RESULTS:      Lab 10/15/21  0502 10/14/21  0228 10/12/21  0702 10/11/21  0704 10/10/21  0149 10/09/21  2142 10/09/21  0448   WBC 14.28* 21.05* 11.04* 9.39 15.18*  --  14.44*   HEMOGLOBIN 9.1* 9.9* 11.1* 10.1* 9.3*   < > 8.1*   HEMATOCRIT 27.5* 31.1* 33.5* 31.8* 28.3*   < > 25.6*   PLATELETS 175 158 269 311 240  --  207   NEUTROS ABS 11.99*  --   --  7.23*  --   --   --    IMMATURE GRANS (ABS) 0.50*  --   --   --   --   --   --    LYMPHS ABS 0.91  --   --   --   --   --   --    MONOS ABS 0.71   --   --   --   --   --   --    EOS ABS 0.14  --   --  0.00  --   --   --    MCV 85.7 87.9 86.3 89.1 87.1  --  91.8   PROTIME  --   --   --   --  15.1*  --  17.0*   APTT  --   --   --   --   --   --  40.1*    < > = values in this interval not displayed.         Lab 10/15/21  0502 10/14/21  0228 10/13/21  0906 10/12/21  0701 10/11/21  0704 10/10/21  0149 10/10/21  0149   SODIUM 135* 135* 137 139 145   < > 137   POTASSIUM 4.0 4.4 4.1 4.3 4.2   < > 4.1   CHLORIDE 102 104 104 107 113*   < > 105   CO2 21.0* 20.0* 19.0* 19.0* 17.0*   < > 20.0*   ANION GAP 12.0 11.0 14.0 13.0 15.0   < > 12.0   BUN 30* 34* 39* 50* 58*   < > 67*   CREATININE 1.52* 1.54* 1.64* 1.80* 2.16*   < > 2.90*   GLUCOSE 109* 144* 172* 181* 61*   < > 124*   CALCIUM 8.6 8.2* 8.5* 8.6 8.2*   < > 8.1*   IONIZED CALCIUM 1.27 1.24  --   --   --   --   --    MAGNESIUM 2.1 1.9  --   --   --   --  2.4   PHOSPHORUS 3.1 2.4*  --  2.7 3.3  --  4.9*    < > = values in this interval not displayed.         Lab 10/15/21  0502 10/14/21  0228 10/12/21  0701 10/11/21  0704 10/10/21  0149   TOTAL PROTEIN 5.7* 5.6* 6.1 5.7* 5.7*   ALBUMIN 2.60* 2.40* 2.60* 2.20* 2.50*   GLOBULIN 3.1 3.2 3.5 3.5 3.2   ALT (SGPT) 27 35 38 35 39   AST (SGOT) 27 37 43* 37 31   BILIRUBIN 0.9 1.2 1.0 1.0 1.4*   ALK PHOS 150* 174* 193* 166* 149*   AMYLASE 37  --   --   --   --    LIPASE 34  --   --   --   --          Lab 10/10/21  0149 10/09/21  0448   PROTIME 15.1* 17.0*   INR 1.23* 1.43*             Lab 10/09/21  1225 10/09/21  0448   IRON  --  10*   IRON SATURATION  --  6*   TIBC  --  177*   TRANSFERRIN  --  119*   FERRITIN  --  718.40*   ABO TYPING A  --    RH TYPING Positive  --    ANTIBODY SCREEN Negative  --          Brief Urine Lab Results  (Last result in the past 365 days)      Color   Clarity   Blood   Leuk Est   Nitrite   Protein   CREAT   Urine HCG        10/08/21 1823             186.2               Microbiology Results Abnormal     Procedure Component Value - Date/Time    Blood  Culture - Blood, Arm, Left [[id number]]  (Normal) Collected: 10/05/21 0734    Lab Status: Final result Specimen: Blood from Arm, Left Updated: 10/10/21 0901     Blood Culture No growth at 5 days    Narrative:      The previously reported component GRAM STAIN is no longer being reported. Previous result was <null> (Reference Range: [Reference Range]) on 10/8/2021 at 0901 EDT.    Blood Culture - Blood, Arm, Left [[id number]]  (Normal) Collected: 10/04/21 2120    Lab Status: Final result Specimen: Blood from Arm, Left Updated: 10/09/21 2215     Blood Culture No growth at 5 days    Narrative:      The previously reported component GRAM STAIN is no longer being reported. Previous result was <null> (Reference Range: [Reference Range]) on 10/8/2021 at 2215 EDT.    Blood Culture - Blood, Arm, Right [[id number]]  (Normal) Collected: 10/04/21 2143    Lab Status: Final result Specimen: Blood from Arm, Right Updated: 10/09/21 2215     Blood Culture No growth at 5 days    Narrative:      The previously reported component GRAM STAIN is no longer being reported. Previous result was <null> (Reference Range: [Reference Range]) on 10/8/2021 at 2215 EDT.    Eosinophil Smear - Urine, Urine, Clean Catch [[id number]]  (Normal) Collected: 10/08/21 1823    Lab Status: Final result Specimen: Urine, Clean Catch Updated: 10/08/21 2003     Eosinophil Smear 0 % EOS/100 Cells     Narrative:      No eosinophil seen    COVID PRE-OP / PRE-PROCEDURE SCREENING ORDER (NO ISOLATION) - Swab, Nasopharynx [[id number]]  (Normal) Collected: 10/05/21 0015    Lab Status: Final result Specimen: Swab from Nasopharynx Updated: 10/05/21 0043    Narrative:      The following orders were created for panel order COVID PRE-OP / PRE-PROCEDURE SCREENING ORDER (NO ISOLATION) - Swab, Nasopharynx.  Procedure                               Abnormality         Status                     ---------                               -----------         ------                      COVID-19, ABBOTT IN-HOUS...[[id number]]  Normal              Final result                 Please view results for these tests on the individual orders.    COVID-19, ABBOTT IN-HOUSE,NASAL Swab (NO TRANSPORT MEDIA) 2 HR TAT - Swab, Nasopharynx [[id number]]  (Normal) Collected: 10/05/21 0015    Lab Status: Final result Specimen: Swab from Nasopharynx Updated: 10/05/21 0043     COVID19 Presumptive Negative    Narrative:      Fact sheet for providers: https://www.fda.gov/media/914530/download     Fact sheet for patients: https://www.fda.gov/media/453551/download    Test performed by PCR.  If inconsistent with clinical signs and symptoms patient should be tested with different authorized molecular test.          CT Abdomen Pelvis Without Contrast    Result Date: 10/14/2021  EXAMINATION: CT ABDOMEN/PELVIS WO CONTRAST - 10/14/2021  INDICATION: Abdominal abscess/infection suspected.  TECHNIQUE: 5 mm unenhanced images through the abdomen and pelvis.  The radiation dose reduction device was turned on for each scan per the ALARA (As Low as Reasonably Achievable) protocol.  COMPARISON: 10/07/2021  FINDINGS: Previous 10/07/2021 exam report indicated 16 x 6 mm hepatic fluid collection consistent with hematoma. Since that time, LAWRENCE drains have been placed along the dorsal dome of the liver and in the subhepatic space, with evacuation collection, and only a small rim of remaining fluid around the margins of the liver, up to 8 mm in width, as seen on coronal image #67 and adjacent images. No significant subhepatic collection is seen. There is interval biliary stent placement from the level of the proximal common duct to the duodenum, and expected air in the left lobe biliary system. No hepatic lesions are identified. There is a very small right pleural effusion and mild right basilar discoid atelectasis. Small round left lower lobe pulmonary nodule approximately 1 cm diameter is better seen, previously probably obscured by pleural  fluid which as a result. This nodule measures approximately 8 mm in 2018.  Pancreas is again noted be largely fatty replaced. There may be mildly increased peripancreatic fat stranding but no extensive inflammatory change, pseudocyst or ascites is seen. Atrophic left kidney with calculi up to 8 mm in diameter and small nonobstructing right renal calculus up to 2-3 mm are again noted, with no evidence of obstructive uropathy. Adrenal glands are not enlarged. Upper abdominal bowel loops are decompressed. No free air is seen. No ascites is seen.  Regarding the lower abdomen and pelvis, bowel loops are decompressed. Bladder again shows generalized trabeculation and small diverticula with trace calcification in the dominant left hutch-type diverticulum. No intrapelvic inflammatory change is seen. Terminal ileum, cecum and appendix appear normal.  Bony structures appear grossly intact. There is grade 1 anterior subluxation of L5 on S1 with nondisplaced bilateral pars defects.      Impression: 1. Interval LAWRENCE drain placement and effective drainage of right perihepatic fluid collection since 10/07/2021. Very small remaining low density rim of perihepatic fluid.  2. Mildly increased fat stranding around the atrophic pancreas, resembling low-level changes pancreatitis, however, this may simply reflect mild fat stranding from recent surgery and stent. Very small right effusion and mild right basilar discoid atelectasis.  3. Decompressed appearance of the colon containing only fluid, nonspecific except for diarrhea. No visible large or small bowel inflammation.  4. Interval biliary stent placement, stent in expected location, with no significant bladder dilatation. There is expected trabeculated bladder with multiple diverticula again noted.  DICTATED:   10/14/2021 EDITED/ls :   10/14/2021         Results for orders placed during the hospital encounter of 10/04/21    Adult Transthoracic Echo Complete W/ Cont if Necessary Per  Protocol    Interpretation Summary  · Estimated left ventricular EF = 30% Left ventricular systolic function is moderately to severely decreased.  · Left ventricular wall thickness is consistent with septal asymmetric hypertrophy.  · Mild mitral valve regurgitation is present  · Trace tricuspid valve regurgitation is present.  · No left ventricular apical thrombus is seen.      I have reviewed the medications:  Scheduled Meds:amiodarone, 200 mg, Oral, BID With Meals  aspirin, 81 mg, Oral, Daily  atorvastatin, 40 mg, Oral, Nightly  docosanol, 1 application, Topical, 5x Daily  dorzolamide-timolol, , Both Eyes, BID  ferric gluconate, 125 mg, Intravenous, Daily Before Supper  insulin lispro, 0-7 Units, Subcutaneous, 4x Daily With Meals & Nightly  lactobacillus acidophilus, 1 capsule, Oral, Daily  metoprolol tartrate, 50 mg, Oral, Q8H  micafungin (MYCAMINE) IV, 100 mg, Intravenous, Q24H  pantoprazole, 40 mg, Oral, Daily  piperacillin-tazobactam, 3.375 g, Intravenous, Q8H  predniSONE, 10 mg, Oral, Daily  rOPINIRole, 0.25 mg, Oral, Nightly  sodium chloride, 10 mL, Intravenous, Q12H  tamsulosin, 0.4 mg, Oral, Daily      Continuous Infusions:sodium chloride, 9 mL/hr, Last Rate: Stopped (10/11/21 1244)      PRN Meds:.dextrose  •  dextrose  •  glucagon (human recombinant)  •  magnesium sulfate **OR** magnesium sulfate in D5W 1g/100mL (PREMIX) **OR** magnesium sulfate  •  ondansetron  •  potassium & sodium phosphates **OR** potassium & sodium phosphates  •  Sodium Chloride (PF)  •  sodium chloride    Assessment/Plan   Assessment & Plan     Active Hospital Problems    Diagnosis  POA   • **Sepsis (HCC) [A41.9]  Yes   • Post-operative infection [T81.40XA]  Yes   • Abdominal wall cellulitis [L03.311]  Yes   • Postoperative intra-abdominal abscess [T81.43XA]  Yes   • Elevated troponin [R77.8]  Yes   • CAD (coronary artery disease) [I25.10]  Yes   • CKD (chronic kidney disease), stage III (HCC) [N18.30]  Yes   • Acute kidney injury  superimposed on chronic kidney disease (HCC) [N17.9, N18.9]  Yes   • Bile leak [K83.9]  Unknown   • Elevated LFTs [R79.89]  Yes   • Combined systolic and diastolic cardiac dysfunction (EF < 50% 2018) [I51.89]  Yes   • History of sarcoidosis (occular) [Z86.2]  Yes   • Moderate obstructive sleep apnea [G47.33]  Yes   • Diabetes mellitus (HCC) [E11.9]  Yes   • Hypertension [I10]  Yes      Resolved Hospital Problems   No resolved problems to display.        Brief Hospital Course to date:  Jeremías Soto is a 66 y.o. male with systolic CHF (46-50%), chronic multiorgan disease, admitted 9/23-9/29 w/ enterococcal/clostridial bacteremia, cholangitis, questionable pyelonephritis-s/p lap farooq 9/27 and ERCP 9/28 discharged home on IV Zosyn with a planned completion date 10/11 who returned with several days worsening erythema of the abdomen and imaging in the ED concerning for fluid collection in the gallbladder fossa now on empiric antibiotics with some initial improvement then new development of abdominal pain     Sepsis, abdominal source  Abnormal abdominal fluid collection  Recent cholecystitis  Recent enterococcal/clostridial bacteremia  -2.5 cm x 8.5 cm fluid collection on initial CT, unclear etiology given recent surgery  -S/p lap farooq 9/27, ERCP 9/28  -Repeat CT abdomen pelvis on 10/7/2021 with new 16 x 6 hepatic fluid collection and hematoma.  -Underwent laparoscopic washout and drain placement of intra-abdominal hematoma and incision and drainage of right flank cellulitis on 10/8/2021 per Dr. Callahan.  -ERCP performed on 10/11/2021, late small bile leak s/p Wallstent placement, plan removal of stent in 1 mos  -general surgery following    -Continue probiotic  -ID following.  Bodily fluid culture growing candida tropicalis and candida albicans.  stopped merrem and dapto and changed to zosyn, micafungin at least until 10/25/21 per ID with weekly CBC with diff, CMP, ESR, CRP to be fax[facility] [phone], also  needs weekly groshong cathetor dressing changes  -note worsening leukocytosis yesterday (up to 21K from 11K) but trending back down today--repeat CTabd/pelvis shows effective drainage of perihepatic fluid collection/very small remaining fluid, mildly increased fat stranding around atrophic pancreas could be d/t recent surgery and stent, decompressed colon, biliary stent with correct placement, multiple diverticula    Peptic Duodenitis  --incidentally noted on ERCP, ?likely 2nd to occasional NSAID use.  AVOID NSAIDS    Anorexia  --nutrition following, starting to eat more.  Encouraged.      RUDY on CKD 3, improving  Hyperkalemia, resolved  Metabolic acidosis  -Baseline Cr 1.9-2.3; EGFR 30.  Creatinine trending down  -Nephrology consulted.  Likely secondary to ATN.  Improved/back to baseline      Acute blood loss anemia  -Likely postsurgical  -s/p transfusion on 10/9/2021    Hypoxia, resolved  -Chest x-ray on 10/9/2021 shows vascular congestion new since 10/4/2021.  -Diuresis per nephrology, plans metolazone and albumin today    Systolic heart failure  Nonsustained V. Tach  -5 beat run on Sunday, asymptomatic  -Echo on 9/24/2021 showed EF of 26 to 30%  -Lisinopril on hold due to RUDY  -LifeVest at discharge  -cards following, recs continue amio but reduce dose to BID and increase metoprolol to 50mg q8h, may need to add terazosin    Postoperative anemia  -s/p transfusion on 10/9, with improvement in anemia  -IV iron per nephrology    Hypernatremia, resolved  -Resolved with D5W    LV thrombus  -Per cardiology note 10/6, records from the [facility] demonstrate prescription of Eliquis 7/19 for a large LV thrombus found on MRI  -Cardiology following.  Echo showed EF 30%, LV wall thickness c/w septal asymmetric hypertrophy, mild MR, trace TR, no LV apical thrombus seen.  Ok to stop eliquis per cardiology  --needs lifevest at discharge per cards    Elevated INR, improving  - Trended down but still remains elevated at 1.23 when  last checked on 10/10.  Will repeat with AM labs tomorrow    Perioral lesions  -Empiric Valtrex, renally dosed     DM type 2, A1c 7.3%, without long-term use of insulin  -Accu-Cheks with SSI     CAD with prior CABG/stent  Chronic systolic/diastolic CHF  Mild troponin elevation  Hx multiple TIA  -Echo 9/24/2021 EF 26-30%  -Continue Lopressor  -Chronically on Eliquis, currently on hold  -Lisinopril on hold for RUDY     Elevated LFTs, resolved  -Recent admission cholangitis, holding statin as noted     Hypertension  Hyperlipidemia  -Statin on hold while on daptomycin     Ocular sarcoidosis  Bilateral lung nodules  -Repeat dedicated chest CT 6 months outpatient regarding nodules  -Continue chronic oral prednisone 10 mg     Obesity, BMI 35.16 kg/M2  EVA  Hx COVID-19 December 2020    DVT prophylaxis:  Mechanical DVT prophylaxis orders are present.     AM-PAC 6 Clicks Score (PT): 15 (10/14/21 2106)    Disposition: Multiple acute ongoing issues, anticipate need for rehab at discharge    Discussed with wife at bedside    CODE STATUS:   Code Status and Medical Interventions:   Ordered at: 10/05/21 0134     Code Status:    CPR     Medical Interventions (Level of Support Prior to Arrest):    Full     I have prepared this progress note with copied portions of the prior day's progress note of my own authorship to preserve accuracy and maintain consistency of documentation. I have reviewed these portions and edited them for correctness. I verify that the above documentation accurately and truly represents the evaluation and management performed on today's date.     Justus Milan MD  10/15/21

## 2021-10-15 NOTE — CASE MANAGEMENT/SOCIAL WORK
Continued Stay Note  Psychiatric     Patient Name: Jeremías Soto  MRN: [id number]  Today's Date: 10/15/2021    Admit Date: 10/4/2021     Discharge Plan     Row Name 10/15/21 1527       Plan    Plan discharge plan    Plan Comments Per discussion in MDR, pt will be in the hospital over the weekend.  Per cards note, pt needs a life vest. Nephrology and [facility] following.  Per [facility] note, pt will need outpatient IV antibiotics at discharge and referral made to [facility].  Pt's spouse wants to see how pt does over weekend before deciding on discharge plan:  home, versus home with  or inpatient rehab. Pt wants to go home. CM will follow up Monday.    Final Discharge Disposition Code 30 - still a patient               Discharge Codes    No documentation.               Expected Discharge Date and Time     Expected Discharge Date Expected Discharge Time    Oct 18, 2021             Corina Shi RN

## 2021-10-15 NOTE — PROGRESS NOTES
"GI Daily Progress Note  Subjective:    Chief Complaint:  Follow up bile leak     Abdominal pain is improved.  His appetite is also improving, states he drank a protein shake and ate some of [employer] a.      Objective:    /87 (BP Location: Left arm, Patient Position: Lying)   Pulse 77   Temp 98.1 °F (36.7 °C) (Oral)   Resp 18   Ht 170.2 cm (67.01\")   Wt 104 kg (229 lb 4.5 oz)   SpO2 96%   BMI 35.90 kg/m²     Physical Exam  Constitutional:       Appearance: He is obese. He is ill-appearing.   Cardiovascular:      Rate and Rhythm: Normal rate and regular rhythm.   Pulmonary:      Effort: Pulmonary effort is normal. No respiratory distress.   Abdominal:      General: Bowel sounds are normal.      Palpations: Abdomen is soft.      Tenderness: There is no abdominal tenderness.      Comments: Scant serosanguineous fluid in both LAWRENCE drains   Neurological:      Mental Status: He is oriented to person, place, and time.       Lab  Lab Results   Component Value Date    WBC 14.28 (H) 10/15/2021    HGB 9.1 (L) 10/15/2021    HGB 9.9 (L) 10/14/2021    HGB 11.1 (L) 10/12/2021    MCV 85.7 10/15/2021     10/15/2021    INR 1.23 (H) 10/10/2021    INR 1.43 (H) 10/09/2021    INR 2.20 (H) 10/08/2021    INR 2.39 (H) 10/07/2021    INR 2.22 (H) 10/06/2021       Lab Results   Component Value Date    GLUCOSE 109 (H) 10/15/2021    BUN 30 (H) 10/15/2021    CREATININE 1.52 (H) 10/15/2021    EGFRIFNONA 46 (L) 10/15/2021    BCR 19.7 10/15/2021     (L) 10/15/2021    K 4.0 10/15/2021    CO2 21.0 (L) 10/15/2021    CALCIUM 8.6 10/15/2021    ALBUMIN 2.60 (L) 10/15/2021    ALKPHOS 150 (H) 10/15/2021    BILITOT 0.9 10/15/2021    ALT 27 10/15/2021    AST 27 10/15/2021       Assessment:    1.  Late small bile leak following cholecystectomy for acute necrotizing cholecystitis.  Postop day #4 biliary Wallstent placement.  LAWRENCE drain output is improved, and no longer appears bilious.  2.  Epigastric discomfort, improving.   3.  Anorexia, " slightly improved.    4.  Peptic duodenitis incidentally noted on ERCP, with post-ulcer deformity. Admits to occasional NSAID use.    Plan:    >>> Daily Protonix  >>> Plan removal of biliary stent in ~ 1 month    We will sign off.  Please call for any questions or concerns.      ALEX Davison  10/15/21  15:26 EDT

## 2021-10-15 NOTE — PROGRESS NOTES
Jeremías Soto  1955  [id number]    Date of Consult: 10/5/21  Requesting Provider: Vincent Crawford MD  Evaluating Physician: Diaz Moreno MD    Chief Complaint: abdominal pain and redness    Reason for Consultation: sepsis secondary to an abdominal wall abscess/cellulitis    History of present illness:    Jeremías Soto is a 66 y.o.  Yr old male with history of coronary artery disease, CK D stage III B, diabetes mellitus type 2, hyperlipidemia, hypertension, sarcoidosis, and history of CVA who I recently evaluated during an admission to [facility] last month for enterococcus bacteremia and Clostridium perfringens bacteremia due to a biliary source/gallbladder source (cholangitis and cholecystitis).  The patient underwent laparoscopic cholecystectomy on 9/27 and ERCP with sphincterotomy stone extraction on 9/28.  He additionally had aerococcus urinae from urine culture. Due to his baseline renal dysfunction he did have a Groshong catheter placed for IV antibiotics.  He clinically improved with improvement in his LFTs and sepsis. He was discharged on 9/29 with plans to continue Zosyn at least until 10/11/21. I have not yet seen him in outpatient follow-up but he was scheduled to follow-up with me tomorrow on 10/6.      He was doing well apparently until 2-3 days ago when he noticed redness developing over his right abdominal wall.  The erythema has gotten progressively worse with some swelling and warmth and tenderness.  As far as I know he did not contact our clinic regarding this erythema. He was not having any fevers or chills at home.  He presented to the ER early this morning for evaluation of this redness. Since arrival, he was noted to have a maximum temperature of 99.1°F. labs showed a CRP of 46.11, troponin elevated to 0.07, proBNP of 8353, INR of 2.72, White blood cell count of 24.54 (up from 13.29 just before discharge), hemoglobin of 12, lactic acid of 4.1-->2.9, creatinine of  2.84 (up from 2.15 just prior to discharge). COVID-19 PCR was negative. White blood cell count is now improved to 13.69 on antibiotics.creatinine is worse at 3.09 today.  LFTs remain elevated with an ALT of 94, an AST of 137, an alkaline phosphatase of 176, and a total bilirubin of 1.4. CT abdomen and pelvis was done yesterday and showed cholecystectomy with a fluid collection in the gallbladder fossa containing a bubble of air with differential considerations including a seroma/hematoma, biloma, or possible developing abscess.  No biliary obstruction was seen.  There was also fat stranding in the ventral abdominal wall and right flank that could reflect bland edema or cellulitis. The patient's antibiotics have been changed to vancomycin, cefepime, and Flagyl. Repeat blood cultures and urine culture are in progress. Surgery consult has been placed. Plan is for IR drainage procedure but having to wait on this due to his anticoagulation that he has been on. He underwent NM Hepatobiliary scan today with no evidence for abnormal tracer activity to suggest leak.  The infectious disease team has been consulted for recommendations.    Subjective:    10/6/21: the patient states that his abdominal pain has been improving although he was having some increased right-sided abdominal pain after I went in the room today as he has just eaten something. No fevers. Tolerating the abx well without ADRs.    10/7/21: The patient is doing worse today.  Still having some right-sided abdominal pain.  He was taken for repeat CT abdomen and pelvis today as his leukocytosis worsened And he did have a new 16 x 6 cm hepatic fluid collection with intermittent heterogenous and hyperdense contents compatible with a large hematoma.  He did have a new small right pleural effusion with some adjacent consolidation or atelectasis. He denies any nausea.  No fevers    10/8/21: The patient was very somnolent today although he just recently got back from  the OR.  Dr. Callahan took the patient to the OR today and did not see any acute bleeding but did see some old blood/hematoma.  No necrotic fascia noted.  2 LAWRENCE drains left in place, one of the liver and another in the gallbladder fossa.  No fevers overnight.  Leukocytosis is slightly better.  His wife is at bedside and states that he is just been very somnolent after his procedure and during week but no other acute issues.    10/9/21: the patient states that his abdominal pain is improving. No fevers. Tolerating abx well without ADRs. Did have worsening hypoxia overnight to 10L NC but now improved back to 2L NC. Diuresis per nephrology and cardiology.    10/10/21: The patient has serous drainage from his LAWRENCE drain #1 but his LAWRENCE drain #2 has her bile and Dr. Callahan suspects that the patient has a bile leak from his prior cholecystectomy procedure.  Dr. Sylvester with GI has been consulted for ERCP tomorrow. The patient is somnolent today.  His wife remains at bedside.  She states that he has had a fairly good today although he is somnolent.  No fevers.  No history available from the patient    10/11/21: LAWRENCE drains in place.  Taken for ERCP today by Dr. Brunner with no overt bile leak seen and balloon sweep showed no stones.  A stent was placed in the CBD to the common hepatic duct and across the cystic duct with plans to remove in 1 month.  Patient is sitting up in bed with food in front of him, but he is not hungry.  He denies f/c, sob, v/d, rashes. He has some nausea.  Had NSVT on Sunday.  He was started on amiodarone and may go home with LifeVest. He remains afebrile. Creatinine improved. Has some diarrhea.     10/12/21: The patient remains afebrile.  Abdominal pain is improving.  LAWRENCE drain still remain in place.  Now growing Candida albicans and Candida tropicalis from his body fluid cultures.  Diarrhea has improved    10/13/21: the patient is nauseated today. No worsening abdominal pain. LAWRENCE drains remain in place. No  fevers.     10/14/21: The patient does have a substantial worsening of his leukocytosis today from 11-->21.  He denies having any severe diarrhea although he did have a bowel movement this morning.  Abdominal pain has improved.  He still having some anorexia and mild nausea.  No fevers.  Repeat CT abdomen and pelvis was done today with the official report pending but are my discussion with Dr. Kaminski, the patient still has a fluid collection by the liver but LAWRENCE drains remain in place and there is no other signs of new infection/acute abnormality.    10/15/21: The patient remains afebrile.  Feeling better with less abdominal pain.  White blood cell count better, down to 14.28 from 21. LAWRENCE drains remain in place    Past Medical History:   Diagnosis Date   • Cancer (HCC)     skin   • Coronary artery disease     stent   • Diabetes mellitus (HCC)    • Elevated cholesterol    • Heart attack (HCC) 2003   • Hypertension    • Sarcoid    • Sleep apnea     no cpap   • SOB (shortness of breath)    • Stroke (HCC)     Pt. states that he has had 2 mini strokes. No residual        Past Surgical History:   Procedure Laterality Date   • CARDIAC CATHETERIZATION  2003    cardiac stent x2 after s/p CABG   • CATARACT EXTRACTION, BILATERAL     • CHOLECYSTECTOMY N/A 10/8/2021    Procedure: LAPAROSCOPIC WASHOUT;  Surgeon: David Callahan MD;  Location: [facility] OR;  Service: General;  Laterality: N/A;   • CHOLECYSTECTOMY WITH INTRAOPERATIVE CHOLANGIOGRAM N/A 9/27/2021    Procedure: CHOLECYSTECTOMY LAPAROSCOPIC INTRAOPERATIVE CHOLANGIOGRAM;  Surgeon: Gaudencio Wolfe MD;  Location: [facility] OR;  Service: General;  Laterality: N/A;   • COLONOSCOPY     • CORONARY ARTERY BYPASS GRAFT  2002   • ENDOSCOPY     • ERCP N/A 9/28/2021    Procedure: ENDOSCOPIC RETROGRADE CHOLANGIOPANCREATOGRAPHY;  Surgeon: Brunner, Mark I, MD;  Location: [facility] ENDOSCOPY;  Service: Gastroenterology;  Laterality: N/A;  sphincterotomy made, balloon sweep of bile duct  done with 9-12 balloon.    • ERCP N/A 10/11/2021    Procedure: ENDOSCOPIC RETROGRADE CHOLANGIOPANCREATOGRAPHY with biliary wall stent;  Surgeon: Brunner, Mark I, MD;  Location: [facility] ENDOSCOPY;  Service: Gastroenterology;  Laterality: N/A;  BALLOON SWEEP 1229  10X8 STENT PLACED IN CBD    • EXTRACORPOREAL SHOCKWAVE LITHOTRIPSY (ESWL), STENT INSERTION/REMOVAL Bilateral 8/21/2020    Procedure: EXTRACORPOREAL SHOCKWAVE LITHOTRIPSY BILATERAL WITH STENT PLACEMENT LEFT;  Surgeon: Ethan Barnes Jr., MD;  Location: [facility] OR;  Service: Urology;  Laterality: Bilateral;   • SKIN CANCER EXCISION       Social history:  The patient is .  He lives in [facility].  No history of tobacco, alcohol, or illicit drug use.    Family history:  family history includes COPD in his mother; Cancer in his brother; Diabetes in his father and sister; Heart disease in his father; Hypertension in his father; Obesity in his sister.    No Known Allergies    Medication:  Current Facility-Administered Medications   Medication Dose Route Frequency Provider Last Rate Last Admin   • albumin human 25 % IV SOLN 12.5 g  12.5 g Intravenous BID Aleena Abarca MD       • amiodarone (PACERONE) tablet 200 mg  200 mg Oral BID With Meals Manjula Owens MD   200 mg at 10/15/21 0907   • aspirin EC tablet 81 mg  81 mg Oral Daily Manjula Owens MD   81 mg at 10/15/21 0907   • atorvastatin (LIPITOR) tablet 40 mg  40 mg Oral Nightly Gayle Butler MD   40 mg at 10/14/21 2106   • bumetanide (BUMEX) injection 0.5 mg  0.5 mg Intravenous TID Aleena Abarca MD       • dextrose (D50W) 25 g/ 50mL Intravenous Solution 25 g  25 g Intravenous Q15 Min PRN Brunner, Mark I, MD   25 g at 10/11/21 0742   • dextrose (GLUTOSE) oral gel 15 g  15 g Oral Q15 Min PRN Brunner, Mark I, MD       • docosanol (ABREVA) 10 % cream 1 application  1 application Topical 5x Daily Brunner, Mark I, MD   1 application at 10/15/21 0907   •  dorzolamide (TRUSOPT) 2 % 1 drop, timolol (TIMOPTIC) 0.5 % 1 drop for Cosopt 22.3-6.8 mg/mL   Both Eyes BID Brunner, Mark I, MD   Given at 10/15/21 0907   • ferric gluconate (FERRLECIT)125 MG in sodium chloride 0.9 % 100 mL IVPB  125 mg Intravenous Daily Before Supper Brunner, Mark I, MD   125 mg at 10/14/21 1730   • glucagon (human recombinant) (GLUCAGEN DIAGNOSTIC) injection 1 mg  1 mg Subcutaneous Q15 Min PRN Brunner, Mark I, MD       • insulin lispro (humaLOG) injection 0-7 Units  0-7 Units Subcutaneous 4x Daily With Meals & Nightly Brunner, Mark I, MD   2 Units at 10/14/21 2233   • lactobacillus acidophilus (RISAQUAD) capsule 1 capsule  1 capsule Oral Daily Brunner, Mark I, MD   1 capsule at 10/15/21 0907   • magnesium sulfate 4 gram infusion - Mg less than or equal to 1mg/dL  4 g Intravenous PRN Justus Milan MD        Or   • magnesium sulfate 3 gram infusion (1gm x 3) - Mg 1.1 - 1.5 mg/dL  1 g Intravenous PRN Justus Milan MD        Or   • Magnesium Sulfate 2 gram infusion- Mg 1.6 - 1.9 mg/dL  2 g Intravenous PRN Justus Milan MD 25 mL/hr at 10/14/21 1219 2 g at 10/14/21 1219   • metoprolol tartrate (LOPRESSOR) tablet 50 mg  50 mg Oral Q8H Manjula Owens MD       • micafungin 100 mg/100 mL 0.9% NS IVPB (mbp)  100 mg Intravenous Q24H Diaz Moreno MD   100 mg at 10/14/21 2234   • ondansetron (ZOFRAN) injection 4 mg  4 mg Intravenous Q6H PRN Justus Milan MD       • pantoprazole (PROTONIX) EC tablet 40 mg  40 mg Oral Daily Brunner, Mark I, MD   40 mg at 10/15/21 0907   • piperacillin-tazobactam (ZOSYN) 3.375 g in iso-osmotic dextrose 50 ml (premix)  3.375 g Intravenous Q8H Diaz Moreno MD   3.375 g at 10/15/21 0906   • potassium & sodium phosphates (PHOS-NAK) 280-160-250 MG packet - for Phosphorus less than 1.25 mg/dL  2 packet Oral Q6H PRN Justus Milan MD        Or   • potassium & sodium phosphates (PHOS-NAK) 280-160-250 MG packet - for Phosphorus 1.25 - 2.5 mg/dL  2  packet Oral Q6H PRN Justus Milan MD   2 packet at 10/14/21 1219   • predniSONE (DELTASONE) tablet 10 mg  10 mg Oral Daily Brunner, Mark I, MD   10 mg at 10/15/21 0907   • rOPINIRole (REQUIP) tablet 0.25 mg  0.25 mg Oral Nightly Brunner, Mark I, MD   0.25 mg at 10/14/21 2106   • Sodium Chloride (PF) 0.9 % 10 mL  10 mL Intravenous PRN Brunner, Mark I, MD       • sodium chloride 0.9 % flush 10 mL  10 mL Intravenous Q12H Brunner, Mark I, MD   10 mL at 10/15/21 0907   • sodium chloride 0.9 % flush 10 mL  10 mL Intravenous PRN Brunner, Mark I, MD       • sodium chloride 0.9 % infusion  9 mL/hr Intravenous Continuous Brunner, Mark I, MD   Stopped at 10/11/21 1244   • tamsulosin (FLOMAX) 24 hr capsule 0.4 mg  0.4 mg Oral Daily Brunner, Mark I, MD   0.4 mg at 10/15/21 0907         Antibiotics:  Anti-Infectives (From admission, onward)    Ordered     Dose/Rate Route Frequency Start Stop    10/12/21 0955  piperacillin-tazobactam (ZOSYN) 3.375 g in iso-osmotic dextrose 50 ml (premix)        Ordering Provider: Diaz Moreno MD    3.375 g  over 4 Hours Intravenous Every 8 Hours 10/12/21 1600 10/25/21 2359    10/12/21 0955  piperacillin-tazobactam (ZOSYN) 3.375 g in iso-osmotic dextrose 50 ml (premix)        Ordering Provider: Diaz Moreno MD    3.375 g  over 30 Minutes Intravenous Once 10/12/21 1045 10/12/21 1459    10/07/21 0852  meropenem (MERREM) 1 g/100 mL 0.9% NS VTB (mbp)        Ordering Provider: Thierry Chinchilla, PharmD    1 g  over 30 Minutes Intravenous Once 10/07/21 0945 10/07/21 1338    10/06/21 1506  valACYclovir (VALTREX) tablet 500 mg        Ordering Provider: Don Draper DO    500 mg Oral Every 12 Hours Scheduled 10/06/21 2100 10/07/21 0854    10/05/21 1632  micafungin 100 mg/100 mL 0.9% NS IVPB (mbp)        Ordering Provider: Diaz Moreno MD    100 mg  over 60 Minutes Intravenous Every 24 Hours 10/05/21 1800 10/25/21 9595    10/04/21 2101  vancomycin 1750 mg/500 mL 0.9% NS IVPB  "([facility])        Ordering Provider: Toño Lainez MD    20 mg/kg × 81.9 kg (Adjusted)  250 mL/hr over 120 Minutes Intravenous Once 10/04/21 2103 10/05/21 0132    10/04/21 2101  cefepime (MAXIPIME) 2 g/100 mL 0.9% NS (mbp)        Ordering Provider: Toño Lainez MD    2 g  200 mL/hr over 30 Minutes Intravenous Once 10/04/21 2103 10/04/21 2250            Review of Systems    As above    Physical Exam:   Vital Signs   /87 (BP Location: Left arm, Patient Position: Lying)   Pulse 77   Temp 98.1 °F (36.7 °C) (Oral)   Resp 18   Ht 170.2 cm (67.01\")   Wt 104 kg (229 lb 4.5 oz)   SpO2 96%   BMI 35.90 kg/m²     GENERAL: frail appearing. Lying in bed.  No acute distress  HENT: Normocephalic, atraumatic.  Labial ulcers improved  Eyes: No conjunctival injection. No icterus.  NECK: Supple without nuchal rigidity. No mass.  HEART: RRR; No murmur, rubs, gallops.   LUNGS: clear to auscultation anteriorly.  Nonlabored breathing  ABDOMEN: erythema over right abdominal wall has improved.  Serous drainage from both LAWRENCE drains in right abdomen.  Abdominal tenderness has improved.  SKIN: no new rashes noted. No jaundice  NEURO: Awake and alert. Oriented ×3. Normal speech and cognition  PSYCHIATRIC: Cooperative. Calm    Groshong cath site is without erythema or drainage    Laboratory Data    Results from last 7 days   Lab Units 10/15/21  0502 10/14/21  0228 10/12/21  0702   WBC 10*3/mm3 14.28* 21.05* 11.04*   HEMOGLOBIN g/dL 9.1* 9.9* 11.1*   HEMATOCRIT % 27.5* 31.1* 33.5*   PLATELETS 10*3/mm3 175 158 269     Results from last 7 days   Lab Units 10/15/21  0502   SODIUM mmol/L 135*   POTASSIUM mmol/L 4.0   CHLORIDE mmol/L 102   CO2 mmol/L 21.0*   BUN mg/dL 30*   CREATININE mg/dL 1.52*   GLUCOSE mg/dL 109*   CALCIUM mg/dL 8.6     Results from last 7 days   Lab Units 10/15/21  0502   ALK PHOS U/L 150*   BILIRUBIN mg/dL 0.9   ALT (SGPT) U/L 27   AST (SGOT) U/L 27               Estimated Creatinine Clearance: 53.5 " mL/min (A) (by C-G formula based on SCr of 1.52 mg/dL (H)).       Microbiology:  Microbiology Results (last 10 days)     Procedure Component Value - Date/Time    Blood Culture - Blood, Arm, Left [[id number]]  (Normal) Collected: 10/14/21 1412    Lab Status: Preliminary result Specimen: Blood from Arm, Left Updated: 10/15/21 1515     Blood Culture No growth at 24 hours    Narrative:      AEROBIC BOTTLE ONLY    Blood Culture - Blood, Hand, Left [[id number]]  (Normal) Collected: 10/14/21 1410    Lab Status: Preliminary result Specimen: Blood from Hand, Left Updated: 10/15/21 1515     Blood Culture No growth at 24 hours    Eosinophil Smear - Urine, Urine, Clean Catch [[id number]]  (Normal) Collected: 10/08/21 1823    Lab Status: Final result Specimen: Urine, Clean Catch Updated: 10/08/21 2003     Eosinophil Smear 0 % EOS/100 Cells     Narrative:      No eosinophil seen    Body Fluid Culture - Body Fluid, Abdomen, Right [[id number]]  (Abnormal)  (Susceptibility) Collected: 10/08/21 0912    Lab Status: Final result Specimen: Body Fluid from Abdomen, Right Updated: 10/12/21 0845     Body Fluid Culture Light growth (2+) Candida tropicalis      Light growth (2+) Candida albicans     Gram Stain Few (2+) WBCs seen      No organisms seen    Susceptibility      Candida tropicalis     MELINDA     Fluconazole Susceptible     Micafungin Susceptible                  Linear View               Susceptibility      Candida albicans     MELINDA     Fluconazole Susceptible     Micafungin Susceptible                  Linear View                           Radiology:  CT Abdomen Pelvis Without Contrast    Result Date: 10/14/2021  1. Interval LAWRENCE drain placement and effective drainage of right perihepatic fluid collection since 10/07/2021. Very small remaining low density rim of perihepatic fluid.  2. Mildly increased fat stranding around the atrophic pancreas, resembling low-level changes pancreatitis, however, this may simply reflect mild fat stranding  from recent surgery and stent. Very small right effusion and mild right basilar discoid atelectasis.  3. Decompressed appearance of the colon containing only fluid, nonspecific except for diarrhea. No visible large or small bowel inflammation.  4. Interval biliary stent placement, stent in expected location, with no significant bladder dilatation. There is expected trabeculated bladder with multiple diverticula again noted.  DICTATED:   10/14/2021 EDITED/ls :   10/14/2021       XR Chest 1 View    Result Date: 10/9/2021  1. Potential mild vascular congestion or volume overload, new since 10/4/2021, correlate clinically. 2. Stable cardiomegaly. Median sternotomy. 3. Elevation right hemidiaphragm with scarring or atelectasis right lung base. Signer Name: Navneet Beaver MD  Signed: 10/9/2021 6:44 AM  Workstation Name: [email or web address]  [facility]    XR Abdomen KUB    Result Date: 10/13/2021  Biliary stent projects and grossly unchanged position in the right upper quadrant. Adjacent flat LAWRENCE drain is noted.  This report was finalized on 10/13/2021 12:56 PM by Corey Castillo.      FL ercp biliary duct only    Result Date: 10/12/2021  Fluoroscopy provided during ERCP.  D: 10/12/2021 E: 10/12/2021   This report was finalized on 10/12/2021 8:40 PM by Dr. Sean Kaminski MD.           Impression:     Problems:  -Sepsis with leukocytosis with neutrophilia, lactic acidosis, elevated pro calcitonin level- due to intra-abdominal source/abdominal wall cellulitis. - leukocytosis is substantially worse on 10/14 but now improving again  - S/p  ERCP 10/11 with stent and no obvious signs of bile leak.  -Intra-abdominal hematoma, now status post washout procedure on 10/8- Candida tropicalis and Candida albicans from culture  -Abdominal wall cellulitis- improved  -Possible developing intra-abdominal abscess vs. Seroma. No signs of biliary leak on NM hepatobiliary scan but stent was placed  -Recent cholangitis and  cholecystitis status post recent cholecystectomy on 9/27 and ERCP on 9/28  -Recent enterococcus bacteremia  -Recent Clostridium perfringens bacteremia  -Recent pancreatitis  -Macrocytic anemia  -Elevated troponin level  -Elevated LFTs  -RUDY on CKD stage IIIB- due to sepsis vs dehydration  -Coronary artery disease status post CABG/stents  -systolic heart failure, LVEF was 26-30% on echo in 09/2021  -history of cardiac thrombus, on anticoagulation with eliquis  -Diabetes mellitus type 2 with hyperglycemia  -Essential hypertension  -Hyperlipidemia  -Sarcoidosis/chronic prednisone  -prolonged QTc interval  -elevated CPK level  -external oral blisters, possible herpes labialis  - Diarrhea- improved  - nausea- ongoing. Not eating much    PLAN:    -continue to follow cbc with diff, cmp, crp  -continue Zosyn 3.375 g IV every 8 hours- if white blood cell count continues to trend back up and we may have to broaden his antibiotics out.  He is not having any fevers.  No other signs of decline clinically or on repeat CT scan.  -continue empiric Micafungin (avoiding fluconazole in the setting of his prolonged QTc interval)  -status post washout procedure  by Dr. Callahan 10/8.  2 LAWRENCE drains left in place.  Surgical cultures - Candida tropicalis and Candida albicans  -s/p ERCP on 10/11 with no evidence of bile leak, stent placed.  -weekly Groshong cath dressing changes  -s/p Valtrex    I am okay with his discharge whenever he is cleared by surgery.  Leukocytosis is now improving.  He remains clinically stable.  LAWRENCE drains remain in place but may be removed soon.    Tentative antibiotic plans:    UM/LUC:  1.  Zosyn 3.375 g IV every 8 hours  2.  Micafungin 100 mg IV every 24 hours  3.  Plan to continue the above antimicrobials at least until 10/25/21  4.  Weekly CBC with differential, CMP, ESR, and CRP- labs need to be faxed to [facility] at [phone]  5.  Weekly Groshong catheter dressing changes  6.  Needs to follow-up with me on  10/25/21  7.  Please fax a copy of my orders to [facility] at [phone] and call [phone] with final discharge plans.    I discussed with the patient's wife at bedside today    Complex MDM.     Diaz Moreno MD  10/15/2021

## 2021-10-16 NOTE — PROGRESS NOTES
"   LOS: 11 days    Patient Care Team:  Vincent Crawford MD as PCP - General (Family Medicine)  Manjula Owens MD as Consulting Physician (Cardiology)    Chief Complaint:  Abdominal pain    Subjective     Interval History:     No acute events overnight. No new complaints.     Review of Systems:   No CP or SOA , fever, chills, rigors, rash, N/V, Constipation.       Objective     Vital Sign Min/Max for last 24 hours  Temp  Min: 98.1 °F (36.7 °C)  Max: 98.4 °F (36.9 °C)   BP  Min: 147/86  Max: 171/92   Pulse  Min: 65  Max: 88   Resp  Min: 18  Max: 19   SpO2  Min: 95 %  Max: 95 %   No data recorded   Weight  Min: 94.5 kg (208 lb 4 oz)  Max: 94.5 kg (208 lb 4 oz)     Flowsheet Rows      First Filed Value   Admission Height 177.8 cm (70\") Documented at 10/04/2021 1842   Admission Weight 95.3 kg (210 lb) Documented at 10/04/2021 1842          No intake/output data recorded.  I/O last 3 completed shifts:  In: 1420 [P.O.:1320; IV Piggyback:100]  Out: 110 [Drains:110]    Physical Exam:    Gen: Alert, NAD   HENT: NC, AT, EOMI   NECK: Supple, no JVD, Trachea midline   LUNGS: CTA bilaterally, non labored respirtation   CVS: S1/S2 audible, RRR, no murmur   Abd: Soft, NT, ND, BS+   Ext: + pedal edema, no cyanosis   CNS: Alert, No focal deficit noted grossly  Psy: Cooperative  Skin: Warm, dry and intact      WBC WBC   Date Value Ref Range Status   10/16/2021 13.29 (H) 3.40 - 10.80 10*3/mm3 Final   10/15/2021 14.28 (H) 3.40 - 10.80 10*3/mm3 Final   10/14/2021 21.05 (H) 3.40 - 10.80 10*3/mm3 Final      HGB Hemoglobin   Date Value Ref Range Status   10/16/2021 9.9 (L) 13.0 - 17.7 g/dL Final   10/15/2021 9.1 (L) 13.0 - 17.7 g/dL Final   10/14/2021 9.9 (L) 13.0 - 17.7 g/dL Final      HCT Hematocrit   Date Value Ref Range Status   10/16/2021 30.4 (L) 37.5 - 51.0 % Final   10/15/2021 27.5 (L) 37.5 - 51.0 % Final   10/14/2021 31.1 (L) 37.5 - 51.0 % Final      Platlets No results found for: LABPLAT   MCV MCV   Date Value Ref Range " Status   10/16/2021 88.4 79.0 - 97.0 fL Final   10/15/2021 85.7 79.0 - 97.0 fL Final   10/14/2021 87.9 79.0 - 97.0 fL Final          Sodium Sodium   Date Value Ref Range Status   10/16/2021 136 136 - 145 mmol/L Final   10/15/2021 135 (L) 136 - 145 mmol/L Final   10/14/2021 135 (L) 136 - 145 mmol/L Final      Potassium Potassium   Date Value Ref Range Status   10/16/2021 3.7 3.5 - 5.2 mmol/L Final   10/15/2021 4.0 3.5 - 5.2 mmol/L Final   10/14/2021 4.4 3.5 - 5.2 mmol/L Final      Chloride Chloride   Date Value Ref Range Status   10/16/2021 100 98 - 107 mmol/L Final   10/15/2021 102 98 - 107 mmol/L Final   10/14/2021 104 98 - 107 mmol/L Final      CO2 CO2   Date Value Ref Range Status   10/16/2021 20.0 (L) 22.0 - 29.0 mmol/L Final   10/15/2021 21.0 (L) 22.0 - 29.0 mmol/L Final   10/14/2021 20.0 (L) 22.0 - 29.0 mmol/L Final      BUN BUN   Date Value Ref Range Status   10/16/2021 29 (H) 8 - 23 mg/dL Final   10/15/2021 30 (H) 8 - 23 mg/dL Final   10/14/2021 34 (H) 8 - 23 mg/dL Final      Creatinine Creatinine   Date Value Ref Range Status   10/16/2021 1.72 (H) 0.76 - 1.27 mg/dL Final   10/15/2021 1.52 (H) 0.76 - 1.27 mg/dL Final   10/14/2021 1.54 (H) 0.76 - 1.27 mg/dL Final      Calcium Calcium   Date Value Ref Range Status   10/16/2021 9.2 8.6 - 10.5 mg/dL Final   10/15/2021 8.6 8.6 - 10.5 mg/dL Final   10/14/2021 8.2 (L) 8.6 - 10.5 mg/dL Final      PO4 No results found for: CAPO4   Albumin Albumin   Date Value Ref Range Status   10/16/2021 3.30 (L) 3.50 - 5.20 g/dL Final   10/15/2021 2.60 (L) 3.50 - 5.20 g/dL Final   10/14/2021 2.40 (L) 3.50 - 5.20 g/dL Final      Magnesium Magnesium   Date Value Ref Range Status   10/16/2021 1.8 1.6 - 2.4 mg/dL Final   10/15/2021 2.1 1.6 - 2.4 mg/dL Final   10/14/2021 1.9 1.6 - 2.4 mg/dL Final      Uric Acid No results found for: URICACID        Results Review:     I reviewed the patient's new clinical results.    amiodarone, 200 mg, Oral, BID With Meals  aspirin, 81 mg, Oral,  Daily  atorvastatin, 40 mg, Oral, Nightly  docosanol, 1 application, Topical, 5x Daily  dorzolamide-timolol, , Both Eyes, BID  ferric gluconate, 125 mg, Intravenous, Daily Before Supper  insulin lispro, 0-7 Units, Subcutaneous, 4x Daily With Meals & Nightly  lactobacillus acidophilus, 1 capsule, Oral, Daily  metoprolol tartrate, 50 mg, Oral, Q8H  micafungin (MYCAMINE) IV, 100 mg, Intravenous, Q24H  pantoprazole, 40 mg, Oral, Daily  piperacillin-tazobactam, 3.375 g, Intravenous, Q8H  predniSONE, 10 mg, Oral, Daily  rOPINIRole, 0.25 mg, Oral, Nightly  sodium chloride, 10 mL, Intravenous, Q12H  tamsulosin, 0.4 mg, Oral, Daily  terazosin, 1 mg, Oral, Nightly      sodium chloride, 9 mL/hr, Last Rate: Stopped (10/11/21 1244)        Medication Review: yes    Assessment/Plan       Sepsis (HCC)    Diabetes mellitus (HCC)    Hypertension    Moderate obstructive sleep apnea    History of sarcoidosis (occular)    Elevated LFTs    Combined systolic and diastolic cardiac dysfunction (EF < 50% 2018)    Post-operative infection    Abdominal wall cellulitis    Postoperative intra-abdominal abscess    Elevated troponin    CAD (coronary artery disease)    CKD (chronic kidney disease), stage III (HCC)    Acute kidney injury superimposed on chronic kidney disease (HCC)    Bile leak      1- RUDY on CKD - pre-enal azotemia from sepsis/abdominal wall cellulitis - resolved.   2- CKD stage III - Baseline Scr 1.8- 2.2 - Nephrosclerosis and obstructive uropathy.   3- Hx of nephrolithiasis - requiring ureteral stent in past   4- Sepsis/abdominal wall cellulitis   5- Hx of CAD s/p CABG.   6- Metabolic acidosis  -stable.  7- Peripheral edema - improving.     Plan:  - Continue with current.   - Avoid nephrotoxic agents.   - Renal diet.   - Fluid restriction 1.5 lit/day   - Hold lisinopril for now with diuresis.  - Bumex 1 mg po q daily.       Follow up with [facility] in 2-3 weeks after discharge with renal function panel and UA    Aleena Marksiq,  MD  10/16/21  11:35 EDT

## 2021-10-16 NOTE — PROGRESS NOTES
[facility] Medicine Services  PROGRESS NOTE    Patient Name: Jeremías Soto  : 1955  MRN: [id number]    Date of Admission: 10/4/2021  Primary Care Physician: Vincent Crawford MD    Subjective   Subjective     CC:  Abdominal pain    HPI  Feeling better.  Eating a little better.  Denies abdominal pain.  States that he walked to  using a walker (does not normally use at home)    ROS:  Gen- No fevers, chills  CV- No chest pain, palpitations  Resp- No cough, dyspnea  GI- +nausea, denies abdominal pain.           Objective   Objective     Vital Signs:   Temp:  [98.1 °F (36.7 °C)-98.4 °F (36.9 °C)] 98.2 °F (36.8 °C)  Heart Rate:  [65-88] 65  Resp:  [18-19] 18  BP: (145-171)/(76-92) 156/86     Physical Exam:  Constitutional: Awake, alert, no acute distress, sitting up in bed, looks much better, wife at bedside  HENT: perioral scabbed lesions  Respiratory: Clear to auscultation bilaterally, respiratory effort normal   Cardiovascular: RRR, no murmurs, rubs, or gallops, palpable radial pulse  Gastrointestinal: Mildly tender with palpation, 2 LAWRENCE drains on right side with serosanguineous drainage, also small open RLQ area incision today bandaged with abdominal binder  Musculoskeletal: trace lower extremity edema  Psychiatric: Appropriate affect, cooperative  Neurologic: Speech clear, moving all extremity spontaneously      Results Reviewed:  LAB RESULTS:      Lab 10/15/21  0502 10/14/21  0228 10/12/21  0702 10/11/21  0704 10/10/21  0149   WBC 14.28* 21.05* 11.04* 9.39 15.18*   HEMOGLOBIN 9.1* 9.9* 11.1* 10.1* 9.3*   HEMATOCRIT 27.5* 31.1* 33.5* 31.8* 28.3*   PLATELETS 175 158 269 311 240   NEUTROS ABS 11.99*  --   --  7.23*  --    IMMATURE GRANS (ABS) 0.50*  --   --   --   --    LYMPHS ABS 0.91  --   --   --   --    MONOS ABS 0.71  --   --   --   --    EOS ABS 0.14  --   --  0.00  --    MCV 85.7 87.9 86.3 89.1 87.1   PROTIME  --   --   --   --  15.1*         Lab 10/15/21  050  10/14/21  0228 10/13/21  0906 10/12/21  0701 10/11/21  0704 10/10/21  0149 10/10/21  0149   SODIUM 135* 135* 137 139 145   < > 137   POTASSIUM 4.0 4.4 4.1 4.3 4.2   < > 4.1   CHLORIDE 102 104 104 107 113*   < > 105   CO2 21.0* 20.0* 19.0* 19.0* 17.0*   < > 20.0*   ANION GAP 12.0 11.0 14.0 13.0 15.0   < > 12.0   BUN 30* 34* 39* 50* 58*   < > 67*   CREATININE 1.52* 1.54* 1.64* 1.80* 2.16*   < > 2.90*   GLUCOSE 109* 144* 172* 181* 61*   < > 124*   CALCIUM 8.6 8.2* 8.5* 8.6 8.2*   < > 8.1*   IONIZED CALCIUM 1.27 1.24  --   --   --   --   --    MAGNESIUM 2.1 1.9  --   --   --   --  2.4   PHOSPHORUS 3.1 2.4*  --  2.7 3.3  --  4.9*    < > = values in this interval not displayed.         Lab 10/15/21  0502 10/14/21  0228 10/12/21  0701 10/11/21  0704 10/10/21  0149   TOTAL PROTEIN 5.7* 5.6* 6.1 5.7* 5.7*   ALBUMIN 2.60* 2.40* 2.60* 2.20* 2.50*   GLOBULIN 3.1 3.2 3.5 3.5 3.2   ALT (SGPT) 27 35 38 35 39   AST (SGOT) 27 37 43* 37 31   BILIRUBIN 0.9 1.2 1.0 1.0 1.4*   ALK PHOS 150* 174* 193* 166* 149*   AMYLASE 37  --   --   --   --    LIPASE 34  --   --   --   --          Lab 10/10/21  0149   PROTIME 15.1*   INR 1.23*             Lab 10/09/21  1225   ABO TYPING A   RH TYPING Positive   ANTIBODY SCREEN Negative         Brief Urine Lab Results  (Last result in the past 365 days)      Color   Clarity   Blood   Leuk Est   Nitrite   Protein   CREAT   Urine HCG        10/08/21 1823             186.2               Microbiology Results Abnormal     Procedure Component Value - Date/Time    Blood Culture - Blood, Arm, Left [[id number]]  (Normal) Collected: 10/14/21 1412    Lab Status: Preliminary result Specimen: Blood from Arm, Left Updated: 10/15/21 1515     Blood Culture No growth at 24 hours    Narrative:      AEROBIC BOTTLE ONLY    Blood Culture - Blood, Hand, Left [[id number]]  (Normal) Collected: 10/14/21 1410    Lab Status: Preliminary result Specimen: Blood from Hand, Left Updated: 10/15/21 1515     Blood Culture No growth at 24  hours    Blood Culture - Blood, Arm, Left [[id number]]  (Normal) Collected: 10/05/21 0734    Lab Status: Final result Specimen: Blood from Arm, Left Updated: 10/10/21 0901     Blood Culture No growth at 5 days    Narrative:      The previously reported component GRAM STAIN is no longer being reported. Previous result was <null> (Reference Range: [Reference Range]) on 10/8/2021 at 0901 EDT.    Blood Culture - Blood, Arm, Left [[id number]]  (Normal) Collected: 10/04/21 2120    Lab Status: Final result Specimen: Blood from Arm, Left Updated: 10/09/21 2215     Blood Culture No growth at 5 days    Narrative:      The previously reported component GRAM STAIN is no longer being reported. Previous result was <null> (Reference Range: [Reference Range]) on 10/8/2021 at 2215 EDT.    Blood Culture - Blood, Arm, Right [[id number]]  (Normal) Collected: 10/04/21 2143    Lab Status: Final result Specimen: Blood from Arm, Right Updated: 10/09/21 2215     Blood Culture No growth at 5 days    Narrative:      The previously reported component GRAM STAIN is no longer being reported. Previous result was <null> (Reference Range: [Reference Range]) on 10/8/2021 at 2215 EDT.    Eosinophil Smear - Urine, Urine, Clean Catch [[id number]]  (Normal) Collected: 10/08/21 1823    Lab Status: Final result Specimen: Urine, Clean Catch Updated: 10/08/21 2003     Eosinophil Smear 0 % EOS/100 Cells     Narrative:      No eosinophil seen    COVID PRE-OP / PRE-PROCEDURE SCREENING ORDER (NO ISOLATION) - Swab, Nasopharynx [[id number]]  (Normal) Collected: 10/05/21 0015    Lab Status: Final result Specimen: Swab from Nasopharynx Updated: 10/05/21 0043    Narrative:      The following orders were created for panel order COVID PRE-OP / PRE-PROCEDURE SCREENING ORDER (NO ISOLATION) - Swab, Nasopharynx.  Procedure                               Abnormality         Status                     ---------                               -----------         ------                      COVID-19, ABBOTT IN-HOUS...[[id number]]  Normal              Final result                 Please view results for these tests on the individual orders.    COVID-19, ABBOTT IN-HOUSE,NASAL Swab (NO TRANSPORT MEDIA) 2 HR TAT - Swab, Nasopharynx [[id number]]  (Normal) Collected: 10/05/21 0015    Lab Status: Final result Specimen: Swab from Nasopharynx Updated: 10/05/21 0043     COVID19 Presumptive Negative    Narrative:      Fact sheet for providers: https://www.fda.gov/media/850460/download     Fact sheet for patients: https://www.fda.gov/media/321723/download    Test performed by PCR.  If inconsistent with clinical signs and symptoms patient should be tested with different authorized molecular test.          CT Abdomen Pelvis Without Contrast    Result Date: 10/14/2021  EXAMINATION: CT ABDOMEN/PELVIS WO CONTRAST - 10/14/2021  INDICATION: Abdominal abscess/infection suspected.  TECHNIQUE: 5 mm unenhanced images through the abdomen and pelvis.  The radiation dose reduction device was turned on for each scan per the ALARA (As Low as Reasonably Achievable) protocol.  COMPARISON: 10/07/2021  FINDINGS: Previous 10/07/2021 exam report indicated 16 x 6 mm hepatic fluid collection consistent with hematoma. Since that time, LAWRENCE drains have been placed along the dorsal dome of the liver and in the subhepatic space, with evacuation collection, and only a small rim of remaining fluid around the margins of the liver, up to 8 mm in width, as seen on coronal image #67 and adjacent images. No significant subhepatic collection is seen. There is interval biliary stent placement from the level of the proximal common duct to the duodenum, and expected air in the left lobe biliary system. No hepatic lesions are identified. There is a very small right pleural effusion and mild right basilar discoid atelectasis. Small round left lower lobe pulmonary nodule approximately 1 cm diameter is better seen, previously probably obscured by  pleural fluid which as a result. This nodule measures approximately 8 mm in 2018.  Pancreas is again noted be largely fatty replaced. There may be mildly increased peripancreatic fat stranding but no extensive inflammatory change, pseudocyst or ascites is seen. Atrophic left kidney with calculi up to 8 mm in diameter and small nonobstructing right renal calculus up to 2-3 mm are again noted, with no evidence of obstructive uropathy. Adrenal glands are not enlarged. Upper abdominal bowel loops are decompressed. No free air is seen. No ascites is seen.  Regarding the lower abdomen and pelvis, bowel loops are decompressed. Bladder again shows generalized trabeculation and small diverticula with trace calcification in the dominant left hutch-type diverticulum. No intrapelvic inflammatory change is seen. Terminal ileum, cecum and appendix appear normal.  Bony structures appear grossly intact. There is grade 1 anterior subluxation of L5 on S1 with nondisplaced bilateral pars defects.      Impression: 1. Interval LAWRENCE drain placement and effective drainage of right perihepatic fluid collection since 10/07/2021. Very small remaining low density rim of perihepatic fluid.  2. Mildly increased fat stranding around the atrophic pancreas, resembling low-level changes pancreatitis, however, this may simply reflect mild fat stranding from recent surgery and stent. Very small right effusion and mild right basilar discoid atelectasis.  3. Decompressed appearance of the colon containing only fluid, nonspecific except for diarrhea. No visible large or small bowel inflammation.  4. Interval biliary stent placement, stent in expected location, with no significant bladder dilatation. There is expected trabeculated bladder with multiple diverticula again noted.  DICTATED:   10/14/2021 EDITED/ls :   10/14/2021         Results for orders placed during the hospital encounter of 10/04/21    Adult Transthoracic Echo Complete W/ Cont if Necessary  Per Protocol    Interpretation Summary  · Estimated left ventricular EF = 30% Left ventricular systolic function is moderately to severely decreased.  · Left ventricular wall thickness is consistent with septal asymmetric hypertrophy.  · Mild mitral valve regurgitation is present  · Trace tricuspid valve regurgitation is present.  · No left ventricular apical thrombus is seen.      I have reviewed the medications:  Scheduled Meds:albumin human, 12.5 g, Intravenous, BID  amiodarone, 200 mg, Oral, BID With Meals  aspirin, 81 mg, Oral, Daily  atorvastatin, 40 mg, Oral, Nightly  bumetanide, 0.5 mg, Intravenous, TID  docosanol, 1 application, Topical, 5x Daily  dorzolamide-timolol, , Both Eyes, BID  ferric gluconate, 125 mg, Intravenous, Daily Before Supper  insulin lispro, 0-7 Units, Subcutaneous, 4x Daily With Meals & Nightly  lactobacillus acidophilus, 1 capsule, Oral, Daily  metoprolol tartrate, 50 mg, Oral, Q8H  micafungin (MYCAMINE) IV, 100 mg, Intravenous, Q24H  pantoprazole, 40 mg, Oral, Daily  piperacillin-tazobactam, 3.375 g, Intravenous, Q8H  predniSONE, 10 mg, Oral, Daily  rOPINIRole, 0.25 mg, Oral, Nightly  sodium chloride, 10 mL, Intravenous, Q12H  tamsulosin, 0.4 mg, Oral, Daily      Continuous Infusions:sodium chloride, 9 mL/hr, Last Rate: Stopped (10/11/21 1244)      PRN Meds:.dextrose  •  dextrose  •  glucagon (human recombinant)  •  magnesium sulfate **OR** magnesium sulfate in D5W 1g/100mL (PREMIX) **OR** magnesium sulfate  •  ondansetron  •  potassium & sodium phosphates **OR** potassium & sodium phosphates  •  Sodium Chloride (PF)  •  sodium chloride    Assessment/Plan   Assessment & Plan     Active Hospital Problems    Diagnosis  POA   • **Sepsis (HCC) [A41.9]  Yes   • Post-operative infection [T81.40XA]  Yes   • Abdominal wall cellulitis [L03.311]  Yes   • Postoperative intra-abdominal abscess [T81.43XA]  Yes   • Elevated troponin [R77.8]  Yes   • CAD (coronary artery disease) [I25.10]  Yes   •  CKD (chronic kidney disease), stage III (HCC) [N18.30]  Yes   • Acute kidney injury superimposed on chronic kidney disease (HCC) [N17.9, N18.9]  Yes   • Bile leak [K83.9]  Unknown   • Elevated LFTs [R79.89]  Yes   • Combined systolic and diastolic cardiac dysfunction (EF < 50% 2018) [I51.89]  Yes   • History of sarcoidosis (occular) [Z86.2]  Yes   • Moderate obstructive sleep apnea [G47.33]  Yes   • Diabetes mellitus (HCC) [E11.9]  Yes   • Hypertension [I10]  Yes      Resolved Hospital Problems   No resolved problems to display.        Brief Hospital Course to date:  Jeremías Soto is a 66 y.o. male with systolic CHF (46-50%), chronic multiorgan disease, admitted 9/23-9/29 w/ enterococcal/clostridial bacteremia, cholangitis, questionable pyelonephritis-s/p lap farooq 9/27 and ERCP 9/28 discharged home on IV Zosyn with a planned completion date 10/11 who returned with several days worsening erythema of the abdomen and imaging in the ED concerning for fluid collection in the gallbladder fossa now on empiric antibiotics with some initial improvement then new development of abdominal pain     Sepsis, abdominal source  Abnormal abdominal fluid collection  Recent cholecystitis  Recent enterococcal/clostridial bacteremia  -2.5 cm x 8.5 cm fluid collection on initial CT, unclear etiology given recent surgery  -S/p lap farooq 9/27, ERCP 9/28  -Repeat CT abdomen pelvis on 10/7/2021 with new 16 x 6 hepatic fluid collection and hematoma.  -Underwent laparoscopic washout and drain placement of intra-abdominal hematoma and incision and drainage of right flank cellulitis on 10/8/2021 per Dr. Callahan.  -ERCP performed on 10/11/2021, late small bile leak s/p Wallstent placement, plan removal of stent in 1 mos  -general surgery following    -Continue probiotic  -ID following.  Bodily fluid culture growing candida tropicalis and candida albicans.  stopped merrem and dapto and changed to zosyn, micafungin at least until 10/25/21 per  ID with weekly CBC with diff, CMP, ESR, CRP to be faxed to [facility] [phone], also needs weekly groshong cathetor dressing changes  -note worsening leukocytosis yesterday (up to 21K from 11K) but trending back down today--repeat CTabd/pelvis shows effective drainage of perihepatic fluid collection/very small remaining fluid, mildly increased fat stranding around atrophic pancreas could be d/t recent surgery and stent, decompressed colon, biliary stent with correct placement, multiple diverticula    Peptic Duodenitis  --incidentally noted on ERCP, ?likely 2nd to occasional NSAID use.  AVOID NSAIDS    Anorexia  --nutrition following, starting to eat more.  Encouraged.      RUDY on CKD 3, improving  Hyperkalemia, resolved  Metabolic acidosis  -Baseline Cr 1.9-2.3; EGFR 30.  Creatinine trending down  -Nephrology consulted.  Likely secondary to ATN.  Improved/back to baseline. Gave albumin and bumex yesterday, plans bumex 1mg PO daily, holding lisinopril with diuresis, fluid restriction 1.5L/day      Acute blood loss anemia  -Likely postsurgical  -s/p transfusion on 10/9/2021    Hypoxia, resolved  -Chest x-ray on 10/9/2021 shows vascular congestion new since 10/4/2021.  -Diuresis per nephrology, s/p metolazone and albumin yesterday.  Bumex PO daily, holding lisinopril, fluid restriction 1.5L/day    Systolic heart failure  Nonsustained V. Tach  -5 beat run on Sunday, asymptomatic  -Echo on 9/24/2021 showed EF of 26 to 30%  -Lisinopril on hold due to RUDY  -LifeVest at discharge  -cards following, recs continue amio but reduce dose to BID and increase metoprolol to 50mg q8h, may need to add terazosin    Postoperative anemia  -s/p transfusion on 10/9, with improvement in anemia  -IV iron per nephrology    Hypernatremia, resolved  -Resolved with D5W    LV thrombus  -Per cardiology note 10/6, records from the [facility] demonstrate prescription of Eliquis 7/19 for a large LV thrombus found on MRI  -Cardiology following.  Echo  showed EF 30%, LV wall thickness c/w septal asymmetric hypertrophy, mild MR, trace TR, no LV apical thrombus seen.  Ok to stop eliquis per cardiology?  --needs lifevest at discharge per cards    Elevated INR, improving  - Trending down now to 1.08    Perioral lesions  -Empiric Valtrex, renally dosed     DM type 2, A1c 7.3%, without long-term use of insulin  -Accu-Cheks with SSI     CAD with prior CABG/stent  Chronic systolic/diastolic CHF  Mild troponin elevation  Hx multiple TIA  -Echo 9/24/2021 EF 26-30%  -Continue Lopressor  -Chronically on Eliquis, currently on hold  -Lisinopril on hold for RUDY     Elevated LFTs, resolved  -Recent admission cholangitis, holding statin as noted     Hypertension  Hyperlipidemia  -Statin on hold while on daptomycin     Ocular sarcoidosis  Bilateral lung nodules  -Repeat dedicated chest CT 6 months outpatient regarding nodules  -Continue chronic oral prednisone 10 mg     Obesity, BMI 35.16 kg/M2  EVA  Hx COVID-19 December 2020    DVT prophylaxis:  Mechanical DVT prophylaxis orders are present.     AM-PAC 6 Clicks Score (PT): 18 (10/15/21 2052)    Disposition: Multiple acute ongoing issues, anticipate need for rehab at discharge    Discussed with wife at bedside    CODE STATUS:   Code Status and Medical Interventions:   Ordered at: 10/05/21 0134     Code Status:    CPR     Medical Interventions (Level of Support Prior to Arrest):    Full     I have prepared this progress note with copied portions of the prior day's progress note of my own authorship to preserve accuracy and maintain consistency of documentation. I have reviewed these portions and edited them for correctness. I verify that the above documentation accurately and truly represents the evaluation and management performed on today's date.     Justus Milan MD  10/16/21

## 2021-10-16 NOTE — PROGRESS NOTES
" [facility] - Progress Note    Jeremías Soto  1955  [id number]    10/16/21, 08:52 EDT      Chief Complaint: Following for CHF, CAD, NSVT    Subjective:   Resting in room - awakens easily.  A&Ox3.  NSR without ectopy on tele.  Vitals stable.  94% O2 on RA.     Review of Systems:  Pertinent positives are listed above and in physical exam.  All others have been reviewed and are negative.    albumin human, 12.5 g, Intravenous, BID  amiodarone, 200 mg, Oral, BID With Meals  aspirin, 81 mg, Oral, Daily  atorvastatin, 40 mg, Oral, Nightly  bumetanide, 0.5 mg, Intravenous, TID  docosanol, 1 application, Topical, 5x Daily  dorzolamide-timolol, , Both Eyes, BID  ferric gluconate, 125 mg, Intravenous, Daily Before Supper  insulin lispro, 0-7 Units, Subcutaneous, 4x Daily With Meals & Nightly  lactobacillus acidophilus, 1 capsule, Oral, Daily  metoprolol tartrate, 50 mg, Oral, Q8H  micafungin (MYCAMINE) IV, 100 mg, Intravenous, Q24H  pantoprazole, 40 mg, Oral, Daily  piperacillin-tazobactam, 3.375 g, Intravenous, Q8H  predniSONE, 10 mg, Oral, Daily  rOPINIRole, 0.25 mg, Oral, Nightly  sodium chloride, 10 mL, Intravenous, Q12H  tamsulosin, 0.4 mg, Oral, Daily        Objective:  Vitals:   height is 170.2 cm (67.01\") and weight is 94.5 kg (208 lb 4 oz). His oral temperature is 98.2 °F (36.8 °C). His blood pressure is 156/86 and his pulse is 65. His respiration is 18 and oxygen saturation is 96%.       Intake/Output Summary (Last 24 hours) at 10/16/2021 0719  Last data filed at 10/16/2021 0436  Gross per 24 hour   Intake 1320 ml   Output 60 ml   Net 1260 ml       Physical Exam:    General: Alert and oriented   Cardiovascular: Heart has a nondisplaced focal PMI. Regular rate and rhythm without murmur, gallop or rub.  Lungs: Clear without rales or wheezes.  Slightly decreased bases  Extremities: Show 1+ edema.  May be slightly better  Skin: warm and dry.  Neurologic: nonfocal         Results from " last 7 days   Lab Units 10/15/21  0502   WBC 10*3/mm3 14.28*   HEMOGLOBIN g/dL 9.1*   HEMATOCRIT % 27.5*   PLATELETS 10*3/mm3 175     Results from last 7 days   Lab Units 10/15/21  0502   SODIUM mmol/L 135*   POTASSIUM mmol/L 4.0   CHLORIDE mmol/L 102   CO2 mmol/L 21.0*   BUN mg/dL 30*   CREATININE mg/dL 1.52*   CALCIUM mg/dL 8.6   BILIRUBIN mg/dL 0.9   ALK PHOS U/L 150*   ALT (SGPT) U/L 27   AST (SGOT) U/L 27   GLUCOSE mg/dL 109*     Results from last 7 days   Lab Units 10/10/21  0149   INR  1.23*                 Echo:No thrombus by TTE.  Apical DK.  EF 30%  Tele:  NSR    Assessment and Plan:  1)  NSVT  - 25 beat run at [address] 10/10/21 at 135 bpm, asymptomatic  -  9/24/21 Echo EF 26-30%  -  Now on Metoprolol 25mg Q8.  Rates much improved.  -  Initiated oral Amiodarone 10/10.  EKG reviewed, NSR with acceptable QTc.  -  Will need LifeVest at discharge.  Will order for Monday.  -  No further NSVT, continue current regimen.  Acceptable EKG.     2)  CAD/ischemic cardiomyopathy/congestive heart failure  -  Prior CABG/stents  -  Mild troponin elevation on admission - likely chronic, currently chest pain-free with no anginal equivalents  -  Echocardiogram 9/24/2021 LVEF 26-30%.  Again, will need LifeVest at discharge.  -  Continue Lopressor, home lisinopril on hold due to acute kidney injury        3) History of LV thrombus  - per records from [facility] as documented by Dr Owens patient has previously been anticoagulated with Eliquis currently on hold given recent surgical intervention.    Consider resuming anticoagulation when okay with surgery.  -  Repeat Echo on 10/12/2021 shows EF 30%, no thrombus noted, mild MR/TR    4) Sepsis/abdominal wall cellulitis  - 10/8/21  laparoscopic washout and drain placement.  Incision and drainage of right flank cellulitis.  Surgical management per Dr. Callahan  -  Infectious disease managing antibiotics  -  Anemia/transfusion per primary/surgical teams.  - S/P ERCP and  biliary Wallstent Monday 10/11.   Surgery has given ok to resume AC  -Leukocytosis improving as of 10/15/2021       5) RUDY on CKD    -   Nephrology following.Creatinine now stable at 1.5.     6)  Hypertension   -  Will add Terazosin per Dr. Owens's recommendation.   -Long-term goal blood pressure less than 130/80, would like to reduce blood pressure by at least 10 mmHg during inpatient stay.    Cardiology will see again on Monday.  Please call over the weekend if needed further.  Thank you.    Lesley Jean PA-C  10/16/21, 08:52 EDT    I have seen and examined the patient, performing a face-to-face diagnostic evaluation with plan of care reviewed and developed with the Advanced Practice Clinician and nursing staff. I have addended and modified the above history of present illness, physical examination, and assessment and plan to reflect my findings and impressions    Caedn Arzate MD, FACC  10/16/21

## 2021-10-16 NOTE — PLAN OF CARE
Problem: Fall Injury Risk  Goal: Absence of Fall and Fall-Related Injury  Outcome: Ongoing, Progressing  Intervention: Identify and Manage Contributors to Fall Injury Risk  Recent Flowsheet Documentation  Taken 10/16/2021 0200 by Yeni Liu RN  Medication Review/Management: medications reviewed  Taken 10/16/2021 0000 by Yeni Liu RN  Medication Review/Management: medications reviewed  Taken 10/15/2021 2200 by Yeni Liu RN  Medication Review/Management: medications reviewed  Taken 10/15/2021 2052 by Yeni Liu RN  Medication Review/Management: medications reviewed  Intervention: Promote Injury-Free Environment  Recent Flowsheet Documentation  Taken 10/16/2021 0200 by Yeni Liu RN  Safety Promotion/Fall Prevention:   activity supervised   assistive device/personal items within reach   clutter free environment maintained   lighting adjusted   safety round/check completed   toileting scheduled  Taken 10/16/2021 0000 by Yeni Liu RN  Safety Promotion/Fall Prevention:   activity supervised   assistive device/personal items within reach   clutter free environment maintained   lighting adjusted   safety round/check completed   toileting scheduled   nonskid shoes/slippers when out of bed  Taken 10/15/2021 2200 by Yeni Liu RN  Safety Promotion/Fall Prevention:   activity supervised   assistive device/personal items within reach   clutter free environment maintained   lighting adjusted   safety round/check completed   toileting scheduled   nonskid shoes/slippers when out of bed  Taken 10/15/2021 2052 by Yeni Liu RN  Safety Promotion/Fall Prevention:   activity supervised   assistive device/personal items within reach   clutter free environment maintained   lighting adjusted   safety round/check completed   toileting scheduled   nonskid shoes/slippers when out of bed   Goal Outcome Evaluation:  Plan of Care Reviewed With: patient

## 2021-10-16 NOTE — PROGRESS NOTES
"Patient Name:  Jeremías Soto  YOB: 1955  [id number]    Surgery Progress Note    Date of visit: 10/16/2021    Subjective   No acute events overnight. Appetite continues to improve. No abdominal pain. No fevers/chills. +multiple BMs.         Objective       /86 (BP Location: Left arm, Patient Position: Lying)   Pulse 68   Temp 98.2 °F (36.8 °C) (Oral)   Resp 18   Ht 170.2 cm (67.01\")   Wt 94.5 kg (208 lb 4 oz)   SpO2 95%   BMI 32.61 kg/m²     Intake/Output Summary (Last 24 hours) at 10/16/2021 1135  Last data filed at 10/16/2021 0436  Gross per 24 hour   Intake 720 ml   Output 60 ml   Net 660 ml       CV:  Rhythm regular and rate regular  L:  Clear to auscultation bilaterally  Abd:  Bowel sounds positive, soft, inc c/d/i, JPs serous with bile tinge  Ext:  No cyanosis, clubbing, edema    Recent labs and imaging that are back at this time have been reviewed.   WBCs 14.3 -> 13.3  Hgb 9.1 -> 9.9  Cr 1.72  Tbili 1       Assessment/Plan     Pt s/p lap farooq with subsequent lap washout of hematoma and I and D of right incision, followed by ERCP with stent placement for suspected bile leak  Regular diet  OOB, IS, DVT prlx  Pain control  DC planning OK from surgical perspective           Gaudencio Wolfe MD  10/16/2021  11:35 EDT      "

## 2021-10-17 NOTE — PROGRESS NOTES
[facility] Medicine Services  PROGRESS NOTE    Patient Name: Jeremías Soto  : 1955  MRN: [id number]    Date of Admission: 10/4/2021  Primary Care Physician: Vincent Crawford MD    Subjective   Subjective     CC:  Abdominal pain    HPI  Feels better.  No abdominal pain or nausea this AM.  Ate cereal and a banana for breakfast.  States that he got up and walked yesterday, thinks he's strong enough to go home.     ROS:  Gen- No fevers, chills  CV- No chest pain, palpitations  Resp- No cough, dyspnea  GI- +nausea, denies abdominal pain.           Objective   Objective     Vital Signs:   Temp:  [98 °F (36.7 °C)-99.1 °F (37.3 °C)] 99.1 °F (37.3 °C)  Heart Rate:  [58-76] 62  Resp:  [16-18] 18  BP: (147-178)/(79-94) 156/89     Physical Exam:  Constitutional: Awake, alert, no acute distress, sitting up in bed, looks much better,   HENT: perioral scabbed lesions  Respiratory: Clear to auscultation bilaterally, respiratory effort normal   Cardiovascular: RRR, no murmurs, rubs, or gallops, palpable radial pulse  Gastrointestinal: Mildly tender with palpation, 2 LAWRENCE drains on right side with serosanguineous drainage, also small open RLQ area incision today bandaged with abdominal binder  Musculoskeletal: trace lower extremity edema  Psychiatric: Appropriate affect, cooperative  Neurologic: Speech clear, moving all extremity spontaneously      Results Reviewed:  LAB RESULTS:      Lab 10/17/21  0445 10/16/21  0920 10/16/21  0919 10/15/21  0502 10/14/21  0228 10/12/21  0702 10/11/21  0704 10/11/21  0704   WBC 12.88*  --  13.29* 14.28* 21.05* 11.04*   < > 9.39   HEMOGLOBIN 9.5*  --  9.9* 9.1* 9.9* 11.1*   < > 10.1*   HEMATOCRIT 29.9*  --  30.4* 27.5* 31.1* 33.5*   < > 31.8*   PLATELETS 208  --  226 175 158 269   < > 311   NEUTROS ABS  --   --   --  11.99*  --   --   --  7.23*   IMMATURE GRANS (ABS)  --   --   --  0.50*  --   --   --   --    LYMPHS ABS  --   --   --  0.91  --   --   --   --     MONOS ABS  --   --   --  0.71  --   --   --   --    EOS ABS  --   --   --  0.14  --   --   --  0.00   MCV 89.3  --  88.4 85.7 87.9 86.3   < > 89.1   PROTIME  --  13.7  --   --   --   --   --   --    APTT  --  33.3  --   --   --   --   --   --     < > = values in this interval not displayed.         Lab 10/17/21  0445 10/16/21  0920 10/15/21  0502 10/14/21  0228 10/13/21  0906 10/12/21  0701 10/12/21  0701 10/11/21  0704 10/11/21  0704   SODIUM 135* 136 135* 135* 137   < > 139   < > 145   POTASSIUM 3.7 3.7 4.0 4.4 4.1   < > 4.3   < > 4.2   CHLORIDE 101 100 102 104 104   < > 107   < > 113*   CO2 22.0 20.0* 21.0* 20.0* 19.0*   < > 19.0*   < > 17.0*   ANION GAP 12.0 16.0* 12.0 11.0 14.0   < > 13.0   < > 15.0   BUN 28* 29* 30* 34* 39*   < > 50*   < > 58*   CREATININE 1.69* 1.72* 1.52* 1.54* 1.64*   < > 1.80*   < > 2.16*   GLUCOSE 110* 120* 109* 144* 172*   < > 181*   < > 61*   CALCIUM 8.8 9.2 8.6 8.2* 8.5*   < > 8.6   < > 8.2*   IONIZED CALCIUM  --  1.30 1.27 1.24  --   --   --   --   --    MAGNESIUM  --  1.8 2.1 1.9  --   --   --   --   --    PHOSPHORUS  --  3.1 3.1 2.4*  --   --  2.7  --  3.3    < > = values in this interval not displayed.         Lab 10/17/21  0445 10/16/21  0920 10/15/21  0502 10/14/21  0228 10/12/21  0701   TOTAL PROTEIN 5.9* 6.7 5.7* 5.6* 6.1   ALBUMIN 3.10* 3.30* 2.60* 2.40* 2.60*   GLOBULIN 2.8 3.4 3.1 3.2 3.5   ALT (SGPT) 40 37 27 35 38   AST (SGOT) 43* 45* 27 37 43*   BILIRUBIN 0.9 1.0 0.9 1.2 1.0   ALK PHOS 163* 183* 150* 174* 193*   AMYLASE  --   --  37  --   --    LIPASE  --   --  34  --   --          Lab 10/16/21  0920   PROTIME 13.7   INR 1.08                 Brief Urine Lab Results  (Last result in the past 365 days)      Color   Clarity   Blood   Leuk Est   Nitrite   Protein   CREAT   Urine HCG        10/08/21 1823             186.2               Microbiology Results Abnormal     Procedure Component Value - Date/Time    Blood Culture - Blood, Hand, Left [[id number]]  (Normal) Collected:  10/14/21 1410    Lab Status: Preliminary result Specimen: Blood from Hand, Left Updated: 10/16/21 1515     Blood Culture No growth at 2 days    Blood Culture - Blood, Arm, Left [[id number]]  (Normal) Collected: 10/14/21 1412    Lab Status: Preliminary result Specimen: Blood from Arm, Left Updated: 10/16/21 1515     Blood Culture No growth at 2 days    Narrative:      AEROBIC BOTTLE ONLY    Blood Culture - Blood, Arm, Left [[id number]]  (Normal) Collected: 10/05/21 0734    Lab Status: Final result Specimen: Blood from Arm, Left Updated: 10/10/21 0901     Blood Culture No growth at 5 days    Narrative:      The previously reported component GRAM STAIN is no longer being reported. Previous result was <null> (Reference Range: [Reference Range]) on 10/8/2021 at 0901 EDT.    Blood Culture - Blood, Arm, Left [[id number]]  (Normal) Collected: 10/04/21 2120    Lab Status: Final result Specimen: Blood from Arm, Left Updated: 10/09/21 2215     Blood Culture No growth at 5 days    Narrative:      The previously reported component GRAM STAIN is no longer being reported. Previous result was <null> (Reference Range: [Reference Range]) on 10/8/2021 at 2215 EDT.    Blood Culture - Blood, Arm, Right [[id number]]  (Normal) Collected: 10/04/21 2143    Lab Status: Final result Specimen: Blood from Arm, Right Updated: 10/09/21 2215     Blood Culture No growth at 5 days    Narrative:      The previously reported component GRAM STAIN is no longer being reported. Previous result was <null> (Reference Range: [Reference Range]) on 10/8/2021 at 2215 EDT.    Eosinophil Smear - Urine, Urine, Clean Catch [[id number]]  (Normal) Collected: 10/08/21 1823    Lab Status: Final result Specimen: Urine, Clean Catch Updated: 10/08/21 2003     Eosinophil Smear 0 % EOS/100 Cells     Narrative:      No eosinophil seen    COVID PRE-OP / PRE-PROCEDURE SCREENING ORDER (NO ISOLATION) - Swab, Nasopharynx [[id number]]  (Normal) Collected: 10/05/21 0015    Lab Status:  Final result Specimen: Swab from Nasopharynx Updated: 10/05/21 0043    Narrative:      The following orders were created for panel order COVID PRE-OP / PRE-PROCEDURE SCREENING ORDER (NO ISOLATION) - Swab, Nasopharynx.  Procedure                               Abnormality         Status                     ---------                               -----------         ------                     COVID-19, ABBOTT IN-HOUS...[[id number]]  Normal              Final result                 Please view results for these tests on the individual orders.    COVID-19, ABBOTT IN-HOUSE,NASAL Swab (NO TRANSPORT MEDIA) 2 HR TAT - Swab, Nasopharynx [[id number]]  (Normal) Collected: 10/05/21 0015    Lab Status: Final result Specimen: Swab from Nasopharynx Updated: 10/05/21 0043     COVID19 Presumptive Negative    Narrative:      Fact sheet for providers: https://www.fda.gov/media/837551/download     Fact sheet for patients: https://www.fda.gov/media/423057/download    Test performed by PCR.  If inconsistent with clinical signs and symptoms patient should be tested with different authorized molecular test.          No radiology results from the last 24 hrs    Results for orders placed during the hospital encounter of 10/04/21    Adult Transthoracic Echo Complete W/ Cont if Necessary Per Protocol    Interpretation Summary  · Estimated left ventricular EF = 30% Left ventricular systolic function is moderately to severely decreased.  · Left ventricular wall thickness is consistent with septal asymmetric hypertrophy.  · Mild mitral valve regurgitation is present  · Trace tricuspid valve regurgitation is present.  · No left ventricular apical thrombus is seen.      I have reviewed the medications:  Scheduled Meds:amiodarone, 200 mg, Oral, BID With Meals  aspirin, 81 mg, Oral, Daily  atorvastatin, 40 mg, Oral, Nightly  bumetanide, 1 mg, Oral, Daily  docosanol, 1 application, Topical, 5x Daily  dorzolamide-timolol, , Both Eyes, BID  insulin lispro,  0-7 Units, Subcutaneous, 4x Daily With Meals & Nightly  lactobacillus acidophilus, 1 capsule, Oral, Daily  metoprolol tartrate, 50 mg, Oral, Q8H  micafungin (MYCAMINE) IV, 100 mg, Intravenous, Q24H  pantoprazole, 40 mg, Oral, Daily  piperacillin-tazobactam, 3.375 g, Intravenous, Q8H  predniSONE, 10 mg, Oral, Daily  rOPINIRole, 0.25 mg, Oral, Nightly  sodium chloride, 10 mL, Intravenous, Q12H  tamsulosin, 0.4 mg, Oral, Daily  terazosin, 1 mg, Oral, Nightly      Continuous Infusions:sodium chloride, 9 mL/hr, Last Rate: Stopped (10/11/21 1244)      PRN Meds:.dextrose  •  dextrose  •  glucagon (human recombinant)  •  magnesium sulfate **OR** magnesium sulfate in D5W 1g/100mL (PREMIX) **OR** magnesium sulfate  •  ondansetron  •  potassium & sodium phosphates **OR** potassium & sodium phosphates  •  Sodium Chloride (PF)  •  sodium chloride    Assessment/Plan   Assessment & Plan     Active Hospital Problems    Diagnosis  POA   • **Sepsis (HCC) [A41.9]  Yes   • Post-operative infection [T81.40XA]  Yes   • Abdominal wall cellulitis [L03.311]  Yes   • Postoperative intra-abdominal abscess [T81.43XA]  Yes   • Elevated troponin [R77.8]  Yes   • CAD (coronary artery disease) [I25.10]  Yes   • CKD (chronic kidney disease), stage III (HCC) [N18.30]  Yes   • Acute kidney injury superimposed on chronic kidney disease (HCC) [N17.9, N18.9]  Yes   • Bile leak [K83.9]  Unknown   • Elevated LFTs [R79.89]  Yes   • Combined systolic and diastolic cardiac dysfunction (EF < 50% 2018) [I51.89]  Yes   • History of sarcoidosis (occular) [Z86.2]  Yes   • Moderate obstructive sleep apnea [G47.33]  Yes   • Diabetes mellitus (HCC) [E11.9]  Yes   • Hypertension [I10]  Yes      Resolved Hospital Problems   No resolved problems to display.        Brief Hospital Course to date:  Jeremías Soto is a 66 y.o. male with systolic CHF (46-50%), chronic multiorgan disease, admitted 9/23-9/29 w/ enterococcal/clostridial bacteremia, cholangitis,  questionable pyelonephritis-s/p lap farooq 9/27 and ERCP 9/28 discharged home on IV Zosyn with a planned completion date 10/11 who returned with several days worsening erythema of the abdomen and imaging in the ED concerning for fluid collection in the gallbladder fossa now on empiric antibiotics with some initial improvement then new development of abdominal pain     Sepsis, abdominal source  Abnormal abdominal fluid collection  Recent cholecystitis  Recent enterococcal/clostridial bacteremia  -2.5 cm x 8.5 cm fluid collection on initial CT, unclear etiology given recent surgery  -S/p lap farooq 9/27, ERCP 9/28  -Repeat CT abdomen pelvis on 10/7/2021 with new 16 x 6 hepatic fluid collection and hematoma.  -Underwent laparoscopic washout and drain placement of intra-abdominal hematoma and incision and drainage of right flank cellulitis on 10/8/2021 per Dr. Callahan.  -ERCP performed on 10/11/2021, late small bile leak s/p Wallstent placement, plan removal of stent in 1 mos  -general surgery following    -Continue probiotic  -ID following.  Bodily fluid culture growing candida tropicalis and candida albicans.  stopped merrem and dapto and changed to zosyn, micafungin at least until 10/25/21 per ID with weekly CBC with diff, CMP, ESR, CRP to be faxed to [facility] [phone], also needs weekly groshong cathetor dressing changes  -note worsening leukocytosis yesterday (up to 21K from 11K) but trending back down today--repeat CTabd/pelvis shows effective drainage of perihepatic fluid collection/very small remaining fluid, mildly increased fat stranding around atrophic pancreas could be d/t recent surgery and stent, decompressed colon, biliary stent with correct placement, multiple diverticula    Peptic Duodenitis  --incidentally noted on ERCP, ?likely 2nd to occasional NSAID use.  AVOID NSAIDS    Anorexia  --nutrition following, starting to eat more.  Encouraged.      RUDY on CKD 3, improving  Hyperkalemia, resolved  Metabolic  acidosis  -Baseline Cr 1.9-2.3; EGFR 30.  Creatinine trending down  -Nephrology consulted.  Likely secondary to ATN.  Improved/back to baseline. Gave albumin and bumex yesterday, plans bumex 1mg PO daily,  fluid restriction 1.5L/day, starting lisinopril 5mg daily today      Acute blood loss anemia  -Likely postsurgical  -s/p transfusion on 10/9/2021    Hypoxia, resolved  -Chest x-ray on 10/9/2021 shows vascular congestion new since 10/4/2021.  -Diuresis per nephrology, s/p metolazone and albumin yesterday.  Bumex PO daily, , fluid restriction 1.5L/day    Systolic heart failure  Nonsustained V. Tach  -5 beat run on Sunday, asymptomatic  -Echo on 9/24/2021 showed EF of 26 to 30%  -Lisinopril on hold due to RUDY  -LifeVest at discharge  -cards following, recs continue amio but reduce dose to BID and increase metoprolol to 50mg q8h, may need to add terazosin    Postoperative anemia  -s/p transfusion on 10/9, with improvement in anemia  -IV iron per nephrology    Hypernatremia, resolved  -Resolved with D5W    LV thrombus  -Per cardiology note 10/6, records from the [facility] demonstrate prescription of Eliquis 7/19 for a large LV thrombus found on MRI  -Cardiology following.  Echo showed EF 30%, LV wall thickness c/w septal asymmetric hypertrophy, mild MR, trace TR, no LV apical thrombus seen.  Ok to stop eliquis per cardiology?  --needs lifevest at discharge per cards    Elevated INR, improving  - Trending down now to 1.08    Perioral lesions  -Empiric Valtrex, renally dosed     DM type 2, A1c 7.3%, without long-term use of insulin  -Accu-Cheks with SSI     CAD with prior CABG/stent  Chronic systolic/diastolic CHF  Mild troponin elevation  Hx multiple TIA  -Echo 9/24/2021 EF 26-30%  -Continue Lopressor  -Chronically on Eliquis, currently on hold  -Lisinopril on hold for RUDY     Elevated LFTs, resolved  -Recent admission cholangitis, holding statin as noted     Hypertension  Hyperlipidemia  -statin restarted since  daptomycin stopped     Ocular sarcoidosis  Bilateral lung nodules  -Repeat dedicated chest CT 6 months outpatient regarding nodules  -Continue chronic oral prednisone 10 mg     Obesity, BMI 35.16 kg/M2  EVA  Hx COVID-19 December 2020    DVT prophylaxis:  Mechanical DVT prophylaxis orders are present.     AM-PAC 6 Clicks Score (PT): 19 (10/16/21 0800)    Disposition: medically improved, ?Home vs rehab, awaiting PT recs      CODE STATUS:   Code Status and Medical Interventions:   Ordered at: 10/05/21 0134     Code Status:    CPR     Medical Interventions (Level of Support Prior to Arrest):    Full     I have prepared this progress note with copied portions of the prior day's progress note of my own authorship to preserve accuracy and maintain consistency of documentation. I have reviewed these portions and edited them for correctness. I verify that the above documentation accurately and truly represents the evaluation and management performed on today's date.     Justus Milan MD  10/17/21

## 2021-10-17 NOTE — PLAN OF CARE
Goal Outcome Evaluation:    VSS. Patient rested well overnight and did not verbalize any needs. Will continue to monitor.       Problem: Fall Injury Risk  Goal: Absence of Fall and Fall-Related Injury  Outcome: Ongoing, Progressing  Intervention: Promote Injury-Free Environment  Recent Flowsheet Documentation  Taken 10/17/2021 0200 by Supa Carr RN  Safety Promotion/Fall Prevention:   activity supervised   assistive device/personal items within reach   clutter free environment maintained   safety round/check completed   room organization consistent  Taken 10/17/2021 0000 by Supa Carr RN  Safety Promotion/Fall Prevention:   assistive device/personal items within reach   activity supervised   clutter free environment maintained   safety round/check completed   room organization consistent  Taken 10/16/2021 2200 by Supa Carr RN  Safety Promotion/Fall Prevention:   activity supervised   assistive device/personal items within reach   clutter free environment maintained   safety round/check completed   room organization consistent  Taken 10/16/2021 2000 by Supa Carr RN  Safety Promotion/Fall Prevention:   activity supervised   assistive device/personal items within reach   clutter free environment maintained   safety round/check completed   room organization consistent     Problem: Diabetes Comorbidity  Goal: Blood Glucose Level Within Desired Range  Outcome: Ongoing, Progressing     Problem: Adult Inpatient Plan of Care  Goal: Plan of Care Review  Outcome: Ongoing, Progressing  Goal: Patient-Specific Goal (Individualized)  Outcome: Ongoing, Progressing  Goal: Absence of Hospital-Acquired Illness or Injury  Outcome: Ongoing, Progressing  Intervention: Identify and Manage Fall Risk  Recent Flowsheet Documentation  Taken 10/17/2021 0200 by Supa Carr RN  Safety Promotion/Fall Prevention:   activity supervised   assistive device/personal items within reach   clutter free environment maintained    safety round/check completed   room organization consistent  Taken 10/17/2021 0000 by Supa Carr RN  Safety Promotion/Fall Prevention:   assistive device/personal items within reach   activity supervised   clutter free environment maintained   safety round/check completed   room organization consistent  Taken 10/16/2021 2200 by Supa Carr RN  Safety Promotion/Fall Prevention:   activity supervised   assistive device/personal items within reach   clutter free environment maintained   safety round/check completed   room organization consistent  Taken 10/16/2021 2000 by Supa Carr RN  Safety Promotion/Fall Prevention:   activity supervised   assistive device/personal items within reach   clutter free environment maintained   safety round/check completed   room organization consistent  Intervention: Prevent Skin Injury  Recent Flowsheet Documentation  Taken 10/17/2021 0200 by Supa Carr RN  Body Position: position changed independently  Skin Protection: adhesive use limited  Taken 10/17/2021 0000 by Supa Carr RN  Body Position: position changed independently  Skin Protection: adhesive use limited  Taken 10/16/2021 2200 by Supa Carr RN  Body Position: weight shift assistance provided  Skin Protection: adhesive use limited  Taken 10/16/2021 2000 by Supa Carr RN  Body Position: position changed independently  Skin Protection: adhesive use limited  Goal: Optimal Comfort and Wellbeing  Outcome: Ongoing, Progressing  Intervention: Provide Person-Centered Care  Recent Flowsheet Documentation  Taken 10/16/2021 2000 by Supa Carr RN  Trust Relationship/Rapport: care explained  Goal: Readiness for Transition of Care  Outcome: Ongoing, Progressing     Problem: Skin Injury Risk Increased  Goal: Skin Health and Integrity  Outcome: Ongoing, Progressing  Intervention: Optimize Skin Protection  Recent Flowsheet Documentation  Taken 10/17/2021 0200 by Supa Carr RN  Pressure Reduction  Techniques: frequent weight shift encouraged  Head of Bed (HOB): HOB elevated  Pressure Reduction Devices: specialty bed utilized  Skin Protection: adhesive use limited  Taken 10/17/2021 0000 by Supa Carr RN  Pressure Reduction Techniques: frequent weight shift encouraged  Head of Bed (HOB): HOB elevated  Pressure Reduction Devices: specialty bed utilized  Skin Protection: adhesive use limited  Taken 10/16/2021 2200 by Supa Carr RN  Pressure Reduction Techniques: frequent weight shift encouraged  Head of Bed (HOB): [facility] elevated  Pressure Reduction Devices: specialty bed utilized  Skin Protection: adhesive use limited  Taken 10/16/2021 2000 by Supa Carr RN  Pressure Reduction Techniques: frequent weight shift encouraged  Head of Bed (HOB): [facility] elevated  Pressure Reduction Devices: specialty bed utilized  Skin Protection: adhesive use limited

## 2021-10-17 NOTE — PROGRESS NOTES
"   LOS: 12 days    Patient Care Team:  Vincent Crawford MD as PCP - General (Family Medicine)  Manjula Owens MD as Consulting Physician (Cardiology)    Chief Complaint:  Abdominal pain    Subjective     Interval History:     No acute events overnight. No new complaints.     Review of Systems:   No CP or SOA , fever, chills, rigors, rash, N/V, Constipation.       Objective     Vital Sign Min/Max for last 24 hours  Temp  Min: 98 °F (36.7 °C)  Max: 99.1 °F (37.3 °C)   BP  Min: 147/86  Max: 178/89   Pulse  Min: 58  Max: 72   Resp  Min: 16  Max: 18   No data recorded   No data recorded   Weight  Min: 94.7 kg (208 lb 11.2 oz)  Max: 94.7 kg (208 lb 11.2 oz)     Flowsheet Rows      First Filed Value   Admission Height 177.8 cm (70\") Documented at 10/04/2021 1842   Admission Weight 95.3 kg (210 lb) Documented at 10/04/2021 1842          I/O this shift:  In: -   Out: 350 [Urine:350]  I/O last 3 completed shifts:  In: -   Out: 195 [Urine:100; Drains:95]    Physical Exam:    Gen: Alert, NAD   HENT: NC, AT, EOMI   NECK: Supple, no JVD, Trachea midline   LUNGS: CTA bilaterally, non labored respirtation   CVS: S1/S2 audible, RRR, no murmur   Abd: Soft, NT, ND, BS+   Ext: + pedal edema, no cyanosis   CNS: Alert, No focal deficit noted grossly  Psy: Cooperative  Skin: Warm, dry and intact      WBC WBC   Date Value Ref Range Status   10/17/2021 12.88 (H) 3.40 - 10.80 10*3/mm3 Final   10/16/2021 13.29 (H) 3.40 - 10.80 10*3/mm3 Final   10/15/2021 14.28 (H) 3.40 - 10.80 10*3/mm3 Final      HGB Hemoglobin   Date Value Ref Range Status   10/17/2021 9.5 (L) 13.0 - 17.7 g/dL Final   10/16/2021 9.9 (L) 13.0 - 17.7 g/dL Final   10/15/2021 9.1 (L) 13.0 - 17.7 g/dL Final      HCT Hematocrit   Date Value Ref Range Status   10/17/2021 29.9 (L) 37.5 - 51.0 % Final   10/16/2021 30.4 (L) 37.5 - 51.0 % Final   10/15/2021 27.5 (L) 37.5 - 51.0 % Final      Platlets No results found for: LABPLAT   MCV MCV   Date Value Ref Range Status "   10/17/2021 89.3 79.0 - 97.0 fL Final   10/16/2021 88.4 79.0 - 97.0 fL Final   10/15/2021 85.7 79.0 - 97.0 fL Final          Sodium Sodium   Date Value Ref Range Status   10/17/2021 135 (L) 136 - 145 mmol/L Final   10/16/2021 136 136 - 145 mmol/L Final   10/15/2021 135 (L) 136 - 145 mmol/L Final      Potassium Potassium   Date Value Ref Range Status   10/17/2021 3.7 3.5 - 5.2 mmol/L Final   10/16/2021 3.7 3.5 - 5.2 mmol/L Final   10/15/2021 4.0 3.5 - 5.2 mmol/L Final      Chloride Chloride   Date Value Ref Range Status   10/17/2021 101 98 - 107 mmol/L Final   10/16/2021 100 98 - 107 mmol/L Final   10/15/2021 102 98 - 107 mmol/L Final      CO2 CO2   Date Value Ref Range Status   10/17/2021 22.0 22.0 - 29.0 mmol/L Final   10/16/2021 20.0 (L) 22.0 - 29.0 mmol/L Final   10/15/2021 21.0 (L) 22.0 - 29.0 mmol/L Final      BUN BUN   Date Value Ref Range Status   10/17/2021 28 (H) 8 - 23 mg/dL Final   10/16/2021 29 (H) 8 - 23 mg/dL Final   10/15/2021 30 (H) 8 - 23 mg/dL Final      Creatinine Creatinine   Date Value Ref Range Status   10/17/2021 1.69 (H) 0.76 - 1.27 mg/dL Final   10/16/2021 1.72 (H) 0.76 - 1.27 mg/dL Final   10/15/2021 1.52 (H) 0.76 - 1.27 mg/dL Final      Calcium Calcium   Date Value Ref Range Status   10/17/2021 8.8 8.6 - 10.5 mg/dL Final   10/16/2021 9.2 8.6 - 10.5 mg/dL Final   10/15/2021 8.6 8.6 - 10.5 mg/dL Final      PO4 No results found for: CAPO4   Albumin Albumin   Date Value Ref Range Status   10/17/2021 3.10 (L) 3.50 - 5.20 g/dL Final   10/16/2021 3.30 (L) 3.50 - 5.20 g/dL Final   10/15/2021 2.60 (L) 3.50 - 5.20 g/dL Final      Magnesium Magnesium   Date Value Ref Range Status   10/17/2021 1.6 1.6 - 2.4 mg/dL Final   10/16/2021 1.8 1.6 - 2.4 mg/dL Final   10/15/2021 2.1 1.6 - 2.4 mg/dL Final      Uric Acid No results found for: URICACID        Results Review:     I reviewed the patient's new clinical results.    amiodarone, 200 mg, Oral, BID With Meals  aspirin, 81 mg, Oral, Daily  atorvastatin,  40 mg, Oral, Nightly  bumetanide, 1 mg, Oral, Daily  docosanol, 1 application, Topical, 5x Daily  dorzolamide-timolol, , Both Eyes, BID  insulin lispro, 0-7 Units, Subcutaneous, 4x Daily With Meals & Nightly  lactobacillus acidophilus, 1 capsule, Oral, Daily  metoprolol tartrate, 50 mg, Oral, Q8H  micafungin (MYCAMINE) IV, 100 mg, Intravenous, Q24H  pantoprazole, 40 mg, Oral, Daily  piperacillin-tazobactam, 3.375 g, Intravenous, Q8H  predniSONE, 10 mg, Oral, Daily  rOPINIRole, 0.25 mg, Oral, Nightly  sodium chloride, 10 mL, Intravenous, Q12H  tamsulosin, 0.4 mg, Oral, Daily  terazosin, 1 mg, Oral, Nightly           Medication Review: yes    Assessment/Plan       Sepsis (HCC)    Diabetes mellitus (HCC)    Hypertension    Moderate obstructive sleep apnea    History of sarcoidosis (occular)    Elevated LFTs    Combined systolic and diastolic cardiac dysfunction (EF < 50% 2018)    Post-operative infection    Abdominal wall cellulitis    Postoperative intra-abdominal abscess    Elevated troponin    CAD (coronary artery disease)    CKD (chronic kidney disease), stage III (HCC)    Acute kidney injury superimposed on chronic kidney disease (HCC)    Bile leak      1- RUDY on CKD - pre-enal azotemia from sepsis/abdominal wall cellulitis - resolved.   2- CKD stage III - Baseline Scr 1.8- 2.2 - Nephrosclerosis and obstructive uropathy.   3- Hx of nephrolithiasis - requiring ureteral stent in past   4- Sepsis/abdominal wall cellulitis   5- Hx of CAD s/p CABG.   6- Metabolic acidosis  -stable.  7- Peripheral edema - improving.     Plan:  - Continue with current.   - Avoid nephrotoxic agents.   - Renal diet.   - Fluid restriction 1.5 lit/day   - Will start on lisinopril 5 mg po q daily.       Follow up with NAL in 2-3 weeks after discharge with renal function panel and UA    Aleena Abarca MD  10/17/21  09:41 EDT

## 2021-10-18 NOTE — PLAN OF CARE
Problem: Fall Injury Risk  Goal: Absence of Fall and Fall-Related Injury  Outcome: Ongoing, Progressing  Intervention: Identify and Manage Contributors to Fall Injury Risk  Recent Flowsheet Documentation  Taken 10/18/2021 0730 by Sydnie Finley RN  Medication Review/Management: medications reviewed  Intervention: Promote Injury-Free Environment  Recent Flowsheet Documentation  Taken 10/18/2021 0730 by Sydnie Finley RN  Safety Promotion/Fall Prevention: activity supervised     Problem: Diabetes Comorbidity  Goal: Blood Glucose Level Within Desired Range  Outcome: Ongoing, Progressing     Problem: Adult Inpatient Plan of Care  Goal: Plan of Care Review  Outcome: Ongoing, Progressing  Goal: Patient-Specific Goal (Individualized)  Outcome: Ongoing, Progressing  Goal: Absence of Hospital-Acquired Illness or Injury  Outcome: Ongoing, Progressing  Intervention: Identify and Manage Fall Risk  Recent Flowsheet Documentation  Taken 10/18/2021 0730 by Sydnie Finley RN  Safety Promotion/Fall Prevention: activity supervised  Intervention: Prevent Skin Injury  Recent Flowsheet Documentation  Taken 10/18/2021 0730 by Sydnie Finley RN  Body Position: position changed independently  Intervention: Prevent Infection  Recent Flowsheet Documentation  Taken 10/18/2021 0730 by Sydnie Finley RN  Infection Prevention: single patient room provided  Goal: Optimal Comfort and Wellbeing  Outcome: Ongoing, Progressing  Goal: Readiness for Transition of Care  Outcome: Ongoing, Progressing     Problem: Skin Injury Risk Increased  Goal: Skin Health and Integrity  Outcome: Ongoing, Progressing  Intervention: Optimize Skin Protection  Recent Flowsheet Documentation  Taken 10/18/2021 0730 by Sydnie Finley RN  Head of Bed (HOB): HOB at 20 degrees   Goal Outcome Evaluation:

## 2021-10-18 NOTE — PROGRESS NOTES
Clinical Nutrition   Reason For Visit: Follow-up protocol    Patient Name: Jeremías Soto  YOB: 1955  MRN: [id number]  Date of Encounter: 10/18/21 13:25 EDT  Admission date: 10/4/2021      Nutrition Assessment     Admission Problem List:  Sepsis  Elevated LFTs  Abdominal wall cellulitis  Postoperative intra-abdominal hematoma  Intermittent nausea  RUDY on CKD stage 3  Perioral lesions  Skin tear on arm  Presence of LV thrombus  Hypernatremia  SOB  Possible post-op bile leak  Peptic duodenitis  Dyspepsia  SVT      Applicable PMH:  HTN, HLD  CHF  CAD s/p CABG and stents  T2DM  CKD stage 3  Sarcoidosis (occular)  Covid-19 (12/2020)  S/p CCY (9/27/21)      Applicable medical tests/procedures since admission:  (10/5) s/p ultrasound guided abscess drain  (10/8) s/p washout and drain placement, I&D of right flank cellulitis  (10/11) s/p ERCP - no bile leak, wall stent placed, peptic duodenitis      Reported/Observed/Food/Nutrition Related History   10/18: RD notes patient's appetite/PO intake improving during the past 1 week. Discharge soon.    10/15: Patient and wife present during visit. Wife reports patient's appetite/PO intake slowly improving. Yesterday ate a bit at each meal and drank almost 2 full cartons of Premier Protein. This morning consumed 5 hashbrown rounds and 1.5 chicken minis. About to drink a Premier Protein drink.    10/13: RD notes ongoing abdominal pain. GI ordered KUB to rule out stent migration. Poor appetite/PO intake ongoing.    10/11: Patient off of floor at time of visit, wife at bedside. Wife reports hasn't been taking in a whole lot due to both decreased appetite as well as full liquid diet past 2 days. Has taken sips of one ONS drink - doesn't care for them. Hopeful that patient's PO intake will improve with advanced to regular foods/diet after ERCP. Wife concerned patient will not eat much due to being afraid of getting sick with PO intake.    10/7: Wife reaffirms rpt.      10/5:  Patient and wife present during visit. Patient's wife provides majority of information. Reports patient wasn't eating very well during most of previous [facility] admission (9/23-9/29), but did eat well 9/29-9/30. On 10/1 patient began having poor appetite/intermittent nausea and poor PO intake (</=25% of usual). Reports patient weighed 218 lbs on (9/23) but unsure what patient weighs at this time. RD requested standing scale weight from RN. NPO at this time. Agrees to try clear liquid ONS drinks (no orange flavor). Denies food allergies and difficulty chewing/swallowing.    Anthropometrics   Height: 67 in  Weight: 200 lbs (bed scale weight 10/18)  BMI: 31.3  BMI classification: Obese Class II: 35-39.9kg/m2   IBW: 148 lbs    UBW: 218 lbs (9/23/21) per wife  Per EMR (GUSTAVO) - 219 lbs (6/17/21), 226 lbs (2/18/21), 219 lbs (12/10/20)  Weight change: RD unable to evaluate weight changes prior to/during admission 2/2 bed scale weights fluctuating, no standing scale weight available as patient has been unable to stand    Labs reviewed   Labs reviewed: Yes    Medications reviewed   Medications reviewed: Yes  Pertinent: antibiotic, probiotic, insulin, bumex, protonix, steroid    Needs Assessment 10/7   Weight used: 97.6 Kg wt on 10/7  IBW: 67.3 Kg    Estimated Energy needs: ~ 1755 Kcal/da based on 18 Kcal/Kg       Estimated Protein needs: ~ 81 g/da based on 1.2g/Kg IBW  Pt w CKD       Estimated Fluid needs: ~1755 ml fluid daily      Current Nutrition Prescription   PO: Diet Regular; Consistent Carbohydrate   Oral Nutrition Supplement: Premier Protein 3x daily (per MD 10/12)    Average PO intake: 75% x 3 meals    Nutrition Diagnosis     10/5, 10/7, 10/11, 10/13, 10/15  Problem Inadequate oral intake   Etiology Decreased appetite, altered GI function s/p CCY, nausea   Signs/Symptoms PO intake: 25% x 3 meals + 2 Premier Protein drinks (10/14) per nsg doc   Status: resolved (10/18)    Intervention   Intervention: Follow  treatment progress, Care plan reviewed, Encourage intake    -Continue vanilla Premier Protein 2x daily.    Goal:   General: Nutrition support treatment  PO: Maintain intake    Monitoring/Evaluation:   Monitoring/Evaluation: Per protocol, I&O, PO intake, Supplement intake, Pertinent labs, Weight, GI status, Symptoms    Opal Car RD  Time Spent: 15 min

## 2021-10-18 NOTE — PROGRESS NOTES
"Patient Name:  Jeremías Soto  YOB: 1955  [id number]    Surgery Progress Note    Date of visit: 10/18/2021    Subjective   No acute events overnight. No abdominal pain. No fevers/chills. Improved appetite. BMs slowing.         Objective       /91 (BP Location: Left arm, Patient Position: Lying)   Pulse 68   Temp 98.8 °F (37.1 °C) (Axillary)   Resp 18   Ht 170.2 cm (67.01\")   Wt 90.8 kg (200 lb 1.6 oz)   SpO2 95%   BMI 31.33 kg/m²     Intake/Output Summary (Last 24 hours) at 10/18/2021 0756  Last data filed at 10/18/2021 0534  Gross per 24 hour   Intake --   Output 1290 ml   Net -1290 ml       CV:  Rhythm regular and rate regular  L:  Clear to auscultation bilaterally  Abd:  Bowel sounds positive, soft, inc c/d/i, JPs serosang with bile tinge  Ext:  No cyanosis, clubbing, edema    Recent labs and imaging that are back at this time have been reviewed.   WBCs 15.5  Hgb 10.4  Cr 1.73       Assessment/Plan     Pt s/p lap farooq with subsequent lap washout of hematoma and I and D of right incision, followed by ERCP with stent placement for suspected bile leak  Regular diet  OOB, IS, DVT prlx  Pain control  Slight increase in WBCs although symptoms continue to improve -> would notify ID  DC planning ok from surgical perspective        Gaudencio Wolfe MD  10/18/2021  07:56 EDT      "

## 2021-10-18 NOTE — PROGRESS NOTES
"   LOS: 13 days    Patient Care Team:  Vincent Crawford MD as PCP - General (Family Medicine)  Manjula Owens MD as Consulting Physician (Cardiology)    Chief Complaint:  Abdominal pain    Subjective     Interval History:     No acute events overnight. No new complaints.     Review of Systems:   No CP or SOA , fever, chills, rigors, rash, N/V, Constipation.       Objective     Vital Sign Min/Max for last 24 hours  Temp  Min: 98 °F (36.7 °C)  Max: 98.8 °F (37.1 °C)   BP  Min: 142/79  Max: 149/87   Pulse  Min: 59  Max: 71   Resp  Min: 16  Max: 18   No data recorded   No data recorded   Weight  Min: 90.8 kg (200 lb 1.6 oz)  Max: 90.8 kg (200 lb 1.6 oz)     Flowsheet Rows      First Filed Value   Admission Height 177.8 cm (70\") Documented at 10/04/2021 1842   Admission Weight 95.3 kg (210 lb) Documented at 10/04/2021 1842          I/O this shift:  In: 237 [P.O.:237]  Out: -   I/O last 3 completed shifts:  In: -   Out: 1430 [Urine:1350; Drains:80]    Physical Exam:    Gen: Alert, NAD   HENT: NC, AT, EOMI   NECK: Supple, no JVD, Trachea midline   LUNGS: CTA bilaterally, non labored respirtation   CVS: S1/S2 audible, RRR, no murmur   Abd: Soft, NT, ND, BS+   Ext: + pedal edema, no cyanosis   CNS: Alert, No focal deficit noted grossly  Psy: Cooperative  Skin: Warm, dry and intact      WBC WBC   Date Value Ref Range Status   10/18/2021 15.54 (H) 3.40 - 10.80 10*3/mm3 Final   10/17/2021 12.88 (H) 3.40 - 10.80 10*3/mm3 Final   10/16/2021 13.29 (H) 3.40 - 10.80 10*3/mm3 Final      HGB Hemoglobin   Date Value Ref Range Status   10/18/2021 10.4 (L) 13.0 - 17.7 g/dL Final   10/17/2021 9.5 (L) 13.0 - 17.7 g/dL Final   10/16/2021 9.9 (L) 13.0 - 17.7 g/dL Final      HCT Hematocrit   Date Value Ref Range Status   10/18/2021 30.9 (L) 37.5 - 51.0 % Final   10/17/2021 29.9 (L) 37.5 - 51.0 % Final   10/16/2021 30.4 (L) 37.5 - 51.0 % Final      Platlets No results found for: LABPLAT   MCV MCV   Date Value Ref Range Status "   10/18/2021 87.5 79.0 - 97.0 fL Final   10/17/2021 89.3 79.0 - 97.0 fL Final   10/16/2021 88.4 79.0 - 97.0 fL Final          Sodium Sodium   Date Value Ref Range Status   10/18/2021 137 136 - 145 mmol/L Final   10/17/2021 135 (L) 136 - 145 mmol/L Final   10/16/2021 136 136 - 145 mmol/L Final      Potassium Potassium   Date Value Ref Range Status   10/18/2021 3.7 3.5 - 5.2 mmol/L Final   10/17/2021 3.7 3.5 - 5.2 mmol/L Final   10/16/2021 3.7 3.5 - 5.2 mmol/L Final      Chloride Chloride   Date Value Ref Range Status   10/18/2021 99 98 - 107 mmol/L Final   10/17/2021 101 98 - 107 mmol/L Final   10/16/2021 100 98 - 107 mmol/L Final      CO2 CO2   Date Value Ref Range Status   10/18/2021 23.0 22.0 - 29.0 mmol/L Final   10/17/2021 22.0 22.0 - 29.0 mmol/L Final   10/16/2021 20.0 (L) 22.0 - 29.0 mmol/L Final      BUN BUN   Date Value Ref Range Status   10/18/2021 25 (H) 8 - 23 mg/dL Final   10/17/2021 28 (H) 8 - 23 mg/dL Final   10/16/2021 29 (H) 8 - 23 mg/dL Final      Creatinine Creatinine   Date Value Ref Range Status   10/18/2021 1.73 (H) 0.76 - 1.27 mg/dL Final   10/17/2021 1.69 (H) 0.76 - 1.27 mg/dL Final   10/16/2021 1.72 (H) 0.76 - 1.27 mg/dL Final      Calcium Calcium   Date Value Ref Range Status   10/18/2021 8.9 8.6 - 10.5 mg/dL Final   10/17/2021 8.8 8.6 - 10.5 mg/dL Final   10/16/2021 9.2 8.6 - 10.5 mg/dL Final      PO4 No results found for: CAPO4   Albumin Albumin   Date Value Ref Range Status   10/18/2021 3.20 (L) 3.50 - 5.20 g/dL Final   10/17/2021 3.10 (L) 3.50 - 5.20 g/dL Final   10/16/2021 3.30 (L) 3.50 - 5.20 g/dL Final      Magnesium Magnesium   Date Value Ref Range Status   10/18/2021 1.9 1.6 - 2.4 mg/dL Final   10/17/2021 1.6 1.6 - 2.4 mg/dL Final   10/16/2021 1.8 1.6 - 2.4 mg/dL Final      Uric Acid No results found for: URICACID        Results Review:     I reviewed the patient's new clinical results.    amiodarone, 200 mg, Oral, BID With Meals  aspirin, 81 mg, Oral, Daily  atorvastatin, 40 mg,  Oral, Nightly  bumetanide, 1 mg, Oral, Daily  docosanol, 1 application, Topical, 5x Daily  dorzolamide-timolol, , Both Eyes, BID  insulin lispro, 0-7 Units, Subcutaneous, 4x Daily With Meals & Nightly  lactobacillus acidophilus, 1 capsule, Oral, Daily  lisinopril, 5 mg, Oral, Q24H  [START ON 10/19/2021] metoprolol tartrate, 75 mg, Oral, Q12H  micafungin (MYCAMINE) IV, 100 mg, Intravenous, Q24H  pantoprazole, 40 mg, Oral, Daily  piperacillin-tazobactam, 3.375 g, Intravenous, Q8H  predniSONE, 10 mg, Oral, Daily  rOPINIRole, 0.25 mg, Oral, Nightly  sodium chloride, 10 mL, Intravenous, Q12H  tamsulosin, 0.4 mg, Oral, Daily  terazosin, 1 mg, Oral, Nightly           Medication Review: yes    Assessment/Plan       Sepsis (HCC)    Diabetes mellitus (HCC)    Hypertension    Moderate obstructive sleep apnea    History of sarcoidosis (occular)    Elevated LFTs    Combined systolic and diastolic cardiac dysfunction (EF < 50% 2018)    Post-operative infection    Abdominal wall cellulitis    Postoperative intra-abdominal abscess    Elevated troponin    CAD (coronary artery disease)    CKD (chronic kidney disease), stage III (HCC)    Acute kidney injury superimposed on chronic kidney disease (HCC)    Bile leak      1- RUDY on CKD - pre-enal azotemia from sepsis/abdominal wall cellulitis - resolved.   2- CKD stage III - Baseline Scr 1.8- 2.2 - Nephrosclerosis and obstructive uropathy.   3- Hx of nephrolithiasis - requiring ureteral stent in past   4- Sepsis/abdominal wall cellulitis   5- Hx of CAD s/p CABG.   6- Metabolic acidosis  -stable.  7- Peripheral edema - improving.     Plan:  - Continue with current. Net negative 1.2 lit/24 hrs.   - Avoid nephrotoxic agents.   - Renal diet.   - Fluid restriction 1.5 lit/day     Follow up with NAL in 2-3 weeks after discharge with renal function panel and UA    Aleena Abarca MD  10/18/21  09:58 EDT

## 2021-10-18 NOTE — PROGRESS NOTES
" [facility] - Progress Note    Jeremías Soto  1955  [id number]    10/18/21, 08:52 EDT      Chief Complaint: Following for CHF, CAD, NSVT    Subjective:  95% on room air.  Blood pressure and heart rates are well controlled.      Review of Systems:  Pertinent positives are listed above and in physical exam.  All others have been reviewed and are negative.    amiodarone, 200 mg, Oral, BID With Meals  aspirin, 81 mg, Oral, Daily  atorvastatin, 40 mg, Oral, Nightly  bumetanide, 1 mg, Oral, Daily  docosanol, 1 application, Topical, 5x Daily  dorzolamide-timolol, , Both Eyes, BID  insulin lispro, 0-7 Units, Subcutaneous, 4x Daily With Meals & Nightly  lactobacillus acidophilus, 1 capsule, Oral, Daily  lisinopril, 5 mg, Oral, Q24H  metoprolol tartrate, 50 mg, Oral, Q8H  micafungin (MYCAMINE) IV, 100 mg, Intravenous, Q24H  pantoprazole, 40 mg, Oral, Daily  piperacillin-tazobactam, 3.375 g, Intravenous, Q8H  predniSONE, 10 mg, Oral, Daily  rOPINIRole, 0.25 mg, Oral, Nightly  sodium chloride, 10 mL, Intravenous, Q12H  tamsulosin, 0.4 mg, Oral, Daily  terazosin, 1 mg, Oral, Nightly        Objective:  Vitals:   height is 170.2 cm (67.01\") and weight is 90.8 kg (200 lb 1.6 oz). His axillary temperature is 98.8 °F (37.1 °C). His blood pressure is 145/91 and his pulse is 68. His respiration is 18 and oxygen saturation is 95%.       Intake/Output Summary (Last 24 hours) at 10/18/2021 0771  Last data filed at 10/18/2021 0534  Gross per 24 hour   Intake --   Output 1290 ml   Net -1290 ml       Physical Exam:    General: Alert and oriented   Cardiovascular: Heart has a nondisplaced focal PMI. Regular rate and rhythm without murmur, gallop or rub.  Lungs: Clear without rales or wheezes.  Slightly decreased bases  Extremities: Show 1+ edema.  May be slightly better  Skin: warm and dry.  Neurologic: nonfocal         Results from last 7 days   Lab Units 10/18/21  0451   WBC 10*3/mm3 15.54*   HEMOGLOBIN " g/dL 10.4*   HEMATOCRIT % 30.9*   PLATELETS 10*3/mm3 271     Results from last 7 days   Lab Units 10/18/21  0451   SODIUM mmol/L 137   POTASSIUM mmol/L 3.7   CHLORIDE mmol/L 99   CO2 mmol/L 23.0   BUN mg/dL 25*   CREATININE mg/dL 1.73*   CALCIUM mg/dL 8.9   BILIRUBIN mg/dL 0.9   ALK PHOS U/L 178*   ALT (SGPT) U/L 53*   AST (SGOT) U/L 54*   GLUCOSE mg/dL 123*     Results from last 7 days   Lab Units 10/16/21  0920   INR  1.08                 Echo:No thrombus by TTE.  Apical DK.  EF 30%  Tele:  NSR    Assessment and Plan:  1)  NSVT  - 25 beat run at [address] 10/10/21 at 135 bpm, asymptomatic  -  9/24/21 Echo EF 26-30%  -  On low dose metoprolol tartrate 12.5mg BID   -  Initiated oral Amiodarone 10/10.  EKG reviewed, NSR with acceptable QTc.  -  Will need LifeVest at discharge.     2)  CAD/ischemic cardiomyopathy/congestive heart failure  -  Prior CABG/stents  -  Mild troponin elevation on admission - likely chronic, currently chest pain-free with no anginal equivalents  -  Echocardiogram 9/24/2021 LVEF 26-30%.  Again, will need LifeVest at discharge.  -  Continue Lopressor, home lisinopril on hold due to acute kidney injury        3) History of LV thrombus  - per records from [facility] as documented by Dr Owens patient has previously been anticoagulated with Eliquis currently on hold given recent surgical intervention.    -  Per prior notes, will pursue echocardiogram with [facility] for reassessment of LV thrombus. Surgery has given the ok to resume anti coagulation         4) Sepsis/abdominal wall cellulitis  - 10/8/21  laparoscopic washout and drain placement.  Incision and drainage of right flank cellulitis.  Surgical management per Dr. Callahan  -  Infectious disease managing antibiotics  -  Anemia/transfusion per primary/surgical teams.  - S/P ERCP and biliary Wallstent Monday 10/11.   Surgery has given ok to resume AC  -Leukocytosis improving as of 10/15/2021       5) RUDY on CKD    -   Nephrology  following.Creatinine now stable at 1.5.     No further NSVT  Cont amio for NSVT but reduce dose to twice daily.   Continue lisinopril at 5 mg daily  Change metoprolol to 75 mg every 12 hours.  Will need LifeVest prior to discharge.      Manjula Owens MD-C  10/18/21, 08:52 EDT

## 2021-10-18 NOTE — PROGRESS NOTES
"[facility] Cardiology Daily Note       LOS: 13 days   Patient Care Team:  Vincent Crawford MD as PCP - General (Family Medicine)  Manjula Owens MD as Consulting Physician (Cardiology)    Chief Complaint:  NSVT; chronic systolic heart failure    Subjective     Subjective: No problems overnight.  Blood pressure improved with the addition of 5 mg of lisinopril.  Minimal abdominal discomfort.  Creatinine is stable.  H&H is stable.    Review of Systems:   As above.    Medications:  amiodarone, 200 mg, Oral, BID With Meals  aspirin, 81 mg, Oral, Daily  atorvastatin, 40 mg, Oral, Nightly  bumetanide, 1 mg, Oral, Daily  docosanol, 1 application, Topical, 5x Daily  dorzolamide-timolol, , Both Eyes, BID  insulin lispro, 0-7 Units, Subcutaneous, 4x Daily With Meals & Nightly  lactobacillus acidophilus, 1 capsule, Oral, Daily  lisinopril, 5 mg, Oral, Q24H  [START ON 10/19/2021] metoprolol tartrate, 75 mg, Oral, Q12H  micafungin (MYCAMINE) IV, 100 mg, Intravenous, Q24H  pantoprazole, 40 mg, Oral, Daily  piperacillin-tazobactam, 3.375 g, Intravenous, Q8H  predniSONE, 10 mg, Oral, Daily  rOPINIRole, 0.25 mg, Oral, Nightly  sodium chloride, 10 mL, Intravenous, Q12H  tamsulosin, 0.4 mg, Oral, Daily  terazosin, 1 mg, Oral, Nightly        Objective     Vital Sign Min/Max for last 24 hours  Temp  Min: 98 °F (36.7 °C)  Max: 98.8 °F (37.1 °C)    BP  Min: 142/79  Max: 149/87   Pulse  Min: 59  Max: 71   Resp  Min: 16  Max: 18   SpO2  Min: 95 %  Max: 95 %   No data recorded   Weight  Min: 90.8 kg (200 lb 1.6 oz)  Max: 90.8 kg (200 lb 1.6 oz)      Intake/Output Summary (Last 24 hours) at 10/18/2021 0755  Last data filed at 10/18/2021 0534  Gross per 24 hour   Intake --   Output 1290 ml   Net -1290 ml        Flowsheet Rows      First Filed Value   Admission Height 177.8 cm (70\") Documented at 10/04/2021 1842   Admission Weight 95.3 kg (210 lb) Documented at 10/04/2021 1842          Physical Exam:    General: " Alert and oriented   Cardiovascular: Heart has a nondisplaced focal PMI. Regular rate and rhythm without murmur, gallop or rub.  Lungs: Clear without rales or wheezes. Equal expansion is noted.   Extremities: Show trace edema.  Skin: warm and dry.  Neurologic: nonfocal       Results Review:    I reviewed the patient's new clinical results.  EKG:  Tele: NSR    Labs:    Results from last 7 days   Lab Units 10/18/21  0451 10/17/21  0445 10/16/21  0920   SODIUM mmol/L 137 135* 136   POTASSIUM mmol/L 3.7 3.7 3.7   CHLORIDE mmol/L 99 101 100   CO2 mmol/L 23.0 22.0 20.0*   BUN mg/dL 25* 28* 29*   CREATININE mg/dL 1.73* 1.69* 1.72*   CALCIUM mg/dL 8.9 8.8 9.2   BILIRUBIN mg/dL 0.9 0.9 1.0   ALK PHOS U/L 178* 163* 183*   ALT (SGPT) U/L 53* 40 37   AST (SGOT) U/L 54* 43* 45*   GLUCOSE mg/dL 123* 110* 120*     Results from last 7 days   Lab Units 10/18/21  0451 10/17/21  0445 10/16/21  0919   WBC 10*3/mm3 15.54* 12.88* 13.29*   HEMOGLOBIN g/dL 10.4* 9.5* 9.9*   HEMATOCRIT % 30.9* 29.9* 30.4*   PLATELETS 10*3/mm3 271 208 226     Lab Results   Component Value Date    TROPONINT 0.048 (C) 10/05/2021    TROPONINT 0.054 (C) 10/04/2021    TROPONINT 0.071 (C) 10/04/2021        Lab Results   Component Value Date    INR 1.08 10/16/2021    INR 1.23 (H) 10/10/2021    INR 1.43 (H) 10/09/2021    PROTIME 13.7 10/16/2021    PROTIME 15.1 (H) 10/10/2021    PROTIME 17.0 (H) 10/09/2021         Assessment      Assessment and Plan:  1)  NSVT  - 25 beat run at [address]16 [address] 10/10/21 at 135 bpm, asymptomatic  -  Initiated oral Amiodarone 10/10.   -  Will need LifeVest at discharge.     2)  CAD  -  Prior CABG/stents  -  Mild troponin elevation on admission, presumed to be chronic/not ACS    3) Chronic systolic heart failure  -  Echocardiogram 9/24/2021 LVEF 26-30%.    4) History of LV thrombus  - Repeat echo without thrombus. NOAC discontinued.       5) Sepsis/abdominal wall cellulitis  - 10/8/21  laparoscopic washout and drain placement.  Incision and  drainage of right flank cellulitis.  Surgical management per Dr. Callahan  -  Infectious disease managing antibiotics  -  Anemia/transfusion per primary/surgical teams.  - S/P ERCP and biliary Wallstent Monday 10/11.      6) RUDY on CKD    -   Nephrology following.     Plan:  Continue amiodarone for nonsustained VT at 200 mg twice daily for an additional week and then 200 mg daily.  Continue aspirin and statin for coronary disease  LifeVest at discharge  Continue metoprolol at 75 mg every 12 hours  Continue lisinopril 5 mg daily  Continue terazosin 1 mg daily  Diuresis per renal.  DC planning okay per surgery      Electronically signed by Rosie Rodriguez, ASHER, 10/18/21, 7:56 AM EDT.    I have seen and examined the patient, case was discussed with the physician extender, reviewed the above note, necessary changes were made and I agree with the final note.  Manjula Owens MD, FACC

## 2021-10-18 NOTE — PLAN OF CARE
Goal Outcome Evaluation:  Plan of Care Reviewed With: patient        Progress: improving  Outcome Summary: pt dem increased independence with mobility this date. Pt amb in hallway x300ft with CGA/SBA with RW with slow pace, fwd flexed posture. No LOB. VSS upon return to room. PT progress note: pt making good progress toward mobility goals, Will continue to benefit from PT services to increase gait distance and independence with functional mobility.

## 2021-10-18 NOTE — CASE MANAGEMENT/SOCIAL WORK
Continued Stay Note  [facility]     Patient Name: Jeremías Soto  MRN: [id number]  Today's Date: 10/18/2021    Admit Date: 10/4/2021     Discharge Plan     Row Name 10/18/21 1641       Plan    Plan discharge plan    Plan Comments CM spoke with pt and pt's spouse in room regarding discharge plan. Plan is home with spouse and HH arranged with [facility]. CM spoke with Colton at [facility] and they accepted referral. [facility] following and per provider pt will need outpatient IV antibiotics arranged at discharge. Referral made to [facility]. CM will cont to follow and notify [facility] and [facility] at discharge.    Final Discharge Disposition Code 06 - home with home health care    Row Name 10/18/21 8996       Plan    Plan discharge plan               Discharge Codes    No documentation.               Expected Discharge Date and Time     Expected Discharge Date Expected Discharge Time    Oct 20, 2021             Corina Shi RN

## 2021-10-18 NOTE — PROGRESS NOTES
[facility] Medicine Services  PROGRESS NOTE    Patient Name: Jeremías Soto  : 1955  MRN: [id number]    Date of Admission: 10/4/2021  Primary Care Physician: Vincent Crawford MD    Subjective     CC: f/u abdominal fluid collection     HPI  In bed. Feeling pretty good today. Tolerating PO intake. He has opted to return home. Wondering if he will go home with drains in place or not    ROS:  Gen- No fevers, chills  CV- No chest pain, palpitations  Resp- No cough, dyspnea  GI- No N/V/D, abd pain    Objective     Vital Signs:   Temp:  [98 °F (36.7 °C)-98.8 °F (37.1 °C)] 98.2 °F (36.8 °C)  Heart Rate:  [59-76] 70  Resp:  [16-20] 20  BP: (132-149)/(72-91) 138/77     Physical Exam:  Constitutional: No acute distress, awake, alert and conversant. Chronically ill appearing   HENT: NCAT, mucous membranes moist  Respiratory: Clear to auscultation bilaterally, respiratory effort normal on room air   Cardiovascular: RRR, no murmurs, rubs, or gallops  Gastrointestinal: Positive bowel sounds. Incisions dressed, LAWRENCE drain x 2 remain in place with serosanguinous drainage   Musculoskeletal: Trace bilateral ankle edema  Psychiatric: Appropriate affect, cooperative  Neurologic: Oriented x 3, moves all extremities spontaneously, speech clear  Skin: No rashes    Results Reviewed:  LAB RESULTS:      Lab 10/18/21  0451 10/17/21  0445 10/16/21  0920 10/16/21  0919 10/15/21  0502 10/14/21  0228   WBC 15.54* 12.88*  --  13.29* 14.28* 21.05*   HEMOGLOBIN 10.4* 9.5*  --  9.9* 9.1* 9.9*   HEMATOCRIT 30.9* 29.9*  --  30.4* 27.5* 31.1*   PLATELETS 271 208  --  226 175 158   NEUTROS ABS  --   --   --   --  11.99*  --    IMMATURE GRANS (ABS)  --   --   --   --  0.50*  --    LYMPHS ABS  --   --   --   --  0.91  --    MONOS ABS  --   --   --   --  0.71  --    EOS ABS  --   --   --   --  0.14  --    MCV 87.5 89.3  --  88.4 85.7 87.9   PROTIME  --   --  13.7  --   --   --    APTT  --   --  33.3  --   --   --           Lab 10/18/21  0451 10/17/21  0445 10/16/21  0920 10/15/21  0502 10/14/21  0228 10/13/21  0906 10/12/21  0701   SODIUM 137 135* 136 135* 135*   < > 139   POTASSIUM 3.7 3.7 3.7 4.0 4.4   < > 4.3   CHLORIDE 99 101 100 102 104   < > 107   CO2 23.0 22.0 20.0* 21.0* 20.0*   < > 19.0*   ANION GAP 15.0 12.0 16.0* 12.0 11.0   < > 13.0   BUN 25* 28* 29* 30* 34*   < > 50*   CREATININE 1.73* 1.69* 1.72* 1.52* 1.54*   < > 1.80*   GLUCOSE 123* 110* 120* 109* 144*   < > 181*   CALCIUM 8.9 8.8 9.2 8.6 8.2*   < > 8.6   IONIZED CALCIUM 1.28  --  1.30 1.27 1.24  --   --    MAGNESIUM 1.9 1.6 1.8 2.1 1.9  --   --    PHOSPHORUS 3.1  --  3.1 3.1 2.4*  --  2.7    < > = values in this interval not displayed.         Lab 10/18/21  0451 10/17/21  0445 10/16/21  0920 10/15/21  0502 10/14/21  0228   TOTAL PROTEIN 6.4 5.9* 6.7 5.7* 5.6*   ALBUMIN 3.20* 3.10* 3.30* 2.60* 2.40*   GLOBULIN 3.2 2.8 3.4 3.1 3.2   ALT (SGPT) 53* 40 37 27 35   AST (SGOT) 54* 43* 45* 27 37   BILIRUBIN 0.9 0.9 1.0 0.9 1.2   ALK PHOS 178* 163* 183* 150* 174*   AMYLASE  --   --   --  37  --    LIPASE  --   --   --  34  --          Lab 10/16/21  0920   PROTIME 13.7   INR 1.08     Brief Urine Lab Results  (Last result in the past 365 days)      Color   Clarity   Blood   Leuk Est   Nitrite   Protein   CREAT   Urine HCG        10/08/21 1823             186.2             Microbiology Results Abnormal     Procedure Component Value - Date/Time    Blood Culture - Blood, Arm, Left [[id number]]  (Normal) Collected: 10/14/21 1412    Lab Status: Preliminary result Specimen: Blood from Arm, Left Updated: 10/18/21 1515     Blood Culture No growth at 4 days    Narrative:      AEROBIC BOTTLE ONLY    Blood Culture - Blood, Hand, Left [[id number]]  (Normal) Collected: 10/14/21 1410    Lab Status: Preliminary result Specimen: Blood from Hand, Left Updated: 10/18/21 1515     Blood Culture No growth at 4 days    Blood Culture - Blood, Arm, Left [[id number]]  (Normal) Collected: 10/05/21  0734    Lab Status: Final result Specimen: Blood from Arm, Left Updated: 10/10/21 0901     Blood Culture No growth at 5 days    Narrative:      The previously reported component GRAM STAIN is no longer being reported. Previous result was <null> (Reference Range: [Reference Range]) on 10/8/2021 at 0901 EDT.    Blood Culture - Blood, Arm, Left [[id number]]  (Normal) Collected: 10/04/21 2120    Lab Status: Final result Specimen: Blood from Arm, Left Updated: 10/09/21 2215     Blood Culture No growth at 5 days    Narrative:      The previously reported component GRAM STAIN is no longer being reported. Previous result was <null> (Reference Range: [Reference Range]) on 10/8/2021 at 2215 EDT.    Blood Culture - Blood, Arm, Right [[id number]]  (Normal) Collected: 10/04/21 2143    Lab Status: Final result Specimen: Blood from Arm, Right Updated: 10/09/21 2215     Blood Culture No growth at 5 days    Narrative:      The previously reported component GRAM STAIN is no longer being reported. Previous result was <null> (Reference Range: [Reference Range]) on 10/8/2021 at 2215 EDT.    Eosinophil Smear - Urine, Urine, Clean Catch [[id number]]  (Normal) Collected: 10/08/21 1823    Lab Status: Final result Specimen: Urine, Clean Catch Updated: 10/08/21 2003     Eosinophil Smear 0 % EOS/100 Cells     Narrative:      No eosinophil seen    COVID PRE-OP / PRE-PROCEDURE SCREENING ORDER (NO ISOLATION) - Swab, Nasopharynx [[id number]]  (Normal) Collected: 10/05/21 0015    Lab Status: Final result Specimen: Swab from Nasopharynx Updated: 10/05/21 0043    Narrative:      The following orders were created for panel order COVID PRE-OP / PRE-PROCEDURE SCREENING ORDER (NO ISOLATION) - Swab, Nasopharynx.  Procedure                               Abnormality         Status                     ---------                               -----------         ------                     COVID-19, ABBOTT IN-HOUS...[[id number]]  Normal              Final result                  Please view results for these tests on the individual orders.    COVID-19, ABBOTT IN-HOUSE,NASAL Swab (NO TRANSPORT MEDIA) 2 HR TAT - Swab, Nasopharynx [[id number]]  (Normal) Collected: 10/05/21 0015    Lab Status: Final result Specimen: Swab from Nasopharynx Updated: 10/05/21 0043     COVID19 Presumptive Negative    Narrative:      Fact sheet for providers: https://www.fda.gov/media/796132/download     Fact sheet for patients: https://www.fda.gov/media/506281/download    Test performed by PCR.  If inconsistent with clinical signs and symptoms patient should be tested with different authorized molecular test.        No radiology results from the last 24 hrs    Results for orders placed during the hospital encounter of 10/04/21    Adult Transthoracic Echo Complete W/ Cont if Necessary Per Protocol    Interpretation Summary  · Estimated left ventricular EF = 30% Left ventricular systolic function is moderately to severely decreased.  · Left ventricular wall thickness is consistent with septal asymmetric hypertrophy.  · Mild mitral valve regurgitation is present  · Trace tricuspid valve regurgitation is present.  · No left ventricular apical thrombus is seen.    I have reviewed the medications:  Scheduled Meds:amiodarone, 200 mg, Oral, BID With Meals  aspirin, 81 mg, Oral, Daily  atorvastatin, 40 mg, Oral, Nightly  bumetanide, 1 mg, Oral, Daily  docosanol, 1 application, Topical, 5x Daily  dorzolamide-timolol, , Both Eyes, BID  insulin lispro, 0-7 Units, Subcutaneous, 4x Daily With Meals & Nightly  lactobacillus acidophilus, 1 capsule, Oral, Daily  lisinopril, 5 mg, Oral, Q24H  [START ON 10/19/2021] metoprolol tartrate, 75 mg, Oral, Q12H  micafungin (MYCAMINE) IV, 100 mg, Intravenous, Q24H  pantoprazole, 40 mg, Oral, Daily  piperacillin-tazobactam, 3.375 g, Intravenous, Q8H  predniSONE, 10 mg, Oral, Daily  rOPINIRole, 0.25 mg, Oral, Nightly  sodium chloride, 10 mL, Intravenous, Q12H  tamsulosin, 0.4 mg,  Oral, Daily  terazosin, 1 mg, Oral, Nightly      Continuous Infusions:   PRN Meds:.dextrose  •  dextrose  •  glucagon (human recombinant)  •  magnesium sulfate **OR** magnesium sulfate in D5W 1g/100mL (PREMIX) **OR** magnesium sulfate  •  ondansetron  •  potassium & sodium phosphates **OR** potassium & sodium phosphates  •  Sodium Chloride (PF)  •  sodium chloride    Assessment/Plan   Assessment & Plan     Active Hospital Problems    Diagnosis  POA   • **Sepsis (HCC) [A41.9]  Yes   • Post-operative infection [T81.40XA]  Yes   • Abdominal wall cellulitis [L03.311]  Yes   • Postoperative intra-abdominal abscess [T81.43XA]  Yes   • Elevated troponin [R77.8]  Yes   • CAD (coronary artery disease) [I25.10]  Yes   • CKD (chronic kidney disease), stage III (HCC) [N18.30]  Yes   • Acute kidney injury superimposed on chronic kidney disease (HCC) [N17.9, N18.9]  Yes   • Bile leak [K83.9]  Unknown   • Elevated LFTs [R79.89]  Yes   • Combined systolic and diastolic cardiac dysfunction (EF < 50% 2018) [I51.89]  Yes   • History of sarcoidosis (occular) [Z86.2]  Yes   • Moderate obstructive sleep apnea [G47.33]  Yes   • Diabetes mellitus (HCC) [E11.9]  Yes   • Hypertension [I10]  Yes      Resolved Hospital Problems   No resolved problems to display.     Brief Hospital Course to date:  Jeremías Soto is a 66 y.o. male with HTN, HLD, CAD (s/p CABG, chronic systolic/diastolic CHF (46-50%) and EVA. He was recently admitted to [facility] 9/23-9/29 w/ enterococcal/clostridial bacteremia, cholangitis, questionable pyelonephritis-s/p lap farooq 9/27 and ERCP 9/28 discharged home on IV Zosyn with a planned completion date 10/11. He returned to [facility] 10/4/21 with several days worsening erythema of the abdomen and imaging in the ED concerning for fluid collection in the gallbladder fossa now on empiric antibiotics with some initial improvement then new development of abdominal pain     Sepsis, abdominal source  Abnormal abdominal fluid  collection  Recent cholecystitis  Recent enterococcal/clostridial bacteremia  -S/p lap farooq 9/27, ERCP 9/28  -2.5 cm x 8.5 cm fluid collection on initial CT, unclear etiology given recent surgery  -Repeat CT abdomen pelvis on 10/7/2021 with new 16 x 6 hepatic fluid collection and hematoma.  -Underwent laparoscopic washout and drain placement of intra-abdominal hematoma and incision and drainage of right flank cellulitis on 10/8/2021 per Dr. Callahan.  -ERCP performed on 10/11/2021, late small bile leak s/p Wallstent placement, plan removal of stent in 1 mos  -Continue probiotic  -ID following.  Bodily fluid culture growing candida tropicalis and candida albicans.  stopped merrem and dapto and changed to zosyn, micafungin at least until 10/25/21 per ID with weekly CBC with diff, CMP, ESR, CRP to be faxed to [facility] [phone], also needs weekly groshong cathetor dressing changes  - Discussed leukocytosis with Dr. Sanders today - monitoring but no changes in current regimen. Repeat CT abdomen/pelvis on 10/14 showed effective drainage of perihepatic fluid collection/very small remaining fluid, mildly increased fat stranding around atrophic pancreas could be d/t recent surgery and stent, decompressed colon, biliary stent with correct placement, multiple diverticula    Peptic Duodenitis  --incidentally noted on ERCP, ?likely 2nd to occasional NSAID use.  AVOID NSAIDS    Anorexia  --nutrition following, starting to eat more.  Encouraged.      RUDY on CKD 3, improving  Hyperkalemia, resolved  Metabolic acidosis  -Baseline Cr 1.9-2.3; EGFR 30.  Creatinine trending down  -Nephrology consulted.  Likely secondary to ATN.  Improved/back to baseline.   - s/p albumin and bumex   - Now onbumex 1mg PO daily,  fluid restriction 1.5L/day and lisinopril 5mg daily    Acute blood loss anemia  -Likely postsurgical  -s/p transfusion on 10/9/2021    Hypoxia, resolved  -Chest x-ray on 10/9/2021 shows vascular congestion new since  10/4/2021.  -Diuresis per nephrology, s/p metolazone and albumin yesterday.  Bumex PO daily, , fluid restriction 1.5L/day    Systolic heart failure  Nonsustained V. Tach  -cards following, on amiodarone 200mg BID, metoprolol 75mg q8H  -5 beat run on Sunday, asymptomatic  -Echo on 9/24/2021 showed EF of 26 to 30%  -Will need LifeVest at discharge    Postoperative anemia  -s/p transfusion on 10/9, with improvement in anemia  -IV iron per nephrology    Hypernatremia, resolved  -Resolved with D5W    LV thrombus  -Per cardiology note 10/6, records from the [facility] demonstrate prescription of Eliquis 7/19 for a large LV thrombus found on MRI  -Cardiology following.  Echo showed EF 30%, LV wall thickness c/w septal asymmetric hypertrophy, mild MR, trace TR, no LV apical thrombus seen.  Ok to stop eliquis per cardiology?  --needs lifevest at discharge per cards    Elevated INR, improving  - Trending down now to 1.08    Perioral lesions  -Empiric Valtrex, renally dosed     DM type 2, A1c 7.3%, without long-term use of insulin  -Accu-Cheks with SSI  - Add lispro 3 units TID AC - eating better      CAD with prior CABG/stent  Chronic systolic/diastolic CHF  Mild troponin elevation  Hx multiple TIA  -Echo 9/24/2021 EF 26-30%  -Continue Metoprolol / Lisinopril      Elevated LFTs, resolved  -Recent admission cholangitis, holding statin as noted     Hypertension  Hyperlipidemia  -statin restarted since daptomycin stopped     Ocular sarcoidosis  Bilateral lung nodules  -Repeat dedicated chest CT 6 months outpatient regarding nodules  -Continue chronic oral prednisone 10 mg     Obesity, BMI 35.16 kg/M2  EVA  Hx COVID-19 December 2020    DVT prophylaxis:  Mechanical DVT prophylaxis orders are present.     AM-PAC 6 Clicks Score (PT): 19 (10/17/21 2000)    Disposition: Home tomorrow if cleared by all specialists     CODE STATUS:   Code Status and Medical Interventions:   Ordered at: 10/05/21 0134     Code Status:    CPR     Medical  Interventions (Level of Support Prior to Arrest):    Full     Sammi Tinsley PA-C  10/18/21

## 2021-10-18 NOTE — THERAPY PROGRESS REPORT/RE-CERT
Patient Name: Jeremías Soto  : 1955    MRN: [id number]                              Today's Date: 10/18/2021       Admit Date: 10/4/2021    Visit Dx:     ICD-10-CM ICD-9-CM   1. Cellulitis of abdominal wall  L03.311 682.2   2. Infection of superficial incisional surgical site after procedure, initial encounter  T81.41XA 998.59   3. Leukocytosis, unspecified type  D72.829 288.60   4. Acute cystitis without hematuria  N30.00 595.0   5. RUDY (acute kidney injury) (HCC)  N17.9 584.9   6. Acute congestive heart failure, unspecified heart failure type (HCC)  I50.9 428.0   7. Abdominal wall cellulitis  L03.311 682.2   8. Postoperative intra-abdominal abscess  T81.43XA 998.59     567.22   9. Bile leak  K83.9 576.9     Patient Active Problem List   Diagnosis   • Snoring   • Diabetes mellitus (HCC)   • Hypertension   • Overweight   • Moderate obstructive sleep apnea   • Abdominal pain   • Nephrolithiasis   • History of sarcoidosis (occular)   • Recurrent COVID-19 virus infection (2020, 2021)   • RUDY (acute kidney injury) (HCC)   • Elevated LFTs   • Combined systolic and diastolic cardiac dysfunction (EF < 50% )   • Abnormal CT scan, liver   • Post-operative infection   • Abdominal wall cellulitis   • Sepsis (HCC)   • Postoperative intra-abdominal abscess   • Elevated troponin   • CAD (coronary artery disease)   • CKD (chronic kidney disease), stage III (HCC)   • Acute kidney injury superimposed on chronic kidney disease (HCC)   • Bile leak     Past Medical History:   Diagnosis Date   • Cancer (HCC)     skin   • Coronary artery disease     stent   • Diabetes mellitus (HCC)    • Elevated cholesterol    • Heart attack (HCC)    • Hypertension    • Sarcoid    • Sleep apnea     no cpap   • SOB (shortness of breath)    • Stroke (HCC)     Pt. states that he has had 2 mini strokes. No residual      Past Surgical History:   Procedure Laterality Date   • CARDIAC CATHETERIZATION      cardiac stent x2 after  s/p CABG   • CATARACT EXTRACTION, BILATERAL     • CHOLECYSTECTOMY N/A 10/8/2021    Procedure: LAPAROSCOPIC WASHOUT;  Surgeon: David Callahan MD;  Location: [facility] OR;  Service: General;  Laterality: N/A;   • CHOLECYSTECTOMY WITH INTRAOPERATIVE CHOLANGIOGRAM N/A 9/27/2021    Procedure: CHOLECYSTECTOMY LAPAROSCOPIC INTRAOPERATIVE CHOLANGIOGRAM;  Surgeon: Gaudencio Wolfe MD;  Location: [facility] OR;  Service: General;  Laterality: N/A;   • COLONOSCOPY     • CORONARY ARTERY BYPASS GRAFT  2002   • ENDOSCOPY     • ERCP N/A 9/28/2021    Procedure: ENDOSCOPIC RETROGRADE CHOLANGIOPANCREATOGRAPHY;  Surgeon: Brunner, Mark I, MD;  Location: [facility] ENDOSCOPY;  Service: Gastroenterology;  Laterality: N/A;  sphincterotomy made, balloon sweep of bile duct done with 9-12 balloon.    • ERCP N/A 10/11/2021    Procedure: ENDOSCOPIC RETROGRADE CHOLANGIOPANCREATOGRAPHY with biliary wall stent;  Surgeon: Brunner, Mark I, MD;  Location: [facility] ENDOSCOPY;  Service: Gastroenterology;  Laterality: N/A;  BALLOON SWEEP 1229  10X8 STENT PLACED IN CBD    • EXTRACORPOREAL SHOCKWAVE LITHOTRIPSY (ESWL), STENT INSERTION/REMOVAL Bilateral 8/21/2020    Procedure: EXTRACORPOREAL SHOCKWAVE LITHOTRIPSY BILATERAL WITH STENT PLACEMENT LEFT;  Surgeon: Ethan Barnes Jr., MD;  Location: [facility] OR;  Service: Urology;  Laterality: Bilateral;   • SKIN CANCER EXCISION        General Information     Row Name 10/18/21 1602          Physical Therapy Time and Intention    Document Type therapy note (daily note)  -     Mode of Treatment physical therapy  -     Row Name 10/18/21 1602          General Information    Existing Precautions/Restrictions fall  abdominal incision  -     Row Name 10/18/21 1602          Cognition    Orientation Status (Cognition) oriented x 3  -     Row Name 10/18/21 1602          Safety Issues, Functional Mobility    Impairments Affecting Function (Mobility) balance; endurance/activity tolerance; pain; range of motion  (ROM); strength  -SJ           User Key  (r) = Recorded By, (t) = Taken By, (c) = Cosigned By    Initials Name Provider Type    Rosalie Chatman PT Physical Therapist               Mobility     Row Name 10/18/21 1625          Bed Mobility    Bed Mobility supine-sit  -SJ     Supine-Sit [place] (Bed Mobility) standby assist  -SJ     Assistive Device (Bed Mobility) draw sheet; head of bed elevated  -SJ     Comment (Bed Mobility) Pt able to complete without difficulty  -SJ     Row Name 10/18/21 1625          Transfers    Comment (Transfers) Pt dem good safety awareness with t/f's.  -SJ     Row Name 10/18/21 1625          Sit-Stand Transfer    Sit-Stand [place] (Transfers) standby assist; 1 person assist  -SJ     Assistive Device (Sit-Stand Transfers) walker, front-wheeled  -SJ     Row Name 10/18/21 1625          Gait/Stairs (Locomotion)    [place] Level (Gait) standby assist  -SJ     Assistive Device (Gait) walker, front-wheeled  -SJ     Distance in Feet (Gait) 300  -SJ     Deviations/Abnormal Patterns (Gait) bilateral deviations; harshad decreased; stride length decreased; weight shifting decreased  -SJ     Bilateral Gait Deviations forward flexed posture  -SJ     Comment (Gait/Stairs) Pt amb in hallway with RW with slow pace, fwd flexed posture. No LOB. VSS upon return to room.  -SJ           User Key  (r) = Recorded By, (t) = Taken By, (c) = Cosigned By    Initials Name Provider Type    Rosalie Chatman PT Physical Therapist               Obj/Interventions     Row Name 10/18/21 1627          Balance    Static Sitting Balance WNL; unsupported; sitting, edge of bed; sitting in chair  -SJ     Dynamic Sitting Balance unsupported; sitting in chair; sitting, edge of bed; WNL  -SJ     Static Standing Balance WFL; supported  -SJ     Dynamic Standing Balance WFL; supported  -SJ           User Key  (r) = Recorded By, (t) = Taken By, (c) = Cosigned By    Initials Name Provider Type    Rosalie Chatman,  PT Physical Therapist               Goals/Plan     Row Name 10/18/21 1630          Bed Mobility Goal 1 (PT)    Activity/Assistive Device (Bed Mobility Goal 1, PT) sit to supine/supine to sit  -SJ     [place] Level/Cues Needed (Bed Mobility Goal 1, PT) independent  -SJ     Time Frame (Bed Mobility Goal 1, PT) long term goal (LTG); 2 weeks  -SJ     Progress/Outcomes (Bed Mobility Goal 1, PT) good progress toward goal  -SJ     Row Name 10/18/21 1630          Transfer Goal 1 (PT)    Activity/Assistive Device (Transfer Goal 1, PT) bed-to-chair/chair-to-bed  -SJ     [place] Level/Cues Needed (Transfer Goal 1, PT) modified independence  -SJ     Time Frame (Transfer Goal 1, PT) long term goal (LTG); 2 weeks  -SJ     Progress/Outcome (Transfer Goal 1, PT) good progress toward goal  -SJ     Row Name 10/18/21 1630          Gait Training Goal 1 (PT)    Activity/Assistive Device (Gait Training Goal 1, PT) gait (walking locomotion); assistive device use  -SJ     [place] Level (Gait Training Goal 1, PT) modified independence  -SJ     Distance (Gait Training Goal 1, PT) 600  -SJ     Time Frame (Gait Training Goal 1, PT) long term goal (LTG); 2 weeks  -SJ     Progress/Outcome (Gait Training Goal 1, PT) goal met; goal revised this date  -SJ           User Key  (r) = Recorded By, (t) = Taken By, (c) = Cosigned By    Initials Name Provider Type    Rosalie Chatman, PT Physical Therapist               Clinical Impression     Row Name 10/18/21 1627          Pain    Additional Documentation Pain Scale: Numbers Pre/Post-Treatment (Group)  -SJ     Row Name 10/18/21 1627          Pain Scale: Numbers Pre/Post-Treatment    Pretreatment Pain Rating 0/10 - no pain  -SJ     Posttreatment Pain Rating 0/10 - no pain  -     Row Name 10/18/21 1627          Plan of Care Review    Plan of Care Reviewed With patient  -SJ     Progress improving  -SJ     Outcome Summary pt dem increased independence with mobility this date. Pt amb in  hallway x300ft with CGA/SBA with RW with slow pace, fwd flexed posture. No LOB. VSS upon return to room. PT progress note: pt making good progress toward mobility goals, Will continue to benefit from PT services to increase gait distance and independence with functional mobility.  -     Row Name 10/18/21 1627          Vital Signs    Posttreatment Heart Rate (beats/min) 72  -     Row Name 10/18/21 1627          Positioning and Restraints    Pre-Treatment Position in bed  -     Post Treatment Position chair  -SJ     In Chair notified nsg; reclined; call light within reach; encouraged to call for assist; waffle cushion; heels elevated  -           User Key  (r) = Recorded By, (t) = Taken By, (c) = Cosigned By    Initials Name Provider Type    Rosalie Chatman PT Physical Therapist               Outcome Measures     Row Name 10/18/21 1628          How much help from another person do you currently need...    Turning from your back to your side while in flat bed without using bedrails? 4  -SJ     Moving from lying on back to sitting on the side of a flat bed without bedrails? 4  -SJ     Moving to and from a bed to a chair (including a wheelchair)? 3  -SJ     Standing up from a chair using your arms (e.g., wheelchair, bedside chair)? 4  -SJ     Climbing 3-5 steps with a railing? 3  -SJ     To walk in hospital room? 3  -SJ     AM-PAC 6 Clicks Score (PT) 21  -     Row Name 10/18/21 1628          Functional Assessment    Outcome Measure Options AM-PAC 6 Clicks Basic Mobility (PT)  -           User Key  (r) = Recorded By, (t) = Taken By, (c) = Cosigned By    Initials Name Provider Type    Rosalie Chatman, PT Physical Therapist                             Physical Therapy Education                 Title: PT OT SLP Therapies (In Progress)     Topic: Physical Therapy (In Progress)     Point: Mobility training (In Progress)     Learning Progress Summary           Patient Acceptance, E, VU by MARKO at 10/18/2021  1629   Significant Other Eager, E,D, NR by DM at 10/14/2021 1607      Show all documentation for this point (5)                 Point: Home exercise program (In Progress)     Learning Progress Summary           Patient Acceptance, E, VU by  at 10/18/2021 1629   Significant Other Eager, E,D, NR by DM at 10/14/2021 1607      Show all documentation for this point (4)                 Point: Precautions (In Progress)     Learning Progress Summary           Patient Acceptance, E, VU by  at 10/18/2021 1629   Significant Other Eager, E,D, NR by DM at 10/14/2021 1607      Show all documentation for this point (5)                             User Key     Initials Effective Dates Name Provider Type Discipline     06/16/21 -  Rosalie Patton PT Physical Therapist PT    JUNE 06/16/21 -  Brenda Hancock PT Physical Therapist PT              PT Recommendation and Plan     Plan of Care Reviewed With: patient  Progress: improving  Outcome Summary: pt dem increased independence with mobility this date. Pt amb in hallway x300ft with CGA/SBA with RW with slow pace, fwd flexed posture. No LOB. VSS upon return to room. PT progress note: pt making good progress toward mobility goals, Will continue to benefit from PT services to increase gait distance and independence with functional mobility.     Time Calculation:    PT Charges     Row Name 10/18/21 1629             Time Calculation    Start Time 1602  -      PT Received On 10/18/21  -      PT Goal Re-Cert Due Date 10/28/21  -              Time Calculation- PT    Total Timed Code Minutes- PT 16 minute(s)  -SJ              Timed Charges    54673 - Gait Training Minutes  16  -SJ              Total Minutes    Timed Charges Total Minutes 16  -SJ       Total Minutes 16  -SJ            User Key  (r) = Recorded By, (t) = Taken By, (c) = Cosigned By    Initials Name Provider Type     Rosalie Patton, PT Physical Therapist              Therapy Charges for Today     Code  Description Service Date Service Provider Modifiers Qty    [id number] HC GAIT TRAINING EA 15 MIN 10/18/2021 Rosalie Patton, PT GP 1          PT G-Codes  Outcome Measure Options: AM-PAC 6 Clicks Basic Mobility (PT)  AM-PAC 6 Clicks Score (PT): 21    Rosalie Patton, DANNY  10/18/2021

## 2021-10-18 NOTE — PROGRESS NOTES
Jeremías Soto  1955  [id number]    Date of Consult: 10/5/21  Requesting Provider: Vincent Crawford MD  Evaluating Physician: Diaz Moreno MD    Chief Complaint: abdominal pain and redness    Reason for Consultation: sepsis secondary to an abdominal wall abscess/cellulitis    History of present illness:    Jeremías Soto is a 66 y.o.  Yr old male with history of coronary artery disease, CK D stage III B, diabetes mellitus type 2, hyperlipidemia, hypertension, sarcoidosis, and history of CVA who I recently evaluated during an admission to [facility] last month for enterococcus bacteremia and Clostridium perfringens bacteremia due to a biliary source/gallbladder source (cholangitis and cholecystitis).  The patient underwent laparoscopic cholecystectomy on 9/27 and ERCP with sphincterotomy stone extraction on 9/28.  He additionally had aerococcus urinae from urine culture. Due to his baseline renal dysfunction he did have a Groshong catheter placed for IV antibiotics.  He clinically improved with improvement in his LFTs and sepsis. He was discharged on 9/29 with plans to continue Zosyn at least until 10/11/21. I have not yet seen him in outpatient follow-up but he was scheduled to follow-up with me tomorrow on 10/6.      He was doing well apparently until 2-3 days ago when he noticed redness developing over his right abdominal wall.  The erythema has gotten progressively worse with some swelling and warmth and tenderness.  As far as I know he did not contact our clinic regarding this erythema. He was not having any fevers or chills at home.  He presented to the ER early this morning for evaluation of this redness. Since arrival, he was noted to have a maximum temperature of 99.1°F. labs showed a CRP of 46.11, troponin elevated to 0.07, proBNP of 8353, INR of 2.72, White blood cell count of 24.54 (up from 13.29 just before discharge), hemoglobin of 12, lactic acid of 4.1-->2.9, creatinine of  2.84 (up from 2.15 just prior to discharge). COVID-19 PCR was negative. White blood cell count is now improved to 13.69 on antibiotics.creatinine is worse at 3.09 today.  LFTs remain elevated with an ALT of 94, an AST of 137, an alkaline phosphatase of 176, and a total bilirubin of 1.4. CT abdomen and pelvis was done yesterday and showed cholecystectomy with a fluid collection in the gallbladder fossa containing a bubble of air with differential considerations including a seroma/hematoma, biloma, or possible developing abscess.  No biliary obstruction was seen.  There was also fat stranding in the ventral abdominal wall and right flank that could reflect bland edema or cellulitis. The patient's antibiotics have been changed to vancomycin, cefepime, and Flagyl. Repeat blood cultures and urine culture are in progress. Surgery consult has been placed. Plan is for IR drainage procedure but having to wait on this due to his anticoagulation that he has been on. He underwent NM Hepatobiliary scan today with no evidence for abnormal tracer activity to suggest leak.  The infectious disease team has been consulted for recommendations.    Subjective:    10/6/21: the patient states that his abdominal pain has been improving although he was having some increased right-sided abdominal pain after I went in the room today as he has just eaten something. No fevers. Tolerating the abx well without ADRs.    10/7/21: The patient is doing worse today.  Still having some right-sided abdominal pain.  He was taken for repeat CT abdomen and pelvis today as his leukocytosis worsened And he did have a new 16 x 6 cm hepatic fluid collection with intermittent heterogenous and hyperdense contents compatible with a large hematoma.  He did have a new small right pleural effusion with some adjacent consolidation or atelectasis. He denies any nausea.  No fevers    10/8/21: The patient was very somnolent today although he just recently got back from  the OR.  Dr. Callahan took the patient to the OR today and did not see any acute bleeding but did see some old blood/hematoma.  No necrotic fascia noted.  2 LAWRENCE drains left in place, one of the liver and another in the gallbladder fossa.  No fevers overnight.  Leukocytosis is slightly better.  His wife is at bedside and states that he is just been very somnolent after his procedure and during week but no other acute issues.    10/9/21: the patient states that his abdominal pain is improving. No fevers. Tolerating abx well without ADRs. Did have worsening hypoxia overnight to 10L NC but now improved back to 2L NC. Diuresis per nephrology and cardiology.    10/10/21: The patient has serous drainage from his LAWRENCE drain #1 but his LAWRENCE drain #2 has her bile and Dr. Callahan suspects that the patient has a bile leak from his prior cholecystectomy procedure.  Dr. Sylvester with GI has been consulted for ERCP tomorrow. The patient is somnolent today.  His wife remains at bedside.  She states that he has had a fairly good today although he is somnolent.  No fevers.  No history available from the patient    10/11/21: LAWRENCE drains in place.  Taken for ERCP today by Dr. Brunner with no overt bile leak seen and balloon sweep showed no stones.  A stent was placed in the CBD to the common hepatic duct and across the cystic duct with plans to remove in 1 month.  Patient is sitting up in bed with food in front of him, but he is not hungry.  He denies f/c, sob, v/d, rashes. He has some nausea.  Had NSVT on Sunday.  He was started on amiodarone and may go home with LifeVest. He remains afebrile. Creatinine improved. Has some diarrhea.     10/12/21: The patient remains afebrile.  Abdominal pain is improving.  LAWRENCE drain still remain in place.  Now growing Candida albicans and Candida tropicalis from his body fluid cultures.  Diarrhea has improved    10/13/21: the patient is nauseated today. No worsening abdominal pain. LAWRENCE drains remain in place. No  fevers.     10/14/21: The patient does have a substantial worsening of his leukocytosis today from 11-->21.  He denies having any severe diarrhea although he did have a bowel movement this morning.  Abdominal pain has improved.  He still having some anorexia and mild nausea.  No fevers.  Repeat CT abdomen and pelvis was done today with the official report pending but are my discussion with Dr. Kaminski, the patient still has a fluid collection by the liver but LAWRENCE drains remain in place and there is no other signs of new infection/acute abnormality.    10/15/21: The patient remains afebrile.  Feeling better with less abdominal pain.  White blood cell count better, down to 14.28 from 21. LAWRENCE drains remain in place    10/18/21: 2 LAWRENCE drains remain in place.  Abdominal pain is improved.  He is feeling better today.  No nausea or diarrhea.  No fevers.  He does have a worsening leukocytosis up to 15 today.    Past Medical History:   Diagnosis Date   • Cancer (HCC)     skin   • Coronary artery disease     stent   • Diabetes mellitus (HCC)    • Elevated cholesterol    • Heart attack (HCC) 2003   • Hypertension    • Sarcoid    • Sleep apnea     no cpap   • SOB (shortness of breath)    • Stroke (HCC)     Pt. states that he has had 2 mini strokes. No residual        Past Surgical History:   Procedure Laterality Date   • CARDIAC CATHETERIZATION  2003    cardiac stent x2 after s/p CABG   • CATARACT EXTRACTION, BILATERAL     • CHOLECYSTECTOMY N/A 10/8/2021    Procedure: LAPAROSCOPIC WASHOUT;  Surgeon: David Callahan MD;  Location: [facility] OR;  Service: General;  Laterality: N/A;   • CHOLECYSTECTOMY WITH INTRAOPERATIVE CHOLANGIOGRAM N/A 9/27/2021    Procedure: CHOLECYSTECTOMY LAPAROSCOPIC INTRAOPERATIVE CHOLANGIOGRAM;  Surgeon: Gaudencio Wolfe MD;  Location: [facility] OR;  Service: General;  Laterality: N/A;   • COLONOSCOPY     • CORONARY ARTERY BYPASS GRAFT  2002   • ENDOSCOPY     • ERCP N/A 9/28/2021    Procedure: ENDOSCOPIC  RETROGRADE CHOLANGIOPANCREATOGRAPHY;  Surgeon: Brunner, Mark I, MD;  Location:  [facility];  Service: Gastroenterology;  Laterality: N/A;  sphincterotomy made, balloon sweep of bile duct done with 9-12 balloon.    • ERCP N/A 10/11/2021    Procedure: ENDOSCOPIC RETROGRADE CHOLANGIOPANCREATOGRAPHY with biliary wall stent;  Surgeon: Brunner, Mark I, MD;  Location:  [facility];  Service: Gastroenterology;  Laterality: N/A;  BALLOON SWEEP 1229  10X8 STENT PLACED IN CBD    • EXTRACORPOREAL SHOCKWAVE LITHOTRIPSY (ESWL), STENT INSERTION/REMOVAL Bilateral 8/21/2020    Procedure: EXTRACORPOREAL SHOCKWAVE LITHOTRIPSY BILATERAL WITH STENT PLACEMENT LEFT;  Surgeon: Ethan Barnes Jr., MD;  Location:  [facility] OR;  Service: Urology;  Laterality: Bilateral;   • SKIN CANCER EXCISION       Social history:  The patient is .  He lives in [facility].  No history of tobacco, alcohol, or illicit drug use.    Family history:  family history includes COPD in his mother; Cancer in his brother; Diabetes in his father and sister; Heart disease in his father; Hypertension in his father; Obesity in his sister.    No Known Allergies    Medication:  Current Facility-Administered Medications   Medication Dose Route Frequency Provider Last Rate Last Admin   • amiodarone (PACERONE) tablet 200 mg  200 mg Oral BID With Meals Manjula Owens MD   200 mg at 10/18/21 0908   • aspirin EC tablet 81 mg  81 mg Oral Daily Manjula Owens MD   81 mg at 10/18/21 0907   • atorvastatin (LIPITOR) tablet 40 mg  40 mg Oral Nightly Gayle Butler MD   40 mg at 10/17/21 2040   • bumetanide (BUMEX) tablet 1 mg  1 mg Oral Daily Aleena Abarca MD   1 mg at 10/18/21 0908   • dextrose (D50W) 25 g/ 50mL Intravenous Solution 25 g  25 g Intravenous Q15 Min PRN Brunner, Mark I, MD   25 g at 10/11/21 0742   • dextrose (GLUTOSE) oral gel 15 g  15 g Oral Q15 Min PRN Brunner, Mark I, MD       • docosanol (ABREVA) 10 % cream 1  application  1 application Topical 5x Daily Brunner, Mark I, MD   1 application at 10/18/21 1210   • dorzolamide (TRUSOPT) 2 % 1 drop, timolol (TIMOPTIC) 0.5 % 1 drop for Cosopt 22.3-6.8 mg/mL   Both Eyes BID Brunner, Mark I, MD   Given at 10/18/21 0908   • glucagon (human recombinant) (GLUCAGEN DIAGNOSTIC) injection 1 mg  1 mg Subcutaneous Q15 Min PRN Brunner, Mark I, MD       • insulin lispro (humaLOG) injection 0-7 Units  0-7 Units Subcutaneous 4x Daily With Meals & Nightly Brunner, Mark I, MD   3 Units at 10/18/21 1210   • lactobacillus acidophilus (RISAQUAD) capsule 1 capsule  1 capsule Oral Daily Diaz Moreno MD   1 capsule at 10/18/21 0908   • lisinopril (PRINIVIL,ZESTRIL) tablet 5 mg  5 mg Oral Q24H TevinAleena MD   5 mg at 10/18/21 0908   • magnesium sulfate 4 gram infusion - Mg less than or equal to 1mg/dL  4 g Intravenous PRN Justus Milan MD        Or   • magnesium sulfate 3 gram infusion (1gm x 3) - Mg 1.1 - 1.5 mg/dL  1 g Intravenous PRN Justus Milan MD        Or   • Magnesium Sulfate 2 gram infusion- Mg 1.6 - 1.9 mg/dL  2 g Intravenous PRN Justus Milan MD 25 mL/hr at 10/17/21 2041 2 g at 10/17/21 2041   • [START ON 10/19/2021] metoprolol tartrate (LOPRESSOR) tablet 75 mg  75 mg Oral Q12H Manjula Owens MD       • micafungin 100 mg/100 mL 0.9% NS IVPB (mbp)  100 mg Intravenous Q24H Diaz Moreno MD   100 mg at 10/17/21 2041   • ondansetron (ZOFRAN) injection 4 mg  4 mg Intravenous Q6H PRN Justus Milan MD       • pantoprazole (PROTONIX) EC tablet 40 mg  40 mg Oral Daily Brunner, Mark I, MD   40 mg at 10/18/21 0907   • piperacillin-tazobactam (ZOSYN) 3.375 g in iso-osmotic dextrose 50 ml (premix)  3.375 g Intravenous Q8H Diaz Moreno MD   3.375 g at 10/18/21 0908   • potassium & sodium phosphates (PHOS-NAK) 280-160-250 MG packet - for Phosphorus less than 1.25 mg/dL  2 packet Oral Q6H PRN Justus Milan MD        Or   • potassium & sodium phosphates  (PHOS-NAK) 280-160-250 MG packet - for Phosphorus 1.25 - 2.5 mg/dL  2 packet Oral Q6H PRN Justus Milan MD   2 packet at 10/14/21 1219   • predniSONE (DELTASONE) tablet 10 mg  10 mg Oral Daily Brunner, Mark I, MD   10 mg at 10/18/21 0908   • rOPINIRole (REQUIP) tablet 0.25 mg  0.25 mg Oral Nightly Brunner, Mark I, MD   0.25 mg at 10/17/21 2040   • Sodium Chloride (PF) 0.9 % 10 mL  10 mL Intravenous PRN Brunner, Mark I, MD       • sodium chloride 0.9 % flush 10 mL  10 mL Intravenous Q12H Brunner, Mark I, MD   10 mL at 10/17/21 2100   • sodium chloride 0.9 % flush 10 mL  10 mL Intravenous PRN Brunner, Mark I, MD       • tamsulosin (FLOMAX) 24 hr capsule 0.4 mg  0.4 mg Oral Daily Brunner, Mark I, MD   0.4 mg at 10/18/21 0908   • terazosin (HYTRIN) capsule 1 mg  1 mg Oral Nightly Lesley Jean PA   1 mg at 10/17/21 2040         Antibiotics:  Anti-Infectives (From admission, onward)    Ordered     Dose/Rate Route Frequency Start Stop    10/12/21 0955  piperacillin-tazobactam (ZOSYN) 3.375 g in iso-osmotic dextrose 50 ml (premix)        Ordering Provider: Diaz Moreno MD    3.375 g  over 4 Hours Intravenous Every 8 Hours 10/12/21 1600 10/25/21 2359    10/12/21 0955  piperacillin-tazobactam (ZOSYN) 3.375 g in iso-osmotic dextrose 50 ml (premix)        Ordering Provider: Diaz Moreno MD    3.375 g  over 30 Minutes Intravenous Once 10/12/21 1045 10/12/21 1459    10/07/21 0852  meropenem (MERREM) 1 g/100 mL 0.9% NS VTB (mbp)        Ordering Provider: Thierry Chinchilla, PharmD    1 g  over 30 Minutes Intravenous Once 10/07/21 0945 10/07/21 1338    10/06/21 1506  valACYclovir (VALTREX) tablet 500 mg        Ordering Provider: Don Draper DO    500 mg Oral Every 12 Hours Scheduled 10/06/21 2100 10/07/21 0854    10/05/21 1632  micafungin 100 mg/100 mL 0.9% NS IVPB (mbp)        Ordering Provider: Diaz Moreno MD    100 mg  over 60 Minutes Intravenous Every 24 Hours 10/05/21 1800 10/25/21 7300     "10/04/21 2101  vancomycin 1750 mg/500 mL 0.9% NS IVPB ([facility])        Ordering Provider: Toño Lainez MD    20 mg/kg × 81.9 kg (Adjusted)  250 mL/hr over 120 Minutes Intravenous Once 10/04/21 2103 10/05/21 0132    10/04/21 2101  cefepime (MAXIPIME) 2 g/100 mL 0.9% NS (mbp)        Ordering Provider: Toño Lainez MD    2 g  200 mL/hr over 30 Minutes Intravenous Once 10/04/21 2103 10/04/21 2250            Review of Systems    As above    Physical Exam:   Vital Signs   /77 (BP Location: Left arm, Patient Position: Lying)   Pulse 70   Temp 98.2 °F (36.8 °C) (Oral)   Resp 20   Ht 170.2 cm (67.01\")   Wt 90.8 kg (200 lb 1.6 oz)   SpO2 95%   BMI 31.33 kg/m²     GENERAL: sitting up in bed.  Slightly less ill appearing.  No acute distress.  HENT: Normocephalic, atraumatic.  Labial ulcers improved  Eyes: No conjunctival injection. No icterus.  HEART: RRR; No murmur, rubs, gallops.   LUNGS: clear to auscultation anteriorly.  Nonlabored breathing  ABDOMEN: erythema over right abdominal wall has resolved.  Serous drainage from both LAWRENCE drains in right abdomen.  Abdomen is nontender to palpation  SKIN: no new rashes noted. No jaundice  NEURO: Awake and alert. Oriented ×3. Normal speech and cognition  PSYCHIATRIC: Cooperative. Calm    Groshong cath site is without erythema or drainage    Laboratory Data    Results from last 7 days   Lab Units 10/18/21  0451 10/17/21  0445 10/16/21  0919   WBC 10*3/mm3 15.54* 12.88* 13.29*   HEMOGLOBIN g/dL 10.4* 9.5* 9.9*   HEMATOCRIT % 30.9* 29.9* 30.4*   PLATELETS 10*3/mm3 271 208 226     Results from last 7 days   Lab Units 10/18/21  0451   SODIUM mmol/L 137   POTASSIUM mmol/L 3.7   CHLORIDE mmol/L 99   CO2 mmol/L 23.0   BUN mg/dL 25*   CREATININE mg/dL 1.73*   GLUCOSE mg/dL 123*   CALCIUM mg/dL 8.9     Results from last 7 days   Lab Units 10/18/21  0451   ALK PHOS U/L 178*   BILIRUBIN mg/dL 0.9   ALT (SGPT) U/L 53*   AST (SGOT) U/L 54*               Estimated " Creatinine Clearance: 43.9 mL/min (A) (by C-G formula based on SCr of 1.73 mg/dL (H)).       Microbiology:  Microbiology Results (last 10 days)     Procedure Component Value - Date/Time    Blood Culture - Blood, Arm, Left [[id number]]  (Normal) Collected: 10/14/21 1412    Lab Status: Preliminary result Specimen: Blood from Arm, Left Updated: 10/18/21 1515     Blood Culture No growth at 4 days    Narrative:      AEROBIC BOTTLE ONLY    Blood Culture - Blood, Hand, Left [[id number]]  (Normal) Collected: 10/14/21 1410    Lab Status: Preliminary result Specimen: Blood from Hand, Left Updated: 10/18/21 1515     Blood Culture No growth at 4 days    Eosinophil Smear - Urine, Urine, Clean Catch [[id number]]  (Normal) Collected: 10/08/21 1823    Lab Status: Final result Specimen: Urine, Clean Catch Updated: 10/08/21 2003     Eosinophil Smear 0 % EOS/100 Cells     Narrative:      No eosinophil seen            Radiology:  CT Abdomen Pelvis Without Contrast    Result Date: 10/18/2021  1. Interval LAWRENCE drain placement and effective drainage of right perihepatic fluid collection since 10/07/2021. Very small remaining low density rim of perihepatic fluid.  2. Mildly increased fat stranding around the atrophic pancreas, resembling low-level changes of pancreatitis, however, this may simply reflect mild fat stranding from recent surgery instead. Very small right effusion and mild right basilar discoid atelectasis.  3. Decompressed appearance of the colon containing only fluid, nonspecific except for diarrhea. No visible large or small bowel inflammation.  4. Interval biliary stent placement, stent in expected location, with no significant bladder dilatation.   5. Trabeculated bladder with multiple diverticula again noted.  DICTATED:   10/14/2021 EDITED/ls :   10/14/2021   This report was finalized on 10/18/2021 10:18 AM by Dr. Sean Kaminski MD.      XR Abdomen KUB    Result Date: 10/13/2021  Biliary stent projects and grossly unchanged  position in the right upper quadrant. Adjacent flat LAWRENCE drain is noted.  This report was finalized on 10/13/2021 12:56 PM by Corey Castillo.      FL ercp biliary duct only    Result Date: 10/12/2021  Fluoroscopy provided during ERCP.  D: 10/12/2021 E: 10/12/2021   This report was finalized on 10/12/2021 8:40 PM by Dr. Sean Kaminski MD.           Impression:     Problems:  -Sepsis with leukocytosis with neutrophilia, lactic acidosis, elevated pro calcitonin level- due to intra-abdominal source/abdominal wall cellulitis. - leukocytosis is slightly worse today but he looks good  - S/p  ERCP 10/11 with stent and no obvious signs of bile leak.  -Intra-abdominal hematoma, now status post washout procedure on 10/8- Candida tropicalis and Candida albicans from culture  -Abdominal wall cellulitis- improved  -Possible developing intra-abdominal abscess vs. Seroma. No signs of biliary leak on NM hepatobiliary scan but stent was placed  -Recent cholangitis and cholecystitis status post recent cholecystectomy on 9/27 and ERCP on 9/28  -Recent enterococcus bacteremia  -Recent Clostridium perfringens bacteremia  -Recent pancreatitis  -Macrocytic anemia  -Elevated troponin level  -Elevated LFTs  -RUDY on CKD stage IIIB- due to sepsis vs dehydration  -Coronary artery disease status post CABG/stents  -systolic heart failure, LVEF was 26-30% on echo in 09/2021  -history of cardiac thrombus, on anticoagulation with eliquis  -Diabetes mellitus type 2 with hyperglycemia  -Essential hypertension  -Hyperlipidemia  -Sarcoidosis/chronic prednisone  -prolonged QTc interval  -elevated CPK level  -external oral blisters, possible herpes labialis  - Diarrhea- improved  - nausea- ongoing. Not eating much    PLAN:    -continue to follow cbc with diff, cmp, crp  -continue Zosyn 3.375 g IV every 8 hours  -continue Micafungin (avoiding fluconazole in the setting of his prolonged QTc interval)  -status post washout procedure by Dr. Callahan 10/8.  2 LAWRENCE  drains left in place.  Surgical cultures - Candida tropicalis and Candida albicans  -s/p ERCP on 10/11 with no evidence of bile leak, stent placed.  -weekly Groshong cath dressing changes    I am okay with his discharge whenever he is cleared by surgery.  Leukocytosis is now improving.  He remains clinically stable.  LAWRENCE drains remain in place but may be removed soon.    Tentative antibiotic plans:    UM/LUC:  1.  Zosyn 3.375 g IV every 8 hours  2.  Micafungin 100 mg IV every 24 hours  3.  Plan to continue the above antimicrobials at least until 10/25/21  4.  Weekly CBC with differential, CMP, ESR, and CRP- labs need to be faxed to [facility] at [phone]  5.  Weekly Groshong catheter dressing changes  6.  Needs to follow-up with me on 10/25/21  7.  Please fax a copy of my orders to [facility] at [phone] and call [phone] with final discharge plans.    I discussed with the patient's wife at bedside today    I discussed with Sammi JOHNSON today    Complex MDM.     Diaz Moreno MD  10/18/2021

## 2021-10-19 PROBLEM — E87.0 HYPERNATREMIA: Status: ACTIVE | Noted: 2021-01-01

## 2021-10-19 PROBLEM — D64.9 ANEMIA: Status: ACTIVE | Noted: 2021-01-01

## 2021-10-19 PROBLEM — E87.5 HYPERKALEMIA: Status: ACTIVE | Noted: 2021-01-01

## 2021-10-19 PROBLEM — E87.5 HYPERKALEMIA: Status: RESOLVED | Noted: 2021-01-01 | Resolved: 2021-01-01

## 2021-10-19 PROBLEM — I50.43 ACUTE ON CHRONIC COMBINED SYSTOLIC AND DIASTOLIC CHF (CONGESTIVE HEART FAILURE) (HCC): Status: ACTIVE | Noted: 2021-01-01

## 2021-10-19 PROBLEM — I47.29 NONSUSTAINED VENTRICULAR TACHYCARDIA (HCC): Status: ACTIVE | Noted: 2021-01-01

## 2021-10-19 PROBLEM — R74.01 ELEVATED TRANSAMINASE LEVEL: Status: RESOLVED | Noted: 2021-01-01 | Resolved: 2021-01-01

## 2021-10-19 PROBLEM — L03.311 ABDOMINAL WALL CELLULITIS: Status: RESOLVED | Noted: 2021-01-01 | Resolved: 2021-01-01

## 2021-10-19 PROBLEM — A41.9 SEPSIS (HCC): Status: RESOLVED | Noted: 2021-01-01 | Resolved: 2021-01-01

## 2021-10-19 PROBLEM — R74.01 ELEVATED TRANSAMINASE LEVEL: Status: ACTIVE | Noted: 2021-01-01

## 2021-10-19 PROBLEM — K29.80 DUODENITIS: Status: ACTIVE | Noted: 2021-01-01

## 2021-10-19 PROBLEM — E87.0 HYPERNATREMIA: Status: RESOLVED | Noted: 2021-01-01 | Resolved: 2021-01-01

## 2021-10-19 PROBLEM — E87.20 METABOLIC ACIDOSIS: Status: RESOLVED | Noted: 2021-01-01 | Resolved: 2021-01-01

## 2021-10-19 PROBLEM — E87.20 METABOLIC ACIDOSIS: Status: ACTIVE | Noted: 2021-01-01

## 2021-10-19 PROBLEM — I50.43 ACUTE ON CHRONIC COMBINED SYSTOLIC AND DIASTOLIC CHF (CONGESTIVE HEART FAILURE) (HCC): Status: RESOLVED | Noted: 2021-01-01 | Resolved: 2021-01-01

## 2021-10-19 PROBLEM — T81.40XA POST-OPERATIVE INFECTION: Status: RESOLVED | Noted: 2021-01-01 | Resolved: 2021-01-01

## 2021-10-19 PROBLEM — R79.89 ELEVATED LFTS: Status: RESOLVED | Noted: 2021-01-01 | Resolved: 2021-01-01

## 2021-10-19 PROBLEM — T81.43XA POSTOPERATIVE INTRA-ABDOMINAL ABSCESS: Status: RESOLVED | Noted: 2021-01-01 | Resolved: 2021-01-01

## 2021-10-19 PROBLEM — N17.9 ACUTE KIDNEY INJURY SUPERIMPOSED ON CHRONIC KIDNEY DISEASE (HCC): Status: RESOLVED | Noted: 2021-01-01 | Resolved: 2021-01-01

## 2021-10-19 PROBLEM — K83.9 BILE LEAK: Status: RESOLVED | Noted: 2021-01-01 | Resolved: 2021-01-01

## 2021-10-19 PROBLEM — R91.8 MULTIPLE LUNG NODULES: Status: ACTIVE | Noted: 2021-01-01

## 2021-10-19 PROBLEM — R77.8 ELEVATED TROPONIN: Status: RESOLVED | Noted: 2021-01-01 | Resolved: 2021-01-01

## 2021-10-19 PROBLEM — N18.9 ACUTE KIDNEY INJURY SUPERIMPOSED ON CHRONIC KIDNEY DISEASE (HCC): Status: RESOLVED | Noted: 2021-01-01 | Resolved: 2021-01-01

## 2021-10-19 NOTE — PLAN OF CARE
Problem: Fall Injury Risk  Goal: Absence of Fall and Fall-Related Injury  Outcome: Ongoing, Progressing  Intervention: Identify and Manage Contributors to Fall Injury Risk  Recent Flowsheet Documentation  Taken 10/19/2021 0724 by Sydnie Finley RN  Medication Review/Management: medications reviewed  Taken 10/18/2021 1800 by Sydnie Finley RN  Medication Review/Management: medications reviewed  Intervention: Promote Injury-Free Environment  Recent Flowsheet Documentation  Taken 10/19/2021 0724 by Sydnie Finley RN  Safety Promotion/Fall Prevention: activity supervised  Taken 10/18/2021 1800 by Sydnie Finley RN  Safety Promotion/Fall Prevention: activity supervised     Problem: Diabetes Comorbidity  Goal: Blood Glucose Level Within Desired Range  Outcome: Ongoing, Progressing     Problem: Adult Inpatient Plan of Care  Goal: Plan of Care Review  Outcome: Ongoing, Progressing  Goal: Patient-Specific Goal (Individualized)  Outcome: Ongoing, Progressing  Goal: Absence of Hospital-Acquired Illness or Injury  Outcome: Ongoing, Progressing  Intervention: Identify and Manage Fall Risk  Recent Flowsheet Documentation  Taken 10/19/2021 0724 by Sydnie Finley RN  Safety Promotion/Fall Prevention: activity supervised  Taken 10/18/2021 1800 by Sydnie Finley RN  Safety Promotion/Fall Prevention: activity supervised  Intervention: Prevent Skin Injury  Recent Flowsheet Documentation  Taken 10/19/2021 0724 by Sydnie Finley RN  Body Position: position changed independently  Taken 10/18/2021 1800 by Sydnie Finley RN  Body Position: position changed independently  Intervention: Prevent Infection  Recent Flowsheet Documentation  Taken 10/19/2021 0724 by Sydnie Finley RN  Infection Prevention:   rest/sleep promoted   single patient room provided  Goal: Optimal Comfort and Wellbeing  Outcome: Ongoing, Progressing  Goal: Readiness for Transition of Care  Outcome: Ongoing, Progressing     Problem: Skin Injury Risk Increased  Goal: Skin  Health and Integrity  Outcome: Ongoing, Progressing  Intervention: Optimize Skin Protection  Recent Flowsheet Documentation  Taken 10/19/2021 0724 by Sydnie Finley, RN  Head of Bed ([facility]): HOB at 20 degrees  Taken 10/18/2021 1800 by Sydnie Finley, RN  Head of Bed ([facility]): HOB at 20 degrees   Goal Outcome Evaluation:

## 2021-10-19 NOTE — PROGRESS NOTES
Jeremías Soto  1955  [id number]    Date of Consult: 10/5/21  Requesting Provider: Vincent Crawford MD  Evaluating Physician: Diaz Moreno MD    Chief Complaint: abdominal pain and redness    Reason for Consultation: sepsis secondary to an abdominal wall abscess/cellulitis    History of present illness:    Jeremías Soto is a 66 y.o.  Yr old male with history of coronary artery disease, CK D stage III B, diabetes mellitus type 2, hyperlipidemia, hypertension, sarcoidosis, and history of CVA who I recently evaluated during an admission to [facility] last month for enterococcus bacteremia and Clostridium perfringens bacteremia due to a biliary source/gallbladder source (cholangitis and cholecystitis).  The patient underwent laparoscopic cholecystectomy on 9/27 and ERCP with sphincterotomy stone extraction on 9/28.  He additionally had aerococcus urinae from urine culture. Due to his baseline renal dysfunction he did have a Groshong catheter placed for IV antibiotics.  He clinically improved with improvement in his LFTs and sepsis. He was discharged on 9/29 with plans to continue Zosyn at least until 10/11/21. I have not yet seen him in outpatient follow-up but he was scheduled to follow-up with me tomorrow on 10/6.      He was doing well apparently until 2-3 days ago when he noticed redness developing over his right abdominal wall.  The erythema has gotten progressively worse with some swelling and warmth and tenderness.  As far as I know he did not contact our clinic regarding this erythema. He was not having any fevers or chills at home.  He presented to the ER early this morning for evaluation of this redness. Since arrival, he was noted to have a maximum temperature of 99.1°F. labs showed a CRP of 46.11, troponin elevated to 0.07, proBNP of 8353, INR of 2.72, White blood cell count of 24.54 (up from 13.29 just before discharge), hemoglobin of 12, lactic acid of 4.1-->2.9, creatinine of  2.84 (up from 2.15 just prior to discharge). COVID-19 PCR was negative. White blood cell count is now improved to 13.69 on antibiotics.creatinine is worse at 3.09 today.  LFTs remain elevated with an ALT of 94, an AST of 137, an alkaline phosphatase of 176, and a total bilirubin of 1.4. CT abdomen and pelvis was done yesterday and showed cholecystectomy with a fluid collection in the gallbladder fossa containing a bubble of air with differential considerations including a seroma/hematoma, biloma, or possible developing abscess.  No biliary obstruction was seen.  There was also fat stranding in the ventral abdominal wall and right flank that could reflect bland edema or cellulitis. The patient's antibiotics have been changed to vancomycin, cefepime, and Flagyl. Repeat blood cultures and urine culture are in progress. Surgery consult has been placed. Plan is for IR drainage procedure but having to wait on this due to his anticoagulation that he has been on. He underwent NM Hepatobiliary scan today with no evidence for abnormal tracer activity to suggest leak.  The infectious disease team has been consulted for recommendations.    Subjective:    10/6/21: the patient states that his abdominal pain has been improving although he was having some increased right-sided abdominal pain after I went in the room today as he has just eaten something. No fevers. Tolerating the abx well without ADRs.    10/7/21: The patient is doing worse today.  Still having some right-sided abdominal pain.  He was taken for repeat CT abdomen and pelvis today as his leukocytosis worsened And he did have a new 16 x 6 cm hepatic fluid collection with intermittent heterogenous and hyperdense contents compatible with a large hematoma.  He did have a new small right pleural effusion with some adjacent consolidation or atelectasis. He denies any nausea.  No fevers    10/8/21: The patient was very somnolent today although he just recently got back from  the OR.  Dr. Callahan took the patient to the OR today and did not see any acute bleeding but did see some old blood/hematoma.  No necrotic fascia noted.  2 LAWRENCE drains left in place, one of the liver and another in the gallbladder fossa.  No fevers overnight.  Leukocytosis is slightly better.  His wife is at bedside and states that he is just been very somnolent after his procedure and during week but no other acute issues.    10/9/21: the patient states that his abdominal pain is improving. No fevers. Tolerating abx well without ADRs. Did have worsening hypoxia overnight to 10L NC but now improved back to 2L NC. Diuresis per nephrology and cardiology.    10/10/21: The patient has serous drainage from his LAWRENCE drain #1 but his LAWRENCE drain #2 has her bile and Dr. Callahan suspects that the patient has a bile leak from his prior cholecystectomy procedure.  Dr. Sylvester with GI has been consulted for ERCP tomorrow. The patient is somnolent today.  His wife remains at bedside.  She states that he has had a fairly good today although he is somnolent.  No fevers.  No history available from the patient    10/11/21: LAWRENCE drains in place.  Taken for ERCP today by Dr. Brunner with no overt bile leak seen and balloon sweep showed no stones.  A stent was placed in the CBD to the common hepatic duct and across the cystic duct with plans to remove in 1 month.  Patient is sitting up in bed with food in front of him, but he is not hungry.  He denies f/c, sob, v/d, rashes. He has some nausea.  Had NSVT on Sunday.  He was started on amiodarone and may go home with LifeVest. He remains afebrile. Creatinine improved. Has some diarrhea.     10/12/21: The patient remains afebrile.  Abdominal pain is improving.  LAWRENCE drain still remain in place.  Now growing Candida albicans and Candida tropicalis from his body fluid cultures.  Diarrhea has improved    10/13/21: the patient is nauseated today. No worsening abdominal pain. LAWRENCE drains remain in place. No  fevers.     10/14/21: The patient does have a substantial worsening of his leukocytosis today from 11-->21.  He denies having any severe diarrhea although he did have a bowel movement this morning.  Abdominal pain has improved.  He still having some anorexia and mild nausea.  No fevers.  Repeat CT abdomen and pelvis was done today with the official report pending but are my discussion with Dr. Kaminski, the patient still has a fluid collection by the liver but LAWRNECE drains remain in place and there is no other signs of new infection/acute abnormality.    10/15/21: The patient remains afebrile.  Feeling better with less abdominal pain.  White blood cell count better, down to 14.28 from 21. LAWRENCE drains remain in place    10/18/21: 2 LAWRENCE drains remain in place.  Abdominal pain is improved.  He is feeling better today.  No nausea or diarrhea.  No fevers.  He does have a worsening leukocytosis up to 15 today.    10/19/21: The patient feels well today.  Not having significant abdominal pain.  2 LAWRENCE drains remain in place over his right abdomen.  White blood cell count slightly higher today at 15.98 but did not substantially higher than yesterday.  He remains afebrile.  Surgery has cleared him for discharge and plans to leave LAWRENCE drains in place with close follow-up in one week in their clinic.    Past Medical History:   Diagnosis Date   • Cancer (HCC)     skin   • Coronary artery disease     stent   • Diabetes mellitus (HCC)    • Elevated cholesterol    • Heart attack (HCC) 2003   • Hypertension    • Sarcoid    • Sleep apnea     no cpap   • SOB (shortness of breath)    • Stroke (HCC)     Pt. states that he has had 2 mini strokes. No residual        Past Surgical History:   Procedure Laterality Date   • CARDIAC CATHETERIZATION  2003    cardiac stent x2 after s/p CABG   • CATARACT EXTRACTION, BILATERAL     • CHOLECYSTECTOMY N/A 10/8/2021    Procedure: LAPAROSCOPIC WASHOUT;  Surgeon: David Callahan MD;  [facility];   Service: General;  Laterality: N/A;   • CHOLECYSTECTOMY WITH INTRAOPERATIVE CHOLANGIOGRAM N/A 9/27/2021    Procedure: CHOLECYSTECTOMY LAPAROSCOPIC INTRAOPERATIVE CHOLANGIOGRAM;  Surgeon: Gaudencio Wolfe MD;  Location:  [facility] OR;  Service: General;  Laterality: N/A;   • COLONOSCOPY     • CORONARY ARTERY BYPASS GRAFT  2002   • ENDOSCOPY     • ERCP N/A 9/28/2021    Procedure: ENDOSCOPIC RETROGRADE CHOLANGIOPANCREATOGRAPHY;  Surgeon: Brunner, Mark I, MD;  Location:  [facility];  Service: Gastroenterology;  Laterality: N/A;  sphincterotomy made, balloon sweep of bile duct done with 9-12 balloon.    • ERCP N/A 10/11/2021    Procedure: ENDOSCOPIC RETROGRADE CHOLANGIOPANCREATOGRAPHY with biliary wall stent;  Surgeon: Brunner, Mark I, MD;  Location: [facility] ENDOSCOPY;  Service: Gastroenterology;  Laterality: N/A;  BALLOON SWEEP 1229  10X8 STENT PLACED IN CBD    • EXTRACORPOREAL SHOCKWAVE LITHOTRIPSY (ESWL), STENT INSERTION/REMOVAL Bilateral 8/21/2020    Procedure: EXTRACORPOREAL SHOCKWAVE LITHOTRIPSY BILATERAL WITH STENT PLACEMENT LEFT;  Surgeon: Ethan Barnes Jr., MD;  Location:  [facility] OR;  Service: Urology;  Laterality: Bilateral;   • SKIN CANCER EXCISION       Social history:  The patient is .  He lives in [facility].  No history of tobacco, alcohol, or illicit drug use.    Family history:  family history includes COPD in his mother; Cancer in his brother; Diabetes in his father and sister; Heart disease in his father; Hypertension in his father; Obesity in his sister.    No Known Allergies    Medication:  Current Facility-Administered Medications   Medication Dose Route Frequency Provider Last Rate Last Admin   • amiodarone (PACERONE) tablet 200 mg  200 mg Oral BID With Meals Manjula Owens MD   200 mg at 10/19/21 0847   • aspirin EC tablet 81 mg  81 mg Oral Daily Manjula Owens MD   81 mg at 10/19/21 0847   • atorvastatin (LIPITOR) tablet 40 mg  40 mg Oral Nightly Luke  Gayle CASTAÑEDA MD   40 mg at 10/18/21 2105   • bumetanide (BUMEX) tablet 1 mg  1 mg Oral Daily TevinAleena pandya MD   1 mg at 10/19/21 0847   • dextrose (D50W) 25 g/ 50mL Intravenous Solution 25 g  25 g Intravenous Q15 Min PRN Brunner, Mark I, MD   25 g at 10/11/21 0742   • dextrose (GLUTOSE) oral gel 15 g  15 g Oral Q15 Min PRN Brunner, Mark I, MD       • docosanol (ABREVA) 10 % cream 1 application  1 application Topical 5x Daily Brunner, Mark I, MD   1 application at 10/19/21 0848   • dorzolamide (TRUSOPT) 2 % 1 drop, timolol (TIMOPTIC) 0.5 % 1 drop for Cosopt 22.3-6.8 mg/mL   Both Eyes BID Brunner, Mark I, MD   Given at 10/19/21 0844   • glucagon (human recombinant) (GLUCAGEN DIAGNOSTIC) injection 1 mg  1 mg Subcutaneous Q15 Min PRN Brunner, Mark I, MD       • insulin lispro (humaLOG) injection 0-7 Units  0-7 Units Subcutaneous 4x Daily With Meals & Nightly Brunner, Mark I, MD   2 Units at 10/19/21 1143   • insulin lispro (humaLOG) injection 3 Units  3 Units Subcutaneous TID With Meals Sammi Tinsley PA-C   3 Units at 10/18/21 1718   • lactobacillus acidophilus (RISAQUAD) capsule 1 capsule  1 capsule Oral Daily Diaz Moreno MD   1 capsule at 10/19/21 0847   • lisinopril (PRINIVIL,ZESTRIL) tablet 5 mg  5 mg Oral Q24H TevinAleena MD   5 mg at 10/19/21 0847   • magnesium sulfate 4 gram infusion - Mg less than or equal to 1mg/dL  4 g Intravenous PRN Justus Milan MD        Or   • magnesium sulfate 3 gram infusion (1gm x 3) - Mg 1.1 - 1.5 mg/dL  1 g Intravenous PRN Justus Milan MD        Or   • Magnesium Sulfate 2 gram infusion- Mg 1.6 - 1.9 mg/dL  2 g Intravenous PRN Justus Milan MD 25 mL/hr at 10/17/21 2041 2 g at 10/17/21 2041   • metoprolol tartrate (LOPRESSOR) tablet 75 mg  75 mg Oral Q12H Manjula Owens MD       • micafungin 100 mg/100 mL 0.9% NS IVPB (mbp)  100 mg Intravenous Q24H Diaz Moreno MD   100 mg at 10/18/21 2106   • ondansetron (ZOFRAN) injection 4  mg  4 mg Intravenous Q6H PRN Justus Milan MD       • pantoprazole (PROTONIX) EC tablet 40 mg  40 mg Oral Daily Brunner, Mark I, MD   40 mg at 10/19/21 0847   • piperacillin-tazobactam (ZOSYN) 3.375 g in iso-osmotic dextrose 50 ml (premix)  3.375 g Intravenous Q8H Diaz Moreno MD   3.375 g at 10/19/21 0844   • potassium & sodium phosphates (PHOS-NAK) 280-160-250 MG packet - for Phosphorus less than 1.25 mg/dL  2 packet Oral Q6H PRN Justus Milan MD        Or   • potassium & sodium phosphates (PHOS-NAK) 280-160-250 MG packet - for Phosphorus 1.25 - 2.5 mg/dL  2 packet Oral Q6H PRN Justus Milan MD   2 packet at 10/14/21 1219   • potassium chloride (MICRO-K) CR capsule 40 mEq  40 mEq Oral Once Sammi Tinsley PA-C       • predniSONE (DELTASONE) tablet 10 mg  10 mg Oral Daily Brunner, Mark I, MD   10 mg at 10/19/21 0847   • rOPINIRole (REQUIP) tablet 0.25 mg  0.25 mg Oral Nightly Brunner, Mark I, MD   0.25 mg at 10/18/21 2105   • Sodium Chloride (PF) 0.9 % 10 mL  10 mL Intravenous PRN Brunner, Mark I, MD       • sodium chloride 0.9 % flush 10 mL  10 mL Intravenous Q12H Brunner, Mark I, MD   10 mL at 10/18/21 2105   • sodium chloride 0.9 % flush 10 mL  10 mL Intravenous PRN Brunner, Mark I, MD       • tamsulosin (FLOMAX) 24 hr capsule 0.4 mg  0.4 mg Oral Daily Brunner, Mark I, MD   0.4 mg at 10/19/21 0847   • terazosin (HYTRIN) capsule 1 mg  1 mg Oral Nightly Lesley Jean PA   1 mg at 10/18/21 2105         Antibiotics:  Anti-Infectives (From admission, onward)    Ordered     Dose/Rate Route Frequency Start Stop    10/19/21 1111  micafungin 100 mg/100 mL 0.9% NS IVPB (mbp)        Ordering Provider: Sammi Tinsley PA-C    100 mg Intravenous Every 24 Hours 10/19/21 0000      10/19/21 1111  piperacillin-tazobactam (ZOSYN) 3-0.375 GM/50ML IVPB        Ordering Provider: Sammi Tinsley PA-C    3.375 g Intravenous Every 8 Hours 10/19/21 0000      10/12/21 0955  piperacillin-tazobactam  "(ZOSYN) 3.375 g in iso-osmotic dextrose 50 ml (premix)        Ordering Provider: Diaz Moreno MD    3.375 g  over 4 Hours Intravenous Every 8 Hours 10/12/21 1600 10/25/21 2359    10/12/21 0955  piperacillin-tazobactam (ZOSYN) 3.375 g in iso-osmotic dextrose 50 ml (premix)        Ordering Provider: Diaz Moreno MD    3.375 g  over 30 Minutes Intravenous Once 10/12/21 1045 10/12/21 1459    10/07/21 0852  meropenem (MERREM) 1 g/100 mL 0.9% NS VTB (mbp)        Ordering Provider: Thierry Chinchilla PharmD    1 g  over 30 Minutes Intravenous Once 10/07/21 0945 10/07/21 1338    10/06/21 1506  valACYclovir (VALTREX) tablet 500 mg        Ordering Provider: Don Draper DO    500 mg Oral Every 12 Hours Scheduled 10/06/21 2100 10/07/21 0854    10/05/21 1632  micafungin 100 mg/100 mL 0.9% NS IVPB (mbp)        Ordering Provider: Diaz Moreno MD    100 mg  over 60 Minutes Intravenous Every 24 Hours 10/05/21 1800 10/25/21 2359    10/04/21 2101  vancomycin 1750 mg/500 mL 0.9% NS IVPB ([facility])        Ordering Provider: Toño Lainez MD    20 mg/kg × 81.9 kg (Adjusted)  250 mL/hr over 120 Minutes Intravenous Once 10/04/21 2103 10/05/21 0132    10/04/21 2101  cefepime (MAXIPIME) 2 g/100 mL 0.9% NS (mbp)        Ordering Provider: Toño Lainez MD    2 g  200 mL/hr over 30 Minutes Intravenous Once 10/04/21 2103 10/04/21 2250            Review of Systems    As above    Physical Exam:   Vital Signs   /82 (BP Location: Right arm, Patient Position: Sitting)   Pulse 80   Temp 98.1 °F (36.7 °C) (Oral)   Resp 16   Ht 170.2 cm (67.01\")   Wt 90.8 kg (200 lb 1.6 oz)   SpO2 95%   BMI 31.33 kg/m²     GENERAL:sitting in a bedside chair.  No acute distress.  HENT: Normocephalic, atraumatic.  Labial ulcers improved  Eyes: No conjunctival injection. No icterus.  HEART: RRR; No murmur, rubs, gallops.   LUNGS: clear to auscultation anteriorly.  Nonlabored breathing  ABDOMEN: erythema over right " abdominal wall has resolved.  Serous drainage from both LAWRENCE drains in right abdomen.  Abdomen is nontender to palpation  SKIN: no new rashes noted. No jaundice  NEURO: Awake and alert. Oriented ×3. Normal speech and cognition  PSYCHIATRIC: Cooperative. Calm    Groshong cath site is without erythema or drainage    Laboratory Data    Results from last 7 days   Lab Units 10/19/21  0807 10/18/21  0451 10/17/21  0445   WBC 10*3/mm3 15.98* 15.54* 12.88*   HEMOGLOBIN g/dL 11.0* 10.4* 9.5*   HEMATOCRIT % 33.1* 30.9* 29.9*   PLATELETS 10*3/mm3 237 271 208     Results from last 7 days   Lab Units 10/19/21  0807   SODIUM mmol/L 137   POTASSIUM mmol/L 3.4*   CHLORIDE mmol/L 99   CO2 mmol/L 23.0   BUN mg/dL 26*   CREATININE mg/dL 1.88*   GLUCOSE mg/dL 130*   CALCIUM mg/dL 8.8     Results from last 7 days   Lab Units 10/19/21  0807   ALK PHOS U/L 178*   BILIRUBIN mg/dL 0.9   ALT (SGPT) U/L 55*   AST (SGOT) U/L 48*               Estimated Creatinine Clearance: 40.4 mL/min (A) (by C-G formula based on SCr of 1.88 mg/dL (H)).       Microbiology:  Microbiology Results (last 10 days)     Procedure Component Value - Date/Time    Blood Culture - Blood, Arm, Left [[id number]]  (Normal) Collected: 10/14/21 1412    Lab Status: Preliminary result Specimen: Blood from Arm, Left Updated: 10/18/21 1515     Blood Culture No growth at 4 days    Narrative:      AEROBIC BOTTLE ONLY    Blood Culture - Blood, Hand, Left [[id number]]  (Normal) Collected: 10/14/21 1410    Lab Status: Preliminary result Specimen: Blood from Hand, Left Updated: 10/18/21 1515     Blood Culture No growth at 4 days            Radiology:  CT Abdomen Pelvis Without Contrast    Result Date: 10/18/2021  1. Interval LAWRENCE drain placement and effective drainage of right perihepatic fluid collection since 10/07/2021. Very small remaining low density rim of perihepatic fluid.  2. Mildly increased fat stranding around the atrophic pancreas, resembling low-level changes of pancreatitis,  however, this may simply reflect mild fat stranding from recent surgery instead. Very small right effusion and mild right basilar discoid atelectasis.  3. Decompressed appearance of the colon containing only fluid, nonspecific except for diarrhea. No visible large or small bowel inflammation.  4. Interval biliary stent placement, stent in expected location, with no significant bladder dilatation.   5. Trabeculated bladder with multiple diverticula again noted.  DICTATED:   10/14/2021 EDITED/ls :   10/14/2021   This report was finalized on 10/18/2021 10:18 AM by Dr. Sean Kaminski MD.      XR Abdomen KUB    Result Date: 10/13/2021  Biliary stent projects and grossly unchanged position in the right upper quadrant. Adjacent flat LAWRENCE drain is noted.  This report was finalized on 10/13/2021 12:56 PM by Corey Castillo.      FL ercp biliary duct only    Result Date: 10/12/2021  Fluoroscopy provided during ERCP.  D: 10/12/2021 E: 10/12/2021   This report was finalized on 10/12/2021 8:40 PM by Dr. Sean Kaminski MD.           Impression:     Problems:  -Sepsis with leukocytosis with neutrophilia, lactic acidosis, elevated pro calcitonin level- due to intra-abdominal source/abdominal wall cellulitis. - leukocytosis is slightly worse over the last 48 hours but he looks good and remains afebrile with no significant abdominal pain.  - S/p  ERCP 10/11 with stent and no obvious signs of bile leak.  -Intra-abdominal hematoma, now status post washout procedure on 10/8- Candida tropicalis and Candida albicans from culture  -Abdominal wall cellulitis- improved  -Possible developing intra-abdominal abscess vs. Seroma. No signs of biliary leak on NM hepatobiliary scan but stent was placed  -Recent cholangitis and cholecystitis status post recent cholecystectomy on 9/27 and ERCP on 9/28  -Recent enterococcus bacteremia  -Recent Clostridium perfringens bacteremia  -Recent pancreatitis  -Macrocytic anemia  -Elevated troponin level  -Elevated  LFTs  -RUDY on CKD stage IIIB- due to sepsis vs dehydration  -Coronary artery disease status post CABG/stents  -systolic heart failure, LVEF was 26-30% on echo in 09/2021  -history of cardiac thrombus, on anticoagulation with eliquis  -Diabetes mellitus type 2 with hyperglycemia  -Essential hypertension  -Hyperlipidemia  -Sarcoidosis/chronic prednisone  -prolonged QTc interval  -elevated CPK level  -external oral blisters, possible herpes labialis  - Diarrhea- improved  - nausea- ongoing. Not eating much    PLAN:    -continue to follow cbc with diff, cmp, crp  -continue Zosyn 3.375 g IV every 8 hours  -continue Micafungin (avoiding fluconazole in the setting of his prolonged QTc interval)  -status post washout procedure by Dr. Callahan 10/8.  2 LAWRENCE drains left in place.  Surgical cultures - Candida tropicalis and Candida albicans  -s/p ERCP on 10/11 with no evidence of bile leak, stent placed.  -weekly Groshong cath dressing changes    I am okay with his discharge whenever he is cleared by surgery.  Leukocytosis is ongoing but relatively stable from yesterday..  He remains clinically stable.  LAWRENCE drains remain in place but may be removed soon.    Tentative antibiotic plans:    UM/LUC:  1.  Zosyn 3.375 g IV every 8 hours  2.  Micafungin 100 mg IV every 24 hours  3.  Plan to continue the above antimicrobials at least until 10/25/21  4.  Weekly CBC with differential, CMP, ESR, and CRP- labs need to be faxed to [facility] at [phone]  5.  Weekly Groshong catheter dressing changes  6.  Needs to follow-up with me on 10/25/21  7.  Please fax a copy of my orders to [facility] at [phone] and call [phone] with final discharge plans.  8.  I will set him up with a repeat CT abdomen and pelvis without contrast on 10/25/21 prior to his appointment.  I discussed with our office.  9.  LAWRENCE drains will be left in place per surgery.  He will follow-up with surgery within 1 week.    Complex MDM.     Diaz Moreno,  MD  10/19/2021

## 2021-10-19 NOTE — PROGRESS NOTES
Plans for discharge noted.  The patient will follow up with Dr. Lynch at [facility].    Manjula Owens MD, FACC

## 2021-10-19 NOTE — NURSING NOTE
Woc follow-up for left arm skin tear nonhealing.  Skin tear is much smaller in size.  Distal area of reapproximated skin has reincorporated.  Scab that Woc had honey applied to is much smaller.  And much thinner.  Wound now measures 5 x 3.5 x 0.1 cm lateral aspect of wound is dry pink wound bed.  Continue with applying Thera honey to wound covering with Xeroform (multilayer) top with dry 4 x 4 and wrapped with Kerlix.  May be changed every other day.  Family member at bedside instructed on how to do dressing change.  Dressing change supplies given.  Patient had questions about other wounds, these wounds wound care was not consulted for.  Wound care reassured patient that the doctor has given nurses orders on how to do to change these wounds and they should ask about care of wounds at home for home.  Woc will follow.

## 2021-10-19 NOTE — PROGRESS NOTES
"   LOS: 14 days    Patient Care Team:  Vincent Crawford MD as PCP - General (Family Medicine)  Manjula Owens MD as Consulting Physician (Cardiology)    Chief Complaint:  Abdominal pain    Subjective     Interval History:     No acute events overnight. No new complaints.     Review of Systems:   No CP or SOA , fever, chills, rigors, rash, N/V, Constipation.       Objective     Vital Sign Min/Max for last 24 hours  Temp  Min: 98 °F (36.7 °C)  Max: 98.3 °F (36.8 °C)   BP  Min: 113/77  Max: 141/83   Pulse  Min: 70  Max: 90   Resp  Min: 18  Max: 20   SpO2  Min: 95 %  Max: 95 %   No data recorded   No data recorded     Flowsheet Rows      First Filed Value   Admission Height 177.8 cm (70\") Documented at 10/04/2021 1842   Admission Weight 95.3 kg (210 lb) Documented at 10/04/2021 1842          I/O this shift:  In: 100 [P.O.:100]  Out: -   I/O last 3 completed shifts:  In: 714 [P.O.:714]  Out: 2065 [Urine:1950; Drains:115]    Physical Exam:    Gen: Alert, NAD   HENT: NC, AT, EOMI   NECK: Supple, no JVD, Trachea midline   LUNGS: CTA bilaterally, non labored respirtation   CVS: S1/S2 audible, RRR, no murmur   Abd: Soft, NT, ND, BS+   Ext: + pedal edema, no cyanosis   CNS: Alert, No focal deficit noted grossly  Psy: Cooperative  Skin: Warm, dry and intact      WBC WBC   Date Value Ref Range Status   10/19/2021 15.98 (H) 3.40 - 10.80 10*3/mm3 Final   10/18/2021 15.54 (H) 3.40 - 10.80 10*3/mm3 Final   10/17/2021 12.88 (H) 3.40 - 10.80 10*3/mm3 Final      HGB Hemoglobin   Date Value Ref Range Status   10/19/2021 11.0 (L) 13.0 - 17.7 g/dL Final   10/18/2021 10.4 (L) 13.0 - 17.7 g/dL Final   10/17/2021 9.5 (L) 13.0 - 17.7 g/dL Final      HCT Hematocrit   Date Value Ref Range Status   10/19/2021 33.1 (L) 37.5 - 51.0 % Final   10/18/2021 30.9 (L) 37.5 - 51.0 % Final   10/17/2021 29.9 (L) 37.5 - 51.0 % Final      Platlets No results found for: LABPLAT   MCV MCV   Date Value Ref Range Status   10/19/2021 86.2 79.0 - 97.0 fL " Final   10/18/2021 87.5 79.0 - 97.0 fL Final   10/17/2021 89.3 79.0 - 97.0 fL Final          Sodium Sodium   Date Value Ref Range Status   10/19/2021 137 136 - 145 mmol/L Final   10/18/2021 137 136 - 145 mmol/L Final   10/17/2021 135 (L) 136 - 145 mmol/L Final      Potassium Potassium   Date Value Ref Range Status   10/19/2021 3.4 (L) 3.5 - 5.2 mmol/L Final   10/18/2021 3.7 3.5 - 5.2 mmol/L Final   10/17/2021 3.7 3.5 - 5.2 mmol/L Final      Chloride Chloride   Date Value Ref Range Status   10/19/2021 99 98 - 107 mmol/L Final   10/18/2021 99 98 - 107 mmol/L Final   10/17/2021 101 98 - 107 mmol/L Final      CO2 CO2   Date Value Ref Range Status   10/19/2021 23.0 22.0 - 29.0 mmol/L Final   10/18/2021 23.0 22.0 - 29.0 mmol/L Final   10/17/2021 22.0 22.0 - 29.0 mmol/L Final      BUN BUN   Date Value Ref Range Status   10/19/2021 26 (H) 8 - 23 mg/dL Final   10/18/2021 25 (H) 8 - 23 mg/dL Final   10/17/2021 28 (H) 8 - 23 mg/dL Final      Creatinine Creatinine   Date Value Ref Range Status   10/19/2021 1.88 (H) 0.76 - 1.27 mg/dL Final   10/18/2021 1.73 (H) 0.76 - 1.27 mg/dL Final   10/17/2021 1.69 (H) 0.76 - 1.27 mg/dL Final      Calcium Calcium   Date Value Ref Range Status   10/19/2021 8.8 8.6 - 10.5 mg/dL Final   10/18/2021 8.9 8.6 - 10.5 mg/dL Final   10/17/2021 8.8 8.6 - 10.5 mg/dL Final      PO4 No results found for: CAPO4   Albumin Albumin   Date Value Ref Range Status   10/19/2021 3.30 (L) 3.50 - 5.20 g/dL Final   10/18/2021 3.20 (L) 3.50 - 5.20 g/dL Final   10/17/2021 3.10 (L) 3.50 - 5.20 g/dL Final      Magnesium Magnesium   Date Value Ref Range Status   10/18/2021 1.9 1.6 - 2.4 mg/dL Final   10/17/2021 1.6 1.6 - 2.4 mg/dL Final      Uric Acid No results found for: URICACID        Results Review:     I reviewed the patient's new clinical results.    amiodarone, 200 mg, Oral, BID With Meals  aspirin, 81 mg, Oral, Daily  atorvastatin, 40 mg, Oral, Nightly  bumetanide, 1 mg, Oral, Daily  docosanol, 1 application,  Topical, 5x Daily  dorzolamide-timolol, , Both Eyes, BID  insulin lispro, 0-7 Units, Subcutaneous, 4x Daily With Meals & Nightly  insulin lispro, 3 Units, Subcutaneous, TID With Meals  lactobacillus acidophilus, 1 capsule, Oral, Daily  lisinopril, 5 mg, Oral, Q24H  metoprolol tartrate, 75 mg, Oral, Q12H  micafungin (MYCAMINE) IV, 100 mg, Intravenous, Q24H  pantoprazole, 40 mg, Oral, Daily  piperacillin-tazobactam, 3.375 g, Intravenous, Q8H  predniSONE, 10 mg, Oral, Daily  rOPINIRole, 0.25 mg, Oral, Nightly  sodium chloride, 10 mL, Intravenous, Q12H  tamsulosin, 0.4 mg, Oral, Daily  terazosin, 1 mg, Oral, Nightly           Medication Review: yes    Assessment/Plan       Sepsis (HCC)    Diabetes mellitus (HCC)    Hypertension    Moderate obstructive sleep apnea    History of sarcoidosis (occular)    Elevated LFTs    Combined systolic and diastolic cardiac dysfunction (EF < 50% 2018)    Post-operative infection    Abdominal wall cellulitis    Postoperative intra-abdominal abscess    Elevated troponin    CAD (coronary artery disease)    CKD (chronic kidney disease), stage III (HCC)    Acute kidney injury superimposed on chronic kidney disease (HCC)    Bile leak      1- RUDY on CKD - pre-enal azotemia from sepsis/abdominal wall cellulitis - resolved.   2- CKD stage III - Baseline Scr 1.8- 2.2 - Nephrosclerosis and obstructive uropathy.   3- Hx of nephrolithiasis - requiring ureteral stent in past   4- Sepsis/abdominal wall cellulitis   5- Hx of CAD s/p CABG.   6- Metabolic acidosis  -stable.  7- Peripheral edema - improving.     Plan:  - Continue with current. Net negative 0.4 lit/24 hrs.   - Avoid nephrotoxic agents.   - Renal diet.   - Fluid restriction 1.5 lit/day     Follow up with [facility] in 2-3 weeks after discharge with renal function panel and UA    Aleena Abarca MD  10/19/21  09:37 EDT

## 2021-10-19 NOTE — PLAN OF CARE
Problem: Fall Injury Risk  Goal: Absence of Fall and Fall-Related Injury  10/19/2021 1646 by Sydnie Finley RN  Outcome: Met  10/19/2021 0743 by Sydnie Finley RN  Outcome: Ongoing, Progressing  Intervention: Identify and Manage Contributors to Fall Injury Risk  Recent Flowsheet Documentation  Taken 10/19/2021 1200 by Sydnie Finley RN  Medication Review/Management: medications reviewed  Taken 10/19/2021 0724 by Sydnie Finley RN  Medication Review/Management: medications reviewed  Intervention: Promote Injury-Free Environment  Recent Flowsheet Documentation  Taken 10/19/2021 1200 by Sydnie Finley RN  Safety Promotion/Fall Prevention: activity supervised  Taken 10/19/2021 1000 by Sydnie Finley RN  Safety Promotion/Fall Prevention: activity supervised  Taken 10/19/2021 0724 by Sydnie Finley RN  Safety Promotion/Fall Prevention: activity supervised     Problem: Diabetes Comorbidity  Goal: Blood Glucose Level Within Desired Range  10/19/2021 1646 by Sydnie Finley RN  Outcome: Met  10/19/2021 0743 by Sydnie Finley RN  Outcome: Ongoing, Progressing     Problem: Adult Inpatient Plan of Care  Goal: Plan of Care Review  10/19/2021 1646 by Sydnie Finley RN  Outcome: Met  10/19/2021 0743 by Sydnie Finley RN  Outcome: Ongoing, Progressing  Goal: Patient-Specific Goal (Individualized)  10/19/2021 1646 by Sydnie Finley RN  Outcome: Met  10/19/2021 0743 by Sydnie Finley RN  Outcome: Ongoing, Progressing  Goal: Absence of Hospital-Acquired Illness or Injury  10/19/2021 1646 by Sydnie Finley RN  Outcome: Met  10/19/2021 0743 by Sydnie Finley RN  Outcome: Ongoing, Progressing  Intervention: Identify and Manage Fall Risk  Recent Flowsheet Documentation  Taken 10/19/2021 1200 by Sydnie Finley RN  Safety Promotion/Fall Prevention: activity supervised  Taken 10/19/2021 1000 by Sydnie Finley RN  Safety Promotion/Fall Prevention: activity supervised  Taken 10/19/2021 0724 by Sydnie Finley RN  Safety Promotion/Fall  Prevention: activity supervised  Intervention: Prevent Skin Injury  Recent Flowsheet Documentation  Taken 10/19/2021 1200 by Sydnie Finley RN  Body Position: position changed independently  Taken 10/19/2021 1000 by Sydnie Finley RN  Body Position: position changed independently  Taken 10/19/2021 0724 by Sydnie Finley RN  Body Position: position changed independently  Intervention: Prevent Infection  Recent Flowsheet Documentation  Taken 10/19/2021 0724 by Sydnie Finley RN  Infection Prevention:   rest/sleep promoted   single patient room provided  Goal: Optimal Comfort and Wellbeing  10/19/2021 1646 by Sydnie Finley RN  Outcome: Met  10/19/2021 0743 by Sydnie Finley RN  Outcome: Ongoing, Progressing  Goal: Readiness for Transition of Care  10/19/2021 1646 by Sydnie Finley RN  Outcome: Met  10/19/2021 0743 by Sydnie Finley RN  Outcome: Ongoing, Progressing     Problem: Skin Injury Risk Increased  Goal: Skin Health and Integrity  10/19/2021 1646 by Sydnie Finley RN  Outcome: Met  10/19/2021 0743 by Sydnie Finley RN  Outcome: Ongoing, Progressing  Intervention: Optimize Skin Protection  Recent Flowsheet Documentation  Taken 10/19/2021 1200 by Sydnie Finley RN  Head of Bed (HOB): HOB at 20-30 degrees  Taken 10/19/2021 1000 by Sydnie Finley RN  Head of Bed (HOB): HOB at 20 degrees  Taken 10/19/2021 0724 by Sydnie Finley RN  Head of Bed (HOB): HOB at 20 degrees   Goal Outcome Evaluation:

## 2021-10-19 NOTE — PROGRESS NOTES
"Patient Name:  Jeremías Soto  YOB: 1955  [id number]    Surgery Progress Note    Date of visit: 10/19/2021    Subjective   No acute events overnight. No pain. No fevers/chills. Tolerating diet         Objective       /83 (BP Location: Left arm, Patient Position: Lying)   Pulse 79   Temp 98 °F (36.7 °C) (Oral)   Resp 18   Ht 170.2 cm (67.01\")   Wt 90.8 kg (200 lb 1.6 oz)   SpO2 95%   BMI 31.33 kg/m²     Intake/Output Summary (Last 24 hours) at 10/19/2021 0835  Last data filed at 10/19/2021 0555  Gross per 24 hour   Intake 477 ml   Output 1125 ml   Net -648 ml       CV:  Rhythm regular and rate regular  L:  Clear to auscultation bilaterally  Abd:  Bowel sounds positive, soft, nontender, nondistended, inc c/d/i JPs serosanguineous with bile tinge  Ext:  No cyanosis, clubbing, edema    Recent labs and imaging that are back at this time have been reviewed.   WBCs 15.5 -> 16  Hgb 10.4 -> 11       Assessment/Plan     Pt s/p lap farooq with subsequent lap washout of hematoma and I and D of right incision, followed by ERCP with stent placement for suspected bile leak  Regular diet  OOB, IS, DVT prlx  Pain control  DC planning ok from surgical perspective, can follow with me in 1 week; should be discharged with drains in place        Gaudencio Wolfe MD  10/19/2021  08:35 EDT      "

## 2021-10-19 NOTE — DISCHARGE SUMMARY
[facility] Medicine Services  DISCHARGE SUMMARY    Patient Name: Jeremías Soto  : 1955  MRN: [id number]    Date of Admission: 10/4/2021  8:23 PM  Date of Discharge: 10/19/2021  Primary Care Physician: Vincent Crawford MD    Consults     Date and Time Order Name Status Description    10/10/2021  2:20 PM Inpatient Gastroenterology Consult Completed     10/8/2021 11:20 AM Inpatient Nephrology Consult Completed     10/5/2021 10:30 AM Inpatient Cardiology Consult Completed     10/5/2021  1:34 AM Inpatient General Surgery Consult Completed     10/5/2021  1:34 AM Inpatient Infectious Diseases Consult Completed     2021  4:33 PM Inpatient General Surgery Consult Completed     2021  3:55 PM Inpatient Infectious Diseases Consult Completed     2021  7:37 PM Inpatient Gastroenterology Consult Completed         Hospital Course     Presenting Problem:   Post-operative infection [T81.40XA]    Active Hospital Problems    Diagnosis  POA   • Anemia [D64.9]  Yes   • Duodenitis [K29.80]  Yes   • Nonsustained ventricular tachycardia (HCC) [I47.2]  Clinically Undetermined   • Multiple lung nodules [R91.8]  Yes   • CAD (coronary artery disease) [I25.10]  Yes   • CKD (chronic kidney disease), stage III (HCC) [N18.30]  Yes   • Combined systolic and diastolic cardiac dysfunction (EF < 50% 2018) [I51.89]  Yes   • History of sarcoidosis (occular) [Z86.2]  Yes   • Moderate obstructive sleep apnea [G47.33]  Yes   • Type 2 diabetes mellitus without complication, without long-term current use of insulin (HCC) [E11.9]  Yes   • Hypertension [I10]  Yes      Resolved Hospital Problems    Diagnosis Date Resolved POA   • **Sepsis (HCC) [A41.9] 10/19/2021 Yes   • Hyperkalemia [E87.5] 10/19/2021 Yes   • Metabolic acidosis [E87.2] 10/19/2021 Yes   • Acute on chronic combined systolic and diastolic CHF (congestive heart failure) (HCC) [I50.43] 10/19/2021 No   • Hypernatremia [E87.0] 10/19/2021 No    • Elevated transaminase level [R74.01] 10/19/2021 Yes   • Post-operative infection [T81.40XA] 10/19/2021 Yes   • Abdominal wall cellulitis [L03.311] 10/19/2021 Yes   • Postoperative intra-abdominal abscess [T81.43XA] 10/19/2021 Yes   • Elevated troponin [R77.8] 10/19/2021 Yes   • Acute kidney injury superimposed on chronic kidney disease (HCC) [N17.9, N18.9] 10/19/2021 Yes   • Bile leak [K83.9] 10/19/2021 Yes   • Elevated LFTs [R79.89] 10/19/2021 Yes      Hospital Course:  Mr. 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     He was recently admitted to [facility] 9/23-9/29/21 for severe sepsis, found to have acute cholangitis, UTI/pyelonephritis, RUDY and enterococcus / clostridium bacteremia. He underwent laparoscopic CCY with IOC by Dr. Wolfe on 9/27/21. GI performed post-op ERCP on 9/28/21 (several black stones / sludge noted). A Groshong catheter was placed on 9/29/21.  He was discharged home on IV Zosyn (planned through 10/11/21).     He returned to [facility] ED 10/4/2021 with complaints of several days of worsening erythema of his abdomen. Imaging was concerning for 2.5 x 8.5cm fluid collection in the gallbladder fossa. He was placed on empiric antibiotics with initial improvement in his pain. Due to worsening pain after admission, a CT abdomen/pelvis was performed on 10/7 and showed a new hepatic fluid collection (16 x 6 cm) concerning for hematoma due to intermixed hyperdense / heterogenous appearance on CT.     Sepsis, POA ( bpm, WBC 24.54, lactic acid 4.1, CRP 46.11, RUDY and abdominal source)  Recent cholecystitis   Recent enterococcal / clostridium bacteremia  Abdominal fluid collection  - s/p laparoscopic CCY with IOC 9/27/21, post-op ERCP 9/28/21  - Initial CT  abdomen/pelvis showed 2.5 cm x 8.5 cm abdominal fluid collection  - Repeat CT abdomen/pelvis 10/7/21 with new 16 x 6 hepatic fluid collection and hematoma.  - Underwent I&D of right flank cellulitis, laparoscopic washout of intraabdominal hematoma / fluid collection and drain placement on 10/8/21 by Dr. Wolfe   - Repeat CT abdomen/pelvis on 10/14 showed effective drainage of perihepatic fluid collection / very small amount remaining fluid  - Appreciate [facility] assistance. Abdominal fluid culture growing candida tropicalis and candida albicans. S/p Merrem/Daptomycin, now on IV Zosyn and Micafungin through at least 10/25/21. Will need weekly CBC with diff, CMP, ESR, CRP (results to be faxed to [facility] @ [phone]) as well as weekly Groshong catheter dressing changes   - s/p ERCP 10/11/21 which showed no overt bile leak, balloon sweeps of bile duct yielded nothing. Wallstent placed in CBD to common hepatic duct with flow of clear yellow bile. Will need repeat EGD in 1 month to remove Wallstent. Discussed with GI PA on day of DC, the office will contact him to schedule EGD   - Per GI, will need to remain on probiotic to reduce colonization of upper GI tract   - He will discharge with LAWRENCE drains in place. Follow up with Dr. Wolfe in 1 week and anticipate drain removal at this time    Peptic Duodenitis  - Incidentally noted on ERCP  - AVOID NSAIDS. Continue daily PPI    RUDY on CKD 3, resolved   Hyperkalemia, resolved  Metabolic acidosis, resolved  - Baseline creatinine appears to be 1.9-2.3  - Creatinine 2.84 on admission, appreciate nephrology assistance this admission  - s/p IV Albumin / Bumex, now on Bumex 1mg PO daily, Lisinopril 5mg daily and 1.5L/day fluid restriction  - Creatinine 1.73 on 10/18   - Sodium bicarbonate stopped prior to DC     Post-op blood loss anemia  - s/p 1 unit PRBCs on 10/9/21   - Received IV iron this admission   - Hgb 10.4 on 10/18     Acute on chronic combined systolic / diastolic heart  failure  Hypoxia, resolved  - New vascular congestion on 10/9 (as compared to prior CXR on 10/4)  - Appreciate nephrology assistance with diuresis  - Now on Bumex PO daily and 1.5L/day fluid restriction   - Repeat ECHO on 10/12/21 showed LVEF 30% with septal asymmetric hypertrophy   - Plan to DC on Bumex 1mg daily, Lisinopril 5mg daily, Metoprolol 75mg BID, Terazosin 1mg QHS     Nonsustained ventricular tachycardia  - 5 beat run of non-sustained ventricular tachycardia on 10/10/21   - Appreciate cardiology assistance - started on Amiodarone and Metoprolol   - At DC, cardiology recommends: 200mg PO BID x 7 days then 200mg PO daily, Metoprolol 75mg BID    ? LV thrombus  - Per cardiology note on 10/6, records from [facility] patient previously anticoagulated for a large LV thrombus that was found on MRI  - ECHO this admission showed LVEF 30%, LV wall thickness consistent with asymmetric LV hypertrophy. No thrombus. Eliquis has been discontinued    Hypernatremia, resolved with D5W      Elevated LFTs, resolved. Recent cholangitis   Elevated INR, improving  - INR as high as 3.59 on 10/5, likely related to acute illness/anorexia  - Improved to 1.08 by 10/16      Perioral lesions  - On empiric Valtrex, dosed renally due to above issues     Non-insulin dependent DMII  - Hgb A1c 7.3% - ok to resume home regimen at DC    CAD with prior CABG/stent, continue ASA / statin / BB   Mild troponin elevation, likely demand ischemia in the setting of sepsis   Hx multiple TIA, ASA / statin      Ocular sarcoidosis  Bilateral lung nodules  - Will need repeat CT chest in 6 months - will refer to lung nodule clinic  - Continue chronic Prednisone 10mg PO daily    Day of Discharge     HPI:   In bed. Had a good night. No pain. Denied chest pain, dyspnea, abdominal pain, nausea or vomiting. He feels ready to go home.     Review of Systems  Gen- No fevers, chills  CV- No chest pain, palpitations  Resp- No cough, dyspnea  GI- No N/V/D, abd  pain    Vital Signs:   Temp:  [98 °F (36.7 °C)-98.3 °F (36.8 °C)] 98.1 °F (36.7 °C)  Heart Rate:  [70-90] 80  Resp:  [16-20] 16  BP: (113-141)/(77-83) 123/82     Physical Exam:  Constitutional: No acute distress, awake, alert and conversant. Chronically ill appearing   HENT: NCAT, mucous membranes moist  Respiratory: Clear to auscultation bilaterally, respiratory effort normal on room air   Cardiovascular: RRR, no murmurs, rubs, or gallops  Gastrointestinal: Positive bowel sounds. Incisions dressed, LAWRENCE drain x 2 remain in place with serosanguinous drainage   Musculoskeletal: Trace bilateral ankle edema  Psychiatric: Appropriate affect, cooperative  Neurologic: Oriented x 3, moves all extremities spontaneously, speech clear  Skin: No rashes     Pertinent  and/or Most Recent Results     LAB RESULTS:      Lab 10/19/21  0807 10/18/21  0451 10/17/21  0445 10/16/21  0920 10/16/21  0919 10/15/21  0502   WBC 15.98* 15.54* 12.88*  --  13.29* 14.28*   HEMOGLOBIN 11.0* 10.4* 9.5*  --  9.9* 9.1*   HEMATOCRIT 33.1* 30.9* 29.9*  --  30.4* 27.5*   PLATELETS 237 271 208  --  226 175   NEUTROS ABS 13.25*  --   --   --   --  11.99*   IMMATURE GRANS (ABS) 0.38*  --   --   --   --  0.50*   LYMPHS ABS 1.24  --   --   --   --  0.91   MONOS ABS 0.75  --   --   --   --  0.71   EOS ABS 0.27  --   --   --   --  0.14   MCV 86.2 87.5 89.3  --  88.4 85.7   PROTIME  --   --   --  13.7  --   --    APTT  --   --   --  33.3  --   --          Lab 10/19/21  0807 10/18/21  0451 10/17/21  0445 10/16/21  0920 10/15/21  0502 10/14/21  0228 10/14/21  0228   SODIUM 137 137 135* 136 135*   < > 135*   POTASSIUM 3.4* 3.7 3.7 3.7 4.0   < > 4.4   CHLORIDE 99 99 101 100 102   < > 104   CO2 23.0 23.0 22.0 20.0* 21.0*   < > 20.0*   ANION GAP 15.0 15.0 12.0 16.0* 12.0   < > 11.0   BUN 26* 25* 28* 29* 30*   < > 34*   CREATININE 1.88* 1.73* 1.69* 1.72* 1.52*   < > 1.54*   GLUCOSE 130* 123* 110* 120* 109*   < > 144*   CALCIUM 8.8 8.9 8.8 9.2 8.6   < > 8.2*   IONIZED  CALCIUM  --  1.28  --  1.30 1.27  --  1.24   MAGNESIUM  --  1.9 1.6 1.8 2.1  --  1.9   PHOSPHORUS  --  3.1  --  3.1 3.1  --  2.4*    < > = values in this interval not displayed.         Lab 10/19/21  0807 10/18/21  0451 10/17/21  0445 10/16/21  0920 10/15/21  0502   TOTAL PROTEIN 6.8 6.4 5.9* 6.7 5.7*   ALBUMIN 3.30* 3.20* 3.10* 3.30* 2.60*   GLOBULIN 3.5 3.2 2.8 3.4 3.1   ALT (SGPT) 55* 53* 40 37 27   AST (SGOT) 48* 54* 43* 45* 27   BILIRUBIN 0.9 0.9 0.9 1.0 0.9   ALK PHOS 178* 178* 163* 183* 150*   AMYLASE  --   --   --   --  37   LIPASE  --   --   --   --  34         Lab 10/16/21  0920   PROTIME 13.7   INR 1.08     Brief Urine Lab Results  (Last result in the past 365 days)      Color   Clarity   Blood   Leuk Est   Nitrite   Protein   CREAT   Urine HCG        10/08/21 1823             186.2             Microbiology Results (last 10 days)     Procedure Component Value - Date/Time    Blood Culture - Blood, Arm, Left [[id number]]  (Normal) Collected: 10/14/21 1412    Lab Status: Preliminary result Specimen: Blood from Arm, Left Updated: 10/18/21 1515     Blood Culture No growth at 4 days    Narrative:      AEROBIC BOTTLE ONLY    Blood Culture - Blood, Hand, Left [[id number]]  (Normal) Collected: 10/14/21 1410    Lab Status: Preliminary result Specimen: Blood from Hand, Left Updated: 10/18/21 1515     Blood Culture No growth at 4 days        Adult Transthoracic Echo Complete W/ Cont if Necessary Per Protocol    Result Date: 10/12/2021  · Estimated left ventricular EF = 30% Left ventricular systolic function is moderately to severely decreased. · Left ventricular wall thickness is consistent with septal asymmetric hypertrophy. · Mild mitral valve regurgitation is present · Trace tricuspid valve regurgitation is present. · No left ventricular apical thrombus is seen.      CT Abdomen Pelvis Without Contrast    Result Date: 10/18/2021  EXAMINATION: CT ABDOMEN/PELVIS WO CONTRAST - 10/14/2021  INDICATION: Abdominal  abscess/infection suspected.  TECHNIQUE: 5 mm unenhanced images through the abdomen and pelvis.  The radiation dose reduction device was turned on for each scan per the ALARA (As Low as Reasonably Achievable) protocol.  COMPARISON: 10/07/2021  FINDINGS: Previous 10/07/2021 exam report indicated 16 x 6 mm hepatic fluid collection consistent with hematoma. Since that time, LAWRENCE drains have been placed along the dorsal dome of the liver and in the subhepatic space, with evacuation collection, and only a small rim of remaining fluid around the margins of the liver, up to 8 mm in width, as seen on coronal image #67 and adjacent images. No significant subhepatic collection is seen. There is interval biliary stent placement from the level of the proximal common duct to the duodenum, and expected air in the left lobe biliary system. No hepatic lesions are identified. There is a very small right pleural effusion and mild right basilar discoid atelectasis. Small round left lower lobe pulmonary nodule approximately 1 cm diameter is better seen, previously probably obscured by pleural fluid which is now resolved. This nodule measures approximately 8 mm in 2018.  Pancreas is again noted be largely fatty replaced. There may be mildly increased peripancreatic fat stranding but no extensive inflammatory change, pseudocyst or ascites is seen. Atrophic left kidney with calculi up to 8 mm in diameter and small nonobstructing right renal calculus up to 2-3 mm are again noted, with no evidence of obstructive uropathy. Adrenal glands are not enlarged. Upper abdominal bowel loops are decompressed. No free air is seen. No ascites is seen.  Regarding the lower abdomen and pelvis, bowel loops are decompressed. Bladder again shows generalized trabeculation and small diverticula with trace calcification in the dominant left hutch-type diverticulum. No intrapelvic inflammatory change is seen. Terminal ileum, cecum and appendix appear normal.   Bony structures appear grossly intact. There is grade 1 anterior subluxation of L5 on S1 with nondisplaced bilateral pars defects.      1. Interval LAWRENCE drain placement and effective drainage of right perihepatic fluid collection since 10/07/2021. Very small remaining low density rim of perihepatic fluid.  2. Mildly increased fat stranding around the atrophic pancreas, resembling low-level changes of pancreatitis, however, this may simply reflect mild fat stranding from recent surgery instead. Very small right effusion and mild right basilar discoid atelectasis.  3. Decompressed appearance of the colon containing only fluid, nonspecific except for diarrhea. No visible large or small bowel inflammation.  4. Interval biliary stent placement, stent in expected location, with no significant bladder dilatation.   5. Trabeculated bladder with multiple diverticula again noted.  DICTATED:   10/14/2021 EDITED/ls :   10/14/2021   This report was finalized on 10/18/2021 10:18 AM by Dr. Sean Kaminski MD.      XR Chest 1 View    Result Date: 10/9/2021  CHEST X-RAY, 10/9/2021  HISTORY:  66-year-old male hospital inpatient with shortness of air, cellulitis.  TECHNIQUE: AP portable chest x-ray. COMPARISON: *  Chest x-ray, 10/4/2021. FINDINGS: Moderate cardiomegaly. Mildly increased and indistinct interstitial markings are new since the previous study. Correlate for mild vascular congestion or volume overload. Median sternotomy. Low lung volumes. Right basilar atelectasis with elevation right hemidiaphragm. Central venous catheter in good position.     1. Potential mild vascular congestion or volume overload, new since 10/4/2021, correlate clinically. 2. Stable cardiomegaly. Median sternotomy. 3. Elevation right hemidiaphragm with scarring or atelectasis right lung base. Signer Name: Navneet Beaver MD  Signed: 10/9/2021 6:44 AM  Workstation Name: [email or web address]  [facility]    XR Abdomen KUB    Result Date:  10/13/2021  EXAMINATION: XR ABDOMEN KUB-  INDICATION: R/o biliary stent migration; L03.311-Cellulitis of abdominal wall; T81.41XA-Infection following a procedure, superficial incisional surgical site, initial encounter; D72.829-Elevated white blood cell count, unspecified; N30.00-Acute cystitis without hematuria; N17.9-Acute kidney failure, unspecified; I50.9-Heart failure, unspecified; L03.311-Cellulitis of abdominal wall; T81.43XA-Infection follow  COMPARISON: 10/11/2021  FINDINGS: Biliary stent projects and grossly unchanged position in the right upper quadrant. Adjacent flat LAWRENCE drain is noted. Abdominal bowel gas pattern is nonobstructive without pathologic distention of small bowel. No overt pneumoperitoneum.      Biliary stent projects and grossly unchanged position in the right upper quadrant. Adjacent flat LAWRENCE drain is noted.  This report was finalized on 10/13/2021 12:56 PM by Corey Castillo.      FL ercp biliary duct only    Result Date: 10/12/2021  EXAMINATION: FL ERCP BILIARY DUCT ONLY- 10/12/2021  INDICATION: ERCP; L03.311-Cellulitis of abdominal wall; T81.41XA-Infection following a procedure, superficial incisional surgical site, initial encounter; D72.829-Elevated white blood cell count, unspecified; N30.00-Acute cystitis without hematuria; N17.9-Acute kidney failure, unspecified; I50.9-Heart failure, unspecified; L03.311-Cellulitis of abdominal wall; T81.43XA-Infection following a procedure, organ  COMPARISON: NONE  FINDINGS: Dictation is to record 2 minutes and 44 seconds of fluoroscopy time. A total of 4 intraprocedural  images obtained show a couple of LAWRENCE drains in the right upper quadrant, endoscope and side cannula placement with contrast opacification of the common duct, apparent balloon sweep and subsequent Wallstent placement. Please see the procedure report for full details.      Fluoroscopy provided during ERCP.  D: 10/12/2021 E: 10/12/2021   This report was finalized on 10/12/2021 8:40 PM  by Dr. Sean Kaminski MD.      Results for orders placed during the hospital encounter of 02/26/19    Duplex carotid ultrasound CAR    Interpretation Summary  · There is bilateral <50% internal carotid artery stenosis.  · Bilateral vertebral artery flow is antegrade.    Results for orders placed during the hospital encounter of 10/04/21    Adult Transthoracic Echo Complete W/ Cont if Necessary Per Protocol    Interpretation Summary  · Estimated left ventricular EF = 30% Left ventricular systolic function is moderately to severely decreased.  · Left ventricular wall thickness is consistent with septal asymmetric hypertrophy.  · Mild mitral valve regurgitation is present  · Trace tricuspid valve regurgitation is present.  · No left ventricular apical thrombus is seen.    Discharge Details        Discharge Medications      New Medications      Instructions Start Date   amiodarone 200 MG tablet  Commonly known as: PACERONE   200 mg, Oral, 2 times daily, X 7 days, then take 1 tablet by mouth daily thereafter      aspirin 81 MG EC tablet   81 mg, Oral, Daily   Start Date: October 20, 2021     bumetanide 1 MG tablet  Commonly known as: BUMEX   1 mg, Oral, Daily   Start Date: October 20, 2021     micafungin  Commonly known as: MYCAMINE   100 mg, Intravenous, Every 24 Hours      pantoprazole 40 MG EC tablet  Commonly known as: PROTONIX  Replaces: esomeprazole 20 MG capsule   40 mg, Oral, Daily   Start Date: October 20, 2021     piperacillin-tazobactam 3-0.375 GM/50ML IVPB  Commonly known as: ZOSYN   3.375 g, Intravenous, Every 8 Hours      terazosin 1 MG capsule  Commonly known as: HYTRIN   1 mg, Oral, Nightly         Changes to Medications      Instructions Start Date   lisinopril 5 MG tablet  Commonly known as: PRINIVIL,ZESTRIL  What changed:   medication strength  how much to take  when to take this   5 mg, Oral, Every 24 Hours Scheduled   Start Date: October 20, 2021     Metoprolol Tartrate 75 MG tablet  What changed:    medication strength  how much to take  when to take this   75 mg, Oral, Every 12 Hours Scheduled         Continue These Medications      Instructions Start Date   atorvastatin 40 MG tablet  Commonly known as: LIPITOR   40 mg, Oral, Daily      dorzolamide-timolol 22.3-6.8 MG/ML ophthalmic solution  Commonly known as: COSOPT   1 drop, Both Eyes, 2 Times Daily      glimepiride 2 MG tablet  Commonly known as: AMARYL   2 mg, Oral, Every Morning Before Breakfast      lactobacillus acidophilus capsule capsule   1 capsule, Oral, Daily      metFORMIN 500 MG tablet  Commonly known as: GLUCOPHAGE   500 mg, Oral, 2 Times Daily      predniSONE 10 MG tablet  Commonly known as: DELTASONE   10 mg, Oral, Daily      rOPINIRole 0.25 MG tablet  Commonly known as: Requip   0.25 mg, Oral, Nightly, Take 1 hour before bedtime.      tamsulosin 0.4 MG capsule 24 hr capsule  Commonly known as: FLOMAX   0.4 mg, Oral, Daily      Zantac 150 MG tablet  Generic drug: raNITIdine   Zantac Maximum Strength 150 mg tablet   Daily         Stop These Medications    apixaban 5 MG tablet tablet  Commonly known as: ELIQUIS     dicyclomine 20 MG tablet  Commonly known as: BENTYL     esomeprazole 20 MG capsule  Commonly known as: nexIUM  Replaced by: pantoprazole 40 MG EC tablet     oxyCODONE-acetaminophen 5-325 MG per tablet  Commonly known as: PERCOCET          No Known Allergies    Discharge Disposition:  Home or Self Care    Diet:  Diet Order   Procedures   • Diet Regular; Consistent Carbohydrate     Activity  Activity Instructions     Activity as Tolerated      You may shower. Ok to leave LAWRENCE drains uncovered while showering. Do not get groshong catheter wet        CODE STATUS:    Code Status and Medical Interventions:   Ordered at: 10/05/21 0134     Code Status:    CPR     Medical Interventions (Level of Support Prior to Arrest):    Full     No future appointments.    Additional Instructions for the Follow-ups that You Need to Schedule     Ambulatory  Referral to Home Health   As directed      Face to Face Visit Date: 10/18/2021    Follow-up provider for Plan of Care?: I treated the patient in an acute care facility and will not continue treatment after discharge.    Follow-up provider: ADOLPH RICHARDSON [[id number]]    Reason/Clinical Findings: postoperative intra abdominal abcess, sepsis, abdominal wall cellulitis, decreased mobility    Describe mobility limitations that make leaving home difficult: postoperative intra abdominal abcess, sepsis, abdominal wall cellulitis, decreased mobility    Nursing/Therapeutic Services Requested: Skilled Nursing Physical Therapy    Skilled nursing orders: Infusion therapy (Life vest prior to discharge from hospital) PICC line care/instruction Cardiopulmonary assessments    PT orders: Strengthening Home safety assessment    Frequency: 1 Week 1         Ambulatory Referral to [facility] [place]   As directed      Needs follow up and repeat CT chest in 6 months    Order Comments: Needs follow up and repeat CT chest in 6 months          Discharge Follow-up with PCP   As directed       Currently Documented PCP:    Adolph Richardson MD    PCP Phone Number:    [phone]     Follow Up Details: 1 week         Discharge Follow-up with Specified Provider: Diaz Moreno (call and confirm or schedule appointment on 10/25/21)   As directed      To: Diaz Moreno (call and confirm or schedule appointment on 10/25/21)         Discharge Follow-up with Specified Provider: Dr. Wolfe in 1 week   As directed      To: Dr. Wolfe in 1 week         Discharge Follow-up with Specified Provider: Graciela in 1 month   As directed      To: Graciela in 1 month         Discharge Follow-up with Specified Provider: [facility] in 2-3 weeks   As directed      To: [facility] in 2-3 weeks             Sammi Tinsley PA-C  10/19/21    Time Spent on Discharge:  I spent 60 minutes on  this discharge activity which included: face-to-face encounter with the patient, reviewing the data in the system, coordination of the care with the nursing staff as well as consultants, documentation, and entering orders.

## 2021-10-19 NOTE — CASE MANAGEMENT/SOCIAL WORK
Case Management Discharge Note      Final Note: Plan is home with spouse today. HH arranged with [facility] for skilled nursing and PT.  CM spoke with Colton at [facility] and they are aware pt being discharged today and will be in contact with pt to arrange HH visits, starting tomorrow.  [facility] HH also aware that wound/dressing care added to skilled nursing order  Pt being discharged on outpatient IV antibiotics arranged through [facility]. Per Azul, [facility] will  deliver IV antibiotics and supplies to pt's room, including teaching. Pt/spouse agreeable to discharge plan and aware that pt's follow up appointments will be in his AVS or discharge instructions. Spouse will transoport pt home.         Selected Continued Care - Admitted Since 10/4/2021     Destination    No services have been selected for the patient.              Durable Medical Equipment    No services have been selected for the patient.              Dialysis/Infusion Coordination complete.    Service Provider Selected Services Address Phone Fax Patient Preferred    [facility]  [facility] and IV [facility] [address] [facility] [facility] [id number] [phone] --          Home Medical Care Coordination complete.    Service Provider Selected Services Address Phone Fax Patient Preferred    [facility]  Home Health Services [address] [facility] [place] [id number] [phone] --          Therapy    No services have been selected for the patient.              Community Resources    No services have been selected for the patient.              [facility]    No services have been selected for the patient.                       Final Discharge Disposition Code: 06 - home with home health care

## 2021-10-20 NOTE — OUTREACH NOTE
Prep Survey      Responses   [facility] patient discharged from? [place]   Is LACE score < 7 ? No   Emergency Room discharge w/ pulse ox? No   Eligibility Readm Mgmt   Discharge diagnosis sepsis   Does the patient have one of the following disease processes/diagnoses(primary or secondary)? Sepsis   Does the patient have Home health ordered? Yes   What is the Home health agency?  [facility]    Is there a DME ordered? No   Medication alerts for this patient bumex   Prep survey completed? Yes          Phyllis Santiago RN

## 2021-10-21 NOTE — CASE COMMUNICATION
"Pt's spouse is requesting A for obtaining supplies for the wound care. To note, she thinks she will run out of a tape that they are using to hold down abdominal incision - looks to be about 3\" wide, latex free foam tape (in use d/t his skin being extremely sensitive).  "

## 2021-10-21 NOTE — HOME HEALTH
"Please see d/c summary dated 10.19.21 for recent HPI as it is lengthy.    Pt currently w/ 2 post op drains and cardiac vest. Pt lives w/ spouse in single story home, 3-5 steps to enter, has shower with 4\" threshold or tub/shower combo options. Spouse will likely get a hand held shower head. Pt is using a front wheeled walker at this time, but PLOF was very active and no device.   To note: during hospitalization, BP meds were increased and pt has had some intermittent times of light headedness \"when he moves too fast\" so we need to be aware of possible orthostatics and he will f/u with PCP for additional medication management. PT suggested pt and spouse track BP daily. Advised to not move away from chair too quickly upon standing, and could do some light exercise with BLE/BUE prior to standing to also help combat any drops in BP.    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     TU.85 sec= at risk for falls    Upcoming appointments: M/W  &  Requesting PT appointments be plotted /Friday."

## 2021-10-22 NOTE — OUTREACH NOTE
Sepsis Week 1 Survey      Responses   [facility] patient discharged from? [place]   Does the patient have one of the following disease processes/diagnoses(primary or secondary)? Sepsis   Week 1 attempt successful? Yes   Call start time 1541   Call end time 1544   Discharge diagnosis sepsis   Person spoke with today (if not patient) and relationship Patient   Meds reviewed with patient/caregiver? Yes   Is the patient having any side effects they believe may be caused by any medication additions or changes? No   Does the patient have all medications related to this admission filled (includes all antibiotics, inhalers, nebulizers,steroids,etc.) Yes   Is the patient taking all medications as directed (includes completed medication regime)? Yes   Does the patient have a primary care provider?  Yes   Comments regarding PCP 10/22/21   Does the patient have an appointment with their PCP within 7 days of discharge? Yes   Has the patient kept scheduled appointments due by today? Yes   What is the Home health agency?  [facility]    Has home health visited the patient within 72 hours of discharge? Yes   Psychosocial issues? No   Did the patient receive a copy of their discharge instructions? Yes   Nursing interventions Reviewed instructions with patient   What is the patient's perception of their health status since discharge? Improving   Nursing interventions Nurse provided patient education   Is the patient/caregiver able to teach back Sepsis? S - Short of breath,  S - Sleepy, difficult to arouse,confused,  E - Extreme pain or generalized discomfort (worst ever,especially abdomen),  S - Shivering,fever or very cold   Nursing interventions Nurse provided patient education   Is patient/caregiver able to teach back steps to recovery at home? Rest and regain strength,  Eat a balanced diet,  Learn about sepsis(sepsis.org)   Is the patient/caregiver able to teach back signs and symptoms of worsening condition: Fever,  Shortness  of breath/rapid respiratory rate,  Altered mental status(confusion/coma)   If the patient is a current smoker, are they able to teach back resources for cessation? Not a smoker   Week 1 call completed? Yes   Wrap up additional comments Pt states he is feeling better. Pt had PCP hospital fu today, 10/22/21. No questions/concerns.          Erna Peters RN

## 2021-10-28 NOTE — OUTREACH NOTE
Sepsis Week 2 Survey      Responses   [facility] patient discharged from? [place]   Does the patient have one of the following disease processes/diagnoses(primary or secondary)? Sepsis   Week 2 attempt successful? Yes   Call start time 1235   Call end time 1238   Discharge diagnosis sepsis   Meds reviewed with patient/caregiver? Yes   Is the patient taking all medications as directed (includes completed medication regime)? Yes   Comments regarding appointments Cardio 11/8/21, Neph 11/16/21, ID next week.    Does the patient have a primary care provider?  Yes   Comments regarding PCP PCP completed   Does the patient have an appointment with their PCP within 7 days of discharge? Yes   Has the patient kept scheduled appointments due by today? Yes   What is the Home health agency?  [facility]   Has home health visited the patient within 72 hours of discharge? Yes   Psychosocial issues? No   Did the patient receive a copy of their discharge instructions? Yes   Nursing interventions Reviewed instructions with patient   What is the patient's perception of their health status since discharge? Improving   Nursing interventions Nurse provided patient education   Is the patient/caregiver able to teach back Sepsis? S - Shivering,fever or very cold,  E - Extreme pain or generalized discomfort (worst ever,especially abdomen),  P - Pale or discolored skin,  S - Sleepy, difficult to arouse,confused,  S - Short of breath   Nursing interventions Nurse provided patient education   Is patient/caregiver able to teach back steps to recovery at home? Rest and regain strength,  Set small, achievable goals for return to baseline health,  Eat a balanced diet   Is the patient/caregiver able to teach back signs and symptoms of worsening condition: Fever,  Rapid heart rate (>90),  Shortness of breath/rapid respiratory rate,  Altered mental status(confusion/coma)   If the patient is a current smoker, are they able to teach back resources for  cessation? Not a smoker   Week 2 call completed? Yes   Wrap up additional comments Pt reports he is improving and has all his appts. Pt reports he is taking meds as ordered and has all meds. No needs at this time.           Marla Matamoros RN

## 2021-11-02 NOTE — POST-PROCEDURE NOTE
Vascular Interventional Radiology  Procedure Note     Date: 11/02/21      Time: 13:09 EDT      Pre-op Diagnosis: TCL DC.     Post-op Diagnosis: same     Procedure: Aseptic precautions. Local anestheisa. RIJ route tunneled catheter removed with sharp and blunt dissection. Hemostasis. ASD.     Surgeon: Thomas Paige MD      Assistants: PRABHA     Sedation: None     Estimated Blood Loss (EBL): min      IVF: NA     Findings: NA.     Specimens: none     Complications: None immediate     Disposition: IR recovery     Thomas Paige MD   Vascular Interventional Radiology

## 2021-11-02 NOTE — PRE-PROCEDURE NOTE
[facility]   Vascular Interventional Radiology  History & Physicial    Patient Name:Jeremías Soto  : 1955  MRN: [id number]    Primary Care Physician: Vincent Crawford MD    Referring Physician: Diaz Moreno MD     Date of admission: 2021    Subjective   Subjective     Chief Complaint: TCL DC.    History of Present Illness   Jeremías Soto is a 66 y.o. male referred to IR as noted in the chief complaint.      Active Hospital Problems:  There are no active hospital problems to display for this patient.      Personal History     Past Medical History:   Diagnosis Date   • Cancer (HCC)     skin   • Coronary artery disease     stent   • Diabetes mellitus (HCC)    • Elevated cholesterol    • Heart attack (HCC)    • Hypertension    • Sarcoid    • Sleep apnea     no cpap   • SOB (shortness of breath)    • Stroke (HCC)     Pt. states that he has had 2 mini strokes. No residual        Past Surgical History:   Procedure Laterality Date   • CARDIAC CATHETERIZATION      cardiac stent x2 after s/p CABG   • CATARACT EXTRACTION, BILATERAL     • CHOLECYSTECTOMY N/A 10/8/2021    Procedure: LAPAROSCOPIC WASHOUT;  Surgeon: David Callahan MD;  Location: [facility] OR;  Service: General;  Laterality: N/A;   • CHOLECYSTECTOMY WITH INTRAOPERATIVE CHOLANGIOGRAM N/A 2021    Procedure: CHOLECYSTECTOMY LAPAROSCOPIC INTRAOPERATIVE CHOLANGIOGRAM;  Surgeon: Gaudencio Wolfe MD;  Location: [facility] OR;  Service: General;  Laterality: N/A;   • COLONOSCOPY     • CORONARY ARTERY BYPASS GRAFT     • ENDOSCOPY     • ERCP N/A 2021    Procedure: ENDOSCOPIC RETROGRADE CHOLANGIOPANCREATOGRAPHY;  Surgeon: Brunner, Mark I, MD;  Location: [facility] END[facility]COPY;  Service: Gastroenterology;  Laterality: N/A;  sphincterotomy made, balloon sweep of bile duct done with 9-12 balloon.    • ERCP N/A 10/11/2021    Procedure: ENDOSCOPIC RETROGRADE CHOLANGIOPANCREATOGRAPHY with biliary wall stent;  Surgeon: Brunner,  Mayur CHUN MD;  Location: [facility] ENDOSCOPY;  Service: Gastroenterology;  Laterality: N/A;  BALLOON SWEEP 1229  10X8 STENT PLACED IN CBD    • EXTRACORPOREAL SHOCKWAVE LITHOTRIPSY (ESWL), STENT INSERTION/REMOVAL Bilateral 8/21/2020    Procedure: EXTRACORPOREAL SHOCKWAVE LITHOTRIPSY BILATERAL WITH STENT PLACEMENT LEFT;  Surgeon: Ethan Barnes Jr., MD;  Location: [facility] OR;  Service: Urology;  Laterality: Bilateral;   • SKIN CANCER EXCISION         Family History: His family history includes COPD in his mother; Cancer in his brother; Diabetes in his father and sister; Heart disease in his father; Hypertension in his father; Obesity in his sister.     Social History: He  reports that he has never smoked. He has never used smokeless tobacco. He reports that he does not drink alcohol and does not use drugs.    Home Medications:  Metoprolol Tartrate, amiodarone, aspirin, atorvastatin, bumetanide, dorzolamide-timolol, glimepiride, lactobacillus acidophilus, lisinopril, micafungin, pantoprazole, piperacillin-tazobactam, predniSONE, rOPINIRole, tamsulosin, and terazosin    Current Medications:  No current facility-administered medications for this encounter.         Allergies:  He has No Known Allergies.    Review of Systems    Objective    Objective     Vitals:      There were no vitals taken for this visit.     Physical Exam    A&Ox3. Able to communicate  No Apparent Distress  Average physique    Result Review      I have personally reviewed the results from the time of this admission to 11/2/2021 13:08 EDT and agree with these findings.  []  Laboratory  []  Microbiology  []  Radiology  []  EKG/Telemetry   []  Cardiology/Vascular   []  Pathology  []  Old records  []  Other:    Most notable findings include: As noted:          Estimated Creatinine Clearance: 36.6 mL/min (A) (by C-G formula based on SCr of 2.43 mg/dL (H)). No results found for: CREATININE    Assessment/Plan   Assessment / Plan     Brief Patient  Summary:  Jeremías Soto is a 66 y.o. male referred to the IR service with above problem.    Plan:   Patient known to IR service. Here for TCL DC due to completion of therapy.    Thomas Paige MD   Vascular Interventional Radiology  11/02/21   1:08 PM EDT

## 2021-11-03 NOTE — ED PROVIDER NOTES
[place]    EMERGENCY DEPARTMENT ENCOUNTER      Pt Name: Jeremías Soto  MRN: [id number]  YOB: 1955  Date of evaluation: 11/3/2021  Provider: Santiago Santiago MD    CHIEF COMPLAINT     Cardiac arrest      HISTORY OF PRESENT ILLNESS  (Location/Symptom, Timing/Onset, Context/Setting, Quality, Duration, Modifying Factors, Severity.)   Jeremías Soto is a 66 y.o. male who presents to the emergency department in cardiac arrest.  Per report, this was unwitnessed.  The patient was last seen well at 10 PM yesterday evening.  The wife awoke at around 430 this morning to the sound of the patient's LifeVest talking at which point it was discovered that the patient did not have a pulse.  There was bystander CPR performed.  On EMS arrival, they did notice one episode of ventricular tachycardia at 4:41 AM at which point the patient was defibrillated, however since that time he had been in asystole throughout the duration of his transport until his arrival at our facility.  No additional history available at this time.      Nursing notes were reviewed.    REVIEW OF SYSTEMS    (2-9 systems for level 4, 10 or more for level 5)   ROS:  Unable to obtain secondary to clinical condition      PAST MEDICAL HISTORY     Past Medical History:   Diagnosis Date   • Cancer (HCC)     skin   • Coronary artery disease     stent   • Diabetes mellitus (HCC)    • Elevated cholesterol    • Heart attack (HCC) 2003   • Hypertension    • Sarcoid    • Sleep apnea     no cpap   • SOB (shortness of breath)    • Stroke (HCC)     Pt. states that he has had 2 mini strokes. No residual          SURGICAL HISTORY       Past Surgical History:   Procedure Laterality Date   • CARDIAC CATHETERIZATION  2003    cardiac stent x2 after s/p CABG   • CATARACT EXTRACTION, BILATERAL     • CHOLECYSTECTOMY N/A 10/8/2021    Procedure: LAPAROSCOPIC WASHOUT;  Surgeon: David Callahan MD;  Location: [facility];  Service: General;  Laterality: N/A;   •  CHOLECYSTECTOMY WITH INTRAOPERATIVE CHOLANGIOGRAM N/A 9/27/2021    Procedure: CHOLECYSTECTOMY LAPAROSCOPIC INTRAOPERATIVE CHOLANGIOGRAM;  Surgeon: Gaudencio Wolfe MD;  Location:  [facility];  Service: General;  Laterality: N/A;   • COLONOSCOPY     • CORONARY ARTERY BYPASS GRAFT  2002   • ENDOSCOPY     • ERCP N/A 9/28/2021    Procedure: ENDOSCOPIC RETROGRADE CHOLANGIOPANCREATOGRAPHY;  Surgeon: Brunner, Mark I, MD;  Location:  [facility];  Service: Gastroenterology;  Laterality: N/A;  sphincterotomy made, balloon sweep of bile duct done with 9-12 balloon.    • ERCP N/A 10/11/2021    Procedure: ENDOSCOPIC RETROGRADE CHOLANGIOPANCREATOGRAPHY with biliary wall stent;  Surgeon: Brunner, Mark I, MD;  Location:  [facility];  Service: Gastroenterology;  Laterality: N/A;  BALLOON SWEEP 1229  10X8 STENT PLACED IN CBD    • EXTRACORPOREAL SHOCKWAVE LITHOTRIPSY (ESWL), STENT INSERTION/REMOVAL Bilateral 8/21/2020    Procedure: EXTRACORPOREAL SHOCKWAVE LITHOTRIPSY BILATERAL WITH STENT PLACEMENT LEFT;  Surgeon: Ethan Barnes Jr., MD;  Location:  [facility];  Service: Urology;  Laterality: Bilateral;   • SKIN CANCER EXCISION           CURRENT MEDICATIONS     No current facility-administered medications for this encounter.    Current Outpatient Medications:   •  amiodarone (PACERONE) 200 MG tablet, Take 1 tablet by mouth 2 (two) times a day. X 7 days, then take 1 tablet by mouth daily thereafter, Disp: 60 tablet, Rfl: 3  •  aspirin 81 MG EC tablet, Take 1 tablet by mouth Daily., Disp: 90 tablet, Rfl: 3  •  atorvastatin (LIPITOR) 40 MG tablet, Take 40 mg by mouth Daily., Disp: , Rfl: 1  •  bumetanide (BUMEX) 1 MG tablet, Take 1 tablet by mouth Daily., Disp: 30 tablet, Rfl: 5  •  dorzolamide-timolol (COSOPT) 22.3-6.8 MG/ML ophthalmic solution, Administer 1 drop to both eyes 2 (Two) Times a Day., Disp: , Rfl:   •  glimepiride (AMARYL) 2 MG tablet, Take 2 mg by mouth Every Morning Before Breakfast., Disp: , Rfl:   •   "lactobacillus acidophilus (RISAQUAD) capsule capsule, Take 1 capsule by mouth Daily., Disp: 60 capsule, Rfl: 5  •  lisinopril (PRINIVIL,ZESTRIL) 5 MG tablet, Take 1 tablet by mouth Daily., Disp: 30 tablet, Rfl: 5  •  metoprolol tartrate 75 MG tablet, Take 75 mg by mouth Every 12 (Twelve) Hours., Disp: 60 tablet, Rfl: 5  •  micafungin 100 mg/100 mL 0.9% NS IVPB (mbp), Infuse 100 mL into a venous catheter Daily. Indications: Infection Within the Abdomen, Disp: 800 mL, Rfl: 0  •  pantoprazole (PROTONIX) 40 MG EC tablet, Take 1 tablet by mouth Daily., Disp: 30 tablet, Rfl: 5  •  piperacillin-tazobactam (ZOSYN) 3-0.375 GM/50ML IVPB, Infuse 50 mL into a venous catheter Every 8 (Eight) Hours. Indications: Infection Within the Abdomen, Infection of the Skin and/or Soft Tissue, Disp: 950 mL, Rfl: 0  •  predniSONE (DELTASONE) 10 MG tablet, Take 10 mg by mouth Daily., Disp: , Rfl:   •  rOPINIRole (REQUIP) 0.25 MG tablet, Take 1 tablet by mouth Every Night. Take 1 hour before bedtime., Disp: 30 tablet, Rfl: 3  •  tamsulosin (FLOMAX) 0.4 MG capsule 24 hr capsule, Take 0.4 mg by mouth Daily., Disp: , Rfl:   •  terazosin (HYTRIN) 1 MG capsule, Take 1 capsule by mouth Every Night., Disp: 30 capsule, Rfl: 5    ALLERGIES     Patient has no known allergies.    FAMILY HISTORY       Family History   Problem Relation Age of Onset   • COPD Mother    • Diabetes Father    • Heart disease Father    • Hypertension Father    • Diabetes Sister    • Obesity Sister    • Cancer Brother           SOCIAL HISTORY       Social History     Socioeconomic History   • Marital status:    Tobacco Use   • Smoking status: Never Smoker   • Smokeless tobacco: Never Used   Substance and Sexual Activity   • Alcohol use: No   • Drug use: No   • Sexual activity: Defer     Comment:          PHYSICAL EXAM    (up to 7 for level 4, 8 or more for level 5)     Vitals:    11/03/21 0528   Weight: 88.9 kg (196 lb)   Height: 180.3 cm (71\")       Physical " "Exam  General: Motionless  HEENT: Pupils fixed and dilated, nonreactive  Neck: Trachea midline.  Cardiac: No pulse  Lungs: LMA in place  Chest wall: Gerhard in place, no deformity  Abdomen: Nondistended  Musculoskeletal: No deformity.  Neuro: Pupils fixed and dilated  Dermatology: Skin is cool            EMERGENCY DEPARTMENT COURSE and DIFFERENTIAL DIAGNOSIS/MDM:   Vitals:    Vitals:    11/03/21 0528   Weight: 88.9 kg (196 lb)   Height: 180.3 cm (71\")            Patient with over 40 minutes of asystole prior to arrival to the emergency department.  On arrival, he was noted to be persistently in asystole.  Chest compressions were performed in addition to administration of multiple medications.  The LMA was replaced with endotracheal tube by myself.  The patient did have some PEA as well but never regained a pulse or shockable rhythm.  Bedside ultrasound was performed that demonstrated no evidence of reversible cause and cardiac standstill.  Time of death was called at 5:35 AM.        MEDICATIONS ADMINISTERED IN ED:  Medications   EPINEPHrine (ADRENALIN) injection (1 mg Intravenous Given 11/3/21 0534)   sodium bicarbonate injection 8.4% (50 mEq Intravenous Given 11/3/21 0533)   calcium chloride injection (1 g Intravenous Given 11/3/21 0531)       PROCEDURES:    Endotracheal Intubation  Indication: Cardiac arrest  Consent: Emergent  Pre-procedure exam: LMA in place  Preparation: The patient was pre-oxygenated with LMA with head in the upright position. Stable hemodynamics were ensured. Proper equipment at the bedside including multiple blades, endotracheal tubes, OPA/NPA, ETCO2, suction, bougie.  Medications: None  Technique: Once adequate paralysis and sedation were attained, the blade was advanced into the oropharynx. The glottis was identified and the tube was advanced into the airway under direct visualization. The tube was secured at 23 cm at the teeth. Post intubation O2 sat was 90%.  Confirmation: Waveform " ETCO2  Complications: None          FINAL IMPRESSION      1. Cardiac arrest (HCC)    2. Asystole (HCC)    3. PEA (Pulseless electrical activity) (HCC)    4. History of coronary artery disease    5. History of CHF (congestive heart failure)          DISPOSITION/PLAN     ED Disposition     ED Disposition Condition Comment                  Santiago Santiago MD  Attending Emergency Physician               Santiago Santiago MD  21 0625

## 2021-11-03 NOTE — OUTREACH NOTE
Sepsis Week 3 Survey      Responses   [facility] patient discharged from? [place]   Does the patient have one of the following disease processes/diagnoses(primary or secondary)? Sepsis   Week 3 attempt successful? No   Revoke Patient           Nidhi Ferguson RN

## 2021-11-09 ENCOUNTER — APPOINTMENT (OUTPATIENT)
Dept: PET IMAGING | Facility: HOSPITAL | Age: 66
End: 2021-11-09

## 2022-10-06 NOTE — ANESTHESIA POSTPROCEDURE EVALUATION
Patient: Jeremías Soto    Procedure Summary     Date: 10/11/21 Room / [facility]:  [facility]    Anesthesia Start: 1144 Anesthesia Stop:     Procedure: ENDOSCOPIC RETROGRADE CHOLANGIOPANCREATOGRAPHY with biliary wall stent (N/A ) Diagnosis:       Postoperative intra-abdominal abscess      Bile leak      (Postoperative intra-abdominal abscess [T81.43XA])      (Bile leak [K83.9])    Surgeons: Brunner, Mark I, MD Provider: Erasmo Saldaña MD    Anesthesia Type: general ASA Status: 4          Anesthesia Type: general    Vitals  Vitals Value Taken Time   /91 10/11/21 1249   Temp 98.5 °F (36.9 °C) 10/11/21 1249   Pulse 89 10/11/21 1249   Resp 20 10/11/21 1249   SpO2 95 % 10/11/21 1249           Post Anesthesia Care and Evaluation    Patient location during evaluation: PACU  Patient participation: complete - patient participated  Level of consciousness: awake and alert  Pain score: 0  Pain management: adequate  Airway patency: patent  Anesthetic complications: No anesthetic complications  PONV Status: none  Cardiovascular status: hemodynamically stable and acceptable  Respiratory status: nonlabored ventilation, acceptable and nasal cannula  Hydration status: acceptable    Comments: Pt transferred to PACU with O2. Vital signs stable. Report to PACU RN and care accepted.      
stretcher

## (undated) DEVICE — THE BITE BLOCK MAXI, LATEX FREE STRAP IS USED TO PROTECT THE ENDOSCOPE INSERTION TUBE FROM BEING BITTEN BY THE PATIENT.

## (undated) DEVICE — STPCK 4WY ON/OFF VLV M/COLAR FIT 45PSI STRL

## (undated) DEVICE — SYR CONTRL LUERLOK 10CC

## (undated) DEVICE — SOL IRR H2O BTL 1000ML STRL

## (undated) DEVICE — ENDOPATH XCEL UNIVERSAL TROCAR STABLILITY SLEEVES: Brand: ENDOPATH XCEL

## (undated) DEVICE — ENDOPATH XCEL BLADELESS TROCARS WITH STABILITY SLEEVES: Brand: ENDOPATH XCEL

## (undated) DEVICE — SUCTION CANISTER, 1000CC,SAFELINER: Brand: DEROYAL

## (undated) DEVICE — PK CYSTO-TUR BASIC 10

## (undated) DEVICE — GLV SURG SENSICARE PI MIC PF SZ7 LF STRL

## (undated) DEVICE — DRSNG SURESITE WNDW 2.38X2.75

## (undated) DEVICE — SPHINCTEROTOME: Brand: DREAMTOME™ RX 44

## (undated) DEVICE — BANDAGE,GAUZE,BULKEE II,4.5"X4.1YD,STRL: Brand: MEDLINE

## (undated) DEVICE — BOWL PLSTC MD 16OZ BLU STRL

## (undated) DEVICE — PK LAP LASR CHOLE 10

## (undated) DEVICE — ADHS LIQ MASTISOL 2/3ML

## (undated) DEVICE — ST EXT IV TBG W SECUR LK 20IN

## (undated) DEVICE — TUBING, SUCTION, 1/4" X 10', STRAIGHT: Brand: MEDLINE

## (undated) DEVICE — SYR LUERLOK 50ML

## (undated) DEVICE — ENDOPATH 5MM ENDOSCOPIC BLUNT TIP DISSECTORS (12 POUCHES CONTAINING 3 DISSECTORS EACH): Brand: ENDOPATH

## (undated) DEVICE — BNDR ABD PREMIUM/UNIV 10IN 27TO48IN

## (undated) DEVICE — [HIGH FLOW INSUFFLATOR,  DO NOT USE IF PACKAGE IS DAMAGED,  KEEP DRY,  KEEP AWAY FROM SUNLIGHT,  PROTECT FROM HEAT AND RADIOACTIVE SOURCES.]: Brand: PNEUMOSURE

## (undated) DEVICE — GOWN,PREVENTION PLUS,XXLARGE,STERILE: Brand: MEDLINE

## (undated) DEVICE — DEV LK WIREGUIDE FUSN OLYMP SCP

## (undated) DEVICE — SPNG ENDO BEDSIDE TUB ENZYM

## (undated) DEVICE — GLV SURG SENSICARE PI MIC PF SZ7.5 LF STRL

## (undated) DEVICE — SNAP KOVER: Brand: UNBRANDED

## (undated) DEVICE — KT ORCA ORCAPOD DISP STRL

## (undated) DEVICE — PATIENT RETURN ELECTRODE, SINGLE-USE, CONTACT QUALITY MONITORING, ADULT, WITH 9FT CORD, FOR PATIENTS WEIGING OVER 33LBS. (15KG): Brand: MEGADYNE

## (undated) DEVICE — NITINOL WIRE WITH HYDROPHILIC TIP: Brand: SENSOR

## (undated) DEVICE — FEEDING TUBE,RADIOPAQUE: Brand: ARGYLE

## (undated) DEVICE — INTRO ACCSR BLNT TP

## (undated) DEVICE — HYBRID CO2 TUBING/CAP SET FOR OLYMPUS® SCOPES & CO2 SOURCE: Brand: ERBE

## (undated) DEVICE — SYR LUERLOK 20CC BX/50

## (undated) DEVICE — ENDOPOUCH RETRIEVER SPECIMEN RETRIEVAL BAGS: Brand: ENDOPOUCH RETRIEVER

## (undated) DEVICE — SYR LUERLOK 30CC

## (undated) DEVICE — SUT MNCRYL PLS ANTIB UD 4/0 PS2 18IN

## (undated) DEVICE — GLV SURG BIOGEL LTX PF 7

## (undated) DEVICE — ENDOCUT SCISSOR TIP, DISPOSABLE: Brand: RENEW

## (undated) DEVICE — ANTIBACTERIAL UNDYED BRAIDED (POLYGLACTIN 910), SYNTHETIC ABSORBABLE SUTURE: Brand: COATED VICRYL

## (undated) DEVICE — SUT VIC 0 UR6 27IN VCP603H

## (undated) DEVICE — SUT SILK 2/0 TIES 18IN A185H

## (undated) DEVICE — RETRIEVAL BALLOON CATHETER: Brand: EXTRACTOR™ PRO RX

## (undated) DEVICE — ADAPTER,CATHETER/SYRINGE/LUER,STERILE: Brand: MEDLINE

## (undated) DEVICE — JACKSON-PRATT 100CC BULB RESERVOIR: Brand: CARDINAL HEALTH

## (undated) DEVICE — MINI ENDOCUT SCISSOR TIP, DISPOSABLE: Brand: RENEW

## (undated) DEVICE — 30977 SEE SHARP - ENHANCED INTRAOPERATIVE LAPAROSCOPE CLEANING & DEFOGGING: Brand: 30977 SEE SHARP - ENHANCED INTRAOPERATIVE LAPAROSCOPE CLEANING & DEFOGGING

## (undated) DEVICE — SPNG GZ WOVN 4X4IN 12PLY 10/BX STRL

## (undated) DEVICE — APPL CHLORAPREP W/TINT 26ML BLU

## (undated) DEVICE — JP PERF DRN SIL FLT 10MM FULL: Brand: CARDINAL HEALTH

## (undated) DEVICE — UNDERGLV SURG BIOGEL INDICATOR LF PF 7.5